# Patient Record
Sex: FEMALE | Race: WHITE | NOT HISPANIC OR LATINO | Employment: OTHER | URBAN - METROPOLITAN AREA
[De-identification: names, ages, dates, MRNs, and addresses within clinical notes are randomized per-mention and may not be internally consistent; named-entity substitution may affect disease eponyms.]

---

## 2017-01-03 ENCOUNTER — OFFICE VISIT (OUTPATIENT)
Dept: FAMILY MEDICINE CLINIC | Facility: CLINIC | Age: 77
End: 2017-01-03

## 2017-01-03 VITALS
SYSTOLIC BLOOD PRESSURE: 130 MMHG | TEMPERATURE: 98.2 F | HEIGHT: 63 IN | RESPIRATION RATE: 18 BRPM | HEART RATE: 74 BPM | BODY MASS INDEX: 28.17 KG/M2 | WEIGHT: 159 LBS | DIASTOLIC BLOOD PRESSURE: 78 MMHG

## 2017-01-03 DIAGNOSIS — R10.11 RIGHT UPPER QUADRANT ABDOMINAL PAIN: Primary | ICD-10-CM

## 2017-01-03 PROCEDURE — 99214 OFFICE O/P EST MOD 30 MIN: CPT | Performed by: FAMILY MEDICINE

## 2017-01-03 NOTE — PROGRESS NOTES
Subjective   aNncy Goodman is a 76 y.o. female.     History of Present Illness     She comes in today complaining her gallbladder hurts  She has had some intermittent pain in her RUQ and RLQ as well for the last week but she has also had issues with this type of pain for many years  This pain has been happening more frequently  Pain associated with nausea but no vomiting  Pain descried as sharp but mostly a dull ache, lasts for about an hour when it happens.  She can go days without any discomfort at all and then have it several times in a day  Aggravating: can be bothered by eating but not always, she thinks maybe fatty foods  Alleviating: time  Pt denies any change in her bowel movements    The following portions of the patient's history were reviewed and updated as appropriate: allergies, current medications, past family history, past medical history, past social history, past surgical history and problem list.    Review of Systems   Constitutional: Negative.    HENT: Negative.    Eyes: Negative.    Respiratory: Negative.    Cardiovascular: Negative.    Gastrointestinal: Positive for abdominal pain.   Musculoskeletal: Negative.    Skin: Negative.    Neurological: Negative.    Psychiatric/Behavioral: Negative.    All other systems reviewed and are negative.      Objective   Physical Exam   Constitutional: She is oriented to person, place, and time. She appears well-developed and well-nourished. No distress.   HENT:   Head: Normocephalic and atraumatic.   Right Ear: External ear normal.   Left Ear: External ear normal.   Nose: Nose normal.   Mouth/Throat: Oropharynx is clear and moist.   Eyes: Conjunctivae and EOM are normal.   Neck: Normal range of motion. Neck supple. No thyromegaly present.   Cardiovascular: Normal rate and regular rhythm.    Murmur heard.   Systolic murmur is present with a grade of 3/6   Pulmonary/Chest: Effort normal and breath sounds normal. No respiratory distress.   Abdominal: Soft.  Bowel sounds are normal. She exhibits no distension and no mass. There is no tenderness. There is no rebound and no guarding.   Lymphadenopathy:     She has no cervical adenopathy.   Neurological: She is alert and oriented to person, place, and time.   Skin: Skin is warm and dry.   Psychiatric: She has a normal mood and affect. Her behavior is normal. Judgment and thought content normal.   Nursing note and vitals reviewed.      Assessment/Plan   Nancy was seen today for abdominal pain.    Diagnoses and all orders for this visit:    Right upper quadrant abdominal pain  -     US Gallbladder; Future    at this point will pursue work up for her pain with RUQ US and then consider HIDA scan if this is normal, dyskinesia is likely.  Will f/u and make further decisions pending results.

## 2017-01-03 NOTE — MR AVS SNAPSHOT
"                        Nancy Goodman   1/3/2017 9:30 AM   Office Visit    Dept Phone:  [phone]   Encounter #:  [id number]    Provider:  Eyal Cervantes MD   Department:  [facility]                Your Full Care Plan              Your Updated Medication List          This list is accurate as of: 1/3/17  9:43 AM.  Always use your most recent med list.                amLODIPine-benazepril 10-40 MG per capsule   Commonly known as:  LOTREL       B-D INS SYR ULTRAFINE .3CC/31G 31G X 5/16\" 0.3 ML misc   Generic drug:  Insulin Syringe-Needle U-100       chlorthalidone 25 MG tablet   Commonly known as:  HYGROTON   TAKE 1 TABLET DAILY       Cranberry 1000 MG capsule       fish oil 1000 MG capsule capsule       Insulin Glargine 100 UNIT/ML injection pen   Commonly known as:  LANTUS SOLOSTAR   Inject 50 unit marking on U-100 syringe under the skin 1 (one) time daily. 50 Units SQ daily       Insulin Pen Needle 31G X 5 MM misc   Commonly known as:  B-D UF III MINI PEN NEEDLES   Inject once daily       irbesartan 75 MG tablet   Commonly known as:  AVAPRO   Take 1 tablet by mouth Daily.       KLOR-CON 10 MEQ CR tablet   Generic drug:  potassium chloride       Lancets Misc. misc   Use bid prn       PRECISION XTRA TEST STRIPS test strip   Generic drug:  glucose blood       PROBIOTIC ADVANCED PO       Ubiquinol 100 MG capsule       Vitamin D 2000 UNITS capsule       WH[place] BARK PO               You Were Diagnosed With        Codes Comments    Right upper quadrant abdominal pain    -  Primary ICD-10-CM: R10.11  ICD-9-CM: 789.01       Instructions     None    Patient Instructions History      Upcoming Appointments     Visit Type Date Time Department    OFFICE VISIT 1/3/2017  9:30 AM [facility]    FOLLOW UP 5/15/2017  8:30 AM [facility]      MyChart Signup     Our records indicate that you have an active [facility] [employer] account.    You can view your After Visit " "Summary by going to [employer] and logging in with your [facility] username and password.  If you don't have a [facility] username and password but a parent or guardian has access to your record, the parent or guardian should login with their own [facility] username and password and access your record to view the After Visit Summary.    If you have questions, you can email [email or web address] or call [phone] to talk to our [facility] staff.  Remember, [facility] is NOT to be used for urgent needs.  For medical emergencies, dial 911.               Other Info from Your Visit           Your Appointments     May 15, 2017  8:30 AM EDT   Follow Up with Wayne Jorge MD   [facility] (--)    [address] [place]  [facility] [address] [phone]           Arrive 15 minutes prior to appointment.              Allergies     Ciprofloxacin      Statins  Nausea Only      Reason for Visit     Abdominal Pain           Vital Signs     Blood Pressure Pulse Temperature Respirations Height Weight    130/78 74 98.2 °F (36.8 °C) 18 62.5\" (158.8 cm) 159 lb (72.1 kg)    Body Mass Index Smoking Status                28.62 kg/m2 Never Smoker          Problems and Diagnoses Noted     Abdominal pain, right upper quadrant        "

## 2017-01-05 ENCOUNTER — HOSPITAL ENCOUNTER (OUTPATIENT)
Dept: ULTRASOUND IMAGING | Facility: HOSPITAL | Age: 77
Discharge: HOME | End: 2017-01-05
Admitting: FAMILY MEDICINE

## 2017-01-05 DIAGNOSIS — R10.11 RIGHT UPPER QUADRANT ABDOMINAL PAIN: ICD-10-CM

## 2017-01-05 PROCEDURE — 76705 ECHO EXAM OF ABDOMEN: CPT

## 2017-01-06 DIAGNOSIS — R10.11 RIGHT UPPER QUADRANT ABDOMINAL PAIN: Primary | ICD-10-CM

## 2017-01-17 DIAGNOSIS — R10.11 RIGHT UPPER QUADRANT ABDOMINAL PAIN: Primary | ICD-10-CM

## 2017-01-17 RX ORDER — OMEPRAZOLE 40 MG/1
40 CAPSULE, DELAYED RELEASE ORAL DAILY
Qty: 30 CAPSULE | Refills: 5 | Status: SHIPPED | OUTPATIENT
Start: 2017-01-17 | End: 2017-06-05

## 2017-02-21 ENCOUNTER — TELEPHONE (OUTPATIENT)
Dept: FAMILY MEDICINE CLINIC | Facility: CLINIC | Age: 77
End: 2017-02-21

## 2017-02-21 NOTE — TELEPHONE ENCOUNTER
She is concerned because a family with young children have scabies and have had it multiple times. Is there anything they can do for prevention? Unsure if family has been treated.

## 2017-02-21 NOTE — TELEPHONE ENCOUNTER
----- Message from Ainsley Williamson sent at 2/21/2017  3:17 PM EST -----  Contact: Luisito  Patient stated that she has had some children coming to her [employer] that has scabies and would like to know how to get rid of scabies at the [employer]. Please call patient at [phone].

## 2017-02-21 NOTE — TELEPHONE ENCOUNTER
There is no preventative measures that really work, we could treat her with permethrin but the whole family who has the scabies needs to be treated to prevent spread

## 2017-03-15 ENCOUNTER — TELEPHONE (OUTPATIENT)
Dept: FAMILY MEDICINE CLINIC | Facility: CLINIC | Age: 77
End: 2017-03-15

## 2017-03-23 RX ORDER — AMLODIPINE BESYLATE AND BENAZEPRIL HYDROCHLORIDE 10; 40 MG/1; MG/1
CAPSULE ORAL
Qty: 90 CAPSULE | Refills: 0 | Status: SHIPPED | OUTPATIENT
Start: 2017-03-23 | End: 2017-03-29

## 2017-03-28 ENCOUNTER — HOSPITAL ENCOUNTER (OUTPATIENT)
Dept: GENERAL RADIOLOGY | Facility: HOSPITAL | Age: 77
Discharge: HOME | End: 2017-03-28
Admitting: FAMILY MEDICINE

## 2017-03-28 ENCOUNTER — OFFICE VISIT (OUTPATIENT)
Dept: FAMILY MEDICINE CLINIC | Facility: CLINIC | Age: 77
End: 2017-03-28

## 2017-03-28 VITALS
SYSTOLIC BLOOD PRESSURE: 138 MMHG | HEART RATE: 88 BPM | HEIGHT: 63 IN | BODY MASS INDEX: 27.55 KG/M2 | RESPIRATION RATE: 20 BRPM | WEIGHT: 155.5 LBS | DIASTOLIC BLOOD PRESSURE: 70 MMHG | TEMPERATURE: 98.2 F

## 2017-03-28 DIAGNOSIS — Z79.4 TYPE 2 DIABETES MELLITUS WITHOUT COMPLICATION, WITH LONG-TERM CURRENT USE OF INSULIN (HCC): ICD-10-CM

## 2017-03-28 DIAGNOSIS — R53.82 CHRONIC FATIGUE: ICD-10-CM

## 2017-03-28 DIAGNOSIS — R07.89 OTHER CHEST PAIN: Primary | ICD-10-CM

## 2017-03-28 DIAGNOSIS — I10 ESSENTIAL HYPERTENSION: ICD-10-CM

## 2017-03-28 DIAGNOSIS — E78.2 MIXED HYPERLIPIDEMIA: ICD-10-CM

## 2017-03-28 DIAGNOSIS — E11.9 TYPE 2 DIABETES MELLITUS WITHOUT COMPLICATION, WITH LONG-TERM CURRENT USE OF INSULIN (HCC): ICD-10-CM

## 2017-03-28 LAB
ALBUMIN SERPL-MCNC: 4.1 G/DL (ref 3.2–4.8)
ALBUMIN/GLOB SERPL: 1.4 G/DL (ref 1.5–2.5)
ALP SERPL-CCNC: 46 U/L (ref 25–100)
ALT SERPL-CCNC: 29 U/L (ref 7–40)
AST SERPL-CCNC: 23 U/L (ref 0–33)
BASOPHILS # BLD AUTO: 0.05 10*3/MM3 (ref 0–0.2)
BASOPHILS NFR BLD AUTO: 0.8 % (ref 0–1)
BILIRUB SERPL-MCNC: 0.6 MG/DL (ref 0.3–1.2)
BUN SERPL-MCNC: 24 MG/DL (ref 9–23)
BUN/CREAT SERPL: 21.8 (ref 7–25)
CALCIUM SERPL-MCNC: 10.6 MG/DL (ref 8.7–10.4)
CHLORIDE SERPL-SCNC: 105 MMOL/L (ref 99–109)
CHOLEST SERPL-MCNC: 242 MG/DL (ref 0–200)
CO2 SERPL-SCNC: 32 MMOL/L (ref 20–31)
CREAT SERPL-MCNC: 1.1 MG/DL (ref 0.6–1.3)
EOSINOPHIL # BLD AUTO: 0.47 10*3/MM3 (ref 0.1–0.3)
EOSINOPHIL NFR BLD AUTO: 7.2 % (ref 0–3)
ERYTHROCYTE [DISTWIDTH] IN BLOOD BY AUTOMATED COUNT: 14.1 % (ref 11.3–14.5)
GLOBULIN SER CALC-MCNC: 3 GM/DL
GLUCOSE SERPL-MCNC: 98 MG/DL (ref 70–100)
HBA1C MFR BLD: 7.5 % (ref 4.8–5.6)
HCT VFR BLD AUTO: 39.4 % (ref 34.5–44)
HDLC SERPL-MCNC: 37 MG/DL (ref 40–60)
HGB BLD-MCNC: 12.9 G/DL (ref 11.5–15.5)
IMM GRANULOCYTES # BLD: 0.02 10*3/MM3 (ref 0–0.03)
IMM GRANULOCYTES NFR BLD: 0.3 % (ref 0–0.6)
LDLC SERPL CALC-MCNC: 157 MG/DL (ref 0–100)
LYMPHOCYTES # BLD AUTO: 2.5 10*3/MM3 (ref 0.6–4.8)
LYMPHOCYTES NFR BLD AUTO: 38.5 % (ref 24–44)
MCH RBC QN AUTO: 31.1 PG (ref 27–31)
MCHC RBC AUTO-ENTMCNC: 32.7 G/DL (ref 32–36)
MCV RBC AUTO: 94.9 FL (ref 80–99)
MONOCYTES # BLD AUTO: 0.56 10*3/MM3 (ref 0–1)
MONOCYTES NFR BLD AUTO: 8.6 % (ref 0–12)
NEUTROPHILS # BLD AUTO: 2.9 10*3/MM3 (ref 1.5–8.3)
NEUTROPHILS NFR BLD AUTO: 44.6 % (ref 41–71)
PLATELET # BLD AUTO: 225 10*3/MM3 (ref 150–450)
POTASSIUM SERPL-SCNC: 4.5 MMOL/L (ref 3.5–5.5)
PROT SERPL-MCNC: 7.1 G/DL (ref 5.7–8.2)
RBC # BLD AUTO: 4.15 10*6/MM3 (ref 3.89–5.14)
SODIUM SERPL-SCNC: 142 MMOL/L (ref 132–146)
TRIGL SERPL-MCNC: 241 MG/DL (ref 0–150)
TSH SERPL DL<=0.005 MIU/L-ACNC: 2.7 MIU/ML (ref 0.35–5.35)
VLDLC SERPL CALC-MCNC: 48.2 MG/DL
WBC # BLD AUTO: 6.5 10*3/MM3 (ref 3.5–10.8)

## 2017-03-28 PROCEDURE — 71020 HC CHEST PA AND LATERAL: CPT

## 2017-03-28 PROCEDURE — 99214 OFFICE O/P EST MOD 30 MIN: CPT | Performed by: FAMILY MEDICINE

## 2017-03-28 NOTE — PROGRESS NOTES
Subjective   Nancy Goodman is a 76 y.o. female.     History of Present Illness     She has had some chest tightness on several occasions over the last 3 weeks  She will then be tired and chill and stay in her bed all day  Has had some coughing intermittently  Nothing seems to bring it on  The tightness would last for an undetermined amount of time and then slowly resolves  Evelyn tea helps her feel better, improves N  She has SOA and N with this    Has less energy all the time  Her daughters had issue with taking amlodipine and benazepril    The following portions of the patient's history were reviewed and updated as appropriate: allergies, current medications, past family history, past medical history, past social history, past surgical history and problem list.    Review of Systems   Constitutional: Positive for fatigue.   HENT: Negative.    Eyes: Negative.    Respiratory: Positive for shortness of breath.    Cardiovascular: Positive for chest pain and palpitations.   Gastrointestinal: Negative.  Negative for abdominal pain.   Musculoskeletal: Negative.    Skin: Negative.    Neurological: Negative.    Psychiatric/Behavioral: Negative.    All other systems reviewed and are negative.      Objective   Physical Exam   Constitutional: She is oriented to person, place, and time. She appears well-developed and well-nourished. No distress.   HENT:   Head: Normocephalic and atraumatic.   Right Ear: Tympanic membrane, external ear and ear canal normal.   Left Ear: Tympanic membrane, external ear and ear canal normal.   Nose: Nose normal.   Mouth/Throat: Uvula is midline and oropharynx is clear and moist.   Eyes: Conjunctivae and EOM are normal.   Neck: Normal range of motion. Neck supple. No thyromegaly present.   Cardiovascular: Normal rate, regular rhythm and normal heart sounds.    No murmur heard.  Pulmonary/Chest: Effort normal and breath sounds normal. No respiratory distress.   Abdominal: Soft. Bowel sounds are  normal. She exhibits no distension and no mass. There is no tenderness.   Lymphadenopathy:     She has no cervical adenopathy.   Neurological: She is alert and oriented to person, place, and time.   Skin: Skin is warm and dry.   Psychiatric: She has a normal mood and affect. Her behavior is normal. Judgment and thought content normal.   Nursing note and vitals reviewed.      Assessment/Plan   Nancy was seen today for tightness in chest, decreased appetite, vomiting, chills, cough and hypertension.    Diagnoses and all orders for this visit:    Other chest pain  -     XR Chest PA & Lateral  -     Cardiopulmonary Stress Test (CPX); Future    Essential hypertension    Type 2 diabetes mellitus without complication, with long-term current use of insulin  -     Hemoglobin A1c  -     Comprehensive Metabolic Panel  -     CBC & Differential    Mixed hyperlipidemia  -     Lipid Panel    Chronic fatigue  -     TSH    I am concerned with her PMHx that she may be having a cardiac event.  Will check stress test and CXR, recheck her labs today as well.  faitgue is multifactorial but will check blood work and f/u pending results.  She does nto feel like she could walk for treadmill stress test, will check adenosine stress test  She is symptom free at this time.  Pt and daughter agree

## 2017-03-29 DIAGNOSIS — E78.2 MIXED HYPERLIPIDEMIA: Primary | ICD-10-CM

## 2017-03-29 RX ORDER — ATORVASTATIN CALCIUM 40 MG/1
40 TABLET, FILM COATED ORAL NIGHTLY
Qty: 30 TABLET | Refills: 5 | Status: ON HOLD | OUTPATIENT
Start: 2017-03-29 | End: 2017-05-16

## 2017-04-10 ENCOUNTER — TELEPHONE (OUTPATIENT)
Dept: FAMILY MEDICINE CLINIC | Facility: CLINIC | Age: 77
End: 2017-04-10

## 2017-04-10 NOTE — TELEPHONE ENCOUNTER
----- Message from Jaci Varela sent at 4/10/2017  4:35 PM EDT -----  Contact: SHARRI DURAN  PATIENT IS WONDERING IF THERE WERE ANY SPECIAL INSTRUCTIONS FOR HER UP COMING STRESS TEST DUE TO HER BEING DIABETIC AND USING INSULIN THE PATIENT IS SCHEDULED FOR WEDS. SHE CAN BE REACHED  [phone]

## 2017-04-10 NOTE — TELEPHONE ENCOUNTER
We usually cut her insulin dose in half the morning of her test as she does not usually eat.  No other medicine change needed.

## 2017-04-12 ENCOUNTER — OUTSIDE FACILITY SERVICE (OUTPATIENT)
Dept: CARDIOLOGY | Facility: CLINIC | Age: 77
End: 2017-04-12

## 2017-04-12 PROCEDURE — 93018 CV STRESS TEST I&R ONLY: CPT | Performed by: INTERNAL MEDICINE

## 2017-04-12 PROCEDURE — 78452 HT MUSCLE IMAGE SPECT MULT: CPT | Performed by: INTERNAL MEDICINE

## 2017-04-20 ENCOUNTER — OFFICE VISIT (OUTPATIENT)
Dept: RETAIL CLINIC | Facility: CLINIC | Age: 77
End: 2017-04-20

## 2017-04-20 VITALS — RESPIRATION RATE: 18 BRPM | OXYGEN SATURATION: 98 % | TEMPERATURE: 99.5 F | HEART RATE: 104 BPM

## 2017-04-20 DIAGNOSIS — J06.9 ACUTE URI: ICD-10-CM

## 2017-04-20 DIAGNOSIS — J40 BRONCHITIS: Primary | ICD-10-CM

## 2017-04-20 DIAGNOSIS — R01.1 MURMUR, HEART: ICD-10-CM

## 2017-04-20 PROCEDURE — 99213 OFFICE O/P EST LOW 20 MIN: CPT | Performed by: NURSE PRACTITIONER

## 2017-04-20 RX ORDER — BENZONATATE 200 MG/1
200 CAPSULE ORAL 3 TIMES DAILY PRN
Qty: 21 CAPSULE | Refills: 0 | Status: SHIPPED | OUTPATIENT
Start: 2017-04-20 | End: 2017-05-15

## 2017-04-20 RX ORDER — AMOXICILLIN 500 MG/1
1000 CAPSULE ORAL 2 TIMES DAILY
Qty: 14 CAPSULE | Refills: 0 | Status: SHIPPED | OUTPATIENT
Start: 2017-04-20 | End: 2017-05-02

## 2017-04-20 NOTE — PATIENT INSTRUCTIONS
"Upper Respiratory Infection, Adult  Most upper respiratory infections (URIs) are caused by a virus. A URI affects the nose, throat, and upper air passages. The most common type of URI is often called \"the common cold.\"  HOME CARE   · Take medicines only as told by your doctor.  · Gargle warm saltwater or take cough drops to comfort your throat as told by your doctor.  · Use a warm mist humidifier or inhale steam from a shower to increase air moisture. This may make it easier to breathe.  · Drink enough fluid to keep your pee (urine) clear or pale yellow.  · Eat soups and other clear broths.  · Have a healthy diet.  · Rest as needed.  · Go back to work when your fever is gone or your doctor says it is okay.  ¨ You may need to stay home longer to avoid giving your URI to others.  ¨ You can also wear a face mask and wash your hands often to prevent spread of the virus.  · Use your inhaler more if you have asthma.  · Do not use any tobacco products, including cigarettes, chewing tobacco, or electronic cigarettes. If you need help quitting, ask your doctor.  GET HELP IF:  · You are getting worse, not better.  · Your symptoms are not helped by medicine.  · You have chills.  · You are getting more short of breath.  · You have brown or red mucus.  · You have yellow or brown discharge from your nose.  · You have pain in your face, especially when you bend forward.  · You have a fever.  · You have puffy (swollen) neck glands.  · You have pain while swallowing.  · You have white areas in the back of your throat.  GET HELP RIGHT AWAY IF:   · You have very bad or constant:    Headache.    Ear pain.    Pain in your forehead, behind your eyes, and over your cheekbones (sinus pain).    Chest pain.  · You have long-lasting (chronic) lung disease and any of the following:    Wheezing.    Long-lasting cough.    Coughing up blood.    A change in your usual mucus.  · You have a stiff neck.  · You have changes in your:    Vision.   " " Hearing.    Thinking.    Mood.  MAKE SURE YOU:   · Understand these instructions.  · Will watch your condition.  · Will get help right away if you are not doing well or get worse.     This information is not intended to replace advice given to you by your health care provider. Make sure you discuss any questions you have with your health care provider.     Document Released: 06/05/2009 Document Revised: 05/03/2016 Document Reviewed: 03/25/2015  [employer] Interactive Patient Education ©2016 [employer]  Heart Murmur  A heart murmur is an extra sound heard by your health care provider when listening to your heart with a device called a stethoscope. The sound comes from turbulence when blood flows through the heart and may be a \"hum\" or \"whoosh\" sound heard when the heart beats. There are two types of heart murmurs:  · Innocent murmurs. Most people with this type of heart murmur do not have a heart problem. Many children have innocent heart murmurs. Your health care provider may suggest some basic testing to know whether your murmur is an innocent murmur. If an innocent heart murmur is found, there is no need for further tests or treatment and no need to restrict activities or stop playing sports.  · Abnormal murmurs. These types of murmurs can occur in children and adults. In children, abnormal heart murmurs are typically caused from heart defects that are present at birth (congenital). In adults, abnormal murmurs are usually from heart valve problems caused by disease, infection, or aging.  CAUSES   Normally, these valves open to let blood flow through or out of your heart and then shut to keep it from flowing backward. If they do not work properly, you could have:  · Regurgitation--When blood leaks back through the valve in the wrong direction.  · Mitral valve prolapse--When the mitral valve of the heart has a loose flap and does not close tightly.  · Stenosis--When the valve does not open enough and blocks blood " flow.  SIGNS AND SYMPTOMS   Innocent murmurs do not cause symptoms, and many people with abnormal murmurs may or may not have symptoms. If symptoms do develop, they may include:  · Shortness of breath.  · Blue coloring of the skin, especially on the fingertips.  · Chest pain.  · Palpitations, or feeling a fluttering or skipped heartbeat.  · Fainting.  · Persistent cough.  · Getting tired much faster than expected.  DIAGNOSIS   A heart murmur might be heard during a sports physical or during any type of examination. When a murmur is heard, it may suggest a possible problem. When this happens, your health care provider may ask you to see a heart specialist (cardiologist). You may also be asked to have one or more heart tests. In these cases, testing may vary depending on what your health care provider heard. Tests for a heart murmur may include:  · Electrocardiogram.  · Echocardiogram.  · MRI.  For children and adults who have an abnormal heart murmur and want to play sports, it is important to complete testing, review test results, and receive recommendations from your health care provider. If heart disease is present, it may not be safe to play.  TREATMENT   Innocent murmurs require no treatment or activity restriction. If an abnormal murmur represents a problem with the heart, treatment will depend on the exact nature of the problem. In these cases, medicine or surgery may be needed to treat the problem.  HOME CARE INSTRUCTIONS  If you want to participate in sports or other types of strenuous physical activity, it is important to discuss this first with your health care provider. If the murmur represents a problem with the heart and you choose to participate in sports, there is a small chance that a serious problem (including sudden death) could result.   SEEK MEDICAL CARE IF:   · You feel that your symptoms are slowly worsening.  · You develop any new symptoms that cause concern.  · You feel that you are having  side effects from any medicines prescribed.  SEEK IMMEDIATE MEDICAL CARE IF:   · You develop chest pain.  · You have shortness of breath.  · You notice that your heart beats irregularly often enough to cause you to worry.  · You have fainting spells.  · Your symptoms suddenly get worse.     This information is not intended to replace advice given to you by your health care provider. Make sure you discuss any questions you have with your health care provider.     Document Released: 01/25/2006 Document Revised: 01/08/2016 Document Reviewed: 08/25/2014  [employer] Interactive Patient Education ©2016 [employer]    Acute Bronchitis  Bronchitis is inflammation of the airways that extend from the windpipe into the lungs (bronchi). The inflammation often causes mucus to develop. This leads to a cough, which is the most common symptom of bronchitis.   In acute bronchitis, the condition usually develops suddenly and goes away over time, usually in a couple weeks. Smoking, allergies, and asthma can make bronchitis worse. Repeated episodes of bronchitis may cause further lung problems.   CAUSES  Acute bronchitis is most often caused by the same virus that causes a cold. The virus can spread from person to person (contagious) through coughing, sneezing, and touching contaminated objects.  SIGNS AND SYMPTOMS   · Cough.    · Fever.    · Coughing up mucus.    · Body aches.    · Chest congestion.    · Chills.    · Shortness of breath.    · Sore throat.    DIAGNOSIS   Acute bronchitis is usually diagnosed through a physical exam. Your health care provider will also ask you questions about your medical history. Tests, such as chest X-rays, are sometimes done to rule out other conditions.   TREATMENT   Acute bronchitis usually goes away in a couple weeks. Oftentimes, no medical treatment is necessary. Medicines are sometimes given for relief of fever or cough. Antibiotic medicines are usually not needed but may be prescribed in  certain situations. In some cases, an inhaler may be recommended to help reduce shortness of breath and control the cough. A cool mist vaporizer may also be used to help thin bronchial secretions and make it easier to clear the chest.   HOME CARE INSTRUCTIONS  · Get plenty of rest.    · Drink enough fluids to keep your urine clear or pale yellow (unless you have a medical condition that requires fluid restriction). Increasing fluids may help thin your respiratory secretions (sputum) and reduce chest congestion, and it will prevent dehydration.    · Take medicines only as directed by your health care provider.  · If you were prescribed an antibiotic medicine, finish it all even if you start to feel better.  · Avoid smoking and secondhand smoke. Exposure to cigarette smoke or irritating chemicals will make bronchitis worse. If you are a smoker, consider using nicotine gum or skin patches to help control withdrawal symptoms. Quitting smoking will help your lungs heal faster.    · Reduce the chances of another bout of acute bronchitis by washing your hands frequently, avoiding people with cold symptoms, and trying not to touch your hands to your mouth, nose, or eyes.    · Keep all follow-up visits as directed by your health care provider.    SEEK MEDICAL CARE IF:  Your symptoms do not improve after 1 week of treatment.   SEEK IMMEDIATE MEDICAL CARE IF:  · You develop an increased fever or chills.    · You have chest pain.    · You have severe shortness of breath.  · You have bloody sputum.    · You develop dehydration.  · You faint or repeatedly feel like you are going to pass out.  · You develop repeated vomiting.  · You develop a severe headache.  MAKE SURE YOU:   · Understand these instructions.  · Will watch your condition.  · Will get help right away if you are not doing well or get worse.     This information is not intended to replace advice given to you by your health care provider. Make sure you discuss any  questions you have with your health care provider.     Document Released: 01/25/2006 Document Revised: 01/08/2016 Document Reviewed: 06/10/2014  [employer] Interactive Patient Education ©2016 [employer]

## 2017-04-20 NOTE — PROGRESS NOTES
Nancy Goodman is 76 y.o. female      HPI Comments:  is sick with same symptoms. Pt also was recently taken off of benazepril due to cough.    Cough   This is a new problem. The current episode started in the past 7 days (had a cough for weeks, it got better and a few days ago became worse.). The problem has been gradually worsening. The problem occurs every few minutes. The cough is non-productive. Associated symptoms include a fever, nasal congestion and a sore throat. Pertinent negatives include no chest pain, chills, ear pain, headaches, heartburn, hemoptysis, myalgias, postnasal drip, rhinorrhea, shortness of breath or wheezing. Associated symptoms comments: Had a sore throat that is gone now. The symptoms are aggravated by lying down. She has tried nothing for the symptoms. There is no history of asthma, bronchiectasis, bronchitis, COPD, emphysema, environmental allergies or pneumonia.             Current Outpatient Prescriptions:   •  amoxicillin (AMOXIL) 500 MG capsule, Take 2 capsules by mouth 2 (Two) Times a Day., Disp: 14 capsule, Rfl: 0  •  atorvastatin (LIPITOR) 40 MG tablet, Take 1 tablet by mouth Every Night., Disp: 30 tablet, Rfl: 5  •  benzonatate (TESSALON) 200 MG capsule, Take 1 capsule by mouth 3 (Three) Times a Day As Needed for Cough., Disp: 21 capsule, Rfl: 0  •  chlorthalidone (HYGROTEN) 25 MG tablet, TAKE 1 TABLET DAILY, Disp: 90 tablet, Rfl: 1  •  Cholecalciferol (VITAMIN D) 2000 UNITS capsule, Take 1 capsule by mouth 1 (one) time daily., Disp: , Rfl:   •  Cranberry 1000 MG capsule, Take  by mouth., Disp: , Rfl:   •  glucose blood (PRECISION XTRA TEST STRIPS) test strip, Test twice daily., Disp: , Rfl:   •  Insulin Glargine (LANTUS SOLOSTAR) 100 UNIT/ML solution pen-injector, Inject 50 unit marking on U-100 syringe under the skin 1 (one) time daily. 50 Units SQ daily, Disp: 100 mL, Rfl: 3  •  Insulin Pen Needle (B-D UF III MINI PEN NEEDLES) 31G X 5 MM misc, Inject once daily,  "Disp: 100 each, Rfl: 3  •  Insulin Syringe-Needle U-100 (B-D INS SYR ULTRAFINE .3CC/31G) 31G X 5/16\" 0.3 ML misc, , Disp: , Rfl:   •  irbesartan (AVAPRO) 75 MG tablet, Take 1 tablet by mouth Daily., Disp: 90 tablet, Rfl: 3  •  Lancets Misc. misc, Use bid prn, Disp: 180 each, Rfl: 3  •  Misc Natural Products (WHITE WILLOW BARK PO), Take  by mouth., Disp: , Rfl:   •  Omega-3 Fatty Acids (FISH OIL) 1000 MG capsule capsule, Take 1,000 mg by mouth Daily With Breakfast., Disp: , Rfl:   •  omeprazole (PRILOSEC) 40 MG capsule, Take 1 capsule by mouth Daily., Disp: 30 capsule, Rfl: 5  •  potassium chloride (KLOR-CON) 10 MEQ CR tablet, Take 1 capsule by mouth 1 (one) time daily., Disp: , Rfl:   •  Probiotic Product (PROBIOTIC ADVANCED PO), Take 1 tablet by mouth., Disp: , Rfl:   •  Ubiquinol 100 MG capsule, Take  by mouth., Disp: , Rfl:         Allergies   Allergen Reactions   • Ciprofloxacin    • Statins Nausea Only           Past Medical History:   Diagnosis Date   • Allergic rhinitis    • Cellulitis of finger    • Diabetes mellitus    • Edema    • Hyperlipidemia    • Hypertension    • Ingrown nail    • Rosacea    • Vitamin D deficiency            Past Surgical History:   Procedure Laterality Date   • TUBAL ABDOMINAL LIGATION     • VARICOSE VEIN SURGERY             Family History   Problem Relation Age of Onset   • Cancer Mother      Lung cancer           Social History     Social History   • Marital status:      Spouse name: N/A   • Number of children: N/A   • Years of education: N/A     Occupational History   •  Retired     Social History Main Topics   • Smoking status: Never Smoker   • Smokeless tobacco: Never Used   • Alcohol use No   • Drug use: No   • Sexual activity: Not on file      Comment:      Other Topics Concern   • Not on file     Social History Narrative           Review of Systems   Constitutional: Positive for fever. Negative for activity change, appetite change, chills and fatigue.   HENT: " Positive for congestion, sore throat and voice change. Negative for ear pain, facial swelling, postnasal drip, rhinorrhea, sinus pressure and trouble swallowing.    Eyes: Negative.    Respiratory: Positive for cough. Negative for hemoptysis, choking, chest tightness, shortness of breath, wheezing and stridor.    Cardiovascular: Negative for chest pain.   Gastrointestinal: Negative.  Negative for heartburn.   Musculoskeletal: Negative for myalgias.   Allergic/Immunologic: Negative.  Negative for environmental allergies.   Neurological: Negative for headaches.           Physical Exam   Constitutional: She is oriented to person, place, and time. She appears well-developed and well-nourished. She is cooperative.  Non-toxic appearance. She does not have a sickly appearance. She does not appear ill. No distress.   Appears younger than stated age.   HENT:   Head: Normocephalic and atraumatic.   Right Ear: Tympanic membrane and external ear normal.   Left Ear: Tympanic membrane and external ear normal.   Nose: Mucosal edema present. No sinus tenderness. Right sinus exhibits no maxillary sinus tenderness and no frontal sinus tenderness. Left sinus exhibits no maxillary sinus tenderness and no frontal sinus tenderness.   Mouth/Throat: Uvula is midline, oropharynx is clear and moist and mucous membranes are normal. No oropharyngeal exudate. Tonsils are 0 on the right. Tonsils are 0 on the left. No tonsillar exudate.   Eyes: Conjunctivae and EOM are normal. Pupils are equal, round, and reactive to light.   Neck: Neck supple. No JVD present.   Cardiovascular: Regular rhythm.  Tachycardia present.  Exam reveals no gallop and no friction rub.    Murmur heard.   Systolic murmur is present with a grade of 3/6   LSB 3/6 murmur  RSB 3/6 hum.   Pulmonary/Chest: Effort normal and breath sounds normal. No stridor. No respiratory distress. She has no decreased breath sounds. She has no wheezes. She has no rhonchi. She has no rales. She  exhibits no tenderness.   Fine crackles to RLL only.   Lymphadenopathy:     She has no cervical adenopathy.   Neurological: She is alert and oriented to person, place, and time.   Skin: Skin is warm and dry.   Psychiatric: She has a normal mood and affect. Her behavior is normal.           Nancy was seen today for cough.    Diagnoses and all orders for this visit:    Bronchitis    Murmur, heart    Acute URI    Other orders  -     amoxicillin (AMOXIL) 500 MG capsule; Take 2 capsules by mouth 2 (Two) Times a Day.  -     benzonatate (TESSALON) 200 MG capsule; Take 1 capsule by mouth 3 (Three) Times a Day As Needed for Cough.      Advised to follow up with PCP for heart test results, worsening symptoms or concerns. Advised to monitor blood sugars with illness.    Education instructions given to patient per AVS diagnosis. Patient verbalizes understanding of instructions.

## 2017-04-21 DIAGNOSIS — R94.39 ABNORMAL STRESS TEST: Primary | ICD-10-CM

## 2017-05-02 ENCOUNTER — CONSULT (OUTPATIENT)
Dept: CARDIOLOGY | Facility: CLINIC | Age: 77
End: 2017-05-02

## 2017-05-02 VITALS
DIASTOLIC BLOOD PRESSURE: 72 MMHG | WEIGHT: 154.7 LBS | SYSTOLIC BLOOD PRESSURE: 126 MMHG | BODY MASS INDEX: 27.84 KG/M2 | HEART RATE: 106 BPM

## 2017-05-02 DIAGNOSIS — E78.2 MIXED HYPERLIPIDEMIA: ICD-10-CM

## 2017-05-02 DIAGNOSIS — R94.39 ABNORMAL MYOCARDIAL PERFUSION STUDY: Primary | ICD-10-CM

## 2017-05-02 DIAGNOSIS — I25.110 CORONARY ARTERY DISEASE INVOLVING NATIVE CORONARY ARTERY OF NATIVE HEART WITH UNSTABLE ANGINA PECTORIS (HCC): ICD-10-CM

## 2017-05-02 DIAGNOSIS — I10 ESSENTIAL HYPERTENSION: ICD-10-CM

## 2017-05-02 PROCEDURE — 93000 ELECTROCARDIOGRAM COMPLETE: CPT | Performed by: INTERNAL MEDICINE

## 2017-05-02 PROCEDURE — 99204 OFFICE O/P NEW MOD 45 MIN: CPT | Performed by: INTERNAL MEDICINE

## 2017-05-08 ENCOUNTER — PREP FOR SURGERY (OUTPATIENT)
Dept: CARDIOLOGY | Facility: CLINIC | Age: 77
End: 2017-05-08

## 2017-05-08 DIAGNOSIS — R94.39 ABNORMAL MYOCARDIAL PERFUSION STUDY: Primary | ICD-10-CM

## 2017-05-08 RX ORDER — ONDANSETRON 2 MG/ML
4 INJECTION INTRAMUSCULAR; INTRAVENOUS EVERY 6 HOURS PRN
Status: CANCELLED | OUTPATIENT
Start: 2017-05-08

## 2017-05-08 RX ORDER — ASPIRIN 325 MG
325 TABLET ORAL ONCE
Status: CANCELLED | OUTPATIENT
Start: 2017-05-08 | End: 2017-05-08

## 2017-05-08 RX ORDER — NITROGLYCERIN 0.4 MG/1
0.4 TABLET SUBLINGUAL
Status: CANCELLED | OUTPATIENT
Start: 2017-05-08

## 2017-05-08 RX ORDER — SODIUM CHLORIDE 0.9 % (FLUSH) 0.9 %
1-10 SYRINGE (ML) INJECTION AS NEEDED
Status: CANCELLED | OUTPATIENT
Start: 2017-05-08

## 2017-05-08 RX ORDER — ASPIRIN 81 MG/1
325 TABLET ORAL DAILY
Status: CANCELLED | OUTPATIENT
Start: 2017-05-09

## 2017-05-08 RX ORDER — ACETAMINOPHEN 325 MG/1
650 TABLET ORAL EVERY 4 HOURS PRN
Status: CANCELLED | OUTPATIENT
Start: 2017-05-08

## 2017-05-15 ENCOUNTER — OFFICE VISIT (OUTPATIENT)
Dept: FAMILY MEDICINE CLINIC | Facility: CLINIC | Age: 77
End: 2017-05-15

## 2017-05-15 VITALS
TEMPERATURE: 97.8 F | HEART RATE: 92 BPM | SYSTOLIC BLOOD PRESSURE: 140 MMHG | HEIGHT: 63 IN | BODY MASS INDEX: 27.36 KG/M2 | DIASTOLIC BLOOD PRESSURE: 80 MMHG | RESPIRATION RATE: 16 BRPM | WEIGHT: 154.4 LBS

## 2017-05-15 DIAGNOSIS — R94.39 ABNORMAL STRESS TEST: ICD-10-CM

## 2017-05-15 DIAGNOSIS — Z12.31 ENCOUNTER FOR SCREENING MAMMOGRAM FOR BREAST CANCER: ICD-10-CM

## 2017-05-15 DIAGNOSIS — Z79.4 TYPE 2 DIABETES MELLITUS WITHOUT COMPLICATION, WITH LONG-TERM CURRENT USE OF INSULIN (HCC): Primary | ICD-10-CM

## 2017-05-15 DIAGNOSIS — E78.2 MIXED HYPERLIPIDEMIA: ICD-10-CM

## 2017-05-15 DIAGNOSIS — I10 ESSENTIAL HYPERTENSION: ICD-10-CM

## 2017-05-15 DIAGNOSIS — E11.9 TYPE 2 DIABETES MELLITUS WITHOUT COMPLICATION, WITH LONG-TERM CURRENT USE OF INSULIN (HCC): Primary | ICD-10-CM

## 2017-05-15 DIAGNOSIS — E55.9 VITAMIN D DEFICIENCY: ICD-10-CM

## 2017-05-15 DIAGNOSIS — Z12.31 ENCOUNTER FOR SCREENING MAMMOGRAM FOR MALIGNANT NEOPLASM OF BREAST: ICD-10-CM

## 2017-05-15 PROBLEM — R10.11 RIGHT UPPER QUADRANT ABDOMINAL PAIN: Status: RESOLVED | Noted: 2017-01-03 | Resolved: 2017-05-15

## 2017-05-15 PROCEDURE — 99214 OFFICE O/P EST MOD 30 MIN: CPT | Performed by: FAMILY MEDICINE

## 2017-05-15 RX ORDER — CHLORTHALIDONE 25 MG/1
TABLET ORAL
Qty: 90 TABLET | Refills: 0 | Status: SHIPPED | OUTPATIENT
Start: 2017-05-15 | End: 2017-05-23

## 2017-05-16 ENCOUNTER — APPOINTMENT (OUTPATIENT)
Dept: GENERAL RADIOLOGY | Facility: HOSPITAL | Age: 77
End: 2017-05-16

## 2017-05-16 ENCOUNTER — APPOINTMENT (OUTPATIENT)
Dept: CARDIOLOGY | Facility: HOSPITAL | Age: 77
End: 2017-05-16

## 2017-05-16 ENCOUNTER — HOSPITAL ENCOUNTER (OUTPATIENT)
Facility: HOSPITAL | Age: 77
Setting detail: HOSPITAL OUTPATIENT SURGERY
Discharge: HOME | End: 2017-05-16
Attending: INTERNAL MEDICINE | Admitting: INTERNAL MEDICINE

## 2017-05-16 VITALS
HEIGHT: 62 IN | WEIGHT: 155.2 LBS | BODY MASS INDEX: 28.56 KG/M2 | RESPIRATION RATE: 16 BRPM | SYSTOLIC BLOOD PRESSURE: 179 MMHG | HEART RATE: 79 BPM | DIASTOLIC BLOOD PRESSURE: 84 MMHG | TEMPERATURE: 97.1 F | OXYGEN SATURATION: 95 %

## 2017-05-16 DIAGNOSIS — R94.39 ABNORMAL MYOCARDIAL PERFUSION STUDY: ICD-10-CM

## 2017-05-16 DIAGNOSIS — I25.119 CORONARY ARTERY DISEASE INVOLVING NATIVE CORONARY ARTERY OF NATIVE HEART WITH ANGINA PECTORIS (HCC): Primary | ICD-10-CM

## 2017-05-16 LAB
25(OH)D3+25(OH)D2 SERPL-MCNC: 41 NG/ML
ALBUMIN SERPL-MCNC: 4.1 G/DL (ref 3.2–4.8)
ALBUMIN SERPL-MCNC: 4.3 G/DL (ref 3.2–4.8)
ALBUMIN/GLOB SERPL: 1.4 G/DL (ref 1.5–2.5)
ALBUMIN/GLOB SERPL: 1.5 G/DL (ref 1.5–2.5)
ALP SERPL-CCNC: 54 U/L (ref 25–100)
ALP SERPL-CCNC: 58 U/L (ref 25–100)
ALT SERPL W P-5'-P-CCNC: 19 U/L (ref 7–40)
ALT SERPL-CCNC: 22 U/L (ref 7–40)
ANION GAP SERPL CALCULATED.3IONS-SCNC: 5 MMOL/L (ref 3–11)
ARTICHOKE IGE QN: 171 MG/DL (ref 0–130)
AST SERPL-CCNC: 24 U/L (ref 0–33)
AST SERPL-CCNC: 24 U/L (ref 0–33)
BH CV XLRA MEAS LEFT CCA RATIO VEL: 59.1 CM/SEC
BH CV XLRA MEAS LEFT DIST CCA EDV: 15.1 CM/SEC
BH CV XLRA MEAS LEFT DIST CCA PSV: 59.1 CM/SEC
BH CV XLRA MEAS LEFT DIST ICA EDV: 18.9 CM/SEC
BH CV XLRA MEAS LEFT DIST ICA PSV: 60.1 CM/SEC
BH CV XLRA MEAS LEFT ICA RATIO VEL: 103 CM/SEC
BH CV XLRA MEAS LEFT ICA/CCA RATIO: 1.7
BH CV XLRA MEAS LEFT MID ICA EDV: 22.3 CM/SEC
BH CV XLRA MEAS LEFT MID ICA PSV: 103 CM/SEC
BH CV XLRA MEAS LEFT PROX CCA EDV: 21 CM/SEC
BH CV XLRA MEAS LEFT PROX CCA PSV: 93.4 CM/SEC
BH CV XLRA MEAS LEFT PROX ECA PSV: 159 CM/SEC
BH CV XLRA MEAS LEFT PROX ICA EDV: 13.8 CM/SEC
BH CV XLRA MEAS LEFT PROX ICA PSV: 72.2 CM/SEC
BH CV XLRA MEAS LEFT PROX SCLA PSV: 114 CM/SEC
BH CV XLRA MEAS LEFT VERTEBRAL A EDV: 10.1 CM/SEC
BH CV XLRA MEAS LEFT VERTEBRAL A PSV: 31.4 CM/SEC
BH CV XLRA MEAS RIGHT CCA RATIO VEL: 83 CM/SEC
BH CV XLRA MEAS RIGHT DIST CCA EDV: 17.6 CM/SEC
BH CV XLRA MEAS RIGHT DIST CCA PSV: 83 CM/SEC
BH CV XLRA MEAS RIGHT DIST ICA EDV: 26.4 CM/SEC
BH CV XLRA MEAS RIGHT DIST ICA PSV: 95.5 CM/SEC
BH CV XLRA MEAS RIGHT ICA RATIO VEL: 101 CM/SEC
BH CV XLRA MEAS RIGHT ICA/CCA RATIO: 1.2
BH CV XLRA MEAS RIGHT MID ICA EDV: 22 CM/SEC
BH CV XLRA MEAS RIGHT MID ICA PSV: 79.2 CM/SEC
BH CV XLRA MEAS RIGHT PROX CCA EDV: 16.3 CM/SEC
BH CV XLRA MEAS RIGHT PROX CCA PSV: 83 CM/SEC
BH CV XLRA MEAS RIGHT PROX ECA PSV: 119 CM/SEC
BH CV XLRA MEAS RIGHT PROX ICA EDV: 27.7 CM/SEC
BH CV XLRA MEAS RIGHT PROX ICA PSV: 101 CM/SEC
BH CV XLRA MEAS RIGHT PROX SCLA PSV: 182 CM/SEC
BH CV XLRA MEAS RIGHT VERTEBRAL A EDV: 4.1 CM/SEC
BH CV XLRA MEAS RIGHT VERTEBRAL A PSV: 18.7 CM/SEC
BILIRUB SERPL-MCNC: 0.3 MG/DL (ref 0.3–1.2)
BILIRUB SERPL-MCNC: 0.5 MG/DL (ref 0.3–1.2)
BUN BLD-MCNC: 14 MG/DL (ref 9–23)
BUN SERPL-MCNC: 13 MG/DL (ref 9–23)
BUN/CREAT SERPL: 14 (ref 7–25)
BUN/CREAT SERPL: 14.4 (ref 7–25)
CALCIUM SERPL-MCNC: 10.4 MG/DL (ref 8.7–10.4)
CALCIUM SPEC-SCNC: 10.2 MG/DL (ref 8.7–10.4)
CHLORIDE SERPL-SCNC: 101 MMOL/L (ref 99–109)
CHLORIDE SERPL-SCNC: 102 MMOL/L (ref 99–109)
CHOLEST SERPL-MCNC: 260 MG/DL (ref 0–200)
CHOLEST SERPL-MCNC: 261 MG/DL (ref 0–200)
CO2 SERPL-SCNC: 30 MMOL/L (ref 20–31)
CO2 SERPL-SCNC: 33 MMOL/L (ref 20–31)
CREAT BLD-MCNC: 1 MG/DL (ref 0.6–1.3)
CREAT SERPL-MCNC: 0.9 MG/DL (ref 0.6–1.3)
DEPRECATED RDW RBC AUTO: 46.8 FL (ref 37–54)
ERYTHROCYTE [DISTWIDTH] IN BLOOD BY AUTOMATED COUNT: 13.5 % (ref 11.3–14.5)
GFR SERPL CREATININE-BSD FRML MDRD: 54 ML/MIN/1.73
GLOBULIN SER CALC-MCNC: 2.7 GM/DL
GLOBULIN UR ELPH-MCNC: 3.1 GM/DL
GLUCOSE BLD-MCNC: 117 MG/DL (ref 70–100)
GLUCOSE SERPL-MCNC: 95 MG/DL (ref 70–100)
HBA1C MFR BLD: 6.3 % (ref 4.8–5.6)
HBA1C MFR BLD: 6.7 % (ref 4.8–5.6)
HCT VFR BLD AUTO: 41.6 % (ref 34.5–44)
HDLC SERPL-MCNC: 42 MG/DL (ref 40–60)
HDLC SERPL-MCNC: 44 MG/DL (ref 40–60)
HGB BLD-MCNC: 13.8 G/DL (ref 11.5–15.5)
LDLC SERPL CALC-MCNC: 157 MG/DL (ref 0–100)
LDLC/HDLC SERPL: 3.73 {RATIO}
MCH RBC QN AUTO: 31.4 PG (ref 27–31)
MCHC RBC AUTO-ENTMCNC: 33.2 G/DL (ref 32–36)
MCV RBC AUTO: 94.8 FL (ref 80–99)
MICROALBUMIN UR-MCNC: 3.3 UG/ML
PLATELET # BLD AUTO: 186 10*3/MM3 (ref 150–450)
PMV BLD AUTO: 11.1 FL (ref 6–12)
POTASSIUM BLD-SCNC: 3.5 MMOL/L (ref 3.5–5.5)
POTASSIUM SERPL-SCNC: 4.3 MMOL/L (ref 3.5–5.5)
PROT SERPL-MCNC: 6.8 G/DL (ref 5.7–8.2)
PROT SERPL-MCNC: 7.4 G/DL (ref 5.7–8.2)
RBC # BLD AUTO: 4.39 10*6/MM3 (ref 3.89–5.14)
SODIUM BLD-SCNC: 137 MMOL/L (ref 132–146)
SODIUM SERPL-SCNC: 143 MMOL/L (ref 132–146)
TRIGL SERPL-MCNC: 306 MG/DL (ref 0–150)
TRIGL SERPL-MCNC: 320 MG/DL (ref 0–150)
VLDLC SERPL CALC-MCNC: 61.2 MG/DL
WBC NRBC COR # BLD: 7.86 10*3/MM3 (ref 3.5–10.8)

## 2017-05-16 PROCEDURE — 93306 TTE W/DOPPLER COMPLETE: CPT

## 2017-05-16 PROCEDURE — 36415 COLL VENOUS BLD VENIPUNCTURE: CPT

## 2017-05-16 PROCEDURE — 25010000002 FENTANYL CITRATE (PF) 100 MCG/2ML SOLUTION: Performed by: INTERNAL MEDICINE

## 2017-05-16 PROCEDURE — 80061 LIPID PANEL: CPT | Performed by: NURSE PRACTITIONER

## 2017-05-16 PROCEDURE — 25010000002 HEPARIN (PORCINE) PER 1000 UNITS: Performed by: INTERNAL MEDICINE

## 2017-05-16 PROCEDURE — 83036 HEMOGLOBIN GLYCOSYLATED A1C: CPT | Performed by: NURSE PRACTITIONER

## 2017-05-16 PROCEDURE — 25010000002 MIDAZOLAM PER 1 MG: Performed by: INTERNAL MEDICINE

## 2017-05-16 PROCEDURE — 0 IOPAMIDOL PER 1 ML: Performed by: INTERNAL MEDICINE

## 2017-05-16 PROCEDURE — 99205 OFFICE O/P NEW HI 60 MIN: CPT | Performed by: THORACIC SURGERY (CARDIOTHORACIC VASCULAR SURGERY)

## 2017-05-16 PROCEDURE — 71010 HC CHEST PA OR AP: CPT

## 2017-05-16 PROCEDURE — 93880 EXTRACRANIAL BILAT STUDY: CPT

## 2017-05-16 PROCEDURE — C1894 INTRO/SHEATH, NON-LASER: HCPCS | Performed by: INTERNAL MEDICINE

## 2017-05-16 PROCEDURE — 80053 COMPREHEN METABOLIC PANEL: CPT | Performed by: NURSE PRACTITIONER

## 2017-05-16 PROCEDURE — 93458 L HRT ARTERY/VENTRICLE ANGIO: CPT | Performed by: INTERNAL MEDICINE

## 2017-05-16 PROCEDURE — 93306 TTE W/DOPPLER COMPLETE: CPT | Performed by: INTERNAL MEDICINE

## 2017-05-16 PROCEDURE — C1769 GUIDE WIRE: HCPCS | Performed by: INTERNAL MEDICINE

## 2017-05-16 PROCEDURE — 85027 COMPLETE CBC AUTOMATED: CPT | Performed by: NURSE PRACTITIONER

## 2017-05-16 PROCEDURE — 93880 EXTRACRANIAL BILAT STUDY: CPT | Performed by: INTERNAL MEDICINE

## 2017-05-16 RX ORDER — ACETAMINOPHEN 325 MG/1
650 TABLET ORAL EVERY 4 HOURS PRN
Status: DISCONTINUED | OUTPATIENT
Start: 2017-05-16 | End: 2017-05-16 | Stop reason: HOSPADM

## 2017-05-16 RX ORDER — ASPIRIN 325 MG
325 TABLET ORAL ONCE
Status: COMPLETED | OUTPATIENT
Start: 2017-05-16 | End: 2017-05-16

## 2017-05-16 RX ORDER — ASPIRIN 81 MG/1
81 TABLET, CHEWABLE ORAL DAILY
Refills: 11 | Status: ON HOLD
Start: 2017-05-16 | End: 2019-04-23

## 2017-05-16 RX ORDER — ATORVASTATIN CALCIUM 40 MG/1
40 TABLET, FILM COATED ORAL DAILY
Qty: 30 TABLET | Refills: 11 | Status: SHIPPED | OUTPATIENT
Start: 2017-05-16 | End: 2017-06-05

## 2017-05-16 RX ORDER — MIDAZOLAM HYDROCHLORIDE 1 MG/ML
INJECTION INTRAMUSCULAR; INTRAVENOUS AS NEEDED
Status: DISCONTINUED | OUTPATIENT
Start: 2017-05-16 | End: 2017-05-16 | Stop reason: HOSPADM

## 2017-05-16 RX ORDER — SODIUM CHLORIDE 0.9 % (FLUSH) 0.9 %
1-10 SYRINGE (ML) INJECTION AS NEEDED
Status: DISCONTINUED | OUTPATIENT
Start: 2017-05-16 | End: 2017-05-16 | Stop reason: HOSPADM

## 2017-05-16 RX ORDER — NITROGLYCERIN 0.4 MG/1
0.4 TABLET SUBLINGUAL
Status: DISCONTINUED | OUTPATIENT
Start: 2017-05-16 | End: 2017-05-16 | Stop reason: HOSPADM

## 2017-05-16 RX ORDER — LIDOCAINE HYDROCHLORIDE 10 MG/ML
INJECTION, SOLUTION INFILTRATION; PERINEURAL AS NEEDED
Status: DISCONTINUED | OUTPATIENT
Start: 2017-05-16 | End: 2017-05-16 | Stop reason: HOSPADM

## 2017-05-16 RX ORDER — ASPIRIN 325 MG
325 TABLET, DELAYED RELEASE (ENTERIC COATED) ORAL DAILY
Status: DISCONTINUED | OUTPATIENT
Start: 2017-05-17 | End: 2017-05-16 | Stop reason: HOSPADM

## 2017-05-16 RX ORDER — HYDROCODONE BITARTRATE AND ACETAMINOPHEN 5; 325 MG/1; MG/1
1 TABLET ORAL EVERY 4 HOURS PRN
Status: DISCONTINUED | OUTPATIENT
Start: 2017-05-16 | End: 2017-05-16 | Stop reason: HOSPADM

## 2017-05-16 RX ORDER — DIAZEPAM 5 MG/1
5 TABLET ORAL ONCE
Status: COMPLETED | OUTPATIENT
Start: 2017-05-16 | End: 2017-05-16

## 2017-05-16 RX ORDER — SODIUM CHLORIDE 9 MG/ML
1-3 INJECTION, SOLUTION INTRAVENOUS CONTINUOUS
Status: DISCONTINUED | OUTPATIENT
Start: 2017-05-16 | End: 2017-05-16 | Stop reason: HOSPADM

## 2017-05-16 RX ORDER — ONDANSETRON 2 MG/ML
4 INJECTION INTRAMUSCULAR; INTRAVENOUS EVERY 6 HOURS PRN
Status: DISCONTINUED | OUTPATIENT
Start: 2017-05-16 | End: 2017-05-16 | Stop reason: HOSPADM

## 2017-05-16 RX ORDER — ASPIRIN 81 MG/1
81 TABLET, CHEWABLE ORAL DAILY
Status: DISCONTINUED | OUTPATIENT
Start: 2017-05-16 | End: 2017-05-16 | Stop reason: HOSPADM

## 2017-05-16 RX ORDER — FENTANYL CITRATE 50 UG/ML
INJECTION, SOLUTION INTRAMUSCULAR; INTRAVENOUS AS NEEDED
Status: DISCONTINUED | OUTPATIENT
Start: 2017-05-16 | End: 2017-05-16 | Stop reason: HOSPADM

## 2017-05-16 RX ADMIN — DIAZEPAM 5 MG: 5 TABLET ORAL at 08:30

## 2017-05-16 RX ADMIN — SODIUM CHLORIDE 3 ML/KG/HR: 9 INJECTION, SOLUTION INTRAVENOUS at 07:53

## 2017-05-16 RX ADMIN — ASPIRIN 325 MG ORAL TABLET 325 MG: 325 PILL ORAL at 07:53

## 2017-05-17 ENCOUNTER — PREP FOR SURGERY (OUTPATIENT)
Dept: CARDIAC SURGERY | Facility: CLINIC | Age: 77
End: 2017-05-17

## 2017-05-17 DIAGNOSIS — I25.119 CORONARY ARTERY DISEASE INVOLVING NATIVE CORONARY ARTERY OF NATIVE HEART WITH ANGINA PECTORIS (HCC): Primary | ICD-10-CM

## 2017-05-18 LAB
BH CV ECHO MEAS - AI DEC SLOPE: 356.5 CM/SEC^2
BH CV ECHO MEAS - AI MAX PG: 70.1 MMHG
BH CV ECHO MEAS - AI MAX VEL: 418.5 CM/SEC
BH CV ECHO MEAS - AI P1/2T: 343.8 MSEC
BH CV ECHO MEAS - AO MAX PG (FULL): 32.7 MMHG
BH CV ECHO MEAS - AO MAX PG: 37 MMHG
BH CV ECHO MEAS - AO MEAN PG (FULL): 20 MMHG
BH CV ECHO MEAS - AO MEAN PG: 22 MMHG
BH CV ECHO MEAS - AO ROOT AREA (BSA CORRECTED): 1.8
BH CV ECHO MEAS - AO ROOT AREA: 7.5 CM^2
BH CV ECHO MEAS - AO ROOT DIAM: 3.1 CM
BH CV ECHO MEAS - AO V2 MAX: 304 CM/SEC
BH CV ECHO MEAS - AO V2 MEAN: 217 CM/SEC
BH CV ECHO MEAS - AO V2 VTI: 70.9 CM
BH CV ECHO MEAS - AVA(I,A): 1.1 CM^2
BH CV ECHO MEAS - AVA(I,D): 1.1 CM^2
BH CV ECHO MEAS - AVA(V,A): 1.1 CM^2
BH CV ECHO MEAS - AVA(V,D): 1.1 CM^2
BH CV ECHO MEAS - BSA(HAYCOCK): 1.8 M^2
BH CV ECHO MEAS - BSA: 1.7 M^2
BH CV ECHO MEAS - BZI_BMI: 28.4 KILOGRAMS/M^2
BH CV ECHO MEAS - BZI_METRIC_HEIGHT: 157.5 CM
BH CV ECHO MEAS - BZI_METRIC_WEIGHT: 70.3 KG
BH CV ECHO MEAS - CONTRAST EF (2CH): 64.4 ML/M^2
BH CV ECHO MEAS - CONTRAST EF 4CH: 83.6 ML/M^2
BH CV ECHO MEAS - EDV(CUBED): 48.6 ML
BH CV ECHO MEAS - EDV(MOD-SP2): 45 ML
BH CV ECHO MEAS - EDV(MOD-SP4): 55 ML
BH CV ECHO MEAS - EDV(TEICH): 56.3 ML
BH CV ECHO MEAS - EF(CUBED): 62.6 %
BH CV ECHO MEAS - EF(MOD-SP2): 64.4 %
BH CV ECHO MEAS - EF(TEICH): 55 %
BH CV ECHO MEAS - ESV(CUBED): 18.2 ML
BH CV ECHO MEAS - ESV(MOD-SP2): 16 ML
BH CV ECHO MEAS - ESV(MOD-SP4): 9 ML
BH CV ECHO MEAS - ESV(TEICH): 25.3 ML
BH CV ECHO MEAS - FS: 27.9 %
BH CV ECHO MEAS - IVS/LVPW: 1.1
BH CV ECHO MEAS - IVSD: 1.5 CM
BH CV ECHO MEAS - LA DIMENSION: 4.1 CM
BH CV ECHO MEAS - LA/AO: 1.3
BH CV ECHO MEAS - LAT PEAK E' VEL: 7.1 CM/SEC
BH CV ECHO MEAS - LV DIASTOLIC VOL/BSA (35-75): 32.1 ML/M^2
BH CV ECHO MEAS - LV MASS(C)D: 181.3 GRAMS
BH CV ECHO MEAS - LV MASS(C)DI: 105.7 GRAMS/M^2
BH CV ECHO MEAS - LV MAX PG: 4.2 MMHG
BH CV ECHO MEAS - LV MEAN PG: 2 MMHG
BH CV ECHO MEAS - LV SYSTOLIC VOL/BSA (12-30): 5.2 ML/M^2
BH CV ECHO MEAS - LV V1 MAX: 103 CM/SEC
BH CV ECHO MEAS - LV V1 MEAN: 68.9 CM/SEC
BH CV ECHO MEAS - LV V1 VTI: 23.8 CM
BH CV ECHO MEAS - LVIDD: 3.7 CM
BH CV ECHO MEAS - LVIDS: 2.6 CM
BH CV ECHO MEAS - LVLD AP2: 7.2 CM
BH CV ECHO MEAS - LVLD AP4: 8 CM
BH CV ECHO MEAS - LVLS AP2: 5.5 CM
BH CV ECHO MEAS - LVLS AP4: 6 CM
BH CV ECHO MEAS - LVOT AREA (M): 3.1 CM^2
BH CV ECHO MEAS - LVOT AREA: 3.1 CM^2
BH CV ECHO MEAS - LVOT DIAM: 2 CM
BH CV ECHO MEAS - LVPWD: 1.3 CM
BH CV ECHO MEAS - MED PEAK E' VEL: 7.3 CM/SEC
BH CV ECHO MEAS - MV A MAX VEL: 129 CM/SEC
BH CV ECHO MEAS - MV E MAX VEL: 108 CM/SEC
BH CV ECHO MEAS - MV E/A: 0.84
BH CV ECHO MEAS - PA ACC SLOPE: 343 CM/SEC^2
BH CV ECHO MEAS - PA ACC TIME: 0.17 SEC
BH CV ECHO MEAS - PA PR(ACCEL): 1.2 MMHG
BH CV ECHO MEAS - RAP SYSTOLE: 8 MMHG
BH CV ECHO MEAS - RVSP: 31 MMHG
BH CV ECHO MEAS - SI(AO): 312 ML/M^2
BH CV ECHO MEAS - SI(CUBED): 17.7 ML/M^2
BH CV ECHO MEAS - SI(LVOT): 43.6 ML/M^2
BH CV ECHO MEAS - SI(MOD-SP2): 16.9 ML/M^2
BH CV ECHO MEAS - SI(MOD-SP4): 26.8 ML/M^2
BH CV ECHO MEAS - SI(TEICH): 18 ML/M^2
BH CV ECHO MEAS - SV(AO): 535.1 ML
BH CV ECHO MEAS - SV(CUBED): 30.4 ML
BH CV ECHO MEAS - SV(LVOT): 74.8 ML
BH CV ECHO MEAS - SV(MOD-SP2): 29 ML
BH CV ECHO MEAS - SV(MOD-SP4): 46 ML
BH CV ECHO MEAS - SV(TEICH): 30.9 ML
BH CV ECHO MEAS - TAPSE (>1.6): 2.1 CM2
BH CV ECHO MEAS - TR MAX VEL: 234 CM/SEC
BH CV XLRA - RV BASE: 2.9 CM
BH CV XLRA - RV LENGTH: 6.9 CM
BH CV XLRA - RV MID: 2.3 CM
BH CV XLRA - TDI S': 6.4 CM/SEC
LV EF 2D ECHO EST: 65 %

## 2017-05-23 ENCOUNTER — ANESTHESIA EVENT (OUTPATIENT)
Dept: PERIOP | Facility: HOSPITAL | Age: 77
End: 2017-05-23

## 2017-05-23 ENCOUNTER — HOSPITAL ENCOUNTER (OUTPATIENT)
Dept: GENERAL RADIOLOGY | Facility: HOSPITAL | Age: 77
Discharge: HOME | End: 2017-05-23
Admitting: PHYSICIAN ASSISTANT

## 2017-05-23 ENCOUNTER — APPOINTMENT (OUTPATIENT)
Dept: MAMMOGRAPHY | Facility: HOSPITAL | Age: 77
End: 2017-05-23
Attending: FAMILY MEDICINE

## 2017-05-23 ENCOUNTER — APPOINTMENT (OUTPATIENT)
Dept: PREADMISSION TESTING | Facility: HOSPITAL | Age: 77
End: 2017-05-23

## 2017-05-23 ENCOUNTER — HOSPITAL ENCOUNTER (OUTPATIENT)
Dept: PULMONOLOGY | Facility: HOSPITAL | Age: 77
Discharge: HOME | End: 2017-05-23

## 2017-05-23 VITALS — WEIGHT: 152.56 LBS | OXYGEN SATURATION: 96 % | HEIGHT: 63 IN | BODY MASS INDEX: 27.03 KG/M2

## 2017-05-23 DIAGNOSIS — I25.119 CORONARY ARTERY DISEASE INVOLVING NATIVE CORONARY ARTERY OF NATIVE HEART WITH ANGINA PECTORIS (HCC): ICD-10-CM

## 2017-05-23 LAB
ABO GROUP BLD: NORMAL
ALBUMIN SERPL-MCNC: 4.4 G/DL (ref 3.2–4.8)
ALBUMIN/GLOB SERPL: 1.5 G/DL (ref 1.5–2.5)
ALP SERPL-CCNC: 65 U/L (ref 25–100)
ALT SERPL W P-5'-P-CCNC: 27 U/L (ref 7–40)
AMPHET+METHAMPHET UR QL: NEGATIVE
AMPHETAMINES UR QL: NEGATIVE
ANION GAP SERPL CALCULATED.3IONS-SCNC: 4 MMOL/L (ref 3–11)
AST SERPL-CCNC: 26 U/L (ref 0–33)
BARBITURATES UR QL SCN: NEGATIVE
BASOPHILS # BLD AUTO: 0.03 10*3/MM3 (ref 0–0.2)
BASOPHILS NFR BLD AUTO: 0.5 % (ref 0–1)
BENZODIAZ UR QL SCN: POSITIVE
BILIRUB SERPL-MCNC: 0.5 MG/DL (ref 0.3–1.2)
BLD GP AB SCN SERPL QL: NEGATIVE
BUN BLD-MCNC: 22 MG/DL (ref 9–23)
BUN/CREAT SERPL: 24.4 (ref 7–25)
BUPRENORPHINE SERPL-MCNC: NEGATIVE NG/ML
CALCIUM SPEC-SCNC: 10.3 MG/DL (ref 8.7–10.4)
CANNABINOIDS SERPL QL: NEGATIVE
CHLORIDE SERPL-SCNC: 101 MMOL/L (ref 99–109)
CO2 SERPL-SCNC: 32 MMOL/L (ref 20–31)
COCAINE UR QL: NEGATIVE
CREAT BLD-MCNC: 0.9 MG/DL (ref 0.6–1.3)
DEPRECATED RDW RBC AUTO: 47.5 FL (ref 37–54)
EOSINOPHIL # BLD AUTO: 0.32 10*3/MM3 (ref 0.1–0.3)
EOSINOPHIL NFR BLD AUTO: 4.9 % (ref 0–3)
ERYTHROCYTE [DISTWIDTH] IN BLOOD BY AUTOMATED COUNT: 13.6 % (ref 11.3–14.5)
GFR SERPL CREATININE-BSD FRML MDRD: 61 ML/MIN/1.73
GLOBULIN UR ELPH-MCNC: 3 GM/DL
GLUCOSE BLD-MCNC: 127 MG/DL (ref 70–100)
HCT VFR BLD AUTO: 42 % (ref 34.5–44)
HGB BLD-MCNC: 13.7 G/DL (ref 11.5–15.5)
IMM GRANULOCYTES # BLD: 0 10*3/MM3 (ref 0–0.03)
IMM GRANULOCYTES NFR BLD: 0 % (ref 0–0.6)
INR PPP: 1.01
LYMPHOCYTES # BLD AUTO: 2.66 10*3/MM3 (ref 0.6–4.8)
LYMPHOCYTES NFR BLD AUTO: 41 % (ref 24–44)
MAGNESIUM SERPL-MCNC: 1.8 MG/DL (ref 1.3–2.7)
MCH RBC QN AUTO: 31.1 PG (ref 27–31)
MCHC RBC AUTO-ENTMCNC: 32.6 G/DL (ref 32–36)
MCV RBC AUTO: 95.5 FL (ref 80–99)
METHADONE UR QL SCN: NEGATIVE
MONOCYTES # BLD AUTO: 0.47 10*3/MM3 (ref 0–1)
MONOCYTES NFR BLD AUTO: 7.2 % (ref 0–12)
NEUTROPHILS # BLD AUTO: 3.01 10*3/MM3 (ref 1.5–8.3)
NEUTROPHILS NFR BLD AUTO: 46.4 % (ref 41–71)
OPIATES UR QL: NEGATIVE
OXYCODONE UR QL SCN: NEGATIVE
PA ADP PRP-ACNC: 222 PRU
PCP UR QL SCN: NEGATIVE
PLATELET # BLD AUTO: 188 10*3/MM3 (ref 150–450)
PMV BLD AUTO: 10.7 FL (ref 6–12)
POTASSIUM BLD-SCNC: 3.6 MMOL/L (ref 3.5–5.5)
PROPOXYPH UR QL: NEGATIVE
PROT SERPL-MCNC: 7.4 G/DL (ref 5.7–8.2)
PROTHROMBIN TIME: 11 SECONDS (ref 9.6–11.5)
RBC # BLD AUTO: 4.4 10*6/MM3 (ref 3.89–5.14)
RH BLD: POSITIVE
SODIUM BLD-SCNC: 137 MMOL/L (ref 132–146)
TRICYCLICS UR QL SCN: NEGATIVE
WBC NRBC COR # BLD: 6.49 10*3/MM3 (ref 3.5–10.8)

## 2017-05-23 PROCEDURE — 86920 COMPATIBILITY TEST SPIN: CPT

## 2017-05-23 PROCEDURE — 94010 BREATHING CAPACITY TEST: CPT | Performed by: INTERNAL MEDICINE

## 2017-05-23 RX ORDER — NITROGLYCERIN 0.4 MG/1
0.4 TABLET SUBLINGUAL
Status: CANCELLED | OUTPATIENT
Start: 2017-05-24

## 2017-05-23 RX ORDER — ACETAMINOPHEN 325 MG/1
650 TABLET ORAL EVERY 4 HOURS PRN
Status: CANCELLED | OUTPATIENT
Start: 2017-05-24

## 2017-05-23 RX ORDER — ASPIRIN 325 MG
325 TABLET ORAL NIGHTLY
Status: SHIPPED | OUTPATIENT
Start: 2017-05-23 | End: 2017-05-24

## 2017-05-23 RX ORDER — CHLORHEXIDINE GLUCONATE 500 MG/1
1 CLOTH TOPICAL EVERY 12 HOURS PRN
Status: DISCONTINUED | OUTPATIENT
Start: 2017-05-23 | End: 2017-06-05

## 2017-05-23 RX ORDER — CHLORHEXIDINE GLUCONATE 500 MG/1
1 CLOTH TOPICAL EVERY 12 HOURS PRN
Status: CANCELLED | OUTPATIENT
Start: 2017-05-23

## 2017-05-23 RX ORDER — CRANBERRY FRUIT EXTRACT 425 MG
1 CAPSULE ORAL DAILY
COMMUNITY
End: 2018-11-28

## 2017-05-23 RX ORDER — ASPIRIN 325 MG
325 TABLET ORAL NIGHTLY
Status: CANCELLED | OUTPATIENT
Start: 2017-05-23 | End: 2017-05-24

## 2017-05-23 RX ORDER — CHLORHEXIDINE GLUCONATE 0.12 MG/ML
15 RINSE ORAL ONCE
Status: CANCELLED | OUTPATIENT
Start: 2017-05-24 | End: 2017-05-24

## 2017-05-23 RX ORDER — CHLORTHALIDONE 25 MG/1
25 TABLET ORAL DAILY
COMMUNITY
End: 2017-05-29 | Stop reason: HOSPADM

## 2017-05-23 RX ORDER — CHLORHEXIDINE GLUCONATE 500 MG/1
1 CLOTH TOPICAL EVERY 12 HOURS PRN
Status: CANCELLED | OUTPATIENT
Start: 2017-05-24

## 2017-05-24 ENCOUNTER — APPOINTMENT (OUTPATIENT)
Dept: GENERAL RADIOLOGY | Facility: HOSPITAL | Age: 77
End: 2017-05-24

## 2017-05-24 ENCOUNTER — HOSPITAL ENCOUNTER (INPATIENT)
Facility: HOSPITAL | Age: 77
LOS: 5 days | Discharge: HOME | End: 2017-05-29
Attending: THORACIC SURGERY (CARDIOTHORACIC VASCULAR SURGERY) | Admitting: THORACIC SURGERY (CARDIOTHORACIC VASCULAR SURGERY)

## 2017-05-24 ENCOUNTER — ANESTHESIA (OUTPATIENT)
Dept: PERIOP | Facility: HOSPITAL | Age: 77
End: 2017-05-24

## 2017-05-24 DIAGNOSIS — Z79.4 TYPE 2 DIABETES MELLITUS WITHOUT COMPLICATION, WITH LONG-TERM CURRENT USE OF INSULIN (HCC): ICD-10-CM

## 2017-05-24 DIAGNOSIS — I25.10 CORONARY ARTERY DISEASE INVOLVING NATIVE CORONARY ARTERY OF NATIVE HEART WITHOUT ANGINA PECTORIS: ICD-10-CM

## 2017-05-24 DIAGNOSIS — Z74.09 IMPAIRED FUNCTIONAL MOBILITY, BALANCE, GAIT, AND ENDURANCE: Primary | ICD-10-CM

## 2017-05-24 DIAGNOSIS — I10 ESSENTIAL HYPERTENSION: ICD-10-CM

## 2017-05-24 DIAGNOSIS — E11.9 TYPE 2 DIABETES MELLITUS WITHOUT COMPLICATION, WITH LONG-TERM CURRENT USE OF INSULIN (HCC): ICD-10-CM

## 2017-05-24 DIAGNOSIS — E78.2 MIXED HYPERLIPIDEMIA: ICD-10-CM

## 2017-05-24 DIAGNOSIS — E55.9 VITAMIN D DEFICIENCY: ICD-10-CM

## 2017-05-24 DIAGNOSIS — I38 VHD (VALVULAR HEART DISEASE): ICD-10-CM

## 2017-05-24 DIAGNOSIS — I25.119 CORONARY ARTERY DISEASE INVOLVING NATIVE CORONARY ARTERY OF NATIVE HEART WITH ANGINA PECTORIS (HCC): ICD-10-CM

## 2017-05-24 LAB
ABO GROUP BLD: NORMAL
ACT BLD: 137 SECONDS (ref 82–152)
ACT BLD: 137 SECONDS (ref 82–152)
ACT BLD: 518 SECONDS (ref 82–152)
ACT BLD: 698 SECONDS (ref 82–152)
ACT BLD: 853 SECONDS (ref 82–152)
ACT BLD: 868 SECONDS (ref 82–152)
ALBUMIN SERPL-MCNC: 2.7 G/DL (ref 3.2–4.8)
ALBUMIN SERPL-MCNC: 3.2 G/DL (ref 3.2–4.8)
ANION GAP SERPL CALCULATED.3IONS-SCNC: 6 MMOL/L (ref 3–11)
ANION GAP SERPL CALCULATED.3IONS-SCNC: 7 MMOL/L (ref 3–11)
APTT PPP: 28.5 SECONDS (ref 24–31)
ARTERIAL PATENCY WRIST A: ABNORMAL
ARTERIAL PATENCY WRIST A: ABNORMAL
ATMOSPHERIC PRESS: ABNORMAL MMHG
ATMOSPHERIC PRESS: ABNORMAL MMHG
BACTERIA UR QL AUTO: NORMAL /HPF
BASE EXCESS BLDA CALC-SCNC: -1 MMOL/L (ref -5–5)
BASE EXCESS BLDA CALC-SCNC: -1 MMOL/L (ref -5–5)
BASE EXCESS BLDA CALC-SCNC: -3 MMOL/L (ref -5–5)
BASE EXCESS BLDA CALC-SCNC: -3 MMOL/L (ref -5–5)
BASE EXCESS BLDA CALC-SCNC: -3.5 MMOL/L (ref 0–2)
BASE EXCESS BLDA CALC-SCNC: -4.8 MMOL/L (ref 0–2)
BASE EXCESS BLDA CALC-SCNC: 1 MMOL/L (ref -5–5)
BASE EXCESS BLDA CALC-SCNC: 2 MMOL/L (ref -5–5)
BDY SITE: ABNORMAL
BDY SITE: ABNORMAL
BILIRUB UR QL STRIP: NEGATIVE
BUN BLD-MCNC: 16 MG/DL (ref 9–23)
BUN BLD-MCNC: 17 MG/DL (ref 9–23)
BUN/CREAT SERPL: 20 (ref 7–25)
BUN/CREAT SERPL: 21.3 (ref 7–25)
CA-I BLDA-SCNC: 0.93 MMOL/L (ref 1.2–1.32)
CA-I BLDA-SCNC: 0.95 MMOL/L (ref 1.2–1.32)
CA-I BLDA-SCNC: 0.96 MMOL/L (ref 1.2–1.32)
CA-I BLDA-SCNC: 0.98 MMOL/L (ref 1.2–1.32)
CA-I BLDA-SCNC: 1.22 MMOL/L (ref 1.2–1.32)
CA-I BLDA-SCNC: 1.33 MMOL/L (ref 1.2–1.32)
CA-I SERPL ISE-MCNC: 0.95 MMOL/L (ref 1.12–1.32)
CALCIUM SPEC-SCNC: 7.2 MG/DL (ref 8.7–10.4)
CALCIUM SPEC-SCNC: 8.2 MG/DL (ref 8.7–10.4)
CHLORIDE SERPL-SCNC: 108 MMOL/L (ref 99–109)
CHLORIDE SERPL-SCNC: 110 MMOL/L (ref 99–109)
CLARITY UR: CLEAR
CO2 BLDA-SCNC: 22.4 MMOL/L (ref 22–33)
CO2 BLDA-SCNC: 23 MMOL/L (ref 24–29)
CO2 BLDA-SCNC: 23.3 MMOL/L (ref 22–33)
CO2 BLDA-SCNC: 25 MMOL/L (ref 24–29)
CO2 BLDA-SCNC: 27 MMOL/L (ref 24–29)
CO2 BLDA-SCNC: 28 MMOL/L (ref 24–29)
CO2 SERPL-SCNC: 22 MMOL/L (ref 20–31)
CO2 SERPL-SCNC: 25 MMOL/L (ref 20–31)
COHGB MFR BLD: 0.8 % (ref 0–2)
COHGB MFR BLD: 1.1 % (ref 0–2)
COLOR UR: YELLOW
CREAT BLD-MCNC: 0.8 MG/DL (ref 0.6–1.3)
CREAT BLD-MCNC: 0.8 MG/DL (ref 0.6–1.3)
DEPRECATED RDW RBC AUTO: 47.8 FL (ref 37–54)
DEPRECATED RDW RBC AUTO: 48.5 FL (ref 37–54)
ERYTHROCYTE [DISTWIDTH] IN BLOOD BY AUTOMATED COUNT: 13.9 % (ref 11.3–14.5)
ERYTHROCYTE [DISTWIDTH] IN BLOOD BY AUTOMATED COUNT: 14.1 % (ref 11.3–14.5)
GFR SERPL CREATININE-BSD FRML MDRD: 70 ML/MIN/1.73
GFR SERPL CREATININE-BSD FRML MDRD: 70 ML/MIN/1.73
GLUCOSE BLD-MCNC: 242 MG/DL (ref 70–100)
GLUCOSE BLD-MCNC: 335 MG/DL (ref 70–100)
GLUCOSE BLDC GLUCOMTR-MCNC: 104 MG/DL (ref 70–130)
GLUCOSE BLDC GLUCOMTR-MCNC: 114 MG/DL (ref 70–130)
GLUCOSE BLDC GLUCOMTR-MCNC: 121 MG/DL (ref 70–130)
GLUCOSE BLDC GLUCOMTR-MCNC: 121 MG/DL (ref 70–130)
GLUCOSE BLDC GLUCOMTR-MCNC: 154 MG/DL (ref 70–130)
GLUCOSE BLDC GLUCOMTR-MCNC: 171 MG/DL (ref 70–130)
GLUCOSE BLDC GLUCOMTR-MCNC: 184 MG/DL (ref 70–130)
GLUCOSE BLDC GLUCOMTR-MCNC: 212 MG/DL (ref 70–130)
GLUCOSE BLDC GLUCOMTR-MCNC: 217 MG/DL (ref 70–130)
GLUCOSE BLDC GLUCOMTR-MCNC: 230 MG/DL (ref 70–130)
GLUCOSE BLDC GLUCOMTR-MCNC: 246 MG/DL (ref 70–130)
GLUCOSE BLDC GLUCOMTR-MCNC: 272 MG/DL (ref 70–130)
GLUCOSE BLDC GLUCOMTR-MCNC: 274 MG/DL (ref 70–130)
GLUCOSE BLDC GLUCOMTR-MCNC: 289 MG/DL (ref 70–130)
GLUCOSE UR STRIP-MCNC: NEGATIVE MG/DL
HCO3 BLDA-SCNC: 21.1 MMOL/L (ref 20–26)
HCO3 BLDA-SCNC: 21.8 MMOL/L (ref 22–26)
HCO3 BLDA-SCNC: 22 MMOL/L (ref 20–26)
HCO3 BLDA-SCNC: 23.6 MMOL/L (ref 22–26)
HCO3 BLDA-SCNC: 23.8 MMOL/L (ref 22–26)
HCO3 BLDA-SCNC: 24.2 MMOL/L (ref 22–26)
HCO3 BLDA-SCNC: 25.9 MMOL/L (ref 22–26)
HCO3 BLDA-SCNC: 26.5 MMOL/L (ref 22–26)
HCT VFR BLD AUTO: 31.2 % (ref 34.5–44)
HCT VFR BLD AUTO: 37.4 % (ref 34.5–44)
HCT VFR BLD CALC: 30.3 %
HCT VFR BLD CALC: 42.9 %
HCT VFR BLDA CALC: 21 % (ref 38–51)
HCT VFR BLDA CALC: 26 % (ref 38–51)
HCT VFR BLDA CALC: 29 % (ref 38–51)
HCT VFR BLDA CALC: 29 % (ref 38–51)
HCT VFR BLDA CALC: 33 % (ref 38–51)
HCT VFR BLDA CALC: 38 % (ref 38–51)
HGB BLD-MCNC: 10.2 G/DL (ref 11.5–15.5)
HGB BLD-MCNC: 12.4 G/DL (ref 11.5–15.5)
HGB BLDA-MCNC: 11.2 G/DL (ref 12–17)
HGB BLDA-MCNC: 12.9 G/DL (ref 12–17)
HGB BLDA-MCNC: 14 G/DL (ref 14–18)
HGB BLDA-MCNC: 7.1 G/DL (ref 12–17)
HGB BLDA-MCNC: 8.8 G/DL (ref 12–17)
HGB BLDA-MCNC: 9.9 G/DL (ref 12–17)
HGB BLDA-MCNC: 9.9 G/DL (ref 12–17)
HGB BLDA-MCNC: 9.9 G/DL (ref 14–18)
HGB UR QL STRIP.AUTO: ABNORMAL
HOROWITZ INDEX BLD+IHG-RTO: 40 %
HOROWITZ INDEX BLD+IHG-RTO: 60 %
HYALINE CASTS UR QL AUTO: NORMAL /LPF
INR PPP: 1.27
KETONES UR QL STRIP: NEGATIVE
LEUKOCYTE ESTERASE UR QL STRIP.AUTO: NEGATIVE
MAGNESIUM SERPL-MCNC: 2.3 MG/DL (ref 1.3–2.7)
MAGNESIUM SERPL-MCNC: 2.7 MG/DL (ref 1.3–2.7)
MCH RBC QN AUTO: 30.8 PG (ref 27–31)
MCH RBC QN AUTO: 30.9 PG (ref 27–31)
MCHC RBC AUTO-ENTMCNC: 32.7 G/DL (ref 32–36)
MCHC RBC AUTO-ENTMCNC: 33.2 G/DL (ref 32–36)
MCV RBC AUTO: 93.3 FL (ref 80–99)
MCV RBC AUTO: 94.3 FL (ref 80–99)
METHGB BLD QL: 1.1 % (ref 0–1.5)
METHGB BLD QL: 1.3 % (ref 0–1.5)
MODALITY: ABNORMAL
MODALITY: ABNORMAL
NITRITE UR QL STRIP: NEGATIVE
OXYHGB MFR BLDV: 96.3 % (ref 94–99)
OXYHGB MFR BLDV: 97.8 % (ref 94–99)
PCO2 BLDA: 36.2 MM HG (ref 35–45)
PCO2 BLDA: 37.3 MM HG (ref 35–45)
PCO2 BLDA: 39.2 MM HG (ref 35–45)
PCO2 BLDA: 41.1 MM HG (ref 35–45)
PCO2 BLDA: 41.8 MM HG (ref 35–45)
PCO2 BLDA: 43.4 MM HG (ref 35–45)
PCO2 BLDA: 43.9 MM HG (ref 35–45)
PCO2 BLDA: 46.9 MM HG (ref 35–45)
PEEP RESPIRATORY: 5 CM[H2O]
PH BLDA: 7.31 PH UNITS (ref 7.35–7.45)
PH BLDA: 7.31 PH UNITS (ref 7.35–7.6)
PH BLDA: 7.34 PH UNITS (ref 7.35–7.45)
PH BLDA: 7.38 PH UNITS (ref 7.35–7.6)
PH BLDA: 7.39 PH UNITS (ref 7.35–7.6)
PH BLDA: 7.39 PH UNITS (ref 7.35–7.6)
PH BLDA: 7.4 PH UNITS (ref 7.35–7.6)
PH BLDA: 7.41 PH UNITS (ref 7.35–7.6)
PH UR STRIP.AUTO: 6 [PH] (ref 5–8)
PHOSPHATE SERPL-MCNC: 1.1 MG/DL (ref 2.4–5.1)
PHOSPHATE SERPL-MCNC: 2.1 MG/DL (ref 2.4–5.1)
PLATELET # BLD AUTO: 71 10*3/MM3 (ref 150–450)
PLATELET # BLD AUTO: 79 10*3/MM3 (ref 150–450)
PMV BLD AUTO: 10.1 FL (ref 6–12)
PMV BLD AUTO: 10.6 FL (ref 6–12)
PO2 BLDA: 116 MM HG (ref 83–108)
PO2 BLDA: 191 MM HG (ref 83–108)
PO2 BLDA: 420 MMHG (ref 80–105)
PO2 BLDA: 439 MMHG (ref 80–105)
PO2 BLDA: 450 MMHG (ref 80–105)
PO2 BLDA: 455 MMHG (ref 80–105)
PO2 BLDA: 467 MMHG (ref 80–105)
PO2 BLDA: 76 MMHG (ref 80–105)
POTASSIUM BLD-SCNC: 3.2 MMOL/L (ref 3.5–5.5)
POTASSIUM BLD-SCNC: 4 MMOL/L (ref 3.5–5.5)
POTASSIUM BLDA-SCNC: 3.4 MMOL/L (ref 3.5–4.9)
POTASSIUM BLDA-SCNC: 3.7 MMOL/L (ref 3.5–4.9)
POTASSIUM BLDA-SCNC: 4 MMOL/L (ref 3.5–4.9)
POTASSIUM BLDA-SCNC: 4.4 MMOL/L (ref 3.5–4.9)
POTASSIUM BLDA-SCNC: 4.9 MMOL/L (ref 3.5–4.9)
POTASSIUM BLDA-SCNC: 5.1 MMOL/L (ref 3.5–4.9)
PROT UR QL STRIP: NEGATIVE
PROTHROMBIN TIME: 14 SECONDS (ref 9.6–11.5)
RBC # BLD AUTO: 3.31 10*6/MM3 (ref 3.89–5.14)
RBC # BLD AUTO: 4.01 10*6/MM3 (ref 3.89–5.14)
RBC # UR: NORMAL /HPF
REF LAB TEST METHOD: NORMAL
RH BLD: POSITIVE
SAO2 % BLDA: 100 % (ref 95–98)
SAO2 % BLDA: 95 % (ref 95–98)
SET MECH RESP RATE: 12
SODIUM BLD-SCNC: 137 MMOL/L (ref 132–146)
SODIUM BLD-SCNC: 141 MMOL/L (ref 132–146)
SODIUM BLDA-SCNC: 133 MMOL/L (ref 138–146)
SODIUM BLDA-SCNC: 135 MMOL/L (ref 138–146)
SODIUM BLDA-SCNC: 135 MMOL/L (ref 138–146)
SODIUM BLDA-SCNC: 136 MMOL/L (ref 138–146)
SODIUM BLDA-SCNC: 136 MMOL/L (ref 138–146)
SODIUM BLDA-SCNC: 139 MMOL/L (ref 138–146)
SP GR UR STRIP: 1.02 (ref 1–1.03)
SQUAMOUS #/AREA URNS HPF: NORMAL /HPF
UROBILINOGEN UR QL STRIP: ABNORMAL
VENTILATOR MODE: ABNORMAL
VENTILATOR MODE: ABNORMAL
VT ON VENT VENT: 500 ML
WBC NRBC COR # BLD: 10.99 10*3/MM3 (ref 3.5–10.8)
WBC NRBC COR # BLD: 13.25 10*3/MM3 (ref 3.5–10.8)
WBC UR QL AUTO: NORMAL /HPF

## 2017-05-24 PROCEDURE — 94002 VENT MGMT INPAT INIT DAY: CPT

## 2017-05-24 PROCEDURE — 25010000002 CEFUROXIME: Performed by: PHYSICIAN ASSISTANT

## 2017-05-24 PROCEDURE — P9041 ALBUMIN (HUMAN),5%, 50ML: HCPCS | Performed by: PHYSICIAN ASSISTANT

## 2017-05-24 PROCEDURE — 86901 BLOOD TYPING SEROLOGIC RH(D): CPT

## 2017-05-24 PROCEDURE — 84132 ASSAY OF SERUM POTASSIUM: CPT

## 2017-05-24 PROCEDURE — 87086 URINE CULTURE/COLONY COUNT: CPT | Performed by: PHYSICIAN ASSISTANT

## 2017-05-24 PROCEDURE — 25010000002 MORPHINE SULFATE (PF) 2 MG/ML SOLUTION: Performed by: THORACIC SURGERY (CARDIOTHORACIC VASCULAR SURGERY)

## 2017-05-24 PROCEDURE — 84295 ASSAY OF SERUM SODIUM: CPT

## 2017-05-24 PROCEDURE — 94799 UNLISTED PULMONARY SVC/PX: CPT

## 2017-05-24 PROCEDURE — 25010000002 PHENYLEPHRINE PER 1 ML: Performed by: ANESTHESIOLOGY

## 2017-05-24 PROCEDURE — 36430 TRANSFUSION BLD/BLD COMPNT: CPT

## 2017-05-24 PROCEDURE — 021309W BYPASS CORONARY ARTERY, FOUR OR MORE ARTERIES FROM AORTA WITH AUTOLOGOUS VENOUS TISSUE, OPEN APPROACH: ICD-10-PCS | Performed by: THORACIC SURGERY (CARDIOTHORACIC VASCULAR SURGERY)

## 2017-05-24 PROCEDURE — 25010000002 PROTAMINE SULFATE PER 10 MG: Performed by: ANESTHESIOLOGY

## 2017-05-24 PROCEDURE — 86900 BLOOD TYPING SEROLOGIC ABO: CPT

## 2017-05-24 PROCEDURE — 25010000002 DOPAMINE PER 40 MG: Performed by: ANESTHESIOLOGY

## 2017-05-24 PROCEDURE — 82947 ASSAY GLUCOSE BLOOD QUANT: CPT

## 2017-05-24 PROCEDURE — 82330 ASSAY OF CALCIUM: CPT | Performed by: PHYSICIAN ASSISTANT

## 2017-05-24 PROCEDURE — 25010000002 ALBUMIN HUMAN 5% PER 50 ML: Performed by: PHYSICIAN ASSISTANT

## 2017-05-24 PROCEDURE — 25010000002 INSULIN REGULAR HUMAN PER 5 UNITS: Performed by: PHYSICIAN ASSISTANT

## 2017-05-24 PROCEDURE — C1751 CATH, INF, PER/CENT/MIDLINE: HCPCS | Performed by: ANESTHESIOLOGY

## 2017-05-24 PROCEDURE — 81001 URINALYSIS AUTO W/SCOPE: CPT | Performed by: PHYSICIAN ASSISTANT

## 2017-05-24 PROCEDURE — 80069 RENAL FUNCTION PANEL: CPT | Performed by: PHYSICIAN ASSISTANT

## 2017-05-24 PROCEDURE — 33508 ENDOSCOPIC VEIN HARVEST: CPT | Performed by: THORACIC SURGERY (CARDIOTHORACIC VASCULAR SURGERY)

## 2017-05-24 PROCEDURE — 82803 BLOOD GASES ANY COMBINATION: CPT

## 2017-05-24 PROCEDURE — 82330 ASSAY OF CALCIUM: CPT

## 2017-05-24 PROCEDURE — 85027 COMPLETE CBC AUTOMATED: CPT | Performed by: PHYSICIAN ASSISTANT

## 2017-05-24 PROCEDURE — 25010000002 MAGNESIUM SULFATE PER 500 MG OF MAGNESIUM

## 2017-05-24 PROCEDURE — 25010000002 PROTAMINE SULFATE PER 10 MG: Performed by: PHYSICIAN ASSISTANT

## 2017-05-24 PROCEDURE — 25010000002 HEPARIN (PORCINE) PER 1000 UNITS: Performed by: THORACIC SURGERY (CARDIOTHORACIC VASCULAR SURGERY)

## 2017-05-24 PROCEDURE — 25010000002 HEPARIN (PORCINE) PER 1000 UNITS

## 2017-05-24 PROCEDURE — 82805 BLOOD GASES W/O2 SATURATION: CPT | Performed by: PHYSICIAN ASSISTANT

## 2017-05-24 PROCEDURE — 33521 CABG ARTERY-VEIN FOUR: CPT | Performed by: PHYSICIAN ASSISTANT

## 2017-05-24 PROCEDURE — 25010000002 MIDAZOLAM PER 1 MG: Performed by: ANESTHESIOLOGY

## 2017-05-24 PROCEDURE — 85730 THROMBOPLASTIN TIME PARTIAL: CPT | Performed by: PHYSICIAN ASSISTANT

## 2017-05-24 PROCEDURE — 99231 SBSQ HOSP IP/OBS SF/LOW 25: CPT | Performed by: PHYSICIAN ASSISTANT

## 2017-05-24 PROCEDURE — 85014 HEMATOCRIT: CPT

## 2017-05-24 PROCEDURE — 25010000002 VANCOMYCIN PER 500 MG: Performed by: PHYSICIAN ASSISTANT

## 2017-05-24 PROCEDURE — 83735 ASSAY OF MAGNESIUM: CPT | Performed by: PHYSICIAN ASSISTANT

## 2017-05-24 PROCEDURE — 25010000002 PROPOFOL 10 MG/ML EMULSION: Performed by: ANESTHESIOLOGY

## 2017-05-24 PROCEDURE — 25010000002 PHENYLEPHRINE PER 1 ML

## 2017-05-24 PROCEDURE — 33533 CABG ARTERIAL SINGLE: CPT | Performed by: PHYSICIAN ASSISTANT

## 2017-05-24 PROCEDURE — 25010000002 ONDANSETRON PER 1 MG: Performed by: PHYSICIAN ASSISTANT

## 2017-05-24 PROCEDURE — 25010000002 VANCOMYCIN PER 500 MG: Performed by: ANESTHESIOLOGY

## 2017-05-24 PROCEDURE — P9016 RBC LEUKOCYTES REDUCED: HCPCS

## 2017-05-24 PROCEDURE — 99291 CRITICAL CARE FIRST HOUR: CPT | Performed by: INTERNAL MEDICINE

## 2017-05-24 PROCEDURE — 06BP4ZZ EXCISION OF RIGHT SAPHENOUS VEIN, PERCUTANEOUS ENDOSCOPIC APPROACH: ICD-10-PCS | Performed by: THORACIC SURGERY (CARDIOTHORACIC VASCULAR SURGERY)

## 2017-05-24 PROCEDURE — 25810000003 DEXTROSE 5 % WITH KCL 20 MEQ 20-5 MEQ/L-% SOLUTION: Performed by: PHYSICIAN ASSISTANT

## 2017-05-24 PROCEDURE — 93005 ELECTROCARDIOGRAM TRACING: CPT | Performed by: PHYSICIAN ASSISTANT

## 2017-05-24 PROCEDURE — 82805 BLOOD GASES W/O2 SATURATION: CPT | Performed by: THORACIC SURGERY (CARDIOTHORACIC VASCULAR SURGERY)

## 2017-05-24 PROCEDURE — 71010 HC CHEST PA OR AP: CPT

## 2017-05-24 PROCEDURE — 25010000002 MANNITOL PER 50 ML

## 2017-05-24 PROCEDURE — 85347 COAGULATION TIME ACTIVATED: CPT

## 2017-05-24 PROCEDURE — 33521 CABG ARTERY-VEIN FOUR: CPT | Performed by: THORACIC SURGERY (CARDIOTHORACIC VASCULAR SURGERY)

## 2017-05-24 PROCEDURE — 33533 CABG ARTERIAL SINGLE: CPT | Performed by: THORACIC SURGERY (CARDIOTHORACIC VASCULAR SURGERY)

## 2017-05-24 PROCEDURE — 02100Z9 BYPASS CORONARY ARTERY, ONE ARTERY FROM LEFT INTERNAL MAMMARY, OPEN APPROACH: ICD-10-PCS | Performed by: THORACIC SURGERY (CARDIOTHORACIC VASCULAR SURGERY)

## 2017-05-24 PROCEDURE — 25010000002 PROPOFOL 1000 MG/ML EMULSION: Performed by: THORACIC SURGERY (CARDIOTHORACIC VASCULAR SURGERY)

## 2017-05-24 PROCEDURE — 5A1221Z PERFORMANCE OF CARDIAC OUTPUT, CONTINUOUS: ICD-10-PCS | Performed by: THORACIC SURGERY (CARDIOTHORACIC VASCULAR SURGERY)

## 2017-05-24 PROCEDURE — 25010000002 HEPARIN (PORCINE) PER 1000 UNITS: Performed by: ANESTHESIOLOGY

## 2017-05-24 PROCEDURE — 25010000002 CALCIUM GLUCONATE PER 10 ML: Performed by: PHYSICIAN ASSISTANT

## 2017-05-24 PROCEDURE — 85610 PROTHROMBIN TIME: CPT | Performed by: PHYSICIAN ASSISTANT

## 2017-05-24 RX ORDER — PHENYLEPHRINE HCL IN 0.9% NACL 0.5 MG/5ML
.5-3 SYRINGE (ML) INTRAVENOUS CONTINUOUS PRN
Status: DISCONTINUED | OUTPATIENT
Start: 2017-05-24 | End: 2017-05-29 | Stop reason: HOSPADM

## 2017-05-24 RX ORDER — SODIUM CHLORIDE 0.9 % (FLUSH) 0.9 %
1-10 SYRINGE (ML) INJECTION AS NEEDED
Status: DISCONTINUED | OUTPATIENT
Start: 2017-05-24 | End: 2017-05-24 | Stop reason: HOSPADM

## 2017-05-24 RX ORDER — PROTAMINE SULFATE 10 MG/ML
50 INJECTION, SOLUTION INTRAVENOUS ONCE
Status: COMPLETED | OUTPATIENT
Start: 2017-05-24 | End: 2017-05-24

## 2017-05-24 RX ORDER — CHLORTHALIDONE 25 MG/1
25 TABLET ORAL DAILY
Status: DISCONTINUED | OUTPATIENT
Start: 2017-05-24 | End: 2017-05-24

## 2017-05-24 RX ORDER — POTASSIUM CHLORIDE 750 MG/1
40 CAPSULE, EXTENDED RELEASE ORAL AS NEEDED
Status: DISCONTINUED | OUTPATIENT
Start: 2017-05-24 | End: 2017-05-29 | Stop reason: HOSPADM

## 2017-05-24 RX ORDER — DEXMEDETOMIDINE HYDROCHLORIDE 4 UG/ML
.2-1.5 INJECTION, SOLUTION INTRAVENOUS CONTINUOUS PRN
Status: DISCONTINUED | OUTPATIENT
Start: 2017-05-24 | End: 2017-05-25

## 2017-05-24 RX ORDER — CHLORHEXIDINE GLUCONATE 0.12 MG/ML
15 RINSE ORAL EVERY 12 HOURS SCHEDULED
Status: DISCONTINUED | OUTPATIENT
Start: 2017-05-24 | End: 2017-05-25

## 2017-05-24 RX ORDER — OXYCODONE HYDROCHLORIDE AND ACETAMINOPHEN 5; 325 MG/1; MG/1
2 TABLET ORAL EVERY 4 HOURS PRN
Status: DISCONTINUED | OUTPATIENT
Start: 2017-05-24 | End: 2017-05-29 | Stop reason: HOSPADM

## 2017-05-24 RX ORDER — PROTAMINE SULFATE 10 MG/ML
INJECTION, SOLUTION INTRAVENOUS AS NEEDED
Status: DISCONTINUED | OUTPATIENT
Start: 2017-05-24 | End: 2017-05-24 | Stop reason: SURG

## 2017-05-24 RX ORDER — MAGNESIUM SULFATE HEPTAHYDRATE 40 MG/ML
4 INJECTION, SOLUTION INTRAVENOUS AS NEEDED
Status: DISCONTINUED | OUTPATIENT
Start: 2017-05-24 | End: 2017-05-29 | Stop reason: HOSPADM

## 2017-05-24 RX ORDER — PROPOFOL 10 MG/ML
VIAL (ML) INTRAVENOUS CONTINUOUS PRN
Status: DISCONTINUED | OUTPATIENT
Start: 2017-05-24 | End: 2017-05-24 | Stop reason: SURG

## 2017-05-24 RX ORDER — HYDROMORPHONE HYDROCHLORIDE 1 MG/ML
0.5 INJECTION, SOLUTION INTRAMUSCULAR; INTRAVENOUS; SUBCUTANEOUS
Status: DISCONTINUED | OUTPATIENT
Start: 2017-05-24 | End: 2017-05-24

## 2017-05-24 RX ORDER — SODIUM CHLORIDE 0.9 % (FLUSH) 0.9 %
30 SYRINGE (ML) INJECTION ONCE AS NEEDED
Status: DISCONTINUED | OUTPATIENT
Start: 2017-05-24 | End: 2017-05-25

## 2017-05-24 RX ORDER — ATORVASTATIN CALCIUM 40 MG/1
40 TABLET, FILM COATED ORAL NIGHTLY
Status: DISCONTINUED | OUTPATIENT
Start: 2017-05-24 | End: 2017-05-26

## 2017-05-24 RX ORDER — MORPHINE SULFATE 2 MG/ML
2 INJECTION, SOLUTION INTRAMUSCULAR; INTRAVENOUS
Status: DISCONTINUED | OUTPATIENT
Start: 2017-05-24 | End: 2017-05-25

## 2017-05-24 RX ORDER — ACETAMINOPHEN 325 MG/1
650 TABLET ORAL EVERY 4 HOURS PRN
Status: DISCONTINUED | OUTPATIENT
Start: 2017-05-24 | End: 2017-05-24 | Stop reason: HOSPADM

## 2017-05-24 RX ORDER — SODIUM CHLORIDE 9 MG/ML
INJECTION, SOLUTION INTRAVENOUS CONTINUOUS PRN
Status: DISCONTINUED | OUTPATIENT
Start: 2017-05-24 | End: 2017-05-24 | Stop reason: SURG

## 2017-05-24 RX ORDER — POTASSIUM CHLORIDE 1.5 G/1.77G
40 POWDER, FOR SOLUTION ORAL AS NEEDED
Status: DISCONTINUED | OUTPATIENT
Start: 2017-05-24 | End: 2017-05-29 | Stop reason: HOSPADM

## 2017-05-24 RX ORDER — CHLORHEXIDINE GLUCONATE 500 MG/1
1 CLOTH TOPICAL EVERY 12 HOURS PRN
Status: DISCONTINUED | OUTPATIENT
Start: 2017-05-24 | End: 2017-05-24 | Stop reason: HOSPADM

## 2017-05-24 RX ORDER — DOPAMINE HYDROCHLORIDE 160 MG/100ML
INJECTION, SOLUTION INTRAVENOUS CONTINUOUS PRN
Status: DISCONTINUED | OUTPATIENT
Start: 2017-05-24 | End: 2017-05-24 | Stop reason: SURG

## 2017-05-24 RX ORDER — METOPROLOL TARTRATE 5 MG/5ML
2.5 INJECTION INTRAVENOUS EVERY 6 HOURS SCHEDULED
Status: DISCONTINUED | OUTPATIENT
Start: 2017-05-24 | End: 2017-05-25

## 2017-05-24 RX ORDER — PROPOFOL 10 MG/ML
VIAL (ML) INTRAVENOUS AS NEEDED
Status: DISCONTINUED | OUTPATIENT
Start: 2017-05-24 | End: 2017-05-24 | Stop reason: SURG

## 2017-05-24 RX ORDER — SUFENTANIL CITRATE 50 UG/ML
INJECTION EPIDURAL; INTRAVENOUS AS NEEDED
Status: DISCONTINUED | OUTPATIENT
Start: 2017-05-24 | End: 2017-05-24 | Stop reason: SURG

## 2017-05-24 RX ORDER — ALBUMIN, HUMAN INJ 5% 5 %
500 SOLUTION INTRAVENOUS AS NEEDED
Status: DISCONTINUED | OUTPATIENT
Start: 2017-05-24 | End: 2017-05-29 | Stop reason: HOSPADM

## 2017-05-24 RX ORDER — CALCIUM CHLORIDE 100 MG/ML
INJECTION INTRAVENOUS; INTRAVENTRICULAR AS NEEDED
Status: DISCONTINUED | OUTPATIENT
Start: 2017-05-24 | End: 2017-05-24 | Stop reason: SURG

## 2017-05-24 RX ORDER — PANTOPRAZOLE SODIUM 40 MG/10ML
40 INJECTION, POWDER, LYOPHILIZED, FOR SOLUTION INTRAVENOUS
Status: DISCONTINUED | OUTPATIENT
Start: 2017-05-25 | End: 2017-05-25

## 2017-05-24 RX ORDER — FAMOTIDINE 10 MG/ML
20 INJECTION, SOLUTION INTRAVENOUS 2 TIMES DAILY
Status: DISCONTINUED | OUTPATIENT
Start: 2017-05-24 | End: 2017-05-24

## 2017-05-24 RX ORDER — SODIUM CHLORIDE 9 MG/ML
INJECTION, SOLUTION INTRAVENOUS AS NEEDED
Status: DISCONTINUED | OUTPATIENT
Start: 2017-05-24 | End: 2017-05-24 | Stop reason: HOSPADM

## 2017-05-24 RX ORDER — MAGNESIUM HYDROXIDE 1200 MG/15ML
LIQUID ORAL AS NEEDED
Status: DISCONTINUED | OUTPATIENT
Start: 2017-05-24 | End: 2017-05-24 | Stop reason: HOSPADM

## 2017-05-24 RX ORDER — POTASSIUM CHLORIDE, DEXTROSE MONOHYDRATE 150; 5 MG/100ML; G/100ML
30 INJECTION, SOLUTION INTRAVENOUS CONTINUOUS
Status: DISCONTINUED | OUTPATIENT
Start: 2017-05-24 | End: 2017-05-27

## 2017-05-24 RX ORDER — VECURONIUM BROMIDE 1 MG/ML
INJECTION, POWDER, LYOPHILIZED, FOR SOLUTION INTRAVENOUS AS NEEDED
Status: DISCONTINUED | OUTPATIENT
Start: 2017-05-24 | End: 2017-05-24 | Stop reason: SURG

## 2017-05-24 RX ORDER — MAGNESIUM SULFATE HEPTAHYDRATE 40 MG/ML
2 INJECTION, SOLUTION INTRAVENOUS AS NEEDED
Status: DISCONTINUED | OUTPATIENT
Start: 2017-05-24 | End: 2017-05-29 | Stop reason: HOSPADM

## 2017-05-24 RX ORDER — MEPERIDINE HYDROCHLORIDE 25 MG/ML
25 INJECTION INTRAMUSCULAR; INTRAVENOUS; SUBCUTANEOUS EVERY 4 HOURS PRN
Status: ACTIVE | OUTPATIENT
Start: 2017-05-24 | End: 2017-05-24

## 2017-05-24 RX ORDER — SODIUM CHLORIDE 9 MG/ML
30 INJECTION, SOLUTION INTRAVENOUS CONTINUOUS PRN
Status: DISCONTINUED | OUTPATIENT
Start: 2017-05-24 | End: 2017-05-25

## 2017-05-24 RX ORDER — NITROGLYCERIN 20 MG/100ML
INJECTION INTRAVENOUS CONTINUOUS PRN
Status: DISCONTINUED | OUTPATIENT
Start: 2017-05-24 | End: 2017-05-24 | Stop reason: SURG

## 2017-05-24 RX ORDER — FENTANYL CITRATE 50 UG/ML
50 INJECTION, SOLUTION INTRAMUSCULAR; INTRAVENOUS
Status: DISCONTINUED | OUTPATIENT
Start: 2017-05-24 | End: 2017-05-24

## 2017-05-24 RX ORDER — BISACODYL 10 MG
10 SUPPOSITORY, RECTAL RECTAL DAILY PRN
Status: DISCONTINUED | OUTPATIENT
Start: 2017-05-25 | End: 2017-05-29 | Stop reason: HOSPADM

## 2017-05-24 RX ORDER — ASPIRIN 325 MG
325 TABLET ORAL ONCE
Status: DISCONTINUED | OUTPATIENT
Start: 2017-05-24 | End: 2017-05-24

## 2017-05-24 RX ORDER — POTASSIUM CHLORIDE 29.8 MG/ML
20 INJECTION INTRAVENOUS
Status: DISCONTINUED | OUTPATIENT
Start: 2017-05-24 | End: 2017-05-29 | Stop reason: HOSPADM

## 2017-05-24 RX ORDER — FENTANYL CITRATE 50 UG/ML
25 INJECTION, SOLUTION INTRAMUSCULAR; INTRAVENOUS
Status: DISCONTINUED | OUTPATIENT
Start: 2017-05-24 | End: 2017-05-25

## 2017-05-24 RX ORDER — ASPIRIN 325 MG
325 TABLET, DELAYED RELEASE (ENTERIC COATED) ORAL DAILY
Status: DISCONTINUED | OUTPATIENT
Start: 2017-05-25 | End: 2017-05-29 | Stop reason: HOSPADM

## 2017-05-24 RX ORDER — ACETAMINOPHEN 325 MG/1
650 TABLET ORAL EVERY 4 HOURS PRN
Status: DISCONTINUED | OUTPATIENT
Start: 2017-05-24 | End: 2017-05-29 | Stop reason: HOSPADM

## 2017-05-24 RX ORDER — FAMOTIDINE 20 MG/1
20 TABLET, FILM COATED ORAL ONCE
Status: COMPLETED | OUTPATIENT
Start: 2017-05-24 | End: 2017-05-24

## 2017-05-24 RX ORDER — FAMOTIDINE 20 MG/1
20 TABLET, FILM COATED ORAL 2 TIMES DAILY
Status: DISCONTINUED | OUTPATIENT
Start: 2017-05-24 | End: 2017-05-24

## 2017-05-24 RX ORDER — ROCURONIUM BROMIDE 10 MG/ML
INJECTION, SOLUTION INTRAVENOUS AS NEEDED
Status: DISCONTINUED | OUTPATIENT
Start: 2017-05-24 | End: 2017-05-24 | Stop reason: SURG

## 2017-05-24 RX ORDER — SODIUM CHLORIDE, SODIUM LACTATE, POTASSIUM CHLORIDE, CALCIUM CHLORIDE 600; 310; 30; 20 MG/100ML; MG/100ML; MG/100ML; MG/100ML
9 INJECTION, SOLUTION INTRAVENOUS CONTINUOUS
Status: DISCONTINUED | OUTPATIENT
Start: 2017-05-24 | End: 2017-05-24 | Stop reason: HOSPADM

## 2017-05-24 RX ORDER — AMINOCAPROIC ACID 250 MG/ML
INJECTION, SOLUTION INTRAVENOUS AS NEEDED
Status: DISCONTINUED | OUTPATIENT
Start: 2017-05-24 | End: 2017-05-24 | Stop reason: SURG

## 2017-05-24 RX ORDER — LIDOCAINE HYDROCHLORIDE 20 MG/ML
INJECTION, SOLUTION INFILTRATION; PERINEURAL AS NEEDED
Status: DISCONTINUED | OUTPATIENT
Start: 2017-05-24 | End: 2017-05-24 | Stop reason: SURG

## 2017-05-24 RX ORDER — NITROGLYCERIN 20 MG/100ML
10-50 INJECTION INTRAVENOUS CONTINUOUS PRN
Status: DISCONTINUED | OUTPATIENT
Start: 2017-05-24 | End: 2017-05-29 | Stop reason: HOSPADM

## 2017-05-24 RX ORDER — DOBUTAMINE HYDROCHLORIDE 100 MG/100ML
2-20 INJECTION INTRAVENOUS CONTINUOUS PRN
Status: DISCONTINUED | OUTPATIENT
Start: 2017-05-24 | End: 2017-05-29 | Stop reason: HOSPADM

## 2017-05-24 RX ORDER — ESMOLOL HYDROCHLORIDE 10 MG/ML
INJECTION INTRAVENOUS AS NEEDED
Status: DISCONTINUED | OUTPATIENT
Start: 2017-05-24 | End: 2017-05-24 | Stop reason: SURG

## 2017-05-24 RX ORDER — NITROGLYCERIN 0.4 MG/1
0.4 TABLET SUBLINGUAL
Status: DISCONTINUED | OUTPATIENT
Start: 2017-05-24 | End: 2017-05-24 | Stop reason: HOSPADM

## 2017-05-24 RX ORDER — SENNA AND DOCUSATE SODIUM 50; 8.6 MG/1; MG/1
2 TABLET, FILM COATED ORAL 2 TIMES DAILY
Status: DISCONTINUED | OUTPATIENT
Start: 2017-05-24 | End: 2017-05-29 | Stop reason: HOSPADM

## 2017-05-24 RX ORDER — NALOXONE HCL 0.4 MG/ML
0.4 VIAL (ML) INJECTION
Status: DISCONTINUED | OUTPATIENT
Start: 2017-05-24 | End: 2017-05-25

## 2017-05-24 RX ORDER — DOCUSATE SODIUM 100 MG/1
100 CAPSULE, LIQUID FILLED ORAL 2 TIMES DAILY PRN
Status: DISCONTINUED | OUTPATIENT
Start: 2017-05-24 | End: 2017-05-29 | Stop reason: HOSPADM

## 2017-05-24 RX ORDER — IRBESARTAN 75 MG/1
75 TABLET ORAL DAILY
Status: DISCONTINUED | OUTPATIENT
Start: 2017-05-24 | End: 2017-05-24

## 2017-05-24 RX ORDER — VANCOMYCIN HYDROCHLORIDE 1 G/200ML
15 INJECTION, SOLUTION INTRAVENOUS ONCE
Status: COMPLETED | OUTPATIENT
Start: 2017-05-24 | End: 2017-05-24

## 2017-05-24 RX ORDER — MIDAZOLAM HYDROCHLORIDE 1 MG/ML
INJECTION INTRAMUSCULAR; INTRAVENOUS AS NEEDED
Status: DISCONTINUED | OUTPATIENT
Start: 2017-05-24 | End: 2017-05-24 | Stop reason: SURG

## 2017-05-24 RX ORDER — HEPARIN SODIUM 1000 [USP'U]/ML
INJECTION, SOLUTION INTRAVENOUS; SUBCUTANEOUS AS NEEDED
Status: DISCONTINUED | OUTPATIENT
Start: 2017-05-24 | End: 2017-05-24 | Stop reason: SURG

## 2017-05-24 RX ORDER — CHLORHEXIDINE GLUCONATE 0.12 MG/ML
15 RINSE ORAL ONCE
Status: COMPLETED | OUTPATIENT
Start: 2017-05-24 | End: 2017-05-24

## 2017-05-24 RX ORDER — BISACODYL 5 MG/1
10 TABLET, DELAYED RELEASE ORAL DAILY PRN
Status: DISCONTINUED | OUTPATIENT
Start: 2017-05-24 | End: 2017-05-29 | Stop reason: HOSPADM

## 2017-05-24 RX ORDER — PHENYLEPHRINE HCL IN 0.9% NACL 0.5 MG/5ML
SYRINGE (ML) INTRAVENOUS
Status: COMPLETED
Start: 2017-05-24 | End: 2017-05-24

## 2017-05-24 RX ORDER — HYDROCODONE BITARTRATE AND ACETAMINOPHEN 7.5; 325 MG/1; MG/1
1 TABLET ORAL EVERY 4 HOURS PRN
Status: DISCONTINUED | OUTPATIENT
Start: 2017-05-24 | End: 2017-05-29 | Stop reason: HOSPADM

## 2017-05-24 RX ORDER — ONDANSETRON 2 MG/ML
4 INJECTION INTRAMUSCULAR; INTRAVENOUS EVERY 6 HOURS PRN
Status: DISCONTINUED | OUTPATIENT
Start: 2017-05-24 | End: 2017-05-29 | Stop reason: HOSPADM

## 2017-05-24 RX ADMIN — AMINOCAPROIC ACID 10 G: 250 INJECTION, SOLUTION INTRAVENOUS at 10:26

## 2017-05-24 RX ADMIN — SUFENTANIL CITRATE 50 MCG: 50 INJECTION EPIDURAL; INTRAVENOUS at 08:42

## 2017-05-24 RX ADMIN — CHLORHEXIDINE GLUCONATE 15 ML: 1.2 RINSE ORAL at 16:49

## 2017-05-24 RX ADMIN — CHLORHEXIDINE GLUCONATE 15 ML: 1.2 RINSE ORAL at 22:23

## 2017-05-24 RX ADMIN — SUFENTANIL CITRATE 25 MCG: 50 INJECTION EPIDURAL; INTRAVENOUS at 07:00

## 2017-05-24 RX ADMIN — SUFENTANIL CITRATE 75 MCG: 50 INJECTION EPIDURAL; INTRAVENOUS at 07:25

## 2017-05-24 RX ADMIN — VANCOMYCIN HYDROCHLORIDE 1 G: 1 INJECTION, POWDER, LYOPHILIZED, FOR SOLUTION INTRAVENOUS at 06:50

## 2017-05-24 RX ADMIN — MORPHINE SULFATE 2 MG: 2 INJECTION, SOLUTION INTRAMUSCULAR; INTRAVENOUS at 16:48

## 2017-05-24 RX ADMIN — ROCURONIUM BROMIDE 100 MG: 10 INJECTION INTRAVENOUS at 07:04

## 2017-05-24 RX ADMIN — PHENYLEPHRINE HYDROCHLORIDE 100 MCG: 10 INJECTION INTRAVENOUS at 10:33

## 2017-05-24 RX ADMIN — MIDAZOLAM HYDROCHLORIDE 2 MG: 1 INJECTION, SOLUTION INTRAMUSCULAR; INTRAVENOUS at 10:13

## 2017-05-24 RX ADMIN — MIDAZOLAM HYDROCHLORIDE 1 MG: 1 INJECTION, SOLUTION INTRAMUSCULAR; INTRAVENOUS at 07:03

## 2017-05-24 RX ADMIN — Medication 0.2 MCG/KG/MIN: at 11:40

## 2017-05-24 RX ADMIN — PROTAMINE SULFATE 250 MG: 10 INJECTION, SOLUTION INTRAVENOUS at 10:22

## 2017-05-24 RX ADMIN — CALCIUM GLUCONATE 2 G: 94 INJECTION, SOLUTION INTRAVENOUS at 13:43

## 2017-05-24 RX ADMIN — OXYCODONE AND ACETAMINOPHEN 1 TABLET: 5; 325 TABLET ORAL at 17:01

## 2017-05-24 RX ADMIN — POTASSIUM CHLORIDE AND DEXTROSE MONOHYDRATE 30 ML/HR: 150; 5 INJECTION, SOLUTION INTRAVENOUS at 11:39

## 2017-05-24 RX ADMIN — SODIUM CHLORIDE, POTASSIUM CHLORIDE, SODIUM LACTATE AND CALCIUM CHLORIDE: 600; 310; 30; 20 INJECTION, SOLUTION INTRAVENOUS at 06:43

## 2017-05-24 RX ADMIN — DOPAMINE HYDROCHLORIDE 15 MCG/KG/MIN: 160 INJECTION, SOLUTION INTRAVENOUS at 10:10

## 2017-05-24 RX ADMIN — MORPHINE SULFATE 2 MG: 2 INJECTION, SOLUTION INTRAMUSCULAR; INTRAVENOUS at 18:07

## 2017-05-24 RX ADMIN — ESMOLOL HYDROCHLORIDE 10 MG: 10 INJECTION, SOLUTION INTRAVENOUS at 07:20

## 2017-05-24 RX ADMIN — PHENYLEPHRINE HYDROCHLORIDE 100 MCG: 10 INJECTION INTRAVENOUS at 07:35

## 2017-05-24 RX ADMIN — ATORVASTATIN CALCIUM 40 MG: 40 TABLET, FILM COATED ORAL at 21:55

## 2017-05-24 RX ADMIN — PHENYLEPHRINE HYDROCHLORIDE 100 MCG: 10 INJECTION INTRAVENOUS at 07:20

## 2017-05-24 RX ADMIN — PROPOFOL 100 MG: 10 INJECTION, EMULSION INTRAVENOUS at 07:04

## 2017-05-24 RX ADMIN — MIDAZOLAM HYDROCHLORIDE 1 MG: 1 INJECTION, SOLUTION INTRAMUSCULAR; INTRAVENOUS at 06:55

## 2017-05-24 RX ADMIN — SODIUM CHLORIDE: 9 INJECTION, SOLUTION INTRAVENOUS at 07:20

## 2017-05-24 RX ADMIN — ALBUMIN HUMAN 500 ML: 0.05 INJECTION, SOLUTION INTRAVENOUS at 14:30

## 2017-05-24 RX ADMIN — AMINOCAPROIC ACID 10 G: 250 INJECTION, SOLUTION INTRAVENOUS at 07:22

## 2017-05-24 RX ADMIN — OXYCODONE AND ACETAMINOPHEN 2 TABLET: 5; 325 TABLET ORAL at 21:55

## 2017-05-24 RX ADMIN — NITROGLYCERIN 5 MCG/MIN: 20 INJECTION INTRAVENOUS at 07:55

## 2017-05-24 RX ADMIN — VECURONIUM BROMIDE 6 MG: 1 INJECTION, POWDER, LYOPHILIZED, FOR SOLUTION INTRAVENOUS at 08:35

## 2017-05-24 RX ADMIN — ASPIRIN 325 MG ORAL TABLET 325 MG: 325 PILL ORAL at 15:00

## 2017-05-24 RX ADMIN — PROPOFOL 10 MCG/KG/MIN: 10 INJECTION, EMULSION INTRAVENOUS at 10:26

## 2017-05-24 RX ADMIN — CALCIUM CHLORIDE 1 G: 100 INJECTION INTRAVENOUS; INTRAVENTRICULAR at 10:20

## 2017-05-24 RX ADMIN — MIDAZOLAM HYDROCHLORIDE 2 MG: 1 INJECTION, SOLUTION INTRAMUSCULAR; INTRAVENOUS at 08:40

## 2017-05-24 RX ADMIN — CEFUROXIME 1.5 G: 1.5 INJECTION, POWDER, FOR SOLUTION INTRAVENOUS at 16:59

## 2017-05-24 RX ADMIN — PHENYLEPHRINE HYDROCHLORIDE 100 MCG: 10 INJECTION INTRAVENOUS at 07:50

## 2017-05-24 RX ADMIN — MORPHINE SULFATE 2 MG: 2 INJECTION, SOLUTION INTRAMUSCULAR; INTRAVENOUS at 17:16

## 2017-05-24 RX ADMIN — ONDANSETRON 4 MG: 2 INJECTION INTRAMUSCULAR; INTRAVENOUS at 22:05

## 2017-05-24 RX ADMIN — VANCOMYCIN HYDROCHLORIDE 1000 MG: 1 INJECTION, SOLUTION INTRAVENOUS at 06:46

## 2017-05-24 RX ADMIN — SODIUM CHLORIDE, POTASSIUM CHLORIDE, SODIUM LACTATE AND CALCIUM CHLORIDE 9 ML/HR: 600; 310; 30; 20 INJECTION, SOLUTION INTRAVENOUS at 05:59

## 2017-05-24 RX ADMIN — SUFENTANIL CITRATE 100 MCG: 50 INJECTION EPIDURAL; INTRAVENOUS at 10:12

## 2017-05-24 RX ADMIN — MORPHINE SULFATE 2 MG: 2 INJECTION, SOLUTION INTRAMUSCULAR; INTRAVENOUS at 21:55

## 2017-05-24 RX ADMIN — CEFUROXIME 1.5 G: 1.5 INJECTION, POWDER, FOR SOLUTION INTRAVENOUS at 22:00

## 2017-05-24 RX ADMIN — HEPARIN SODIUM 20000 UNITS: 1000 INJECTION, SOLUTION INTRAVENOUS; SUBCUTANEOUS at 08:26

## 2017-05-24 RX ADMIN — PROPOFOL 40 MCG/KG/MIN: 10 INJECTION, EMULSION INTRAVENOUS at 14:40

## 2017-05-24 RX ADMIN — DEXMEDETOMIDINE HYDROCHLORIDE 0.5 MCG/KG/HR: 4 INJECTION, SOLUTION INTRAVENOUS at 15:40

## 2017-05-24 RX ADMIN — ESMOLOL HYDROCHLORIDE 25 MG: 10 INJECTION, SOLUTION INTRAVENOUS at 10:20

## 2017-05-24 RX ADMIN — SODIUM CHLORIDE 30 ML/HR: 900 INJECTION INTRAVENOUS at 22:20

## 2017-05-24 RX ADMIN — POTASSIUM CHLORIDE AND DEXTROSE MONOHYDRATE 30 ML/HR: 150; 5 INJECTION, SOLUTION INTRAVENOUS at 22:19

## 2017-05-24 RX ADMIN — CHLORHEXIDINE GLUCONATE 15 ML: 1.2 RINSE ORAL at 05:59

## 2017-05-24 RX ADMIN — POTASSIUM CHLORIDE AND DEXTROSE MONOHYDRATE 30 ML/HR: 150; 5 INJECTION, SOLUTION INTRAVENOUS at 11:20

## 2017-05-24 RX ADMIN — METOPROLOL TARTRATE 2 MG: 5 INJECTION, SOLUTION INTRAVENOUS at 10:50

## 2017-05-24 RX ADMIN — LIDOCAINE HYDROCHLORIDE 25 MG: 20 INJECTION, SOLUTION INFILTRATION; PERINEURAL at 07:04

## 2017-05-24 RX ADMIN — MIDAZOLAM HYDROCHLORIDE 1 MG: 1 INJECTION, SOLUTION INTRAMUSCULAR; INTRAVENOUS at 07:25

## 2017-05-24 RX ADMIN — HEPARIN SODIUM 5000 UNITS: 1000 INJECTION, SOLUTION INTRAVENOUS; SUBCUTANEOUS at 08:18

## 2017-05-24 RX ADMIN — SODIUM CHLORIDE 6.3 UNITS/HR: 9 INJECTION, SOLUTION INTRAVENOUS at 13:00

## 2017-05-24 RX ADMIN — FAMOTIDINE 20 MG: 20 TABLET ORAL at 05:59

## 2017-05-24 RX ADMIN — MUPIROCIN 1 APPLICATION: 20 OINTMENT TOPICAL at 05:59

## 2017-05-24 RX ADMIN — SODIUM BICARBONATE 50 MEQ: 84 INJECTION, SOLUTION INTRAVENOUS at 12:20

## 2017-05-24 RX ADMIN — PROTAMINE SULFATE 100 MG: 10 INJECTION, SOLUTION INTRAVENOUS at 11:42

## 2017-05-25 ENCOUNTER — APPOINTMENT (OUTPATIENT)
Dept: GENERAL RADIOLOGY | Facility: HOSPITAL | Age: 77
End: 2017-05-25

## 2017-05-25 LAB
ABO + RH BLD: NORMAL
ABO + RH BLD: NORMAL
ALBUMIN SERPL-MCNC: 3.3 G/DL (ref 3.2–4.8)
ANION GAP SERPL CALCULATED.3IONS-SCNC: 3 MMOL/L (ref 3–11)
BASOPHILS # BLD AUTO: 0.02 10*3/MM3 (ref 0–0.2)
BASOPHILS NFR BLD AUTO: 0.2 % (ref 0–1)
BH BB BLOOD EXPIRATION DATE: NORMAL
BH BB BLOOD EXPIRATION DATE: NORMAL
BH BB BLOOD TYPE BARCODE: 5100
BH BB BLOOD TYPE BARCODE: 5100
BH BB DISPENSE STATUS: NORMAL
BH BB DISPENSE STATUS: NORMAL
BH BB PRODUCT CODE: NORMAL
BH BB PRODUCT CODE: NORMAL
BH BB UNIT NUMBER: NORMAL
BH BB UNIT NUMBER: NORMAL
BUN BLD-MCNC: 15 MG/DL (ref 9–23)
BUN/CREAT SERPL: 18.8 (ref 7–25)
CALCIUM SPEC-SCNC: 8.1 MG/DL (ref 8.7–10.4)
CHLORIDE SERPL-SCNC: 107 MMOL/L (ref 99–109)
CO2 SERPL-SCNC: 28 MMOL/L (ref 20–31)
CREAT BLD-MCNC: 0.8 MG/DL (ref 0.6–1.3)
CROSSMATCH INTERPRETATION: NORMAL
CROSSMATCH INTERPRETATION: NORMAL
DEPRECATED RDW RBC AUTO: 48.5 FL (ref 37–54)
EOSINOPHIL # BLD AUTO: 0.11 10*3/MM3 (ref 0.1–0.3)
EOSINOPHIL NFR BLD AUTO: 1 % (ref 0–3)
ERYTHROCYTE [DISTWIDTH] IN BLOOD BY AUTOMATED COUNT: 14.2 % (ref 11.3–14.5)
GFR SERPL CREATININE-BSD FRML MDRD: 70 ML/MIN/1.73
GLUCOSE BLD-MCNC: 119 MG/DL (ref 70–100)
GLUCOSE BLDC GLUCOMTR-MCNC: 109 MG/DL (ref 70–130)
GLUCOSE BLDC GLUCOMTR-MCNC: 110 MG/DL (ref 70–130)
GLUCOSE BLDC GLUCOMTR-MCNC: 124 MG/DL (ref 70–130)
GLUCOSE BLDC GLUCOMTR-MCNC: 126 MG/DL (ref 70–130)
GLUCOSE BLDC GLUCOMTR-MCNC: 129 MG/DL (ref 70–130)
GLUCOSE BLDC GLUCOMTR-MCNC: 134 MG/DL (ref 70–130)
GLUCOSE BLDC GLUCOMTR-MCNC: 144 MG/DL (ref 70–130)
GLUCOSE BLDC GLUCOMTR-MCNC: 145 MG/DL (ref 70–130)
GLUCOSE BLDC GLUCOMTR-MCNC: 151 MG/DL (ref 70–130)
GLUCOSE BLDC GLUCOMTR-MCNC: 164 MG/DL (ref 70–130)
GLUCOSE BLDC GLUCOMTR-MCNC: 204 MG/DL (ref 70–130)
GLUCOSE BLDC GLUCOMTR-MCNC: 95 MG/DL (ref 70–130)
GLUCOSE BLDC GLUCOMTR-MCNC: 99 MG/DL (ref 70–130)
HCT VFR BLD AUTO: 24.5 % (ref 34.5–44)
HCT VFR BLD AUTO: 27.4 % (ref 34.5–44)
HCT VFR BLD AUTO: 28.7 % (ref 34.5–44)
HGB BLD-MCNC: 8 G/DL (ref 11.5–15.5)
HGB BLD-MCNC: 9.1 G/DL (ref 11.5–15.5)
HGB BLD-MCNC: 9.6 G/DL (ref 11.5–15.5)
IMM GRANULOCYTES # BLD: 0.02 10*3/MM3 (ref 0–0.03)
IMM GRANULOCYTES NFR BLD: 0.2 % (ref 0–0.6)
INR PPP: 1.13
LYMPHOCYTES # BLD AUTO: 0.97 10*3/MM3 (ref 0.6–4.8)
LYMPHOCYTES NFR BLD AUTO: 9.2 % (ref 24–44)
MAGNESIUM SERPL-MCNC: 2.1 MG/DL (ref 1.3–2.7)
MCH RBC QN AUTO: 31 PG (ref 27–31)
MCHC RBC AUTO-ENTMCNC: 33.4 G/DL (ref 32–36)
MCV RBC AUTO: 92.6 FL (ref 80–99)
MONOCYTES # BLD AUTO: 0.63 10*3/MM3 (ref 0–1)
MONOCYTES NFR BLD AUTO: 6 % (ref 0–12)
NEUTROPHILS # BLD AUTO: 8.81 10*3/MM3 (ref 1.5–8.3)
NEUTROPHILS NFR BLD AUTO: 83.4 % (ref 41–71)
PHOSPHATE SERPL-MCNC: 1.9 MG/DL (ref 2.4–5.1)
PHOSPHATE SERPL-MCNC: 3 MG/DL (ref 2.4–5.1)
PLATELET # BLD AUTO: 78 10*3/MM3 (ref 150–450)
PMV BLD AUTO: 10.8 FL (ref 6–12)
POTASSIUM BLD-SCNC: 3.1 MMOL/L (ref 3.5–5.5)
POTASSIUM BLD-SCNC: 3.1 MMOL/L (ref 3.5–5.5)
POTASSIUM BLD-SCNC: 3.8 MMOL/L (ref 3.5–5.5)
POTASSIUM BLD-SCNC: 4.4 MMOL/L (ref 3.5–5.5)
PROTHROMBIN TIME: 12.4 SECONDS (ref 9.6–11.5)
RBC # BLD AUTO: 3.1 10*6/MM3 (ref 3.89–5.14)
SODIUM BLD-SCNC: 138 MMOL/L (ref 132–146)
UNIT  ABO: NORMAL
UNIT  ABO: NORMAL
UNIT  RH: NORMAL
UNIT  RH: NORMAL
WBC NRBC COR # BLD: 10.56 10*3/MM3 (ref 3.5–10.8)

## 2017-05-25 PROCEDURE — 25010000003 POTASSIUM CHLORIDE PER 2 MEQ: Performed by: PHYSICIAN ASSISTANT

## 2017-05-25 PROCEDURE — 80069 RENAL FUNCTION PANEL: CPT | Performed by: PHYSICIAN ASSISTANT

## 2017-05-25 PROCEDURE — 25010000002 KETOROLAC TROMETHAMINE PER 15 MG: Performed by: NURSE PRACTITIONER

## 2017-05-25 PROCEDURE — 85610 PROTHROMBIN TIME: CPT | Performed by: PHYSICIAN ASSISTANT

## 2017-05-25 PROCEDURE — 25010000002 MORPHINE SULFATE (PF) 2 MG/ML SOLUTION: Performed by: THORACIC SURGERY (CARDIOTHORACIC VASCULAR SURGERY)

## 2017-05-25 PROCEDURE — 84100 ASSAY OF PHOSPHORUS: CPT | Performed by: THORACIC SURGERY (CARDIOTHORACIC VASCULAR SURGERY)

## 2017-05-25 PROCEDURE — 25010000002 CEFUROXIME: Performed by: PHYSICIAN ASSISTANT

## 2017-05-25 PROCEDURE — 99291 CRITICAL CARE FIRST HOUR: CPT | Performed by: INTERNAL MEDICINE

## 2017-05-25 PROCEDURE — 25010000002 MORPHINE SULFATE (PF) 2 MG/ML SOLUTION: Performed by: INTERNAL MEDICINE

## 2017-05-25 PROCEDURE — 85025 COMPLETE CBC W/AUTO DIFF WBC: CPT | Performed by: PHYSICIAN ASSISTANT

## 2017-05-25 PROCEDURE — 82962 GLUCOSE BLOOD TEST: CPT

## 2017-05-25 PROCEDURE — 99232 SBSQ HOSP IP/OBS MODERATE 35: CPT | Performed by: INTERNAL MEDICINE

## 2017-05-25 PROCEDURE — 83735 ASSAY OF MAGNESIUM: CPT | Performed by: PHYSICIAN ASSISTANT

## 2017-05-25 PROCEDURE — 25010000002 ALBUMIN HUMAN 5% PER 50 ML: Performed by: THORACIC SURGERY (CARDIOTHORACIC VASCULAR SURGERY)

## 2017-05-25 PROCEDURE — 93005 ELECTROCARDIOGRAM TRACING: CPT | Performed by: PHYSICIAN ASSISTANT

## 2017-05-25 PROCEDURE — 97110 THERAPEUTIC EXERCISES: CPT

## 2017-05-25 PROCEDURE — 85018 HEMOGLOBIN: CPT | Performed by: THORACIC SURGERY (CARDIOTHORACIC VASCULAR SURGERY)

## 2017-05-25 PROCEDURE — 71010 HC CHEST PA OR AP: CPT

## 2017-05-25 PROCEDURE — 84132 ASSAY OF SERUM POTASSIUM: CPT | Performed by: THORACIC SURGERY (CARDIOTHORACIC VASCULAR SURGERY)

## 2017-05-25 PROCEDURE — 85014 HEMATOCRIT: CPT | Performed by: THORACIC SURGERY (CARDIOTHORACIC VASCULAR SURGERY)

## 2017-05-25 PROCEDURE — 63710000001 INSULIN LISPRO (HUMAN) PER 5 UNITS: Performed by: INTERNAL MEDICINE

## 2017-05-25 PROCEDURE — 93010 ELECTROCARDIOGRAM REPORT: CPT | Performed by: INTERNAL MEDICINE

## 2017-05-25 PROCEDURE — 63710000001 INSULIN DETEMIR PER 5 UNITS: Performed by: INTERNAL MEDICINE

## 2017-05-25 PROCEDURE — 97163 PT EVAL HIGH COMPLEX 45 MIN: CPT

## 2017-05-25 PROCEDURE — 25010000002 KETOROLAC TROMETHAMINE PER 15 MG: Performed by: INTERNAL MEDICINE

## 2017-05-25 PROCEDURE — P9041 ALBUMIN (HUMAN),5%, 50ML: HCPCS | Performed by: THORACIC SURGERY (CARDIOTHORACIC VASCULAR SURGERY)

## 2017-05-25 RX ORDER — DEXTROSE MONOHYDRATE 25 G/50ML
25 INJECTION, SOLUTION INTRAVENOUS
Status: DISCONTINUED | OUTPATIENT
Start: 2017-05-25 | End: 2017-05-29 | Stop reason: HOSPADM

## 2017-05-25 RX ORDER — KETOROLAC TROMETHAMINE 15 MG/ML
15 INJECTION, SOLUTION INTRAMUSCULAR; INTRAVENOUS EVERY 8 HOURS
Status: COMPLETED | OUTPATIENT
Start: 2017-05-25 | End: 2017-05-26

## 2017-05-25 RX ORDER — NICOTINE POLACRILEX 4 MG
15 LOZENGE BUCCAL
Status: DISCONTINUED | OUTPATIENT
Start: 2017-05-25 | End: 2017-05-29 | Stop reason: HOSPADM

## 2017-05-25 RX ORDER — KETOROLAC TROMETHAMINE 30 MG/ML
30 INJECTION, SOLUTION INTRAMUSCULAR; INTRAVENOUS ONCE
Status: COMPLETED | OUTPATIENT
Start: 2017-05-25 | End: 2017-05-25

## 2017-05-25 RX ORDER — PANTOPRAZOLE SODIUM 40 MG/1
40 TABLET, DELAYED RELEASE ORAL
Status: DISCONTINUED | OUTPATIENT
Start: 2017-05-26 | End: 2017-05-29 | Stop reason: HOSPADM

## 2017-05-25 RX ORDER — MORPHINE SULFATE 2 MG/ML
2 INJECTION, SOLUTION INTRAMUSCULAR; INTRAVENOUS
Status: DISCONTINUED | OUTPATIENT
Start: 2017-05-25 | End: 2017-05-29 | Stop reason: HOSPADM

## 2017-05-25 RX ORDER — ALBUMIN, HUMAN INJ 5% 5 %
500 SOLUTION INTRAVENOUS ONCE
Status: COMPLETED | OUTPATIENT
Start: 2017-05-25 | End: 2017-05-25

## 2017-05-25 RX ADMIN — CEFUROXIME 1.5 G: 1.5 INJECTION, POWDER, FOR SOLUTION INTRAVENOUS at 06:36

## 2017-05-25 RX ADMIN — CEFUROXIME 1.5 G: 1.5 INJECTION, POWDER, FOR SOLUTION INTRAVENOUS at 21:21

## 2017-05-25 RX ADMIN — KETOROLAC TROMETHAMINE 15 MG: 15 INJECTION, SOLUTION INTRAMUSCULAR; INTRAVENOUS at 14:16

## 2017-05-25 RX ADMIN — OXYCODONE AND ACETAMINOPHEN 2 TABLET: 5; 325 TABLET ORAL at 23:55

## 2017-05-25 RX ADMIN — KETOROLAC TROMETHAMINE 30 MG: 30 INJECTION, SOLUTION INTRAMUSCULAR at 04:41

## 2017-05-25 RX ADMIN — MORPHINE SULFATE 2 MG: 2 INJECTION, SOLUTION INTRAMUSCULAR; INTRAVENOUS at 04:10

## 2017-05-25 RX ADMIN — MORPHINE SULFATE 2 MG: 2 INJECTION, SOLUTION INTRAMUSCULAR; INTRAVENOUS at 01:26

## 2017-05-25 RX ADMIN — CHLORHEXIDINE GLUCONATE 15 ML: 1.2 RINSE ORAL at 09:09

## 2017-05-25 RX ADMIN — PANTOPRAZOLE SODIUM 40 MG: 40 INJECTION, POWDER, FOR SOLUTION INTRAVENOUS at 06:36

## 2017-05-25 RX ADMIN — POTASSIUM CHLORIDE 20 MEQ: 400 INJECTION, SOLUTION INTRAVENOUS at 06:36

## 2017-05-25 RX ADMIN — KETOROLAC TROMETHAMINE 15 MG: 15 INJECTION, SOLUTION INTRAMUSCULAR; INTRAVENOUS at 21:21

## 2017-05-25 RX ADMIN — POTASSIUM CHLORIDE 20 MEQ: 400 INJECTION, SOLUTION INTRAVENOUS at 04:25

## 2017-05-25 RX ADMIN — Medication 0.8 MCG/KG/MIN: at 15:57

## 2017-05-25 RX ADMIN — HYDROCODONE BITARTRATE AND ACETAMINOPHEN 1 TABLET: 7.5; 325 TABLET ORAL at 10:40

## 2017-05-25 RX ADMIN — CEFUROXIME 1.5 G: 1.5 INJECTION, POWDER, FOR SOLUTION INTRAVENOUS at 14:16

## 2017-05-25 RX ADMIN — HYDROCODONE BITARTRATE AND ACETAMINOPHEN 1 TABLET: 7.5; 325 TABLET ORAL at 21:13

## 2017-05-25 RX ADMIN — INSULIN DETEMIR 20 UNITS: 100 INJECTION, SOLUTION SUBCUTANEOUS at 14:16

## 2017-05-25 RX ADMIN — Medication 2 TABLET: at 09:09

## 2017-05-25 RX ADMIN — INSULIN LISPRO 3 UNITS: 100 INJECTION, SOLUTION INTRAVENOUS; SUBCUTANEOUS at 18:01

## 2017-05-25 RX ADMIN — POTASSIUM PHOSPHATE, MONOBASIC AND POTASSIUM PHOSPHATE, DIBASIC 15 MMOL: 224; 236 INJECTION, SOLUTION INTRAVENOUS at 04:10

## 2017-05-25 RX ADMIN — OXYCODONE AND ACETAMINOPHEN 2 TABLET: 5; 325 TABLET ORAL at 04:10

## 2017-05-25 RX ADMIN — HYDROCODONE BITARTRATE AND ACETAMINOPHEN 1 TABLET: 7.5; 325 TABLET ORAL at 01:26

## 2017-05-25 RX ADMIN — POTASSIUM CHLORIDE 20 MEQ: 400 INJECTION, SOLUTION INTRAVENOUS at 03:18

## 2017-05-25 RX ADMIN — HYDROCODONE BITARTRATE AND ACETAMINOPHEN 1 TABLET: 7.5; 325 TABLET ORAL at 14:18

## 2017-05-25 RX ADMIN — ALBUMIN HUMAN 500 ML: 0.05 INJECTION, SOLUTION INTRAVENOUS at 08:04

## 2017-05-25 RX ADMIN — INSULIN LISPRO 5 UNITS: 100 INJECTION, SOLUTION INTRAVENOUS; SUBCUTANEOUS at 21:13

## 2017-05-25 RX ADMIN — POTASSIUM CHLORIDE 20 MEQ: 29.8 INJECTION, SOLUTION INTRAVENOUS at 04:31

## 2017-05-25 RX ADMIN — Medication 2 TABLET: at 17:56

## 2017-05-25 RX ADMIN — MORPHINE SULFATE 2 MG: 2 INJECTION, SOLUTION INTRAMUSCULAR; INTRAVENOUS at 21:14

## 2017-05-25 RX ADMIN — ASPIRIN 325 MG: 325 TABLET, DELAYED RELEASE ORAL at 09:09

## 2017-05-26 ENCOUNTER — APPOINTMENT (OUTPATIENT)
Dept: GENERAL RADIOLOGY | Facility: HOSPITAL | Age: 77
End: 2017-05-26

## 2017-05-26 LAB
ANION GAP SERPL CALCULATED.3IONS-SCNC: 5 MMOL/L (ref 3–11)
BACTERIA SPEC AEROBE CULT: NORMAL
BUN BLD-MCNC: 15 MG/DL (ref 9–23)
BUN/CREAT SERPL: 15 (ref 7–25)
CALCIUM SPEC-SCNC: 8 MG/DL (ref 8.7–10.4)
CHLORIDE SERPL-SCNC: 101 MMOL/L (ref 99–109)
CO2 SERPL-SCNC: 27 MMOL/L (ref 20–31)
CREAT BLD-MCNC: 1 MG/DL (ref 0.6–1.3)
DEPRECATED RDW RBC AUTO: 52.9 FL (ref 37–54)
ERYTHROCYTE [DISTWIDTH] IN BLOOD BY AUTOMATED COUNT: 15.1 % (ref 11.3–14.5)
GFR SERPL CREATININE-BSD FRML MDRD: 54 ML/MIN/1.73
GLUCOSE BLD-MCNC: 114 MG/DL (ref 70–100)
GLUCOSE BLDC GLUCOMTR-MCNC: 124 MG/DL (ref 70–130)
GLUCOSE BLDC GLUCOMTR-MCNC: 142 MG/DL (ref 70–130)
GLUCOSE BLDC GLUCOMTR-MCNC: 149 MG/DL (ref 70–130)
GLUCOSE BLDC GLUCOMTR-MCNC: 187 MG/DL (ref 70–130)
HCT VFR BLD AUTO: 25.2 % (ref 34.5–44)
HGB BLD-MCNC: 8.2 G/DL (ref 11.5–15.5)
MAGNESIUM SERPL-MCNC: 2.1 MG/DL (ref 1.3–2.7)
MCH RBC QN AUTO: 31.2 PG (ref 27–31)
MCHC RBC AUTO-ENTMCNC: 32.5 G/DL (ref 32–36)
MCV RBC AUTO: 95.8 FL (ref 80–99)
PHOSPHATE SERPL-MCNC: 3.2 MG/DL (ref 2.4–5.1)
PLATELET # BLD AUTO: 72 10*3/MM3 (ref 150–450)
PMV BLD AUTO: 10.9 FL (ref 6–12)
POTASSIUM BLD-SCNC: 4 MMOL/L (ref 3.5–5.5)
RBC # BLD AUTO: 2.63 10*6/MM3 (ref 3.89–5.14)
SODIUM BLD-SCNC: 133 MMOL/L (ref 132–146)
WBC NRBC COR # BLD: 10.4 10*3/MM3 (ref 3.5–10.8)

## 2017-05-26 PROCEDURE — 99291 CRITICAL CARE FIRST HOUR: CPT | Performed by: INTERNAL MEDICINE

## 2017-05-26 PROCEDURE — 84100 ASSAY OF PHOSPHORUS: CPT | Performed by: THORACIC SURGERY (CARDIOTHORACIC VASCULAR SURGERY)

## 2017-05-26 PROCEDURE — 71010 HC CHEST PA OR AP: CPT

## 2017-05-26 PROCEDURE — 99024 POSTOP FOLLOW-UP VISIT: CPT | Performed by: PHYSICIAN ASSISTANT

## 2017-05-26 PROCEDURE — 83735 ASSAY OF MAGNESIUM: CPT | Performed by: THORACIC SURGERY (CARDIOTHORACIC VASCULAR SURGERY)

## 2017-05-26 PROCEDURE — 25010000002 KETOROLAC TROMETHAMINE PER 15 MG: Performed by: INTERNAL MEDICINE

## 2017-05-26 PROCEDURE — 93005 ELECTROCARDIOGRAM TRACING: CPT | Performed by: PHYSICIAN ASSISTANT

## 2017-05-26 PROCEDURE — 85027 COMPLETE CBC AUTOMATED: CPT | Performed by: PHYSICIAN ASSISTANT

## 2017-05-26 PROCEDURE — 97110 THERAPEUTIC EXERCISES: CPT

## 2017-05-26 PROCEDURE — 80048 BASIC METABOLIC PNL TOTAL CA: CPT | Performed by: PHYSICIAN ASSISTANT

## 2017-05-26 PROCEDURE — 25010000002 ONDANSETRON PER 1 MG: Performed by: PHYSICIAN ASSISTANT

## 2017-05-26 PROCEDURE — 63710000001 INSULIN DETEMIR PER 5 UNITS: Performed by: INTERNAL MEDICINE

## 2017-05-26 PROCEDURE — 82962 GLUCOSE BLOOD TEST: CPT

## 2017-05-26 PROCEDURE — 93010 ELECTROCARDIOGRAM REPORT: CPT | Performed by: INTERNAL MEDICINE

## 2017-05-26 PROCEDURE — 99024 POSTOP FOLLOW-UP VISIT: CPT | Performed by: THORACIC SURGERY (CARDIOTHORACIC VASCULAR SURGERY)

## 2017-05-26 PROCEDURE — 99232 SBSQ HOSP IP/OBS MODERATE 35: CPT | Performed by: INTERNAL MEDICINE

## 2017-05-26 RX ORDER — ATORVASTATIN CALCIUM 10 MG/1
10 TABLET, FILM COATED ORAL NIGHTLY
Status: DISCONTINUED | OUTPATIENT
Start: 2017-05-26 | End: 2017-05-29 | Stop reason: HOSPADM

## 2017-05-26 RX ORDER — SODIUM CHLORIDE, SODIUM LACTATE, POTASSIUM CHLORIDE, CALCIUM CHLORIDE 600; 310; 30; 20 MG/100ML; MG/100ML; MG/100ML; MG/100ML
200 INJECTION, SOLUTION INTRAVENOUS CONTINUOUS
Status: DISCONTINUED | OUTPATIENT
Start: 2017-05-26 | End: 2017-05-26

## 2017-05-26 RX ADMIN — PANTOPRAZOLE SODIUM 40 MG: 40 TABLET, DELAYED RELEASE ORAL at 05:45

## 2017-05-26 RX ADMIN — ASPIRIN 325 MG: 325 TABLET, DELAYED RELEASE ORAL at 08:17

## 2017-05-26 RX ADMIN — INSULIN DETEMIR 20 UNITS: 100 INJECTION, SOLUTION SUBCUTANEOUS at 17:41

## 2017-05-26 RX ADMIN — Medication 2 TABLET: at 08:17

## 2017-05-26 RX ADMIN — INSULIN LISPRO 3 UNITS: 100 INJECTION, SOLUTION INTRAVENOUS; SUBCUTANEOUS at 11:40

## 2017-05-26 RX ADMIN — HYDROCODONE BITARTRATE AND ACETAMINOPHEN 1 TABLET: 7.5; 325 TABLET ORAL at 04:15

## 2017-05-26 RX ADMIN — SODIUM CHLORIDE, POTASSIUM CHLORIDE, SODIUM LACTATE AND CALCIUM CHLORIDE 200 ML/HR: 600; 310; 30; 20 INJECTION, SOLUTION INTRAVENOUS at 09:26

## 2017-05-26 RX ADMIN — ONDANSETRON 4 MG: 2 INJECTION INTRAMUSCULAR; INTRAVENOUS at 20:50

## 2017-05-26 RX ADMIN — ACETAMINOPHEN 650 MG: 325 TABLET ORAL at 08:26

## 2017-05-26 RX ADMIN — Medication 2 TABLET: at 17:41

## 2017-05-26 RX ADMIN — ATORVASTATIN CALCIUM 10 MG: 10 TABLET, FILM COATED ORAL at 20:50

## 2017-05-26 RX ADMIN — HYDROCODONE BITARTRATE AND ACETAMINOPHEN 1 TABLET: 7.5; 325 TABLET ORAL at 11:30

## 2017-05-26 RX ADMIN — KETOROLAC TROMETHAMINE 15 MG: 15 INJECTION, SOLUTION INTRAMUSCULAR; INTRAVENOUS at 05:47

## 2017-05-26 RX ADMIN — HYDROCODONE BITARTRATE AND ACETAMINOPHEN 1 TABLET: 7.5; 325 TABLET ORAL at 15:53

## 2017-05-26 RX ADMIN — OXYCODONE AND ACETAMINOPHEN 1 TABLET: 5; 325 TABLET ORAL at 20:50

## 2017-05-27 LAB
ABO + RH BLD: NORMAL
ABO + RH BLD: NORMAL
ANION GAP SERPL CALCULATED.3IONS-SCNC: 6 MMOL/L (ref 3–11)
BH BB BLOOD EXPIRATION DATE: NORMAL
BH BB BLOOD EXPIRATION DATE: NORMAL
BH BB BLOOD TYPE BARCODE: 5100
BH BB BLOOD TYPE BARCODE: 5100
BH BB DISPENSE STATUS: NORMAL
BH BB DISPENSE STATUS: NORMAL
BH BB PRODUCT CODE: NORMAL
BH BB PRODUCT CODE: NORMAL
BH BB UNIT NUMBER: NORMAL
BH BB UNIT NUMBER: NORMAL
BUN BLD-MCNC: 17 MG/DL (ref 9–23)
BUN/CREAT SERPL: 21.3 (ref 7–25)
CALCIUM SPEC-SCNC: 9 MG/DL (ref 8.7–10.4)
CHLORIDE SERPL-SCNC: 100 MMOL/L (ref 99–109)
CO2 SERPL-SCNC: 29 MMOL/L (ref 20–31)
CREAT BLD-MCNC: 0.8 MG/DL (ref 0.6–1.3)
CROSSMATCH INTERPRETATION: NORMAL
CROSSMATCH INTERPRETATION: NORMAL
DEPRECATED RDW RBC AUTO: 52 FL (ref 37–54)
ERYTHROCYTE [DISTWIDTH] IN BLOOD BY AUTOMATED COUNT: 14.8 % (ref 11.3–14.5)
GFR SERPL CREATININE-BSD FRML MDRD: 70 ML/MIN/1.73
GLUCOSE BLD-MCNC: 139 MG/DL (ref 70–100)
GLUCOSE BLDC GLUCOMTR-MCNC: 105 MG/DL (ref 70–130)
GLUCOSE BLDC GLUCOMTR-MCNC: 138 MG/DL (ref 70–130)
GLUCOSE BLDC GLUCOMTR-MCNC: 161 MG/DL (ref 70–130)
GLUCOSE BLDC GLUCOMTR-MCNC: 194 MG/DL (ref 70–130)
HCT VFR BLD AUTO: 27 % (ref 34.5–44)
HGB BLD-MCNC: 8.8 G/DL (ref 11.5–15.5)
MAGNESIUM SERPL-MCNC: 1.9 MG/DL (ref 1.3–2.7)
MCH RBC QN AUTO: 31.1 PG (ref 27–31)
MCHC RBC AUTO-ENTMCNC: 32.6 G/DL (ref 32–36)
MCV RBC AUTO: 95.4 FL (ref 80–99)
PLATELET # BLD AUTO: 94 10*3/MM3 (ref 150–450)
PMV BLD AUTO: 11 FL (ref 6–12)
POTASSIUM BLD-SCNC: 3.9 MMOL/L (ref 3.5–5.5)
POTASSIUM BLD-SCNC: 4.2 MMOL/L (ref 3.5–5.5)
RBC # BLD AUTO: 2.83 10*6/MM3 (ref 3.89–5.14)
SODIUM BLD-SCNC: 135 MMOL/L (ref 132–146)
UNIT  ABO: NORMAL
UNIT  ABO: NORMAL
UNIT  RH: NORMAL
UNIT  RH: NORMAL
WBC NRBC COR # BLD: 9.08 10*3/MM3 (ref 3.5–10.8)

## 2017-05-27 PROCEDURE — 82962 GLUCOSE BLOOD TEST: CPT

## 2017-05-27 PROCEDURE — 85027 COMPLETE CBC AUTOMATED: CPT | Performed by: PHYSICIAN ASSISTANT

## 2017-05-27 PROCEDURE — 84132 ASSAY OF SERUM POTASSIUM: CPT | Performed by: INTERNAL MEDICINE

## 2017-05-27 PROCEDURE — 99233 SBSQ HOSP IP/OBS HIGH 50: CPT | Performed by: INTERNAL MEDICINE

## 2017-05-27 PROCEDURE — 25010000002 FUROSEMIDE PER 20 MG: Performed by: INTERNAL MEDICINE

## 2017-05-27 PROCEDURE — 83735 ASSAY OF MAGNESIUM: CPT | Performed by: INTERNAL MEDICINE

## 2017-05-27 PROCEDURE — 99232 SBSQ HOSP IP/OBS MODERATE 35: CPT | Performed by: INTERNAL MEDICINE

## 2017-05-27 PROCEDURE — 80048 BASIC METABOLIC PNL TOTAL CA: CPT | Performed by: PHYSICIAN ASSISTANT

## 2017-05-27 PROCEDURE — 25010000002 FUROSEMIDE PER 20 MG: Performed by: THORACIC SURGERY (CARDIOTHORACIC VASCULAR SURGERY)

## 2017-05-27 PROCEDURE — 63710000001 INSULIN DETEMIR PER 5 UNITS: Performed by: INTERNAL MEDICINE

## 2017-05-27 RX ORDER — FUROSEMIDE 10 MG/ML
40 INJECTION INTRAMUSCULAR; INTRAVENOUS ONCE
Status: COMPLETED | OUTPATIENT
Start: 2017-05-27 | End: 2017-05-27

## 2017-05-27 RX ADMIN — HYDROCODONE BITARTRATE AND ACETAMINOPHEN 1 TABLET: 7.5; 325 TABLET ORAL at 21:22

## 2017-05-27 RX ADMIN — ATORVASTATIN CALCIUM 10 MG: 10 TABLET, FILM COATED ORAL at 21:22

## 2017-05-27 RX ADMIN — OXYCODONE AND ACETAMINOPHEN 1 TABLET: 5; 325 TABLET ORAL at 11:53

## 2017-05-27 RX ADMIN — INSULIN LISPRO 3 UNITS: 100 INJECTION, SOLUTION INTRAVENOUS; SUBCUTANEOUS at 21:25

## 2017-05-27 RX ADMIN — INSULIN LISPRO 3 UNITS: 100 INJECTION, SOLUTION INTRAVENOUS; SUBCUTANEOUS at 13:36

## 2017-05-27 RX ADMIN — METOPROLOL TARTRATE 12.5 MG: 25 TABLET, FILM COATED ORAL at 09:08

## 2017-05-27 RX ADMIN — Medication 2 TABLET: at 17:59

## 2017-05-27 RX ADMIN — OXYCODONE AND ACETAMINOPHEN 1 TABLET: 5; 325 TABLET ORAL at 15:56

## 2017-05-27 RX ADMIN — METOPROLOL TARTRATE 12.5 MG: 25 TABLET, FILM COATED ORAL at 21:22

## 2017-05-27 RX ADMIN — OXYCODONE AND ACETAMINOPHEN 1 TABLET: 5; 325 TABLET ORAL at 07:44

## 2017-05-27 RX ADMIN — INSULIN DETEMIR 20 UNITS: 100 INJECTION, SOLUTION SUBCUTANEOUS at 21:25

## 2017-05-27 RX ADMIN — MAGNESIUM SULFATE HEPTAHYDRATE 4 G: 40 INJECTION, SOLUTION INTRAVENOUS at 21:23

## 2017-05-27 RX ADMIN — FUROSEMIDE 40 MG: 10 INJECTION, SOLUTION INTRAMUSCULAR; INTRAVENOUS at 11:53

## 2017-05-27 RX ADMIN — FUROSEMIDE 40 MG: 10 INJECTION, SOLUTION INTRAMUSCULAR; INTRAVENOUS at 17:59

## 2017-05-27 RX ADMIN — OXYCODONE AND ACETAMINOPHEN 1 TABLET: 5; 325 TABLET ORAL at 03:40

## 2017-05-27 RX ADMIN — ASPIRIN 325 MG: 325 TABLET, DELAYED RELEASE ORAL at 09:07

## 2017-05-27 RX ADMIN — PANTOPRAZOLE SODIUM 40 MG: 40 TABLET, DELAYED RELEASE ORAL at 05:37

## 2017-05-28 LAB
ANION GAP SERPL CALCULATED.3IONS-SCNC: 6 MMOL/L (ref 3–11)
BUN BLD-MCNC: 20 MG/DL (ref 9–23)
BUN/CREAT SERPL: 20 (ref 7–25)
CALCIUM SPEC-SCNC: 9.3 MG/DL (ref 8.7–10.4)
CHLORIDE SERPL-SCNC: 99 MMOL/L (ref 99–109)
CO2 SERPL-SCNC: 32 MMOL/L (ref 20–31)
CREAT BLD-MCNC: 1 MG/DL (ref 0.6–1.3)
GFR SERPL CREATININE-BSD FRML MDRD: 54 ML/MIN/1.73
GLUCOSE BLD-MCNC: 124 MG/DL (ref 70–100)
GLUCOSE BLDC GLUCOMTR-MCNC: 103 MG/DL (ref 70–130)
GLUCOSE BLDC GLUCOMTR-MCNC: 155 MG/DL (ref 70–130)
GLUCOSE BLDC GLUCOMTR-MCNC: 189 MG/DL (ref 70–130)
GLUCOSE BLDC GLUCOMTR-MCNC: 221 MG/DL (ref 70–130)
GLUCOSE BLDC GLUCOMTR-MCNC: 239 MG/DL (ref 70–130)
POTASSIUM BLD-SCNC: 3.8 MMOL/L (ref 3.5–5.5)
SODIUM BLD-SCNC: 137 MMOL/L (ref 132–146)

## 2017-05-28 PROCEDURE — 63710000001 INSULIN DETEMIR PER 5 UNITS: Performed by: INTERNAL MEDICINE

## 2017-05-28 PROCEDURE — 80048 BASIC METABOLIC PNL TOTAL CA: CPT | Performed by: PHYSICIAN ASSISTANT

## 2017-05-28 PROCEDURE — 99232 SBSQ HOSP IP/OBS MODERATE 35: CPT | Performed by: INTERNAL MEDICINE

## 2017-05-28 PROCEDURE — 82962 GLUCOSE BLOOD TEST: CPT

## 2017-05-28 RX ADMIN — HYDROCODONE BITARTRATE AND ACETAMINOPHEN 1 TABLET: 7.5; 325 TABLET ORAL at 21:20

## 2017-05-28 RX ADMIN — INSULIN LISPRO 5 UNITS: 100 INJECTION, SOLUTION INTRAVENOUS; SUBCUTANEOUS at 11:09

## 2017-05-28 RX ADMIN — METOPROLOL TARTRATE 12.5 MG: 25 TABLET, FILM COATED ORAL at 20:45

## 2017-05-28 RX ADMIN — Medication 2 TABLET: at 17:20

## 2017-05-28 RX ADMIN — HYDROCODONE BITARTRATE AND ACETAMINOPHEN 1 TABLET: 7.5; 325 TABLET ORAL at 08:23

## 2017-05-28 RX ADMIN — ATORVASTATIN CALCIUM 10 MG: 10 TABLET, FILM COATED ORAL at 20:29

## 2017-05-28 RX ADMIN — ASPIRIN 325 MG: 325 TABLET, DELAYED RELEASE ORAL at 08:23

## 2017-05-28 RX ADMIN — INSULIN DETEMIR 20 UNITS: 100 INJECTION, SOLUTION SUBCUTANEOUS at 21:19

## 2017-05-28 RX ADMIN — PANTOPRAZOLE SODIUM 40 MG: 40 TABLET, DELAYED RELEASE ORAL at 06:29

## 2017-05-28 RX ADMIN — INSULIN LISPRO 3 UNITS: 100 INJECTION, SOLUTION INTRAVENOUS; SUBCUTANEOUS at 20:30

## 2017-05-28 RX ADMIN — METOPROLOL TARTRATE 12.5 MG: 25 TABLET, FILM COATED ORAL at 08:23

## 2017-05-28 RX ADMIN — Medication 2 TABLET: at 08:23

## 2017-05-28 RX ADMIN — HYDROCODONE BITARTRATE AND ACETAMINOPHEN 1 TABLET: 7.5; 325 TABLET ORAL at 14:39

## 2017-05-29 VITALS
TEMPERATURE: 98 F | OXYGEN SATURATION: 96 % | RESPIRATION RATE: 20 BRPM | HEART RATE: 97 BPM | DIASTOLIC BLOOD PRESSURE: 57 MMHG | HEIGHT: 62 IN | WEIGHT: 152 LBS | SYSTOLIC BLOOD PRESSURE: 123 MMHG | BODY MASS INDEX: 27.97 KG/M2

## 2017-05-29 LAB
CA-I SERPL ISE-MCNC: 1.17 MMOL/L (ref 1.12–1.32)
GLUCOSE BLDC GLUCOMTR-MCNC: 96 MG/DL (ref 70–130)
MAGNESIUM SERPL-MCNC: 1.9 MG/DL (ref 1.3–2.7)
POTASSIUM BLD-SCNC: 3.6 MMOL/L (ref 3.5–5.5)

## 2017-05-29 PROCEDURE — 82962 GLUCOSE BLOOD TEST: CPT

## 2017-05-29 PROCEDURE — 99024 POSTOP FOLLOW-UP VISIT: CPT | Performed by: PHYSICIAN ASSISTANT

## 2017-05-29 PROCEDURE — 99232 SBSQ HOSP IP/OBS MODERATE 35: CPT | Performed by: INTERNAL MEDICINE

## 2017-05-29 PROCEDURE — 97116 GAIT TRAINING THERAPY: CPT

## 2017-05-29 PROCEDURE — 82330 ASSAY OF CALCIUM: CPT | Performed by: THORACIC SURGERY (CARDIOTHORACIC VASCULAR SURGERY)

## 2017-05-29 PROCEDURE — 84132 ASSAY OF SERUM POTASSIUM: CPT | Performed by: THORACIC SURGERY (CARDIOTHORACIC VASCULAR SURGERY)

## 2017-05-29 PROCEDURE — 83735 ASSAY OF MAGNESIUM: CPT | Performed by: THORACIC SURGERY (CARDIOTHORACIC VASCULAR SURGERY)

## 2017-05-29 RX ORDER — FUROSEMIDE 40 MG/1
40 TABLET ORAL DAILY
Status: DISCONTINUED | OUTPATIENT
Start: 2017-05-30 | End: 2017-05-29 | Stop reason: HOSPADM

## 2017-05-29 RX ORDER — POTASSIUM CHLORIDE 750 MG/1
20 TABLET, EXTENDED RELEASE ORAL
Qty: 30 TABLET | Refills: 0
Start: 2017-05-29 | End: 2017-06-06 | Stop reason: SDUPTHER

## 2017-05-29 RX ORDER — BUMETANIDE 0.25 MG/ML
2 INJECTION INTRAMUSCULAR; INTRAVENOUS ONCE
Status: COMPLETED | OUTPATIENT
Start: 2017-05-29 | End: 2017-05-29

## 2017-05-29 RX ORDER — FUROSEMIDE 40 MG/1
40 TABLET ORAL DAILY
Qty: 30 TABLET | Refills: 11 | Status: SHIPPED | OUTPATIENT
Start: 2017-05-30 | End: 2017-06-28

## 2017-05-29 RX ORDER — HYDROCODONE BITARTRATE AND ACETAMINOPHEN 7.5; 325 MG/1; MG/1
1 TABLET ORAL EVERY 4 HOURS PRN
Qty: 30 TABLET | Refills: 0 | Status: SHIPPED | OUTPATIENT
Start: 2017-05-29 | End: 2017-06-03

## 2017-05-29 RX ORDER — POTASSIUM CHLORIDE 1.5 G/1.77G
20 POWDER, FOR SOLUTION ORAL DAILY
Status: DISCONTINUED | OUTPATIENT
Start: 2017-05-30 | End: 2017-05-29 | Stop reason: HOSPADM

## 2017-05-29 RX ADMIN — METOPROLOL TARTRATE 12.5 MG: 25 TABLET, FILM COATED ORAL at 08:15

## 2017-05-29 RX ADMIN — Medication 2 TABLET: at 08:15

## 2017-05-29 RX ADMIN — BUMETANIDE 2 MG: 0.25 INJECTION, SOLUTION INTRAMUSCULAR; INTRAVENOUS at 09:41

## 2017-05-29 RX ADMIN — ASPIRIN 325 MG: 325 TABLET, DELAYED RELEASE ORAL at 08:15

## 2017-05-29 RX ADMIN — OXYCODONE AND ACETAMINOPHEN 1 TABLET: 5; 325 TABLET ORAL at 03:49

## 2017-05-29 RX ADMIN — PANTOPRAZOLE SODIUM 40 MG: 40 TABLET, DELAYED RELEASE ORAL at 05:17

## 2017-05-30 ENCOUNTER — TRANSITIONAL CARE MANAGEMENT TELEPHONE ENCOUNTER (OUTPATIENT)
Dept: FAMILY MEDICINE CLINIC | Facility: CLINIC | Age: 77
End: 2017-05-30

## 2017-05-31 ENCOUNTER — TELEPHONE (OUTPATIENT)
Dept: FAMILY MEDICINE CLINIC | Facility: CLINIC | Age: 77
End: 2017-05-31

## 2017-06-05 ENCOUNTER — PROCEDURE VISIT (OUTPATIENT)
Dept: CARDIOLOGY | Facility: HOSPITAL | Age: 77
End: 2017-06-05

## 2017-06-05 ENCOUNTER — OFFICE VISIT (OUTPATIENT)
Dept: CARDIOLOGY | Facility: HOSPITAL | Age: 77
End: 2017-06-05

## 2017-06-05 VITALS
HEIGHT: 62 IN | SYSTOLIC BLOOD PRESSURE: 127 MMHG | HEART RATE: 98 BPM | DIASTOLIC BLOOD PRESSURE: 72 MMHG | WEIGHT: 150.1 LBS | TEMPERATURE: 98.4 F | BODY MASS INDEX: 27.62 KG/M2 | OXYGEN SATURATION: 97 % | RESPIRATION RATE: 20 BRPM

## 2017-06-05 DIAGNOSIS — I38 VHD (VALVULAR HEART DISEASE): ICD-10-CM

## 2017-06-05 DIAGNOSIS — I25.10 CORONARY ARTERY DISEASE INVOLVING NATIVE CORONARY ARTERY OF NATIVE HEART WITHOUT ANGINA PECTORIS: Primary | ICD-10-CM

## 2017-06-05 DIAGNOSIS — I10 ESSENTIAL HYPERTENSION: ICD-10-CM

## 2017-06-05 DIAGNOSIS — R06.00 DYSPNEA, UNSPECIFIED TYPE: ICD-10-CM

## 2017-06-05 DIAGNOSIS — I25.10 CORONARY ARTERY DISEASE INVOLVING NATIVE CORONARY ARTERY OF NATIVE HEART WITHOUT ANGINA PECTORIS: ICD-10-CM

## 2017-06-05 DIAGNOSIS — R01.2 PERICARDIAL FRICTION RUB: ICD-10-CM

## 2017-06-05 LAB
ANION GAP SERPL CALCULATED.3IONS-SCNC: 6 MMOL/L (ref 3–11)
BNP SERPL-MCNC: 239 PG/ML (ref 0–100)
BUN BLD-MCNC: 19 MG/DL (ref 9–23)
BUN/CREAT SERPL: 21.1 (ref 7–25)
CALCIUM SPEC-SCNC: 10.1 MG/DL (ref 8.7–10.4)
CHLORIDE SERPL-SCNC: 101 MMOL/L (ref 99–109)
CO2 SERPL-SCNC: 32 MMOL/L (ref 20–31)
CREAT BLD-MCNC: 0.9 MG/DL (ref 0.6–1.3)
DEPRECATED RDW RBC AUTO: 53 FL (ref 37–54)
ERYTHROCYTE [DISTWIDTH] IN BLOOD BY AUTOMATED COUNT: 15.1 % (ref 11.3–14.5)
GFR SERPL CREATININE-BSD FRML MDRD: 61 ML/MIN/1.73
GLUCOSE BLD-MCNC: 116 MG/DL (ref 70–100)
HCT VFR BLD AUTO: 32 % (ref 34.5–44)
HGB BLD-MCNC: 10 G/DL (ref 11.5–15.5)
MCH RBC QN AUTO: 30.3 PG (ref 27–31)
MCHC RBC AUTO-ENTMCNC: 31.3 G/DL (ref 32–36)
MCV RBC AUTO: 97 FL (ref 80–99)
PLATELET # BLD AUTO: 260 10*3/MM3 (ref 150–450)
PMV BLD AUTO: 9.3 FL (ref 6–12)
POTASSIUM BLD-SCNC: 4.3 MMOL/L (ref 3.5–5.5)
RBC # BLD AUTO: 3.3 10*6/MM3 (ref 3.89–5.14)
SODIUM BLD-SCNC: 139 MMOL/L (ref 132–146)
WBC NRBC COR # BLD: 10.51 10*3/MM3 (ref 3.5–10.8)

## 2017-06-05 PROCEDURE — 80048 BASIC METABOLIC PNL TOTAL CA: CPT | Performed by: NURSE PRACTITIONER

## 2017-06-05 PROCEDURE — 99214 OFFICE O/P EST MOD 30 MIN: CPT | Performed by: NURSE PRACTITIONER

## 2017-06-05 PROCEDURE — 93005 ELECTROCARDIOGRAM TRACING: CPT

## 2017-06-05 PROCEDURE — 85027 COMPLETE CBC AUTOMATED: CPT | Performed by: NURSE PRACTITIONER

## 2017-06-05 PROCEDURE — 83880 ASSAY OF NATRIURETIC PEPTIDE: CPT | Performed by: NURSE PRACTITIONER

## 2017-06-05 PROCEDURE — 93010 ELECTROCARDIOGRAM REPORT: CPT | Performed by: INTERNAL MEDICINE

## 2017-06-05 RX ORDER — OMEPRAZOLE 40 MG/1
40 CAPSULE, DELAYED RELEASE ORAL AS NEEDED
COMMUNITY
End: 2017-06-26 | Stop reason: SDUPTHER

## 2017-06-05 RX ORDER — ATORVASTATIN CALCIUM 20 MG/1
20 TABLET, FILM COATED ORAL DAILY
COMMUNITY
End: 2017-06-22 | Stop reason: SDUPTHER

## 2017-06-05 RX ORDER — SENNA PLUS 8.6 MG/1
1 TABLET ORAL AS NEEDED
COMMUNITY
End: 2017-07-12

## 2017-06-05 RX ORDER — COLCHICINE 0.6 MG/1
0.6 TABLET ORAL 2 TIMES DAILY
Qty: 10 TABLET | Refills: 0 | Status: SHIPPED | OUTPATIENT
Start: 2017-06-05 | End: 2017-06-10

## 2017-06-05 RX ORDER — INSULIN GLARGINE 100 [IU]/ML
35 INJECTION, SOLUTION SUBCUTANEOUS DAILY
COMMUNITY
End: 2017-06-28

## 2017-06-05 NOTE — PROGRESS NOTES
[facility]      Encounter Date:06/05/2017     Nancy Goodman  [address] [id number] [phone]    1940    Wayne Jorge MD    Nancy Goodman is a 76 y.o. female.      Subjective:     Chief Complaint:  Coronary Artery Disease (post op CABG X 5)       HPI     Patient is a 76 y.o. female admitted for coronary artery bypass grafting on 05/24/2017 and underwent coronary artery bypass grafting ×5.  Patient was also seen by Dr. Fidel Valdez secondary to hypertension. Stable hospital stay.  Remained in normal sinus rhythm and was discharged home on Monday, 05/29/201    Chief complaint today dyspnea, slowly worsened over the last week.  At rest and with exertion.  Mild to moderate.  Eyes chest pain.  Incisional soreness is well tolerated has not required pain medications for 2 days.  Denies fever, chills, nausea, vomiting, diarrhea, hematuria, incisional drainage or pain.  Denies dizziness, presyncope, syncope.  Cough has improved, occasional, nonproductive.  Right lower extremity mild edema, improving ecchymosis noted.  Patient reports elevated heart rates 90s to low 100s since discharge.  Home blood pressures 120s to 130s.  Good appetite, sleeping well.    Patient Active Problem List    Diagnosis   • CAD (coronary artery disease) [I25.10]     Overview Note:     · Procedure: Coronary artery bypass graft ×5 with EVH #1 saphenous vein graft to diagonal and circumflex #2 saphenous vein graft to PDA and posterior lateral right coronary artery #3 left internal mammary artery to LAD       Surgeons: Fidel Calvillo  · Normal EF by Echo 5/16/17     • VHD (valvular heart disease) [I38]     Overview Note:     · Mild aoritc valve stenosis by Echo 5/16/17     • Type 2 diabetes mellitus without complication, with long-term current use of insulin [E11.9, Z79.4]   • Mixed hyperlipidemia [E78.2]   • Essential hypertension [I10]   • Vitamin D deficiency [E55.9]          Past Surgical History:   Procedure Laterality Date   • CARDIAC CATHETERIZATION N/A 5/16/2017    Procedure: Left Heart Cath;  Surgeon: Fidel Valdez MD;  Location:  [facility];  Service:    • CORONARY ARTERY BYPASS GRAFT N/A 5/24/2017    Procedure: CORONARY ARTERY BYPASS x  5 WITH INTERNAL MAMMARY ARTERY GRAFT and EVH of the Right leg;  Surgeon: Fidel Calvillo MD;  Location:  [facility] OR;  Service:    • FINGER NAIL SURGERY     • TUBAL ABDOMINAL LIGATION     • VARICOSE VEIN SURGERY         Allergies   Allergen Reactions   • Amlodipine    • Ciprofloxacin      Pt not sure of reaction   • Statins Nausea Only         Current Outpatient Prescriptions:   •  aspirin 81 MG chewable tablet, Chew 1 tablet Daily., Disp: , Rfl: 11  •  atorvastatin (LIPITOR) 40 MG tablet, Take 1 tablet by mouth Daily., Disp: 30 tablet, Rfl: 11  •  Cholecalciferol (VITAMIN D) 2000 UNITS capsule, Take 1 capsule by mouth Daily., Disp: , Rfl:   •  Cranberry 425 MG capsule, Take 1 tablet by mouth Daily., Disp: , Rfl:   •  Cyanocobalamin (VITAMIN B-12) 5000 MCG sublingual tablet, Place 1 tablet under the tongue 3 (Three) Times a Week., Disp: , Rfl:   •  ELDERBERRY PO, Take 1 capsule by mouth Daily., Disp: , Rfl:   •  furosemide (LASIX) 40 MG tablet, Take 1 tablet by mouth Daily., Disp: 30 tablet, Rfl: 11  •  Insulin Glargine (LANTUS SOLOSTAR) 100 UNIT/ML solution pen-injector, Inject 50 unit marking on U-100 syringe under the skin 1 (one) time daily. 50 Units SQ daily (Patient taking differently: Inject 47-50 unit marking on U-100 syringe under the skin Every Night. 50 Units SQ daily), Disp: 100 mL, Rfl: 3  •  metoprolol tartrate (LOPRESSOR) 25 MG tablet, Take 0.5 tablets by mouth Every 12 (Twelve) Hours., Disp: 30 tablet, Rfl: 11  •  omeprazole (PRILOSEC) 40 MG capsule, Take 1 capsule by mouth Daily. (Patient taking differently: Take 40 mg by mouth As Needed (acid reflux).), Disp: 30 capsule, Rfl: 5  •  potassium chloride  (KLOR-CON) 10 MEQ CR tablet, Take 2 tablets by mouth Daily. Take 2 tablets daily x 2 weeks, then decrease to 1 tablet daily, Disp: 30 tablet, Rfl: 0  •  Probiotic Product (PROBIOTIC ADVANCED PO), Take 1 tablet by mouth Daily., Disp: , Rfl:   •  Ubiquinol 100 MG capsule, Take 100 mg by mouth Daily., Disp: , Rfl:   No current facility-administered medications for this visit.     Facility-Administered Medications Ordered in Other Visits:   •  Chlorhexidine Gluconate Cloth 2 % pads 1 application, 1 application, Topical, Q12H PRN, DANIEL Esquivel    The following portions of the patient's history were reviewed and updated as appropriate: allergies, current medications, past family history, past medical history, past social history, past surgical history and problem list.    Review of Systems   Constitution: Positive for weakness and malaise/fatigue. Negative for chills, decreased appetite, diaphoresis, fever, night sweats, weight gain and weight loss.   HENT: Positive for congestion (post nasal drip) and headaches (mild, occassional). Negative for nosebleeds.    Eyes: Positive for blurred vision (not new). Negative for visual disturbance and visual halos.   Cardiovascular: Positive for dyspnea on exertion and leg swelling. Negative for chest pain, claudication, cyanosis, irregular heartbeat, near-syncope, orthopnea, palpitations, paroxysmal nocturnal dyspnea and syncope.   Respiratory: Positive for cough (better, nonproductive), shortness of breath and snoring (mild). Negative for hemoptysis, sleep disturbances due to breathing, sputum production and wheezing.    Endocrine: Positive for polydipsia. Negative for cold intolerance, heat intolerance, polyphagia and polyuria.   Hematologic/Lymphatic: Bruises/bleeds easily.   Skin: Positive for itching (Midsternal incision ). Negative for dry skin and rash.   Musculoskeletal: Positive for muscle cramps. Negative for falls, joint pain, joint swelling, muscle weakness and  "myalgias.   Gastrointestinal: Positive for abdominal pain (not recently.  More with past STATin use). Negative for bloating, constipation, diarrhea, dysphagia, heartburn, melena, nausea and vomiting.   Genitourinary: Negative for dysuria, flank pain, hematuria and nocturia.   Neurological: Positive for excessive daytime sleepiness. Negative for difficulty with concentration, dizziness and loss of balance.   Psychiatric/Behavioral: Positive for depression. Negative for altered mental status. The patient is not nervous/anxious.    Allergic/Immunologic: Positive for environmental allergies (seasonal allergies).       Objective:     Vitals:    06/05/17 1122 06/05/17 1129 06/05/17 1130   BP: 136/68 137/73 127/72   BP Location: Right arm Left arm Left arm   Patient Position: Sitting Sitting Standing   Pulse: 95  98   Resp: 20     Temp: 98.4 °F (36.9 °C)     TempSrc: Temporal Artery      SpO2: 97%     Weight: 150 lb 1.6 oz (68.1 kg)     Height: 62\" (157.5 cm)           Physical Exam   Constitutional: She is oriented to person, place, and time. She appears well-developed and well-nourished. No distress.   HENT:   Head: Normocephalic and atraumatic.   Mouth/Throat: Oropharynx is clear and moist.   Eyes: Conjunctivae are normal. Pupils are equal, round, and reactive to light. No scleral icterus.   Neck: No hepatojugular reflux and no JVD present. Carotid bruit is not present. No tracheal deviation present. No thyromegaly present.   Cardiovascular: Normal rate, regular rhythm and intact distal pulses.  Exam reveals friction rub.    No murmur heard.  Pulmonary/Chest: Effort normal and breath sounds normal.   Abdominal: Soft. Bowel sounds are normal. She exhibits no distension. There is no tenderness.   Musculoskeletal: She exhibits edema (Mild nonpitting edema right lower extremity.  Ecchymosis noted on right upper inner thigh, yellowish purple discoloration.).   Lymphadenopathy:     She has no cervical adenopathy. "   Neurological: She is alert and oriented to person, place, and time.   Skin: Skin is warm, dry and intact. No rash noted. No cyanosis or erythema. No pallor.   Mid sternal incision, epigastric mediastinal tube incisions, right EVH sites approximated, scabbed, mild kevin-lesional pinkness, no drainage, tenderness, approximated.   Psychiatric: She has a normal mood and affect. Her behavior is normal. Thought content normal.   Vitals reviewed.      Lab and Diagnostic Review:  05/29/17  Magnesium 1.3 - 2.7 mg/dL 1.9     Ionized Calcium 1.12 - 1.32 mmol/L 1.17     Potassium 3.5 - 5.5 mmol/L 3.6     05/27/17  Glucose 70 - 100 mg/dL 139 (H)   BUN 9 - 23 mg/dL 17   Creatinine 0.60 - 1.30 mg/dL 0.80   Sodium 132 - 146 mmol/L 135   Potassium 3.5 - 5.5 mmol/L 4.2   Chloride 99 - 109 mmol/L 100   CO2 20.0 - 31.0 mmol/L 29.0   Calcium 8.7 - 10.4 mg/dL 9.0   eGFR Non African Amer >60 mL/min/1.73 70   BUN/Creatinine Ratio 7.0 - 25.0 21.3   Anion Gap 3.0 - 11.0 mmol/L 6.0     WBC 3.50 - 10.80 10*3/mm3 9.08   RBC 3.89 - 5.14 10*6/mm3 2.83 (L)   Hemoglobin 11.5 - 15.5 g/dL 8.8 (L)   Hematocrit 34.5 - 44.0 % 27.0 (L)   MCV 80.0 - 99.0 fL 95.4   MCH 27.0 - 31.0 pg 31.1 (H)   MCHC 32.0 - 36.0 g/dL 32.6   RDW 11.3 - 14.5 % 14.8 (H)   RDW-SD 37.0 - 54.0 fl 52.0   MPV 6.0 - 12.0 fL 11.0   Platelets 150 - 450 10*3/mm3 94 (     Echocardiogram 5/16/17: EF 65%, mild concentric LVH, mild MR, mild AR, mild aortic stenosis, maximum pressure gradient 32.7 mmHg      Results for orders placed or performed in visit on 06/05/17   Basic Metabolic Panel   Result Value Ref Range    Glucose 116 (H) 70 - 100 mg/dL    BUN 19 9 - 23 mg/dL    Creatinine 0.90 0.60 - 1.30 mg/dL    Sodium 139 132 - 146 mmol/L    Potassium 4.3 3.5 - 5.5 mmol/L    Chloride 101 99 - 109 mmol/L    CO2 32.0 (H) 20.0 - 31.0 mmol/L    Calcium 10.1 8.7 - 10.4 mg/dL    eGFR Non African Amer 61 >60 mL/min/1.73    BUN/Creatinine Ratio 21.1 7.0 - 25.0    Anion Gap 6.0 3.0 - 11.0 mmol/L    CBC (No Diff)   Result Value Ref Range    WBC 10.51 3.50 - 10.80 10*3/mm3    RBC 3.30 (L) 3.89 - 5.14 10*6/mm3    Hemoglobin 10.0 (L) 11.5 - 15.5 g/dL    Hematocrit 32.0 (L) 34.5 - 44.0 %    MCV 97.0 80.0 - 99.0 fL    MCH 30.3 27.0 - 31.0 pg    MCHC 31.3 (L) 32.0 - 36.0 g/dL    RDW 15.1 (H) 11.3 - 14.5 %    RDW-SD 53.0 37.0 - 54.0 fl    MPV 9.3 6.0 - 12.0 fL    Platelets 260 150 - 450 10*3/mm3   BNP   Result Value Ref Range    .0 (H) 0.0 - 100.0 pg/mL       Assessment and Plan:         1. Coronary artery disease involving native coronary artery of native heart without angina pectoris  S/p CAGB X5  No s/s infection.  REviewed s/s infection, post surgery expectations and management.    - ECG 12 Lead; Sinus rhythm 91 bpm  Increase Metoprolol Tartrate 25 mg BID  Continue home BP and HR log    - Basic Metabolic Panel  - CBC (No Diff)    Asa, Statin, BB    Post op pericardial rub.  Colchinie  0.6 mg BID for 5 days.      2. VHD (valvular heart disease)  Mild MR, mild AR, mild aortic stenosis    3. Essential hypertension  Stable.    4.  Dyspnea  Continue Lasix 40 mg daily for 5 days as prescribed.      Discussed [facility] of [facility] and when to call.  F/u with PCP, cardiology, and CT surgery as scheduled.  [facility]u [facility].      *Please note that portions of this note were completed with a voice recognition program. Efforts were made to edit the dictations, but occasionally words are mistranscribed.

## 2017-06-06 ENCOUNTER — TELEPHONE (OUTPATIENT)
Dept: FAMILY MEDICINE CLINIC | Facility: CLINIC | Age: 77
End: 2017-06-06

## 2017-06-06 RX ORDER — POTASSIUM CHLORIDE 750 MG/1
10 TABLET, EXTENDED RELEASE ORAL 2 TIMES DAILY
Qty: 60 TABLET | Refills: 1 | Status: SHIPPED | OUTPATIENT
Start: 2017-06-06 | End: 2017-07-12 | Stop reason: SDUPTHER

## 2017-06-06 NOTE — TELEPHONE ENCOUNTER
Unclear to me why surjit can not fill for her if he wants.  Ok one month supply and then come in for labs please.  Script sent in

## 2017-06-06 NOTE — TELEPHONE ENCOUNTER
----- Message from Klaudia Ricardo sent at 6/6/2017  9:05 AM EDT -----  Contact: SHARRI  PATIENT HAD OPEN HEART SURGERY YESTERDAY AND DR. RESTREPO WOULD LIKE FOR HER TO STAY ON THE POTASSIUM PRESCRIBED, BUT TO TAKE TWO TIMES A DAY. HER DAUGHTER CALLED, SHE IS ALMOST OUT NOW.  ASKING IF YOU COULD CALL IN THIS FOR HER:    POTASSIUM 10 MEQ CR TABLETS--ONE TWO TIMES A DAY    [facility] IN [facility]

## 2017-06-09 ENCOUNTER — TELEPHONE (OUTPATIENT)
Dept: CARDIOLOGY | Facility: HOSPITAL | Age: 77
End: 2017-06-09

## 2017-06-09 NOTE — TELEPHONE ENCOUNTER
----- Message from Nadia Suarez RPH sent at 6/9/2017 11:43 AM EDT -----  Contact:  Bobby  I talked to her  and he said that she is feeling fine and not having any symptoms of hypotension. I told him to go ahead and give her the metoprolol if she is feeling okay. He also said that he cut her furosemide in half this morning because she has been having diarrhea with the colchicine. I told him to watch her weight and fluid and to return to a whole furosemide if she starts swelling/gaining weight.  ----- Message -----     From: BETH Ramirez     Sent: 6/9/2017  11:04 AM       To: Nadia Suarez RPH    Please call and see how she is feeling.   If feeling ok, I am fine with her BP  ----- Message -----     From: Amanda Hanna     Sent: 6/9/2017  10:29 AM       To: BETH Ramirez    Bobby is concerned with willy's bp.  It is 108/62, pulse is 87.  Should he increase her medications?

## 2017-06-21 RX ORDER — AMLODIPINE BESYLATE AND BENAZEPRIL HYDROCHLORIDE 10; 40 MG/1; MG/1
CAPSULE ORAL
Qty: 90 CAPSULE | Refills: 0 | Status: SHIPPED | OUTPATIENT
Start: 2017-06-21 | End: 2017-06-28

## 2017-06-22 RX ORDER — ATORVASTATIN CALCIUM 20 MG/1
20 TABLET, FILM COATED ORAL DAILY
Qty: 90 TABLET | Refills: 0 | Status: SHIPPED | OUTPATIENT
Start: 2017-06-22 | End: 2017-06-28 | Stop reason: SDUPTHER

## 2017-06-26 ENCOUNTER — OFFICE VISIT (OUTPATIENT)
Dept: CARDIAC SURGERY | Facility: CLINIC | Age: 77
End: 2017-06-26

## 2017-06-26 VITALS
WEIGHT: 146.4 LBS | SYSTOLIC BLOOD PRESSURE: 143 MMHG | HEIGHT: 63 IN | DIASTOLIC BLOOD PRESSURE: 82 MMHG | HEART RATE: 87 BPM | BODY MASS INDEX: 25.94 KG/M2 | OXYGEN SATURATION: 96 %

## 2017-06-26 DIAGNOSIS — Z95.1 S/P CABG (CORONARY ARTERY BYPASS GRAFT): Primary | ICD-10-CM

## 2017-06-26 PROCEDURE — 99024 POSTOP FOLLOW-UP VISIT: CPT | Performed by: PHYSICIAN ASSISTANT

## 2017-06-26 PROCEDURE — 71020 CHG CHEST X-RAY 2 VW: CPT | Performed by: THORACIC SURGERY (CARDIOTHORACIC VASCULAR SURGERY)

## 2017-06-26 RX ORDER — OMEPRAZOLE 40 MG/1
40 CAPSULE, DELAYED RELEASE ORAL DAILY
Qty: 90 CAPSULE | Refills: 1 | Status: SHIPPED | OUTPATIENT
Start: 2017-06-26 | End: 2017-12-07 | Stop reason: SDUPTHER

## 2017-06-26 NOTE — PROGRESS NOTES
06/26/2017  Patient Information  Nancy Goodman                                                                                          [address] [facility] [address]   1940  'PCP/Referring Physician'  Wayne Jorge MD  [phone]  No ref. provider found    Chief Complaint   Patient presents with   • Follow-up     4 wk hosp f/u after cabg. Patient states she has been doin better, her leg is still sore       History of Present Illness:  Ms. Goodman is a 76 year old  female who presents to clinic for follow up 4 weeks status post CABG x5.  Patient states that she has been doing well overall but feels slightly sluggish which she attributes to her blood pressure medication.  Patient states that she has been having trouble with abdominal cramping so she stopped taking Lipitor on 6/22.  Patient states that cardiac rehabilitation is scheduled to start on Thursday at [facility].  Patient reports following up with Dr. Valdez and will follow up with PCP in 2 days.  No other concerns or complaints.    Patient Active Problem List   Diagnosis   • Type 2 diabetes mellitus without complication, with long-term current use of insulin   • Mixed hyperlipidemia   • Essential hypertension   • Vitamin D deficiency   • CAD (coronary artery disease)   • VHD (valvular heart disease)     Past Medical History:   Diagnosis Date   • Allergic rhinitis    • Arthritis    • Cellulitis of finger    • Diabetes mellitus     dx 12 years ago- checks fsbs daily   • Dizziness    • Edema    • GERD (gastroesophageal reflux disease)    • Hyperlipidemia    • Hypertension    • Ingrown nail    • PAT (paroxysmal atrial tachycardia)    • Rosacea    • Vitamin D deficiency    • Wears dentures    • Wears eyeglasses    • Wears partial dentures      Past Surgical History:   Procedure Laterality Date   • CARDIAC CATHETERIZATION N/A 5/16/2017    Procedure: Left Heart Cath;  Surgeon: Fidel Valdez MD;  Location: [facility]  INVASIVE LOCATION;  Service:    • CORONARY ARTERY BYPASS GRAFT N/A 5/24/2017    Procedure: CORONARY ARTERY BYPASS x  5 WITH INTERNAL MAMMARY ARTERY GRAFT and EVH of the Right leg;  Surgeon: Fidel Calvillo MD;  Location: [facility] OR;  Service:    • FINGER NAIL SURGERY     • TUBAL ABDOMINAL LIGATION     • VARICOSE VEIN SURGERY         Current Outpatient Prescriptions:   •  amLODIPine-benazepril (LOTREL) 10-40 MG per capsule, TAKE 1 CAPSULE DAILY, Disp: 90 capsule, Rfl: 0  •  aspirin 81 MG chewable tablet, Chew 1 tablet Daily., Disp: , Rfl: 11  •  atorvastatin (LIPITOR) 20 MG tablet, Take 1 tablet by mouth Daily., Disp: 90 tablet, Rfl: 0  •  Cranberry 425 MG capsule, Take 1 tablet by mouth Daily., Disp: , Rfl:   •  ELDERBERRY PO, Take 2 capsules by mouth Daily., Disp: , Rfl:   •  furosemide (LASIX) 40 MG tablet, Take 1 tablet by mouth Daily., Disp: 30 tablet, Rfl: 11  •  insulin glargine (LANTUS) 100 UNIT/ML injection, Inject 35 Units under the skin Daily., Disp: , Rfl:   •  metoprolol tartrate (LOPRESSOR) 25 MG tablet, Take 1 tablet by mouth 2 (Two) Times a Day., Disp: 60 tablet, Rfl: 3  •  omeprazole (priLOSEC) 40 MG capsule, Take 1 capsule by mouth Daily., Disp: 90 capsule, Rfl: 1  •  potassium chloride (KLOR-CON) 10 MEQ CR tablet, Take 1 tablet by mouth 2 (Two) Times a Day. Take 2 tablets daily x 2 weeks, then decrease to 1 tablet daily, Disp: 60 tablet, Rfl: 1  •  Probiotic Product (PROBIOTIC ADVANCED PO), Take 1 tablet by mouth Daily., Disp: , Rfl:   •  senna (SENOKOT) 8.6 MG tablet, Take 1 tablet by mouth As Needed for Constipation., Disp: , Rfl:   •  Ubiquinol 100 MG capsule, Take 100 mg by mouth Daily., Disp: , Rfl:   Allergies   Allergen Reactions   • Amlodipine      sickness   • Ciprofloxacin      Pt not sure of reaction (stomach problems)   • Statins Nausea Only     Stomach problem     Social History     Social History   • Marital status:      Spouse name: N/A   • Number of children: N/A   • Years  "of education: N/A     Occupational History   •  Retired     Social History Main Topics   • Smoking status: Never Smoker   • Smokeless tobacco: Never Used   • Alcohol use No   • Drug use: No   • Sexual activity: Defer      Comment:      Other Topics Concern   • Not on file     Social History Narrative    Patient consumes 1 cup hot chocolate daily.     Patient lives at home with .          Family History   Problem Relation Age of Onset   • Cancer Mother      Lung cancer     ROS   All systems reviewed and are negative unless otherwise stated in HPI.  Vitals:    06/26/17 1407   BP: 143/82   BP Location: Right arm   Patient Position: Sitting   Pulse: 87   SpO2: 96%   Weight: 146 lb 6.4 oz (66.4 kg)   Height: 62.5\" (158.8 cm)      Physical Exam   Constitutional: She is oriented to person, place, and time. She appears well-developed and well-nourished. No distress.   HENT:   Head: Normocephalic and atraumatic.   Eyes: Conjunctivae are normal. Pupils are equal, round, and reactive to light.   Neck: Normal range of motion. Neck supple.   Cardiovascular: Normal rate, regular rhythm and intact distal pulses.  Exam reveals no gallop and no friction rub.    Murmur heard.   Blowing early systolic murmur is present   Pulmonary/Chest: Effort normal and breath sounds normal. No respiratory distress. She has no wheezes. She has no rales.   Abdominal: Soft. Bowel sounds are normal. She exhibits no distension.   Musculoskeletal: Normal range of motion. She exhibits no edema.   Neurological: She is alert and oriented to person, place, and time.   Skin: Skin is warm and dry. She is not diaphoretic. No erythema.   Psychiatric: She has a normal mood and affect. Her behavior is normal. Judgment and thought content normal.   Incision:  Clean, dry and intact.  No erythema, drainage or signs of infection.  Sternum stable.    Labs/Imaging:  CXR on 6/26:  8 sternal wires visualized.  No bony abnormalities.  No pleural effusion.  No " pneumothorax.    Assessment/Plan:  76 year old  female 4 weeks status post CABG x5 with satisfactory recovery.  Patient instructed to contact Dr. Valdez's office to ask about discontinuing statin and to discuss antihypertensives and sluggish feeling.  Patient encouraged to continue to follow up with PCP and cardiologist.  Patient's questions were answered and she appeared to understand.  Patient instructed to follow up in clinic as needed or sooner with any questions and/or concerns.        Patient Active Problem List   Diagnosis   • Type 2 diabetes mellitus without complication, with long-term current use of insulin   • Mixed hyperlipidemia   • Essential hypertension   • Vitamin D deficiency   • CAD (coronary artery disease)   • VHD (valvular heart disease)     Signed by:

## 2017-06-28 ENCOUNTER — OFFICE VISIT (OUTPATIENT)
Dept: FAMILY MEDICINE CLINIC | Facility: CLINIC | Age: 77
End: 2017-06-28

## 2017-06-28 VITALS
HEART RATE: 68 BPM | RESPIRATION RATE: 18 BRPM | WEIGHT: 146 LBS | SYSTOLIC BLOOD PRESSURE: 140 MMHG | HEIGHT: 63 IN | TEMPERATURE: 97.4 F | DIASTOLIC BLOOD PRESSURE: 62 MMHG | BODY MASS INDEX: 25.87 KG/M2

## 2017-06-28 DIAGNOSIS — I10 ESSENTIAL HYPERTENSION: ICD-10-CM

## 2017-06-28 DIAGNOSIS — Z79.4 TYPE 2 DIABETES MELLITUS WITHOUT COMPLICATION, WITH LONG-TERM CURRENT USE OF INSULIN (HCC): ICD-10-CM

## 2017-06-28 DIAGNOSIS — E11.9 TYPE 2 DIABETES MELLITUS WITHOUT COMPLICATION, WITH LONG-TERM CURRENT USE OF INSULIN (HCC): ICD-10-CM

## 2017-06-28 DIAGNOSIS — E78.2 MIXED HYPERLIPIDEMIA: ICD-10-CM

## 2017-06-28 DIAGNOSIS — Z95.1 S/P CABG (CORONARY ARTERY BYPASS GRAFT): Primary | ICD-10-CM

## 2017-06-28 LAB
ALBUMIN SERPL-MCNC: 4.1 G/DL (ref 3.2–4.8)
ALBUMIN/GLOB SERPL: 1.4 G/DL (ref 1.5–2.5)
ALP SERPL-CCNC: 74 U/L (ref 25–100)
ALT SERPL-CCNC: 17 U/L (ref 7–40)
AST SERPL-CCNC: 23 U/L (ref 0–33)
BASOPHILS # BLD AUTO: 0.05 10*3/MM3 (ref 0–0.2)
BASOPHILS NFR BLD AUTO: 0.7 % (ref 0–1)
BILIRUB SERPL-MCNC: 0.4 MG/DL (ref 0.3–1.2)
BUN SERPL-MCNC: 13 MG/DL (ref 9–23)
BUN/CREAT SERPL: 14.4 (ref 7–25)
CALCIUM SERPL-MCNC: 10.6 MG/DL (ref 8.7–10.4)
CHLORIDE SERPL-SCNC: 101 MMOL/L (ref 99–109)
CO2 SERPL-SCNC: 34 MMOL/L (ref 20–31)
CREAT SERPL-MCNC: 0.9 MG/DL (ref 0.6–1.3)
EOSINOPHIL # BLD AUTO: 0.49 10*3/MM3 (ref 0–0.3)
EOSINOPHIL NFR BLD AUTO: 6.6 % (ref 0–3)
ERYTHROCYTE [DISTWIDTH] IN BLOOD BY AUTOMATED COUNT: 14.4 % (ref 11.3–14.5)
GLOBULIN SER CALC-MCNC: 3 GM/DL
GLUCOSE SERPL-MCNC: 114 MG/DL (ref 70–100)
HBA1C MFR BLD: 5.9 % (ref 4.8–5.6)
HCT VFR BLD AUTO: 39.1 % (ref 34.5–44)
HGB BLD-MCNC: 12 G/DL (ref 11.5–15.5)
IMM GRANULOCYTES # BLD: 0.02 10*3/MM3 (ref 0–0.03)
IMM GRANULOCYTES NFR BLD: 0.3 % (ref 0–0.6)
LYMPHOCYTES # BLD AUTO: 2.72 10*3/MM3 (ref 0.6–4.8)
LYMPHOCYTES NFR BLD AUTO: 36.7 % (ref 24–44)
MCH RBC QN AUTO: 29 PG (ref 27–31)
MCHC RBC AUTO-ENTMCNC: 30.7 G/DL (ref 32–36)
MCV RBC AUTO: 94.4 FL (ref 80–99)
MONOCYTES # BLD AUTO: 0.56 10*3/MM3 (ref 0–1)
MONOCYTES NFR BLD AUTO: 7.5 % (ref 0–12)
NEUTROPHILS # BLD AUTO: 3.58 10*3/MM3 (ref 1.5–8.3)
NEUTROPHILS NFR BLD AUTO: 48.2 % (ref 41–71)
PLATELET # BLD AUTO: 250 10*3/MM3 (ref 150–450)
POTASSIUM SERPL-SCNC: 3.3 MMOL/L (ref 3.5–5.5)
PROT SERPL-MCNC: 7.1 G/DL (ref 5.7–8.2)
RBC # BLD AUTO: 4.14 10*6/MM3 (ref 3.89–5.14)
SODIUM SERPL-SCNC: 143 MMOL/L (ref 132–146)
WBC # BLD AUTO: 7.42 10*3/MM3 (ref 3.5–10.8)

## 2017-06-28 PROCEDURE — 99214 OFFICE O/P EST MOD 30 MIN: CPT | Performed by: FAMILY MEDICINE

## 2017-06-28 RX ORDER — IRBESARTAN 75 MG/1
75 TABLET ORAL NIGHTLY
Qty: 90 TABLET | Refills: 1 | Status: SHIPPED | OUTPATIENT
Start: 2017-06-28 | End: 2018-04-18 | Stop reason: SDUPTHER

## 2017-06-28 RX ORDER — ATORVASTATIN CALCIUM 10 MG/1
10 TABLET, FILM COATED ORAL DAILY
Qty: 90 TABLET | Refills: 1 | Status: SHIPPED | OUTPATIENT
Start: 2017-06-28 | End: 2017-09-21 | Stop reason: SINTOL

## 2017-06-28 NOTE — PROGRESS NOTES
Subjective   Nancy Goodman is a 76 y.o. female.     History of Present Illness     She had CAD x5 on 5/24/17    Diabetes Mellitus Type II, Follow-up:   Nancy Goodman is a 76 y.o. female who is here for follow-up of Type 2 diabetes mellitus.  Current symptoms/problems include CAD and have been stable. Patient is adherent with medications.  Known diabetic complications: cardiovascular disease  Cardiovascular risk factors: diabetes mellitus, dyslipidemia, hypertension and CAD with CABG x 5 2017  Current diabetic medications include lantus. lantus 40 units but she changes around based on what her sugar is  Current monitoring regimen: home blood tests - daily  Home blood sugar records: 140-200  Any episodes of hypoglycemia? no  Eye exam current (within one year): yes  Weight trend: stable  She is on ACE inhibitor or angiotensin II receptor blocker. Patient is on a statin.       Nancy Goodman  is here for follow-up of hypertension of several years duration. She is not exercising and is not adherent to a low-salt diet. Patient does not check her blood pressure.     She is compliant with meds.        Nancy Goodman returns today for follow up of Hyperlipidemia with a lipid program recheck.   Nancy indicates her exercise level as not at all.  Diet: trying to eat better  Patient is not compliant with medications as she has stomach upset with the medicine, taking 1/2 pill from time to time  Any side effects to medications:   chest pain No, at least not since her CABG was done in May myalgia No memory change No  Pt is not due for labs        The following portions of the patient's history were reviewed and updated as appropriate: allergies, current medications, past family history, past medical history, past social history, past surgical history and problem list.    Review of Systems   Constitutional: Positive for fatigue.   HENT: Negative.    Eyes: Negative.    Respiratory: Negative.    Cardiovascular:  Negative.    Gastrointestinal: Negative.    Musculoskeletal: Negative.    Skin: Negative.    Neurological: Negative.    Psychiatric/Behavioral: Negative.    All other systems reviewed and are negative.      Objective   Physical Exam   Constitutional: She is oriented to person, place, and time. She appears well-developed and well-nourished. No distress.   HENT:   Head: Normocephalic and atraumatic.   Right Ear: Tympanic membrane, external ear and ear canal normal.   Left Ear: Tympanic membrane, external ear and ear canal normal.   Nose: Nose normal.   Mouth/Throat: Uvula is midline and oropharynx is clear and moist.   Eyes: Conjunctivae and EOM are normal.   Neck: Normal range of motion. Neck supple. No thyromegaly present.   Cardiovascular: Normal rate, regular rhythm and normal heart sounds.    No murmur heard.  Pulmonary/Chest: Effort normal and breath sounds normal. No respiratory distress.   Abdominal: Soft. Bowel sounds are normal. She exhibits no distension and no mass. There is no tenderness.   Musculoskeletal:   Slow, slightly shuffling gait   Lymphadenopathy:     She has no cervical adenopathy.   Neurological: She is alert and oriented to person, place, and time.   Skin: Skin is warm and dry.   Psychiatric: She has a normal mood and affect. Her behavior is normal. Judgment and thought content normal.   Nursing note and vitals reviewed.      Assessment/Plan   Nancy was seen today for follow-up.    Diagnoses and all orders for this visit:    S/P CABG (coronary artery bypass graft)  -     atorvastatin (LIPITOR) 10 MG tablet; Take 1 tablet by mouth Daily.    Type 2 diabetes mellitus without complication, with long-term current use of insulin  -     CBC & Differential  -     Comprehensive Metabolic Panel  -     Hemoglobin A1c  -     Insulin Glargine (LANTUS SOLOSTAR) 100 UNIT/ML injection pen; Inject 40 Units under the skin Every Morning.  -     Ambulatory Referral to Ophthalmology    Mixed hyperlipidemia  -      atorvastatin (LIPITOR) 10 MG tablet; Take 1 tablet by mouth Daily.    Essential hypertension  -     irbesartan (AVAPRO) 75 MG tablet; Take 1 tablet by mouth Every Night.    reviewed plans for cardiac rehab and encouraged her to partake in this for overall health and improvement in strength and dexterity  Recheck DM, reviewed how to take her insulin, want her to base it on AM fasting glucose level and be consistent with how she is taking the lantus rather than changing dose from time to time.  Will try lipitor 10, need to find a dose she can tolerate.  Pt has failed zocor and crestor in the last.  livalo an option in the future  BP not ideal but she does not want to change medicine at this time, will recheck in 3 months

## 2017-07-03 DIAGNOSIS — E87.6 HYPOKALEMIA: Primary | ICD-10-CM

## 2017-07-07 ENCOUNTER — TELEPHONE (OUTPATIENT)
Dept: CARDIOLOGY | Facility: CLINIC | Age: 77
End: 2017-07-07

## 2017-07-07 NOTE — TELEPHONE ENCOUNTER
Lindsay at [facility] called stating that Ms. Goodman was having some swelling in her feet and a little shortness of breath, she had also noted a 1 pound weight gain in a week. She stated that she had been off of her Lasix and was wondering if she could go back on it.  I spoke with Minerva Portillo PA-C and she stated to have her take the Lasix 40mg every other day till Ms. Goodman's appt with Dr. Valdez on 7/12/17 in [place].    I spoke with Lindsay and relayed the instructions.  She verbally understood and was going to instruct the patient since she was still at cardiac rehab.

## 2017-07-08 ENCOUNTER — RESULTS ENCOUNTER (OUTPATIENT)
Dept: FAMILY MEDICINE CLINIC | Facility: CLINIC | Age: 77
End: 2017-07-08

## 2017-07-08 DIAGNOSIS — E87.6 HYPOKALEMIA: ICD-10-CM

## 2017-07-12 ENCOUNTER — OFFICE VISIT (OUTPATIENT)
Dept: CARDIOLOGY | Facility: CLINIC | Age: 77
End: 2017-07-12

## 2017-07-12 VITALS
HEIGHT: 62 IN | BODY MASS INDEX: 27.33 KG/M2 | DIASTOLIC BLOOD PRESSURE: 62 MMHG | HEART RATE: 91 BPM | SYSTOLIC BLOOD PRESSURE: 108 MMHG | WEIGHT: 148.5 LBS

## 2017-07-12 DIAGNOSIS — Z79.4 TYPE 2 DIABETES MELLITUS WITHOUT COMPLICATION, WITH LONG-TERM CURRENT USE OF INSULIN (HCC): ICD-10-CM

## 2017-07-12 DIAGNOSIS — I10 ESSENTIAL HYPERTENSION: ICD-10-CM

## 2017-07-12 DIAGNOSIS — E78.2 MIXED HYPERLIPIDEMIA: ICD-10-CM

## 2017-07-12 DIAGNOSIS — E11.9 TYPE 2 DIABETES MELLITUS WITHOUT COMPLICATION, WITH LONG-TERM CURRENT USE OF INSULIN (HCC): ICD-10-CM

## 2017-07-12 DIAGNOSIS — I25.10 CORONARY ARTERY DISEASE INVOLVING NATIVE CORONARY ARTERY OF NATIVE HEART WITHOUT ANGINA PECTORIS: Primary | ICD-10-CM

## 2017-07-12 PROCEDURE — 99213 OFFICE O/P EST LOW 20 MIN: CPT | Performed by: INTERNAL MEDICINE

## 2017-07-12 RX ORDER — POTASSIUM CHLORIDE 750 MG/1
10 TABLET, EXTENDED RELEASE ORAL DAILY
Qty: 90 TABLET | Refills: 3 | Status: SHIPPED | OUTPATIENT
Start: 2017-07-12 | End: 2018-07-23 | Stop reason: SDUPTHER

## 2017-07-12 NOTE — PROGRESS NOTES
Subjective:     Encounter Date:07/12/2017      Patient ID: Nancy Goodman is a 76 y.o. female.    Chief Complaint: Mixed hyperlipidemia; Essential hypertension; and Coronary Artery Disease      PROBLEM LIST:  1. Coronary artery disease  a. 5/16/17 angina, cardiac catheter severe ostial LAD and LCx of these.  Ostial RPL and RCA.  b. 5 vessel CABG (Rajinder) LIMA to LAD, SVG to diagonal and LCx, and SVG to PDA and PL.  2. Hypertension  3. Dyslipidemia  4. Diabetes mellitus  5. GERD  6. Varicose veins  7. Surgeries:  a. Tubal ligation    History of Present Illness  Patient returns today for follow up with a history of follow-up status post CABG.  Patient had normal left ventricular function multivessel disease and what 5 vessel CABG by Dr. Calvillo.  Has done reasonably well postoperatively.  She is begun cardiac rehabilitation last week, and has usual musculoskeletal chest soreness.  She had a 2 week trial of Lasix when she went home, last week noticed some increasing shortness of breath and edema, which her Lasix was resumed, and this is significantly improved.  Her atorvastatin dose has been this decreased significantly due to myalgias and nausea.  He states she has not tolerated other statins in the past either.  Denies any exertional angina orthopnea PND.  She brought her blood pressure log with her and heart rate log, which all appear normal during rehabilitation..     Allergies   Allergen Reactions   • Amlodipine      sickness   • Ciprofloxacin      Pt not sure of reaction (stomach problems)   • Statins Nausea Only     Stomach problem         Current Outpatient Prescriptions:   •  aspirin 81 MG chewable tablet, Chew 1 tablet Daily., Disp: , Rfl: 11  •  atorvastatin (LIPITOR) 10 MG tablet, Take 1 tablet by mouth Daily., Disp: 90 tablet, Rfl: 1  •  Cranberry 425 MG capsule, Take 1 tablet by mouth Daily., Disp: , Rfl:   •  ELDERBERRY PO, Take 2 capsules by mouth Daily., Disp: , Rfl:   •  Insulin Glargine (LANTUS  "SOLOSTAR) 100 UNIT/ML injection pen, Inject 40 Units under the skin Every Morning. (Patient taking differently: Inject 35 Units under the skin Every Morning.), Disp: 15 pen, Rfl: 1  •  irbesartan (AVAPRO) 75 MG tablet, Take 1 tablet by mouth Every Night., Disp: 90 tablet, Rfl: 1  •  metoprolol tartrate (LOPRESSOR) 25 MG tablet, Take 1 tablet by mouth 2 (Two) Times a Day. (Patient taking differently: Take 12.5 mg by mouth Every Night.), Disp: 60 tablet, Rfl: 3  •  potassium chloride (KLOR-CON) 10 MEQ CR tablet, Take 1 tablet by mouth 2 (Two) Times a Day. Take 2 tablets daily x 2 weeks, then decrease to 1 tablet daily (Patient taking differently: Take 10 mEq by mouth Daily. Take 2 tablets daily x 2 weeks, then decrease to 1 tablet daily), Disp: 60 tablet, Rfl: 1  •  Probiotic Product (PROBIOTIC ADVANCED PO), Take 1 tablet by mouth Daily., Disp: , Rfl:   •  Ubiquinol 100 MG capsule, Take 100 mg by mouth Daily., Disp: , Rfl:   •  omeprazole (priLOSEC) 40 MG capsule, Take 1 capsule by mouth Daily., Disp: 90 capsule, Rfl: 1    The following portions of the patient's history were reviewed and updated as appropriate: allergies, current medications, past family history, past medical history, past social history, past surgical history and problem list.    Review of Systems   Constitution: Negative.   Cardiovascular: Positive for chest pain and leg swelling.   Respiratory: Negative.    Hematologic/Lymphatic: Negative for bleeding problem. Does not bruise/bleed easily.   Skin: Negative for rash.   Musculoskeletal: Positive for arthritis. Negative for muscle weakness and myalgias.   Gastrointestinal: Negative for heartburn, nausea and vomiting.   Neurological: Negative.           Objective:   Blood pressure 108/62, pulse 91, height 62\" (157.5 cm), weight 148 lb 8 oz (67.4 kg).      Physical Exam   Constitutional: She is oriented to person, place, and time. She appears well-developed and well-nourished.   HENT:   Mouth/Throat: " Oropharynx is clear and moist.   Neck: No JVD present. Carotid bruit is not present. No thyromegaly present.   Cardiovascular: Regular rhythm, S1 normal, S2 normal and intact distal pulses.  Exam reveals no gallop, no S3 and no S4.    Murmur heard.   Midsystolic murmur is present with a grade of 2/6  at the upper left sternal border, lower left sternal border, apex  Pulses:       Carotid pulses are 2+ on the right side, and 2+ on the left side.       Radial pulses are 2+ on the right side, and 2+ on the left side.   Pulmonary/Chest: Breath sounds normal.   Abdominal: Soft. Bowel sounds are normal. She exhibits no mass. There is no tenderness.   Musculoskeletal: She exhibits no edema.   Trace to 1+ bilateral edema.  Significant bilateral venous varicosities   Neurological: She is alert and oriented to person, place, and time.   Skin: Skin is warm and dry. No rash noted.       Lab Review:    Procedures        Assessment:   Nancy was seen today for mixed hyperlipidemia, essential hypertension and coronary artery disease.    Diagnoses and all orders for this visit:    Coronary artery disease involving native coronary artery of native heart without angina pectoris    Essential hypertension    Mixed hyperlipidemia    Type 2 diabetes mellitus without complication, with long-term current use of insulin      Impression  1. Coronary artery disease, 6 weeks status post CABG for multivessel disease and normal LVEF.  Doing well post operatively.    2. Hypertension, blood pressure now on the low side.  3. Edema, due to venous insufficiency/venous varicosities  4. Dyslipidemia on highest dose that she can tolerate of statin.  5. Diabetes per primary care physician    Plan:  1. Continue current medical therapy.  2. Use Lasix when necessary peripheral edema for her venous insufficiency.  3. We'll keep a close eye on her blood pressure, she understands she returns to her normal diet and activity it may require reinstitution of her  antihypertensives  4. Continue cardiac rehabilitation  5. Revisit in 9 MO, or sooner as needed.    Fidel Valdez MD

## 2017-07-31 ENCOUNTER — TELEPHONE (OUTPATIENT)
Dept: FAMILY MEDICINE CLINIC | Facility: CLINIC | Age: 77
End: 2017-07-31

## 2017-08-01 ENCOUNTER — TELEPHONE (OUTPATIENT)
Dept: FAMILY MEDICINE CLINIC | Facility: CLINIC | Age: 77
End: 2017-08-01

## 2017-08-01 DIAGNOSIS — E11.9 TYPE 2 DIABETES MELLITUS WITHOUT COMPLICATION, WITH LONG-TERM CURRENT USE OF INSULIN (HCC): ICD-10-CM

## 2017-08-01 DIAGNOSIS — Z79.4 TYPE 2 DIABETES MELLITUS WITHOUT COMPLICATION, WITH LONG-TERM CURRENT USE OF INSULIN (HCC): ICD-10-CM

## 2017-08-01 NOTE — TELEPHONE ENCOUNTER
----- Message from Jaci Varela sent at 8/1/2017  1:42 PM EDT -----  Contact: [facility] DURAN  REFI[facility] ON [employer] [facility]     PT WANTS A WRITTEN RX  PT ALSO REQUESTING A NEW RX FOR A FREESTYLE METER WITH STRIPS  [facility] [employer] [facility]     [phone]

## 2017-08-14 RX ORDER — CHLORTHALIDONE 25 MG/1
TABLET ORAL
Qty: 90 TABLET | OUTPATIENT
Start: 2017-08-14

## 2017-09-21 ENCOUNTER — OFFICE VISIT (OUTPATIENT)
Dept: FAMILY MEDICINE CLINIC | Facility: CLINIC | Age: 77
End: 2017-09-21

## 2017-09-21 VITALS
RESPIRATION RATE: 16 BRPM | TEMPERATURE: 97.8 F | HEART RATE: 80 BPM | DIASTOLIC BLOOD PRESSURE: 68 MMHG | BODY MASS INDEX: 28.26 KG/M2 | SYSTOLIC BLOOD PRESSURE: 130 MMHG | WEIGHT: 153.6 LBS | HEIGHT: 62 IN

## 2017-09-21 DIAGNOSIS — I10 ESSENTIAL HYPERTENSION: ICD-10-CM

## 2017-09-21 DIAGNOSIS — I25.10 CORONARY ARTERY DISEASE INVOLVING NATIVE CORONARY ARTERY OF NATIVE HEART WITHOUT ANGINA PECTORIS: ICD-10-CM

## 2017-09-21 DIAGNOSIS — E78.2 MIXED HYPERLIPIDEMIA: ICD-10-CM

## 2017-09-21 DIAGNOSIS — Z79.4 TYPE 2 DIABETES MELLITUS WITHOUT COMPLICATION, WITH LONG-TERM CURRENT USE OF INSULIN (HCC): Primary | ICD-10-CM

## 2017-09-21 DIAGNOSIS — E11.9 TYPE 2 DIABETES MELLITUS WITHOUT COMPLICATION, WITH LONG-TERM CURRENT USE OF INSULIN (HCC): Primary | ICD-10-CM

## 2017-09-21 PROCEDURE — 99214 OFFICE O/P EST MOD 30 MIN: CPT | Performed by: FAMILY MEDICINE

## 2017-09-21 NOTE — PROGRESS NOTES
Subjective    FU Diab Type 2, HTN, HLP, hypokalemia & CAD / CABG X 5.   NOTE: pt is fasting for labs.  Pt could not tolerate Atorvastatin even 3 X weekly, had to stop.    Nancy Goodman is a 76 y.o. female.     History of Present Illness     Overall doing pretty good - s/p CABG in early June. No residual issues with heart that she is aware of; cardiology has released until April and done with surgeon.    Energy level is climbing - essentially back to normal    No chest pain or pressure;    Lipitor - got chills, myalgias; is able to tolerate some salmon oil - would like to restart cinnamon.    The following portions of the patient's history were reviewed and updated as appropriate: allergies, current medications, past medical history and problem list.    Review of Systems   Constitutional: Negative.    Respiratory: Negative.  Negative for shortness of breath.    Cardiovascular: Negative.  Negative for chest pain.   Gastrointestinal: Negative.    Musculoskeletal: Negative.    Skin: Negative.    Neurological: Negative.    Psychiatric/Behavioral: Negative.        Objective   Physical Exam   Constitutional: She is oriented to person, place, and time. She appears well-developed and well-nourished.   HENT:   Head: Normocephalic.   Mouth/Throat: Oropharynx is clear and moist.   Eyes: EOM are normal. Pupils are equal, round, and reactive to light.   Neck: Neck supple. Carotid bruit is not present. No thyromegaly present.   Cardiovascular: Normal rate, regular rhythm and normal heart sounds.    Pulmonary/Chest: Effort normal and breath sounds normal. She has no wheezes.   Musculoskeletal: She exhibits no edema.   Neurological: She is alert and oriented to person, place, and time.   Skin: Skin is warm and dry.   Psychiatric: She has a normal mood and affect. Her behavior is normal.   Nursing note and vitals reviewed.      Assessment/Plan   Nancy was seen today for follow-up, coronary artery disease, hypertension,  hyperlipidemia, diabetes and hypokalemia.    Diagnoses and all orders for this visit:    Type 2 diabetes mellitus without complication, with long-term current use of insulin  -     Comprehensive Metabolic Panel  -     Hemoglobin A1c  -     MicroAlbumin, Urine, Random    Coronary artery disease involving native coronary artery of native heart without angina pectoris    Essential hypertension  -     Comprehensive Metabolic Panel  -     CBC & Differential    Mixed hyperlipidemia  -     Lipid Panel With LDL / HDL Ratio    Overall doing pretty well. Labs as noted. Likely f/u in 4-6 months awaiting labs

## 2017-09-22 LAB
ALBUMIN SERPL-MCNC: 4 G/DL (ref 3.2–4.8)
ALBUMIN/GLOB SERPL: 1.5 G/DL (ref 1.5–2.5)
ALP SERPL-CCNC: 63 U/L (ref 25–100)
ALT SERPL-CCNC: 23 U/L (ref 7–40)
AST SERPL-CCNC: 22 U/L (ref 0–33)
BASOPHILS # BLD AUTO: 0.04 10*3/MM3 (ref 0–0.2)
BASOPHILS NFR BLD AUTO: 0.7 % (ref 0–1)
BILIRUB SERPL-MCNC: 0.5 MG/DL (ref 0.3–1.2)
BUN SERPL-MCNC: 19 MG/DL (ref 9–23)
BUN/CREAT SERPL: 21.1 (ref 7–25)
CALCIUM SERPL-MCNC: 9.7 MG/DL (ref 8.7–10.4)
CHLORIDE SERPL-SCNC: 101 MMOL/L (ref 99–109)
CHOLEST SERPL-MCNC: 228 MG/DL (ref 0–200)
CO2 SERPL-SCNC: 31 MMOL/L (ref 20–31)
CREAT SERPL-MCNC: 0.9 MG/DL (ref 0.6–1.3)
EOSINOPHIL # BLD AUTO: 0.24 10*3/MM3 (ref 0–0.3)
EOSINOPHIL NFR BLD AUTO: 3.9 % (ref 0–3)
ERYTHROCYTE [DISTWIDTH] IN BLOOD BY AUTOMATED COUNT: 15.9 % (ref 11.3–14.5)
GLOBULIN SER CALC-MCNC: 2.6 GM/DL
GLUCOSE SERPL-MCNC: 113 MG/DL (ref 70–100)
HBA1C MFR BLD: 7.7 % (ref 4.8–5.6)
HCT VFR BLD AUTO: 40.5 % (ref 34.5–44)
HDLC SERPL-MCNC: 36 MG/DL (ref 40–60)
HGB BLD-MCNC: 13 G/DL (ref 11.5–15.5)
IMM GRANULOCYTES # BLD: 0.01 10*3/MM3 (ref 0–0.03)
IMM GRANULOCYTES NFR BLD: 0.2 % (ref 0–0.6)
LDLC SERPL CALC-MCNC: 146 MG/DL (ref 0–100)
LDLC/HDLC SERPL: 4.07 {RATIO}
LYMPHOCYTES # BLD AUTO: 2.32 10*3/MM3 (ref 0.6–4.8)
LYMPHOCYTES NFR BLD AUTO: 37.9 % (ref 24–44)
MCH RBC QN AUTO: 28.8 PG (ref 27–31)
MCHC RBC AUTO-ENTMCNC: 32.1 G/DL (ref 32–36)
MCV RBC AUTO: 89.8 FL (ref 80–99)
MICROALBUMIN UR-MCNC: <3 UG/ML
MONOCYTES # BLD AUTO: 0.54 10*3/MM3 (ref 0–1)
MONOCYTES NFR BLD AUTO: 8.8 % (ref 0–12)
NEUTROPHILS # BLD AUTO: 2.97 10*3/MM3 (ref 1.5–8.3)
NEUTROPHILS NFR BLD AUTO: 48.5 % (ref 41–71)
PLATELET # BLD AUTO: 210 10*3/MM3 (ref 150–450)
POTASSIUM SERPL-SCNC: 4.1 MMOL/L (ref 3.5–5.5)
PROT SERPL-MCNC: 6.6 G/DL (ref 5.7–8.2)
RBC # BLD AUTO: 4.51 10*6/MM3 (ref 3.89–5.14)
SODIUM SERPL-SCNC: 138 MMOL/L (ref 132–146)
TRIGL SERPL-MCNC: 228 MG/DL (ref 0–150)
VLDLC SERPL CALC-MCNC: 45.6 MG/DL
WBC # BLD AUTO: 6.12 10*3/MM3 (ref 3.5–10.8)

## 2017-10-31 ENCOUNTER — TELEPHONE (OUTPATIENT)
Dept: CARDIOLOGY | Facility: CLINIC | Age: 77
End: 2017-10-31

## 2017-10-31 RX ORDER — CHLORTHALIDONE 25 MG/1
25 TABLET ORAL DAILY
Qty: 90 TABLET | Refills: 0 | Status: SHIPPED | OUTPATIENT
Start: 2017-10-31 | End: 2018-04-20

## 2017-10-31 NOTE — TELEPHONE ENCOUNTER
Patient calls to report that she was unable to get her chlorthalidone 25 mg daily from her pharmacy. When recently in office she states she may have forgotten to let us know she is on that. Labs reviewed from 9/21. Labs are acceptable and she is on potassium supplement. She only takes Furosemide as needed in which she states she does not need frequently while on Chlorthalidone. Will send her a new script in for Chlorthalidone and have instructed patient to have labs drawn in 4 weeks. Patient verbalized understanding.

## 2017-12-05 ENCOUNTER — LAB (OUTPATIENT)
Dept: FAMILY MEDICINE CLINIC | Facility: CLINIC | Age: 77
End: 2017-12-05

## 2017-12-05 ENCOUNTER — TELEPHONE (OUTPATIENT)
Dept: FAMILY MEDICINE CLINIC | Facility: CLINIC | Age: 77
End: 2017-12-05

## 2017-12-05 DIAGNOSIS — E11.9 TYPE 2 DIABETES MELLITUS WITHOUT COMPLICATION, WITH LONG-TERM CURRENT USE OF INSULIN (HCC): ICD-10-CM

## 2017-12-05 DIAGNOSIS — Z79.4 TYPE 2 DIABETES MELLITUS WITHOUT COMPLICATION, WITH LONG-TERM CURRENT USE OF INSULIN (HCC): Primary | ICD-10-CM

## 2017-12-05 DIAGNOSIS — Z79.4 TYPE 2 DIABETES MELLITUS WITHOUT COMPLICATION, WITH LONG-TERM CURRENT USE OF INSULIN (HCC): ICD-10-CM

## 2017-12-05 DIAGNOSIS — I10 ESSENTIAL HYPERTENSION: ICD-10-CM

## 2017-12-05 DIAGNOSIS — E78.2 MIXED HYPERLIPIDEMIA: ICD-10-CM

## 2017-12-05 DIAGNOSIS — E11.9 TYPE 2 DIABETES MELLITUS WITHOUT COMPLICATION, WITH LONG-TERM CURRENT USE OF INSULIN (HCC): Primary | ICD-10-CM

## 2017-12-05 LAB
CHOLEST SERPL-MCNC: 223 MG/DL (ref 0–200)
HBA1C MFR BLD: 8 % (ref 4.8–5.6)
HDLC SERPL-MCNC: 36 MG/DL (ref 40–60)
LDLC SERPL CALC-MCNC: 112 MG/DL (ref 0–100)
TRIGL SERPL-MCNC: 373 MG/DL (ref 0–150)
VLDLC SERPL CALC-MCNC: 74.6 MG/DL

## 2017-12-05 NOTE — TELEPHONE ENCOUNTER
----- Message from Klaudia Winston sent at 12/5/2017 10:17 AM EST -----  Contact: SHARRI;PT STOP BY  REFILL ON FREESTYLE LITE TEST STRIPS QS  PEN NEEDLES 5MM MINI QS  LANTUS SOLOSTAR 100UNITS QS    PT NEEDS HANDWRITTEN RX TO TAKE TO [facility]    [phone]

## 2017-12-05 NOTE — TELEPHONE ENCOUNTER
----- Message from Klaudia Winston sent at 12/5/2017 10:17 AM EST -----  Contact: SHARRI;PT STOP BY  REFILL ON FREESTYLE LITE TEST STRIPS QS  PEN NEEDLES 5MM MINI QS  LANTUS SOLOSTAR 100UNITS QS    PT NEEDS HANDWRITTEN RX TO TAKE TO [facility]    [phone]

## 2017-12-07 RX ORDER — OMEPRAZOLE 40 MG/1
CAPSULE, DELAYED RELEASE ORAL
Qty: 90 CAPSULE | Refills: 1 | Status: ON HOLD | OUTPATIENT
Start: 2017-12-07 | End: 2019-05-13

## 2018-04-13 NOTE — PROGRESS NOTES
Subjective:     Encounter Date:04/18/2018    Primary Care Physician: Wayne Jorge MD      Patient ID: Nancy Goodman is a 77 y.o. female.    Chief Complaint:Mixed hyperlipidemia and essential hypertension    PROBLEM LIST:  1. Coronary artery disease  a. 5/16/17 angina, cardiac catheter severe ostial LAD and LCx of these.  Ostial RPL and RCA.  b. 5 vessel CABG ([place]) LIMA to LAD, SVG to diagonal and LCx, and SVG to PDA and PL.  2. Hypertension  3. Dyslipidemia  4. Diabetes mellitus  5. GERD  6. Varicose veins  7. Surgeries:  a. Tubal ligation  b. Varicose vein surgery    Allergies   Allergen Reactions   • Amlodipine      sickness   • Ciprofloxacin      Pt not sure of reaction (stomach problems)   • Statins Nausea Only     Stomach problem         Current Outpatient Prescriptions:   •  aspirin 81 MG chewable tablet, Chew 1 tablet Daily., Disp: , Rfl: 11  •  Cholecalciferol (VITAMIN D3) 5000 units capsule capsule, Take 5,000 Units by mouth Daily., Disp: , Rfl:   •  Cranberry 425 MG capsule, Take 1 tablet by mouth Daily., Disp: , Rfl:   •  Echinacea 380 MG capsule, Take  by mouth 2 (Two) Times a Day., Disp: , Rfl:   •  furosemide (LASIX) 20 MG tablet, Take 20 mg by mouth Daily., Disp: , Rfl:   •  glucose blood test strip, Check QD and PRN, Disp: 100 each, Rfl: 5  •  Insulin Glargine (LANTUS SOLOSTAR) 100 UNIT/ML injection pen, Inject 40 Units under the skin Every Morning (Patient taking differently: Inject 37 Units under the skin Every Morning.), Disp: 15 pen, Rfl: 1  •  Insulin Pen Needle (B-D UF III MINI PEN NEEDLES) 31G X 5 MM misc, 100 each Daily, Disp: 100 each, Rfl: 11  •  melatonin 3 MG tablet, Take  by mouth Every Night., Disp: , Rfl:   •  metoprolol tartrate (LOPRESSOR) 25 MG tablet, Take 0.5 tablets by mouth Every Night., Disp: 45 tablet, Rfl: 3  •  Omega-3 Fatty Acids (SALMON OIL PO), Take 1,200 mg by mouth Daily., Disp: , Rfl:   •  omeprazole (priLOSEC) 40 MG capsule, TAKE 1 CAPSULE DAILY, Disp: 90  capsule, Rfl: 1  •  potassium chloride (KLOR-CON) 10 MEQ CR tablet, Take 1 tablet by mouth Daily. Take 2 tablets daily x 2 weeks, then decrease to 1 tablet daily, Disp: 90 tablet, Rfl: 3  •  Probiotic Product (PROBIOTIC ADVANCED PO), Take 1 tablet by mouth Daily., Disp: , Rfl:   •  Specialty Vitamins Products (HEART SAVIOR PO), Take  by mouth. 2 every other day, Disp: , Rfl:   •  Ubiquinol 100 MG capsule, Take 100 mg by mouth Daily., Disp: , Rfl:   •  chlorthalidone (HYGROTON) 25 MG tablet, Take 1 tablet by mouth Daily., Disp: 90 tablet, Rfl: 0  •  ELDERBERRY PO, Take 2 capsules by mouth Daily., Disp: , Rfl:   •  irbesartan (AVAPRO) 75 MG tablet, Take 1 tablet by mouth Every Night., Disp: 90 tablet, Rfl: 3        History of Present Illness    Returns today for annual follow-up post CABG.  She is very active still goes to the gym 3 times weekly without symptoms of chest pain or exertional dyspnea.  Her main issue is intolerant to statins noted she is now stopped her atorvastatin due to nausea primarily and to lesser extent some myalgias.  She probably cannot take other statins as well.  She tried over-the-counter red yeast Rice which cause the same symptoms symptoms resolve with stopping the red yeast Rice and statins.    The following portions of the patient's history were reviewed and updated as appropriate: allergies, current medications, past family history, past medical history, past social history, past surgical history and problem list.      Social History   Substance Use Topics   • Smoking status: Never Smoker   • Smokeless tobacco: Never Used   • Alcohol use No         Review of Systems   Constitution: Negative.   Cardiovascular: Positive for leg swelling. Negative for chest pain.   Respiratory: Negative.    Hematologic/Lymphatic: Negative for bleeding problem. Does not bruise/bleed easily.   Skin: Negative for rash.   Musculoskeletal: Positive for arthritis. Negative for muscle weakness and myalgias.  "  Gastrointestinal: Positive for nausea. Negative for heartburn and vomiting.   Neurological: Negative.           Objective:    height is 158.8 cm (62.5\") and weight is 72.1 kg (159 lb). Her blood pressure is 146/86 and her pulse is 86.         Physical Exam   Constitutional: She is oriented to person, place, and time. She appears well-developed and well-nourished.   HENT:   Mouth/Throat: Oropharynx is clear and moist.   Neck: No JVD present. Carotid bruit is not present. No thyromegaly present.   Cardiovascular: Regular rhythm, S1 normal, S2 normal and intact distal pulses.  Exam reveals no gallop, no S3 and no S4.    Murmur heard.   Systolic murmur is present with a grade of 2/6  at the upper right sternal border, upper left sternal border  Pulses:       Carotid pulses are 2+ on the right side, and 2+ on the left side.       Radial pulses are 2+ on the right side, and 2+ on the left side.   Pulmonary/Chest: Breath sounds normal.   Abdominal: Soft. Bowel sounds are normal. She exhibits no mass. There is no tenderness.   Musculoskeletal: She exhibits no edema.   Neurological: She is alert and oriented to person, place, and time.   Skin: Skin is warm and dry. No rash noted.       Procedures          Assessment:   Assessment/Plan      Nancy was seen today for mixed hyperlipidemia and essential hypertension.    Diagnoses and all orders for this visit:    Coronary artery disease involving native coronary artery of native heart without angina pectoris  -     Adult Transthoracic Echo Complete W/ Cont if Necessary Per Protocol; Future    VHD (valvular heart disease)    Mixed hyperlipidemia    Essential hypertension      1.  Coronary artery disease, one year status post CABG.  Stable no angina doing well  2.  Dyslipidemia intolerant to statins at normal doses including red yeast Rice.  We have instructed the patient to try 5 mg of Crestor weekly.  If she tolerates this after 1 month increased to twice weekly.  3.  " Hypertension well controlled continue beta blocker and arm  4.  Hemoglobin SC 8.0, diabetes poorly controlled discussed with primary  5.  Aortic stenosis, mild to moderate.  Asymptomatic.  We'll check echocardiogram at next visit.  Fidel Valdez MD           Dictated utilizing Dragon dictation

## 2018-04-18 ENCOUNTER — OFFICE VISIT (OUTPATIENT)
Dept: CARDIOLOGY | Facility: CLINIC | Age: 78
End: 2018-04-18

## 2018-04-18 ENCOUNTER — TELEPHONE (OUTPATIENT)
Dept: FAMILY MEDICINE CLINIC | Facility: CLINIC | Age: 78
End: 2018-04-18

## 2018-04-18 VITALS
BODY MASS INDEX: 28.17 KG/M2 | HEART RATE: 86 BPM | WEIGHT: 159 LBS | HEIGHT: 63 IN | DIASTOLIC BLOOD PRESSURE: 86 MMHG | SYSTOLIC BLOOD PRESSURE: 146 MMHG

## 2018-04-18 DIAGNOSIS — I10 ESSENTIAL HYPERTENSION: ICD-10-CM

## 2018-04-18 DIAGNOSIS — I25.10 CORONARY ARTERY DISEASE INVOLVING NATIVE CORONARY ARTERY OF NATIVE HEART WITHOUT ANGINA PECTORIS: ICD-10-CM

## 2018-04-18 DIAGNOSIS — E78.2 MIXED HYPERLIPIDEMIA: ICD-10-CM

## 2018-04-18 DIAGNOSIS — I38 VHD (VALVULAR HEART DISEASE): Primary | ICD-10-CM

## 2018-04-18 PROCEDURE — 99213 OFFICE O/P EST LOW 20 MIN: CPT | Performed by: INTERNAL MEDICINE

## 2018-04-18 RX ORDER — LANOLIN ALCOHOL/MO/W.PET/CERES
3 CREAM (GRAM) TOPICAL NIGHTLY
Status: ON HOLD | COMMUNITY
End: 2019-04-10

## 2018-04-18 RX ORDER — IRBESARTAN 75 MG/1
75 TABLET ORAL NIGHTLY
Qty: 90 TABLET | Refills: 3 | Status: SHIPPED | OUTPATIENT
Start: 2018-04-18 | End: 2018-04-20 | Stop reason: SDUPTHER

## 2018-04-18 RX ORDER — SELENIUM 200 MCG
TABLET ORAL 2 TIMES DAILY
COMMUNITY
End: 2018-11-28

## 2018-04-18 RX ORDER — FUROSEMIDE 20 MG/1
20 TABLET ORAL DAILY
COMMUNITY
End: 2018-04-20 | Stop reason: SDUPTHER

## 2018-04-20 ENCOUNTER — OFFICE VISIT (OUTPATIENT)
Dept: FAMILY MEDICINE CLINIC | Facility: CLINIC | Age: 78
End: 2018-04-20

## 2018-04-20 VITALS
TEMPERATURE: 97.6 F | RESPIRATION RATE: 18 BRPM | HEART RATE: 68 BPM | BODY MASS INDEX: 28.17 KG/M2 | HEIGHT: 63 IN | SYSTOLIC BLOOD PRESSURE: 120 MMHG | WEIGHT: 159 LBS | DIASTOLIC BLOOD PRESSURE: 72 MMHG

## 2018-04-20 DIAGNOSIS — E11.9 TYPE 2 DIABETES MELLITUS WITHOUT COMPLICATION, WITH LONG-TERM CURRENT USE OF INSULIN (HCC): Primary | ICD-10-CM

## 2018-04-20 DIAGNOSIS — Z79.4 TYPE 2 DIABETES MELLITUS WITHOUT COMPLICATION, WITH LONG-TERM CURRENT USE OF INSULIN (HCC): Primary | ICD-10-CM

## 2018-04-20 DIAGNOSIS — E78.2 MIXED HYPERLIPIDEMIA: ICD-10-CM

## 2018-04-20 DIAGNOSIS — I25.10 CORONARY ARTERY DISEASE INVOLVING NATIVE CORONARY ARTERY OF NATIVE HEART WITHOUT ANGINA PECTORIS: ICD-10-CM

## 2018-04-20 DIAGNOSIS — E87.6 HYPOKALEMIA: ICD-10-CM

## 2018-04-20 DIAGNOSIS — I10 ESSENTIAL HYPERTENSION: ICD-10-CM

## 2018-04-20 DIAGNOSIS — R60.0 LOCALIZED EDEMA: ICD-10-CM

## 2018-04-20 LAB
ALBUMIN SERPL-MCNC: 4.1 G/DL (ref 3.2–4.8)
ALBUMIN/GLOB SERPL: 1.6 G/DL (ref 1.5–2.5)
ALP SERPL-CCNC: 60 U/L (ref 25–100)
ALT SERPL-CCNC: 25 U/L (ref 7–40)
AST SERPL-CCNC: 24 U/L (ref 0–33)
BASOPHILS # BLD AUTO: 0.03 10*3/MM3 (ref 0–0.2)
BASOPHILS NFR BLD AUTO: 0.3 % (ref 0–1)
BILIRUB SERPL-MCNC: 0.5 MG/DL (ref 0.3–1.2)
BUN SERPL-MCNC: 22 MG/DL (ref 9–23)
BUN/CREAT SERPL: 22 (ref 7–25)
CALCIUM SERPL-MCNC: 9.5 MG/DL (ref 8.7–10.4)
CHLORIDE SERPL-SCNC: 104 MMOL/L (ref 99–109)
CHOLEST SERPL-MCNC: 224 MG/DL (ref 0–200)
CO2 SERPL-SCNC: 32 MMOL/L (ref 20–31)
CREAT SERPL-MCNC: 1 MG/DL (ref 0.6–1.3)
EOSINOPHIL # BLD AUTO: 0.39 10*3/MM3 (ref 0–0.3)
EOSINOPHIL NFR BLD AUTO: 4.5 % (ref 0–3)
ERYTHROCYTE [DISTWIDTH] IN BLOOD BY AUTOMATED COUNT: 14.5 % (ref 11.3–14.5)
GFR SERPLBLD CREATININE-BSD FMLA CKD-EPI: 54 ML/MIN/1.73
GFR SERPLBLD CREATININE-BSD FMLA CKD-EPI: 65 ML/MIN/1.73
GLOBULIN SER CALC-MCNC: 2.6 GM/DL
GLUCOSE SERPL-MCNC: 145 MG/DL (ref 70–100)
HBA1C MFR BLD: 7.8 % (ref 4.8–5.6)
HCT VFR BLD AUTO: 43 % (ref 34.5–44)
HDLC SERPL-MCNC: 35 MG/DL (ref 40–60)
HGB BLD-MCNC: 13.9 G/DL (ref 11.5–15.5)
IMM GRANULOCYTES # BLD: 0.01 10*3/MM3 (ref 0–0.03)
IMM GRANULOCYTES NFR BLD: 0.1 % (ref 0–0.6)
LDLC SERPL CALC-MCNC: 127 MG/DL (ref 0–100)
LYMPHOCYTES # BLD AUTO: 2.5 10*3/MM3 (ref 0.6–4.8)
LYMPHOCYTES NFR BLD AUTO: 28.6 % (ref 24–44)
MCH RBC QN AUTO: 30.4 PG (ref 27–31)
MCHC RBC AUTO-ENTMCNC: 32.3 G/DL (ref 32–36)
MCV RBC AUTO: 94.1 FL (ref 80–99)
MONOCYTES # BLD AUTO: 0.45 10*3/MM3 (ref 0–1)
MONOCYTES NFR BLD AUTO: 5.2 % (ref 0–12)
NEUTROPHILS # BLD AUTO: 5.35 10*3/MM3 (ref 1.5–8.3)
NEUTROPHILS NFR BLD AUTO: 61.3 % (ref 41–71)
PLATELET # BLD AUTO: 209 10*3/MM3 (ref 150–450)
POTASSIUM SERPL-SCNC: 4.4 MMOL/L (ref 3.5–5.5)
PROT SERPL-MCNC: 6.7 G/DL (ref 5.7–8.2)
RBC # BLD AUTO: 4.57 10*6/MM3 (ref 3.89–5.14)
SODIUM SERPL-SCNC: 143 MMOL/L (ref 132–146)
TRIGL SERPL-MCNC: 311 MG/DL (ref 0–150)
VLDLC SERPL CALC-MCNC: 62.2 MG/DL
WBC # BLD AUTO: 8.73 10*3/MM3 (ref 3.5–10.8)

## 2018-04-20 PROCEDURE — 99214 OFFICE O/P EST MOD 30 MIN: CPT | Performed by: FAMILY MEDICINE

## 2018-04-20 RX ORDER — IRBESARTAN 75 MG/1
75 TABLET ORAL NIGHTLY
Qty: 90 TABLET | Refills: 3 | Status: SHIPPED | OUTPATIENT
Start: 2018-04-20 | End: 2018-08-10 | Stop reason: SDUPTHER

## 2018-04-20 RX ORDER — IRBESARTAN 75 MG/1
75 TABLET ORAL NIGHTLY
Qty: 90 TABLET | Refills: 3 | Status: SHIPPED | OUTPATIENT
Start: 2018-04-20 | End: 2018-04-20 | Stop reason: SDUPTHER

## 2018-04-20 RX ORDER — FUROSEMIDE 20 MG/1
20 TABLET ORAL DAILY
Qty: 90 TABLET | Refills: 1 | Status: SHIPPED | OUTPATIENT
Start: 2018-04-20 | End: 2018-04-20 | Stop reason: SDUPTHER

## 2018-04-20 RX ORDER — FUROSEMIDE 20 MG/1
20 TABLET ORAL DAILY
Qty: 90 TABLET | Refills: 1 | Status: SHIPPED | OUTPATIENT
Start: 2018-04-20 | End: 2018-08-10 | Stop reason: SDUPTHER

## 2018-04-20 RX ORDER — LANCETS 28 GAUGE
EACH MISCELLANEOUS
COMMUNITY
Start: 2018-04-11 | End: 2018-08-10 | Stop reason: SDUPTHER

## 2018-04-20 RX ORDER — ROSUVASTATIN CALCIUM 5 MG/1
5 TABLET, COATED ORAL DAILY
Qty: 30 TABLET | Refills: 1 | Status: SHIPPED | OUTPATIENT
Start: 2018-04-20 | End: 2018-08-10 | Stop reason: SDUPTHER

## 2018-04-20 NOTE — PROGRESS NOTES
Subjective   Nancy Goodman is a 77 y.o. female.     History of Present Illness     Diabetes Mellitus Type II, Follow-up:   Nancy Goodman is a 77 y.o. female who is here for follow-up of Type 2 diabetes mellitus.  Current symptoms/problems include none and have been unchanged. Patient is adherent with medications.  Known diabetic complications: cardiovascular disease  Cardiovascular risk factors: advanced age (older than 55 for men, 65 for women), diabetes mellitus, dyslipidemia, hypertension and CABG x5  Current diabetic medications include lantus 40 units.   Current monitoring regimen: home blood tests - daily  Home blood sugar records: fasting range: 123-130  Any episodes of hypoglycemia? no  Eye exam current (within one year): yes  Weight trend: stable  She is on ACE inhibitor or angiotensin II receptor blocker. Patient is on a statin.       Nancy Goodman  is here for follow-up of hypertension of several years duration. She is not exercising and is not adherent to a low-salt diet. Patient does not check her blood pressure.   Patient denies chest pain and palpitations. She is compliant with meds.        Nancy Goodman returns today for follow up of Hyperlipidemia with a lipid program recheck.   Nancy indicates her exercise level as not at all.  Diet: trying to eat better  Patient is not compliant with medications   Any side effects to medications:   Had GI upset with zocor, crestor and lipitor  Pt is due for labs  Cardiologist mentioned taking crestor 5 1-2 times a week      The following portions of the patient's history were reviewed and updated as appropriate: allergies, current medications, past family history, past medical history, past social history, past surgical history and problem list.    Review of Systems   Constitutional: Negative.    HENT: Negative.    Eyes: Negative.    Respiratory: Negative.    Cardiovascular: Negative.    Gastrointestinal: Negative.    Musculoskeletal: Negative.     Skin: Negative.    Neurological: Negative.    Psychiatric/Behavioral: Negative.    All other systems reviewed and are negative.      Objective   Physical Exam   Constitutional: She is oriented to person, place, and time. She appears well-developed and well-nourished. No distress.   HENT:   Head: Normocephalic and atraumatic.   Right Ear: Tympanic membrane, external ear and ear canal normal.   Left Ear: Tympanic membrane, external ear and ear canal normal.   Nose: Nose normal.   Mouth/Throat: Uvula is midline and oropharynx is clear and moist. She has dentures.   Eyes: Conjunctivae and EOM are normal.   Neck: Normal range of motion. Neck supple. No thyromegaly present.   Cardiovascular: Normal rate, regular rhythm and normal heart sounds.    No murmur heard.  Pulmonary/Chest: Effort normal and breath sounds normal. No respiratory distress.   Abdominal: Soft. Bowel sounds are normal. She exhibits no distension and no mass. There is no tenderness.   Musculoskeletal: She exhibits no edema.   Lymphadenopathy:     She has no cervical adenopathy.   Neurological: She is alert and oriented to person, place, and time.   Skin: Skin is warm and dry.   Psychiatric: She has a normal mood and affect. Her behavior is normal. Judgment and thought content normal.   Nursing note and vitals reviewed.      Assessment/Plan   Nancy was seen today for diabetes.    Diagnoses and all orders for this visit:    Type 2 diabetes mellitus without complication, with long-term current use of insulin  -     Insulin Glargine (LANTUS SOLOSTAR) 100 UNIT/ML injection pen; Inject 40 Units under the skin Every Morning.    Essential hypertension  -     irbesartan (AVAPRO) 75 MG tablet; Take 1 tablet by mouth Every Night.    Coronary artery disease involving native coronary artery of native heart without angina pectoris  -     metoprolol tartrate (LOPRESSOR) 25 MG tablet; Take 0.5 tablets by mouth Every Night.  -     rosuvastatin (CRESTOR) 5 MG tablet;  Take 1 tablet by mouth Daily.    Localized edema  -     furosemide (LASIX) 20 MG tablet; Take 1 tablet by mouth Daily.    Hypokalemia    Mixed hyperlipidemia  -     rosuvastatin (CRESTOR) 5 MG tablet; Take 1 tablet by mouth Daily.    DM control suboptimal with lantus, may consider adding metformin to regimen pending labs  BP stable, no change in regimen    Will try crestor 5 1-2 times a week for lipids, consider repatha in the future if she does not tolerate this.

## 2018-07-24 RX ORDER — POTASSIUM CHLORIDE 750 MG/1
TABLET, FILM COATED, EXTENDED RELEASE ORAL
Qty: 90 TABLET | Refills: 3 | Status: SHIPPED | OUTPATIENT
Start: 2018-07-24 | End: 2019-05-12

## 2018-08-10 ENCOUNTER — OFFICE VISIT (OUTPATIENT)
Dept: FAMILY MEDICINE CLINIC | Facility: CLINIC | Age: 78
End: 2018-08-10

## 2018-08-10 VITALS
TEMPERATURE: 98 F | WEIGHT: 159 LBS | DIASTOLIC BLOOD PRESSURE: 80 MMHG | SYSTOLIC BLOOD PRESSURE: 120 MMHG | RESPIRATION RATE: 18 BRPM | HEART RATE: 74 BPM | HEIGHT: 63 IN | BODY MASS INDEX: 28.17 KG/M2

## 2018-08-10 DIAGNOSIS — Z79.4 TYPE 2 DIABETES MELLITUS WITHOUT COMPLICATION, WITH LONG-TERM CURRENT USE OF INSULIN (HCC): Primary | ICD-10-CM

## 2018-08-10 DIAGNOSIS — E78.2 MIXED HYPERLIPIDEMIA: ICD-10-CM

## 2018-08-10 DIAGNOSIS — R60.0 LOCALIZED EDEMA: ICD-10-CM

## 2018-08-10 DIAGNOSIS — I25.10 CORONARY ARTERY DISEASE INVOLVING NATIVE CORONARY ARTERY OF NATIVE HEART WITHOUT ANGINA PECTORIS: ICD-10-CM

## 2018-08-10 DIAGNOSIS — I10 ESSENTIAL HYPERTENSION: ICD-10-CM

## 2018-08-10 DIAGNOSIS — E11.9 TYPE 2 DIABETES MELLITUS WITHOUT COMPLICATION, WITH LONG-TERM CURRENT USE OF INSULIN (HCC): Primary | ICD-10-CM

## 2018-08-10 LAB
ALBUMIN SERPL-MCNC: 3.92 G/DL (ref 3.2–4.8)
ALBUMIN/GLOB SERPL: 1.5 G/DL (ref 1.5–2.5)
ALP SERPL-CCNC: 56 U/L (ref 25–100)
ALT SERPL-CCNC: 18 U/L (ref 7–40)
AST SERPL-CCNC: 23 U/L (ref 0–33)
BASOPHILS # BLD AUTO: 0.05 10*3/MM3 (ref 0–0.2)
BASOPHILS NFR BLD AUTO: 0.8 % (ref 0–1)
BILIRUB SERPL-MCNC: 0.3 MG/DL (ref 0.3–1.2)
BUN SERPL-MCNC: 15 MG/DL (ref 9–23)
BUN/CREAT SERPL: 14.6 (ref 7–25)
CALCIUM SERPL-MCNC: 9.3 MG/DL (ref 8.7–10.4)
CHLORIDE SERPL-SCNC: 106 MMOL/L (ref 99–109)
CO2 SERPL-SCNC: 30 MMOL/L (ref 20–31)
CREAT SERPL-MCNC: 1.03 MG/DL (ref 0.6–1.3)
EOSINOPHIL # BLD AUTO: 0.46 10*3/MM3 (ref 0–0.3)
EOSINOPHIL NFR BLD AUTO: 6.9 % (ref 0–3)
ERYTHROCYTE [DISTWIDTH] IN BLOOD BY AUTOMATED COUNT: 14.3 % (ref 11.3–14.5)
GLOBULIN SER CALC-MCNC: 2.7 GM/DL
GLUCOSE SERPL-MCNC: 136 MG/DL (ref 70–100)
HBA1C MFR BLD: 8.1 % (ref 4.8–5.6)
HCT VFR BLD AUTO: 41.8 % (ref 34.5–44)
HGB BLD-MCNC: 13.4 G/DL (ref 11.5–15.5)
IMM GRANULOCYTES # BLD: 0.01 10*3/MM3 (ref 0–0.03)
IMM GRANULOCYTES NFR BLD: 0.2 % (ref 0–0.6)
LYMPHOCYTES # BLD AUTO: 2.53 10*3/MM3 (ref 0.6–4.8)
LYMPHOCYTES NFR BLD AUTO: 38.1 % (ref 24–44)
MCH RBC QN AUTO: 30.2 PG (ref 27–31)
MCHC RBC AUTO-ENTMCNC: 32.1 G/DL (ref 32–36)
MCV RBC AUTO: 94.4 FL (ref 80–99)
MONOCYTES # BLD AUTO: 0.56 10*3/MM3 (ref 0–1)
MONOCYTES NFR BLD AUTO: 8.4 % (ref 0–12)
NEUTROPHILS # BLD AUTO: 3.03 10*3/MM3 (ref 1.5–8.3)
NEUTROPHILS NFR BLD AUTO: 45.6 % (ref 41–71)
PLATELET # BLD AUTO: 218 10*3/MM3 (ref 150–450)
POTASSIUM SERPL-SCNC: 4.3 MMOL/L (ref 3.5–5.5)
PROT SERPL-MCNC: 6.6 G/DL (ref 5.7–8.2)
RBC # BLD AUTO: 4.43 10*6/MM3 (ref 3.89–5.14)
SODIUM SERPL-SCNC: 142 MMOL/L (ref 132–146)
WBC # BLD AUTO: 6.64 10*3/MM3 (ref 3.5–10.8)

## 2018-08-10 PROCEDURE — 99214 OFFICE O/P EST MOD 30 MIN: CPT | Performed by: FAMILY MEDICINE

## 2018-08-10 RX ORDER — IRBESARTAN 75 MG/1
75 TABLET ORAL NIGHTLY
Qty: 90 TABLET | Refills: 3 | Status: SHIPPED | OUTPATIENT
Start: 2018-08-10 | End: 2018-11-26

## 2018-08-10 RX ORDER — FUROSEMIDE 20 MG/1
20 TABLET ORAL DAILY
Qty: 90 TABLET | Refills: 1 | Status: SHIPPED | OUTPATIENT
Start: 2018-08-10 | End: 2018-12-03 | Stop reason: SDUPTHER

## 2018-08-10 RX ORDER — LANCETS 28 GAUGE
EACH MISCELLANEOUS
Qty: 300 EACH | Refills: 11 | Status: SHIPPED | OUTPATIENT
Start: 2018-08-10 | End: 2019-05-12

## 2018-08-10 RX ORDER — ROSUVASTATIN CALCIUM 5 MG/1
5 TABLET, COATED ORAL DAILY
Qty: 30 TABLET | Refills: 1 | Status: SHIPPED | OUTPATIENT
Start: 2018-08-10 | End: 2018-11-26

## 2018-11-26 ENCOUNTER — OFFICE VISIT (OUTPATIENT)
Dept: FAMILY MEDICINE CLINIC | Facility: CLINIC | Age: 78
End: 2018-11-26

## 2018-11-26 VITALS
WEIGHT: 157 LBS | HEART RATE: 72 BPM | DIASTOLIC BLOOD PRESSURE: 80 MMHG | TEMPERATURE: 97.6 F | HEIGHT: 63 IN | BODY MASS INDEX: 27.82 KG/M2 | RESPIRATION RATE: 18 BRPM | SYSTOLIC BLOOD PRESSURE: 138 MMHG

## 2018-11-26 DIAGNOSIS — I25.10 CORONARY ARTERY DISEASE INVOLVING NATIVE CORONARY ARTERY OF NATIVE HEART WITHOUT ANGINA PECTORIS: ICD-10-CM

## 2018-11-26 DIAGNOSIS — Z79.4 TYPE 2 DIABETES MELLITUS WITHOUT COMPLICATION, WITH LONG-TERM CURRENT USE OF INSULIN (HCC): Primary | ICD-10-CM

## 2018-11-26 DIAGNOSIS — I10 ESSENTIAL HYPERTENSION: ICD-10-CM

## 2018-11-26 DIAGNOSIS — E11.9 TYPE 2 DIABETES MELLITUS WITHOUT COMPLICATION, WITH LONG-TERM CURRENT USE OF INSULIN (HCC): Primary | ICD-10-CM

## 2018-11-26 DIAGNOSIS — E78.2 MIXED HYPERLIPIDEMIA: ICD-10-CM

## 2018-11-26 LAB
POC CREATININE URINE: 300
POC MICROALBUMIN URINE: 30

## 2018-11-26 PROCEDURE — 82044 UR ALBUMIN SEMIQUANTITATIVE: CPT | Performed by: FAMILY MEDICINE

## 2018-11-26 PROCEDURE — 99214 OFFICE O/P EST MOD 30 MIN: CPT | Performed by: FAMILY MEDICINE

## 2018-11-26 RX ORDER — LOSARTAN POTASSIUM 25 MG/1
25 TABLET ORAL DAILY
Qty: 90 TABLET | Refills: 1 | Status: SHIPPED | OUTPATIENT
Start: 2018-11-26 | End: 2019-02-26 | Stop reason: CLARIF

## 2018-11-26 NOTE — PROGRESS NOTES
Subjective   Nancy Goodman is a 78 y.o. female.     History of Present Illness     Diabetes Mellitus Type II, Follow-up:   Nancy Goodman is a 78 y.o. female who is here for follow-up of Type 2 diabetes mellitus.  Current symptoms/problems include none and have been stable. Patient is adherent with medications.  Known diabetic complications: cardiovascular disease  Cardiovascular risk factors: diabetes mellitus, dyslipidemia and hypertension  Current diabetic medications include lantus 40 units.   Current monitoring regimen: home blood tests - daily  Home blood sugar records: fasting range: doing well but she does not bring log in  Any episodes of hypoglycemia? no  Eye exam current (within one year): yes  Weight trend: stable  She is on ACE inhibitor or angiotensin II receptor blocker. Patient is on a statin.       Nancy Goodman  is here for follow-up of hypertension of several years duration. She is not exercising and is not adherent to a low-salt diet. Patient does not check her blood pressure.    Patient denies chest pain and palpitations. She is compliant with meds.        Nancy Goodman returns today for follow up of Hyperlipidemia with a lipid program recheck.   Nancy indicates her exercise level as not at all.  Diet: has not been eating well  Patient is not compliant with medications.  She simply could not tolerate the statin, even taking it a couple times a week she still had symptoms.  Any side effects to medications:   chest pain No. myalgia Yes so had to stop it, even at a couple times a week still had symptoms. memory change No  Pt is due for labs        The following portions of the patient's history were reviewed and updated as appropriate: allergies, current medications, past family history, past medical history, past social history, past surgical history and problem list.    Review of Systems   Constitutional: Negative.    HENT: Negative.    Eyes: Negative.    Respiratory: Negative.   Negative for shortness of breath.    Cardiovascular: Negative.  Negative for chest pain.   Gastrointestinal: Positive for diarrhea.   Musculoskeletal: Negative.    Skin: Negative.    Neurological: Negative.    Psychiatric/Behavioral: Negative.    All other systems reviewed and are negative.      Objective   Physical Exam   Constitutional: She is oriented to person, place, and time. She appears well-developed and well-nourished. No distress.   HENT:   Head: Normocephalic and atraumatic.   Right Ear: Tympanic membrane, external ear and ear canal normal.   Left Ear: Tympanic membrane, external ear and ear canal normal.   Nose: Nose normal.   Mouth/Throat: Uvula is midline and oropharynx is clear and moist. She has dentures.   Eyes: Conjunctivae and EOM are normal.   Neck: Normal range of motion. Neck supple. No thyromegaly present.   Cardiovascular: Normal rate and regular rhythm.   Murmur heard.   Systolic murmur is present with a grade of 4/6.  Pulmonary/Chest: Effort normal and breath sounds normal. No respiratory distress.   Abdominal: Soft. Bowel sounds are normal. She exhibits no distension and no mass. There is no tenderness.   Lymphadenopathy:     She has no cervical adenopathy.   Neurological: She is alert and oriented to person, place, and time.   Skin: Skin is warm and dry.   Psychiatric: She has a normal mood and affect. Her behavior is normal. Judgment and thought content normal.   Nursing note and vitals reviewed.      Assessment/Plan   Nancy was seen today for diabetes.    Diagnoses and all orders for this visit:    Type 2 diabetes mellitus without complication, with long-term current use of insulin (CMS/[facility])  -     Comprehensive Metabolic Panel  -     Hemoglobin A1c  -     POCT microalbumin    Essential hypertension  -     losartan (COZAAR) 25 MG tablet; Take 1 tablet by mouth Daily.  -     CBC & Differential  -     Comprehensive Metabolic Panel    Mixed hyperlipidemia  -     Comprehensive Metabolic  Panel  -     Lipid Panel    Coronary artery disease involving native coronary artery of native heart without angina pectoris    we are going to check her DM labs and f/u pending results.  Pt agrees  She simply cannot tolerate any statins, will recheck level and consider injectable treatment for this.  BP stable, however there was an issue with the supply of irbesartan, will change to losartan instead.  Pt to call with any issues.  CAD stable

## 2018-11-27 LAB
ALBUMIN SERPL-MCNC: 4.41 G/DL (ref 3.2–4.8)
ALBUMIN/GLOB SERPL: 1.7 G/DL (ref 1.5–2.5)
ALP SERPL-CCNC: 65 U/L (ref 25–100)
ALT SERPL-CCNC: 27 U/L (ref 7–40)
AST SERPL-CCNC: 23 U/L (ref 0–33)
BASOPHILS # BLD AUTO: 0.05 10*3/MM3 (ref 0–0.2)
BASOPHILS NFR BLD AUTO: 0.8 % (ref 0–1)
BILIRUB SERPL-MCNC: 0.5 MG/DL (ref 0.3–1.2)
BUN SERPL-MCNC: 23 MG/DL (ref 9–23)
BUN/CREAT SERPL: 20.7 (ref 7–25)
CALCIUM SERPL-MCNC: 10.2 MG/DL (ref 8.7–10.4)
CHLORIDE SERPL-SCNC: 105 MMOL/L (ref 99–109)
CHOLEST SERPL-MCNC: 276 MG/DL (ref 0–200)
CO2 SERPL-SCNC: 31 MMOL/L (ref 20–31)
CREAT SERPL-MCNC: 1.11 MG/DL (ref 0.6–1.3)
EOSINOPHIL # BLD AUTO: 0.34 10*3/MM3 (ref 0–0.3)
EOSINOPHIL NFR BLD AUTO: 5.2 % (ref 0–3)
ERYTHROCYTE [DISTWIDTH] IN BLOOD BY AUTOMATED COUNT: 13.8 % (ref 11.3–14.5)
GLOBULIN SER CALC-MCNC: 2.6 GM/DL
GLUCOSE SERPL-MCNC: 131 MG/DL (ref 70–100)
HBA1C MFR BLD: 6.8 % (ref 4.8–5.6)
HCT VFR BLD AUTO: 45.3 % (ref 34.5–44)
HDLC SERPL-MCNC: 41 MG/DL (ref 40–60)
HGB BLD-MCNC: 14.9 G/DL (ref 11.5–15.5)
IMM GRANULOCYTES # BLD: 0.01 10*3/MM3 (ref 0–0.03)
IMM GRANULOCYTES NFR BLD: 0.2 % (ref 0–0.6)
LDLC SERPL CALC-MCNC: ABNORMAL MG/DL
LYMPHOCYTES # BLD AUTO: 2.37 10*3/MM3 (ref 0.6–4.8)
LYMPHOCYTES NFR BLD AUTO: 36.6 % (ref 24–44)
MCH RBC QN AUTO: 30.8 PG (ref 27–31)
MCHC RBC AUTO-ENTMCNC: 32.9 G/DL (ref 32–36)
MCV RBC AUTO: 93.6 FL (ref 80–99)
MONOCYTES # BLD AUTO: 0.52 10*3/MM3 (ref 0–1)
MONOCYTES NFR BLD AUTO: 8 % (ref 0–12)
NEUTROPHILS # BLD AUTO: 3.19 10*3/MM3 (ref 1.5–8.3)
NEUTROPHILS NFR BLD AUTO: 49.2 % (ref 41–71)
PLATELET # BLD AUTO: 188 10*3/MM3 (ref 150–450)
POTASSIUM SERPL-SCNC: 4.1 MMOL/L (ref 3.5–5.5)
PROT SERPL-MCNC: 7 G/DL (ref 5.7–8.2)
RBC # BLD AUTO: 4.84 10*6/MM3 (ref 3.89–5.14)
SODIUM SERPL-SCNC: 142 MMOL/L (ref 132–146)
TRIGL SERPL-MCNC: 478 MG/DL (ref 0–150)
VLDLC SERPL CALC-MCNC: ABNORMAL MG/DL
WBC # BLD AUTO: 6.48 10*3/MM3 (ref 3.5–10.8)

## 2018-11-30 ENCOUNTER — TELEPHONE (OUTPATIENT)
Dept: FAMILY MEDICINE CLINIC | Facility: CLINIC | Age: 78
End: 2018-11-30

## 2018-12-03 ENCOUNTER — TELEPHONE (OUTPATIENT)
Dept: FAMILY MEDICINE CLINIC | Facility: CLINIC | Age: 78
End: 2018-12-03

## 2018-12-03 DIAGNOSIS — R60.0 LOCALIZED EDEMA: ICD-10-CM

## 2018-12-03 RX ORDER — FUROSEMIDE 20 MG/1
20 TABLET ORAL DAILY
Qty: 90 TABLET | Refills: 1 | Status: SHIPPED | OUTPATIENT
Start: 2018-12-03 | End: 2019-02-26 | Stop reason: SDUPTHER

## 2019-02-26 ENCOUNTER — OFFICE VISIT (OUTPATIENT)
Dept: FAMILY MEDICINE CLINIC | Facility: CLINIC | Age: 79
End: 2019-02-26

## 2019-02-26 VITALS
RESPIRATION RATE: 16 BRPM | HEIGHT: 63 IN | HEART RATE: 76 BPM | DIASTOLIC BLOOD PRESSURE: 78 MMHG | WEIGHT: 162 LBS | SYSTOLIC BLOOD PRESSURE: 138 MMHG | BODY MASS INDEX: 28.7 KG/M2 | TEMPERATURE: 98.4 F

## 2019-02-26 DIAGNOSIS — K43.2 INCISIONAL HERNIA, WITHOUT OBSTRUCTION OR GANGRENE: ICD-10-CM

## 2019-02-26 DIAGNOSIS — E78.2 MIXED HYPERLIPIDEMIA: ICD-10-CM

## 2019-02-26 DIAGNOSIS — E11.9 TYPE 2 DIABETES MELLITUS WITHOUT COMPLICATION, WITH LONG-TERM CURRENT USE OF INSULIN (HCC): Primary | ICD-10-CM

## 2019-02-26 DIAGNOSIS — R60.0 LOCALIZED EDEMA: ICD-10-CM

## 2019-02-26 DIAGNOSIS — I10 ESSENTIAL HYPERTENSION: ICD-10-CM

## 2019-02-26 DIAGNOSIS — Z79.4 TYPE 2 DIABETES MELLITUS WITHOUT COMPLICATION, WITH LONG-TERM CURRENT USE OF INSULIN (HCC): Primary | ICD-10-CM

## 2019-02-26 LAB
ALBUMIN SERPL-MCNC: 4.67 G/DL (ref 3.2–4.8)
ALBUMIN/GLOB SERPL: 2.1 G/DL (ref 1.5–2.5)
ALP SERPL-CCNC: 62 U/L (ref 25–100)
ALT SERPL-CCNC: 29 U/L (ref 7–40)
AST SERPL-CCNC: 26 U/L (ref 0–33)
BASOPHILS # BLD AUTO: 0.06 10*3/MM3 (ref 0–0.2)
BASOPHILS NFR BLD AUTO: 1 % (ref 0–1)
BILIRUB SERPL-MCNC: 0.7 MG/DL (ref 0.3–1.2)
BUN SERPL-MCNC: 20 MG/DL (ref 9–23)
BUN/CREAT SERPL: 18.2 (ref 7–25)
CALCIUM SERPL-MCNC: 10.1 MG/DL (ref 8.7–10.4)
CHLORIDE SERPL-SCNC: 100 MMOL/L (ref 99–109)
CO2 SERPL-SCNC: 30 MMOL/L (ref 20–31)
CREAT SERPL-MCNC: 1.1 MG/DL (ref 0.6–1.3)
EOSINOPHIL # BLD AUTO: 0.24 10*3/MM3 (ref 0–0.3)
EOSINOPHIL NFR BLD AUTO: 4.2 % (ref 0–3)
ERYTHROCYTE [DISTWIDTH] IN BLOOD BY AUTOMATED COUNT: 14.1 % (ref 11.3–14.5)
GLOBULIN SER CALC-MCNC: 2.2 GM/DL
GLUCOSE SERPL-MCNC: 123 MG/DL (ref 70–100)
HBA1C MFR BLD: 7.2 % (ref 4.8–5.6)
HCT VFR BLD AUTO: 45.7 % (ref 34.5–44)
HGB BLD-MCNC: 14.9 G/DL (ref 11.5–15.5)
IMM GRANULOCYTES # BLD AUTO: 0.01 10*3/MM3 (ref 0–0.05)
IMM GRANULOCYTES NFR BLD AUTO: 0.2 % (ref 0–0.6)
LYMPHOCYTES # BLD AUTO: 2.74 10*3/MM3 (ref 0.6–4.8)
LYMPHOCYTES NFR BLD AUTO: 47.5 % (ref 24–44)
MCH RBC QN AUTO: 31 PG (ref 27–31)
MCHC RBC AUTO-ENTMCNC: 32.6 G/DL (ref 32–36)
MCV RBC AUTO: 95 FL (ref 80–99)
MONOCYTES # BLD AUTO: 0.48 10*3/MM3 (ref 0–1)
MONOCYTES NFR BLD AUTO: 8.3 % (ref 0–12)
NEUTROPHILS # BLD AUTO: 2.24 10*3/MM3 (ref 1.5–8.3)
NEUTROPHILS NFR BLD AUTO: 38.8 % (ref 41–71)
PLATELET # BLD AUTO: 224 10*3/MM3 (ref 150–450)
POTASSIUM SERPL-SCNC: 4.5 MMOL/L (ref 3.5–5.5)
PROT SERPL-MCNC: 6.9 G/DL (ref 5.7–8.2)
RBC # BLD AUTO: 4.81 10*6/MM3 (ref 3.89–5.14)
SODIUM SERPL-SCNC: 138 MMOL/L (ref 132–146)
WBC # BLD AUTO: 5.77 10*3/MM3 (ref 3.5–10.8)

## 2019-02-26 PROCEDURE — 99214 OFFICE O/P EST MOD 30 MIN: CPT | Performed by: FAMILY MEDICINE

## 2019-02-26 RX ORDER — IRBESARTAN 75 MG/1
75 TABLET ORAL NIGHTLY
Qty: 90 TABLET | Refills: 1 | Status: ON HOLD | OUTPATIENT
Start: 2019-02-26 | End: 2019-04-11 | Stop reason: SDUPTHER

## 2019-02-26 RX ORDER — FUROSEMIDE 20 MG/1
20 TABLET ORAL DAILY
Qty: 90 TABLET | Refills: 1 | Status: SHIPPED | OUTPATIENT
Start: 2019-02-26 | End: 2019-05-12

## 2019-02-26 NOTE — PROGRESS NOTES
Subjective   Nancy Goodman is a 78 y.o. female.     History of Present Illness     Diabetes Mellitus Type II, Follow-up:   Nancy Goodman is a 78 y.o. female who is here for follow-up of Type 2 diabetes mellitus.  Current symptoms/problems include none and have been stable. Patient is adherent with medications.  Known diabetic complications: none  Cardiovascular risk factors: advanced age (older than 55 for men, 65 for women), diabetes mellitus, dyslipidemia and hypertension  Current diabetic medications include lantus 40 units.   Current monitoring regimen: home blood tests - daily  Home blood sugar records: fasting range: doing pretty well for the most part  Any episodes of hypoglycemia? no  Eye exam current (within one year): yes  She is on ACE inhibitor or angiotensin II receptor blocker. Patient is on a statin.       Nancy Goodman  is here for follow-up of hypertension of several years duration. She is not exercising and is not adherent to a low-salt diet. Patient does not check her blood pressure.    Patient denies chest pain and palpitations. She is compliant with meds.        Nancy Goodman returns today for follow up of Hyperlipidemia  Nancy indicates her exercise level as not at all.  Diet: she has not been eating well  She has not been able to tolerate any of the statins.  We tried to get her praluent last time but have not been able to get it approved.  Pt is not due for labs        The following portions of the patient's history were reviewed and updated as appropriate: allergies, current medications, past family history, past medical history, past social history, past surgical history and problem list.    Review of Systems   Constitutional: Negative.    HENT: Negative.    Eyes: Negative.    Respiratory: Negative.    Cardiovascular: Negative.    Gastrointestinal: Negative.    Musculoskeletal: Negative.    Skin: Negative.    Neurological: Negative.    Psychiatric/Behavioral: Negative.     All other systems reviewed and are negative.      Objective   Physical Exam   Constitutional: She appears well-developed and well-nourished. No distress.   Cardiovascular: Normal rate and regular rhythm.   Murmur heard.   Systolic murmur is present with a grade of 4/6.  Pulmonary/Chest: Effort normal and breath sounds normal.   Abdominal:       Psychiatric: She has a normal mood and affect. Her behavior is normal.   Nursing note and vitals reviewed.      Assessment/Plan   Nancy was seen today for follow-up.    Diagnoses and all orders for this visit:    Type 2 diabetes mellitus without complication, with long-term current use of insulin (CMS/[facility])  -     CBC & Differential  -     Comprehensive Metabolic Panel  -     Hemoglobin A1c  -     Insulin Glargine (LANTUS SOLOSTAR) 100 UNIT/ML injection pen; Inject 40 Units under the skin into the appropriate area as directed Every Morning.    Essential hypertension  -     CBC & Differential  -     Comprehensive Metabolic Panel  -     irbesartan (AVAPRO) 75 MG tablet; Take 1 tablet by mouth Every Night.    Mixed hyperlipidemia  -     Comprehensive Metabolic Panel  -     Alirocumab 75 MG/ML solution pen-injector; Inject 75 mg under the skin into the appropriate area as directed Every 14 (Fourteen) Days.    Incisional hernia, without obstruction or gangrene  -     Cancel: Ambulatory Referral to General Surgery  -     Ambulatory Referral to General Surgery    Localized edema  -     furosemide (LASIX) 20 MG tablet; Take 1 tablet by mouth Daily.    will recheck DM labs, she notes her levels have been higher at home the last several weeks  BP stable, no change in regimen  Will try to get praluent covered for her cholesterol as it is very elevated.  Will set her up with general surgeon for upper incisional hernia.

## 2019-03-07 ENCOUNTER — TELEPHONE (OUTPATIENT)
Dept: FAMILY MEDICINE CLINIC | Facility: CLINIC | Age: 79
End: 2019-03-07

## 2019-03-07 DIAGNOSIS — E11.9 TYPE 2 DIABETES MELLITUS WITHOUT COMPLICATION, WITH LONG-TERM CURRENT USE OF INSULIN (HCC): ICD-10-CM

## 2019-03-07 DIAGNOSIS — Z79.4 TYPE 2 DIABETES MELLITUS WITHOUT COMPLICATION, WITH LONG-TERM CURRENT USE OF INSULIN (HCC): ICD-10-CM

## 2019-03-07 NOTE — TELEPHONE ENCOUNTER
----- Message from Theresa Wilkinson sent at 3/7/2019  8:13 AM EST -----  Contact: corinne; med refill for lesvia pt ; she is leaving town today   Pt needs a refill on the following        Insulin Pen Needle (B-D UF III MINI PEN NEEDLES) 31G X 5 MM misc 100 each Daily.    [facility] -  [place], [place] - [address] - [phone]  - [phone]  [phone] (Phone)  [phone] (Fax)

## 2019-03-07 NOTE — TELEPHONE ENCOUNTER
I sent this to the pharm at [place] but the patient wants it printed. She say's, she has to hand carry it and they are leaving today. Will you please print and sign it?

## 2019-03-11 DIAGNOSIS — E78.00 HYPERCHOLESTEROLEMIA: Primary | ICD-10-CM

## 2019-03-13 ENCOUNTER — TELEPHONE (OUTPATIENT)
Dept: FAMILY MEDICINE CLINIC | Facility: CLINIC | Age: 79
End: 2019-03-13

## 2019-03-13 NOTE — TELEPHONE ENCOUNTER
Initial wellness visit scheduled with patient for 8/12/19.  She requested to be scheduled with her  and to see Dr. Cervantes.  Please let me know if the way I scheduled her is incorrect.  Thank you.

## 2019-03-28 DIAGNOSIS — I38 VHD (VALVULAR HEART DISEASE): Primary | ICD-10-CM

## 2019-04-09 ENCOUNTER — APPOINTMENT (OUTPATIENT)
Dept: CARDIOLOGY | Facility: HOSPITAL | Age: 79
End: 2019-04-09

## 2019-04-09 ENCOUNTER — APPOINTMENT (OUTPATIENT)
Dept: GENERAL RADIOLOGY | Facility: HOSPITAL | Age: 79
End: 2019-04-09

## 2019-04-09 ENCOUNTER — HOSPITAL ENCOUNTER (INPATIENT)
Facility: HOSPITAL | Age: 79
LOS: 2 days | Discharge: HOME | End: 2019-04-11
Attending: INTERNAL MEDICINE | Admitting: HOSPITALIST

## 2019-04-09 DIAGNOSIS — I25.10 CORONARY ARTERY DISEASE INVOLVING NATIVE CORONARY ARTERY OF NATIVE HEART WITHOUT ANGINA PECTORIS: ICD-10-CM

## 2019-04-09 DIAGNOSIS — Z74.09 IMPAIRED FUNCTIONAL MOBILITY, BALANCE, GAIT, AND ENDURANCE: Primary | ICD-10-CM

## 2019-04-09 DIAGNOSIS — I10 ESSENTIAL HYPERTENSION: ICD-10-CM

## 2019-04-09 PROBLEM — J81.1 PULMONARY EDEMA: Status: ACTIVE | Noted: 2019-04-09

## 2019-04-09 PROBLEM — I50.30 DIASTOLIC CHF (HCC): Status: ACTIVE | Noted: 2019-04-09

## 2019-04-09 LAB
ANION GAP SERPL CALCULATED.3IONS-SCNC: 15 MMOL/L
AORTIC DIMENSIONLESS INDEX: 0.3 (DI)
BASOPHILS # BLD AUTO: 0.02 10*3/MM3 (ref 0–0.2)
BASOPHILS NFR BLD AUTO: 0.2 % (ref 0–1.5)
BH CV ECHO MEAS - AI DEC SLOPE: 329.6 CM/SEC^2
BH CV ECHO MEAS - AI MAX PG: 53.6 MMHG
BH CV ECHO MEAS - AI MAX VEL: 365.7 CM/SEC
BH CV ECHO MEAS - AI P1/2T: 325 MSEC
BH CV ECHO MEAS - AO MAX PG (FULL): 62.1 MMHG
BH CV ECHO MEAS - AO MAX PG: 66.4 MMHG
BH CV ECHO MEAS - AO MEAN PG (FULL): 35.4 MMHG
BH CV ECHO MEAS - AO MEAN PG: 38.4 MMHG
BH CV ECHO MEAS - AO ROOT AREA (BSA CORRECTED): 1.6
BH CV ECHO MEAS - AO ROOT AREA: 6.2 CM^2
BH CV ECHO MEAS - AO ROOT DIAM: 2.8 CM
BH CV ECHO MEAS - AO V2 MAX: 407.4 CM/SEC
BH CV ECHO MEAS - AO V2 MEAN: 285 CM/SEC
BH CV ECHO MEAS - AO V2 VTI: 93.4 CM
BH CV ECHO MEAS - AVA(I,A): 0.69 CM^2
BH CV ECHO MEAS - AVA(I,D): 0.69 CM^2
BH CV ECHO MEAS - AVA(V,A): 0.77 CM^2
BH CV ECHO MEAS - AVA(V,D): 0.77 CM^2
BH CV ECHO MEAS - BSA(HAYCOCK): 1.8 M^2
BH CV ECHO MEAS - BSA: 1.7 M^2
BH CV ECHO MEAS - BZI_BMI: 29.1 KILOGRAMS/M^2
BH CV ECHO MEAS - BZI_METRIC_HEIGHT: 157.5 CM
BH CV ECHO MEAS - BZI_METRIC_WEIGHT: 72.1 KG
BH CV ECHO MEAS - EDV(CUBED): 102.5 ML
BH CV ECHO MEAS - EDV(TEICH): 101.3 ML
BH CV ECHO MEAS - EF(CUBED): 83.2 %
BH CV ECHO MEAS - EF(TEICH): 76.2 %
BH CV ECHO MEAS - ESV(CUBED): 17.2 ML
BH CV ECHO MEAS - ESV(TEICH): 24.2 ML
BH CV ECHO MEAS - FS: 44.9 %
BH CV ECHO MEAS - IVS/LVPW: 1
BH CV ECHO MEAS - IVSD: 1.1 CM
BH CV ECHO MEAS - LA DIMENSION: 3.8 CM
BH CV ECHO MEAS - LA/AO: 1.3
BH CV ECHO MEAS - LAD MAJOR: 5.3 CM
BH CV ECHO MEAS - LAT PEAK E' VEL: 10.1 CM/SEC
BH CV ECHO MEAS - LATERAL E/E' RATIO: 9.6
BH CV ECHO MEAS - LV MASS(C)D: 170.7 GRAMS
BH CV ECHO MEAS - LV MASS(C)DI: 98.4 GRAMS/M^2
BH CV ECHO MEAS - LV MAX PG: 4.3 MMHG
BH CV ECHO MEAS - LV MEAN PG: 3 MMHG
BH CV ECHO MEAS - LV V1 MAX: 104 CM/SEC
BH CV ECHO MEAS - LV V1 MEAN: 78.4 CM/SEC
BH CV ECHO MEAS - LV V1 VTI: 21.3 CM
BH CV ECHO MEAS - LVIDD: 4.7 CM
BH CV ECHO MEAS - LVIDS: 2.6 CM
BH CV ECHO MEAS - LVOT AREA (M): 3.1 CM^2
BH CV ECHO MEAS - LVOT AREA: 3 CM^2
BH CV ECHO MEAS - LVOT DIAM: 2 CM
BH CV ECHO MEAS - LVPWD: 1 CM
BH CV ECHO MEAS - MED PEAK E' VEL: 5.5 CM/SEC
BH CV ECHO MEAS - MEDIAL E/E' RATIO: 17.6
BH CV ECHO MEAS - MV A MAX VEL: 128.3 CM/SEC
BH CV ECHO MEAS - MV DEC SLOPE: 402.5 CM/SEC^2
BH CV ECHO MEAS - MV DEC TIME: 0.32 SEC
BH CV ECHO MEAS - MV E MAX VEL: 98.7 CM/SEC
BH CV ECHO MEAS - MV E/A: 0.77
BH CV ECHO MEAS - MV MAX PG: 4 MMHG
BH CV ECHO MEAS - MV MEAN PG: 7 MMHG
BH CV ECHO MEAS - MV P1/2T MAX VEL: 123.5 CM/SEC
BH CV ECHO MEAS - MV P1/2T: 89.9 MSEC
BH CV ECHO MEAS - MVA P1/2T LCG: 1.8 CM^2
BH CV ECHO MEAS - MVA(P1/2T): 2.4 CM^2
BH CV ECHO MEAS - PA ACC SLOPE: 1013 CM/SEC^2
BH CV ECHO MEAS - PA ACC TIME: 0.11 SEC
BH CV ECHO MEAS - PA PR(ACCEL): 27.9 MMHG
BH CV ECHO MEAS - RAP SYSTOLE: 3 MMHG
BH CV ECHO MEAS - RV MAX PG: 2.5 MMHG
BH CV ECHO MEAS - RV V1 MAX: 79.5 CM/SEC
BH CV ECHO MEAS - RVSP: 22 MMHG
BH CV ECHO MEAS - SI(AO): 334.7 ML/M^2
BH CV ECHO MEAS - SI(CUBED): 49.2 ML/M^2
BH CV ECHO MEAS - SI(LVOT): 37.3 ML/M^2
BH CV ECHO MEAS - SI(TEICH): 44.5 ML/M^2
BH CV ECHO MEAS - SV(AO): 580.3 ML
BH CV ECHO MEAS - SV(CUBED): 85.3 ML
BH CV ECHO MEAS - SV(LVOT): 64.6 ML
BH CV ECHO MEAS - SV(TEICH): 77.2 ML
BH CV ECHO MEAS - TAPSE (>1.6): 1.6 CM2
BH CV ECHO MEAS - TR MAX PG: 19 MMHG
BH CV ECHO MEAS - TR MAX VEL: 215.5 CM/SEC
BH CV ECHO MEASUREMENTS AVERAGE E/E' RATIO: 12.65
BH CV XLRA - RV BASE: 3.4 CM
BH CV XLRA - RV LENGTH: 6 CM
BH CV XLRA - RV MID: 2.6 CM
BH CV XLRA - TDI S': 11.7 CM/SEC
BUN BLD-MCNC: 22 MG/DL (ref 8–23)
BUN/CREAT SERPL: 18.3 (ref 7–25)
CALCIUM SPEC-SCNC: 9.1 MG/DL (ref 8.6–10.5)
CHLORIDE SERPL-SCNC: 99 MMOL/L (ref 98–107)
CO2 SERPL-SCNC: 23 MMOL/L (ref 22–29)
CREAT BLD-MCNC: 1.2 MG/DL (ref 0.57–1)
DEPRECATED RDW RBC AUTO: 47.5 FL (ref 37–54)
EOSINOPHIL # BLD AUTO: 0.03 10*3/MM3 (ref 0–0.4)
EOSINOPHIL NFR BLD AUTO: 0.3 % (ref 0.3–6.2)
ERYTHROCYTE [DISTWIDTH] IN BLOOD BY AUTOMATED COUNT: 13.6 % (ref 12.3–15.4)
GFR SERPL CREATININE-BSD FRML MDRD: 43 ML/MIN/1.73
GLUCOSE BLD-MCNC: 205 MG/DL (ref 65–99)
GLUCOSE BLDC GLUCOMTR-MCNC: 168 MG/DL (ref 70–130)
GLUCOSE BLDC GLUCOMTR-MCNC: 175 MG/DL (ref 70–130)
GLUCOSE BLDC GLUCOMTR-MCNC: 176 MG/DL (ref 70–130)
GLUCOSE BLDC GLUCOMTR-MCNC: 190 MG/DL (ref 70–130)
HBA1C MFR BLD: 7.6 % (ref 4.8–5.6)
HCT VFR BLD AUTO: 42.9 % (ref 34–46.6)
HGB BLD-MCNC: 13.9 G/DL (ref 12–15.9)
IMM GRANULOCYTES # BLD AUTO: 0.03 10*3/MM3 (ref 0–0.05)
IMM GRANULOCYTES NFR BLD AUTO: 0.3 % (ref 0–0.5)
LEFT ATRIUM VOLUME INDEX: 27.1 ML/M^2
LEFT ATRIUM VOLUME: 47 ML
LV EF 2D ECHO EST: 65 %
LYMPHOCYTES # BLD AUTO: 2.01 10*3/MM3 (ref 0.7–3.1)
LYMPHOCYTES NFR BLD AUTO: 21.3 % (ref 19.6–45.3)
MAXIMAL PREDICTED HEART RATE: 141 BPM
MCH RBC QN AUTO: 31 PG (ref 26.6–33)
MCHC RBC AUTO-ENTMCNC: 32.4 G/DL (ref 31.5–35.7)
MCV RBC AUTO: 95.5 FL (ref 79–97)
MONOCYTES # BLD AUTO: 0.51 10*3/MM3 (ref 0.1–0.9)
MONOCYTES NFR BLD AUTO: 5.4 % (ref 5–12)
NEUTROPHILS # BLD AUTO: 6.87 10*3/MM3 (ref 1.4–7)
NEUTROPHILS NFR BLD AUTO: 72.8 % (ref 42.7–76)
PLATELET # BLD AUTO: 218 10*3/MM3 (ref 140–450)
PMV BLD AUTO: 10.9 FL (ref 6–12)
POTASSIUM BLD-SCNC: 4.5 MMOL/L (ref 3.5–5.2)
RBC # BLD AUTO: 4.49 10*6/MM3 (ref 3.77–5.28)
SODIUM BLD-SCNC: 137 MMOL/L (ref 136–145)
STRESS TARGET HR: 120 BPM
TROPONIN T SERPL-MCNC: 0.02 NG/ML (ref 0–0.03)
WBC NRBC COR # BLD: 9.44 10*3/MM3 (ref 3.4–10.8)

## 2019-04-09 PROCEDURE — 83036 HEMOGLOBIN GLYCOSYLATED A1C: CPT | Performed by: HOSPITALIST

## 2019-04-09 PROCEDURE — 99222 1ST HOSP IP/OBS MODERATE 55: CPT | Performed by: INTERNAL MEDICINE

## 2019-04-09 PROCEDURE — 25010000002 HEPARIN (PORCINE) PER 1000 UNITS: Performed by: HOSPITALIST

## 2019-04-09 PROCEDURE — 82962 GLUCOSE BLOOD TEST: CPT

## 2019-04-09 PROCEDURE — 94660 CPAP INITIATION&MGMT: CPT

## 2019-04-09 PROCEDURE — 71045 X-RAY EXAM CHEST 1 VIEW: CPT

## 2019-04-09 PROCEDURE — 63710000001 INSULIN LISPRO (HUMAN) PER 5 UNITS: Performed by: HOSPITALIST

## 2019-04-09 PROCEDURE — 84484 ASSAY OF TROPONIN QUANT: CPT | Performed by: HOSPITALIST

## 2019-04-09 PROCEDURE — 99223 1ST HOSP IP/OBS HIGH 75: CPT | Performed by: HOSPITALIST

## 2019-04-09 PROCEDURE — 25010000002 FUROSEMIDE PER 20 MG: Performed by: HOSPITALIST

## 2019-04-09 PROCEDURE — 93306 TTE W/DOPPLER COMPLETE: CPT

## 2019-04-09 PROCEDURE — 80048 BASIC METABOLIC PNL TOTAL CA: CPT | Performed by: HOSPITALIST

## 2019-04-09 PROCEDURE — 85025 COMPLETE CBC W/AUTO DIFF WBC: CPT | Performed by: HOSPITALIST

## 2019-04-09 PROCEDURE — 93005 ELECTROCARDIOGRAM TRACING: CPT | Performed by: HOSPITALIST

## 2019-04-09 PROCEDURE — 97161 PT EVAL LOW COMPLEX 20 MIN: CPT

## 2019-04-09 PROCEDURE — 93306 TTE W/DOPPLER COMPLETE: CPT | Performed by: INTERNAL MEDICINE

## 2019-04-09 PROCEDURE — 93010 ELECTROCARDIOGRAM REPORT: CPT | Performed by: INTERNAL MEDICINE

## 2019-04-09 RX ORDER — DEXTROSE MONOHYDRATE 25 G/50ML
25 INJECTION, SOLUTION INTRAVENOUS
Status: DISCONTINUED | OUTPATIENT
Start: 2019-04-09 | End: 2019-04-11 | Stop reason: HOSPADM

## 2019-04-09 RX ORDER — HEPARIN SODIUM 5000 [USP'U]/ML
5000 INJECTION, SOLUTION INTRAVENOUS; SUBCUTANEOUS EVERY 8 HOURS SCHEDULED
Status: DISCONTINUED | OUTPATIENT
Start: 2019-04-09 | End: 2019-04-11 | Stop reason: HOSPADM

## 2019-04-09 RX ORDER — FUROSEMIDE 10 MG/ML
20 INJECTION INTRAMUSCULAR; INTRAVENOUS ONCE
Status: COMPLETED | OUTPATIENT
Start: 2019-04-09 | End: 2019-04-09

## 2019-04-09 RX ORDER — ASPIRIN 81 MG/1
81 TABLET, CHEWABLE ORAL DAILY
Status: DISCONTINUED | OUTPATIENT
Start: 2019-04-09 | End: 2019-04-11 | Stop reason: HOSPADM

## 2019-04-09 RX ORDER — LOSARTAN POTASSIUM 25 MG/1
25 TABLET ORAL
Status: DISCONTINUED | OUTPATIENT
Start: 2019-04-09 | End: 2019-04-11 | Stop reason: HOSPADM

## 2019-04-09 RX ORDER — SODIUM CHLORIDE 0.9 % (FLUSH) 0.9 %
3-10 SYRINGE (ML) INJECTION AS NEEDED
Status: DISCONTINUED | OUTPATIENT
Start: 2019-04-09 | End: 2019-04-11 | Stop reason: HOSPADM

## 2019-04-09 RX ORDER — NITROGLYCERIN 20 MG/100ML
10 INJECTION INTRAVENOUS
Status: DISCONTINUED | OUTPATIENT
Start: 2019-04-09 | End: 2019-04-10

## 2019-04-09 RX ORDER — SODIUM CHLORIDE 0.9 % (FLUSH) 0.9 %
3 SYRINGE (ML) INJECTION EVERY 12 HOURS SCHEDULED
Status: DISCONTINUED | OUTPATIENT
Start: 2019-04-09 | End: 2019-04-11 | Stop reason: HOSPADM

## 2019-04-09 RX ORDER — PANTOPRAZOLE SODIUM 40 MG/1
40 TABLET, DELAYED RELEASE ORAL
Status: DISCONTINUED | OUTPATIENT
Start: 2019-04-09 | End: 2019-04-11 | Stop reason: HOSPADM

## 2019-04-09 RX ORDER — NICOTINE POLACRILEX 4 MG
15 LOZENGE BUCCAL
Status: DISCONTINUED | OUTPATIENT
Start: 2019-04-09 | End: 2019-04-11 | Stop reason: HOSPADM

## 2019-04-09 RX ADMIN — HEPARIN SODIUM 5000 UNITS: 5000 INJECTION INTRAVENOUS; SUBCUTANEOUS at 15:04

## 2019-04-09 RX ADMIN — INSULIN LISPRO 2 UNITS: 100 INJECTION, SOLUTION INTRAVENOUS; SUBCUTANEOUS at 21:03

## 2019-04-09 RX ADMIN — ASPIRIN 81 MG 81 MG: 81 TABLET ORAL at 10:37

## 2019-04-09 RX ADMIN — NITROGLYCERIN 10 MCG/MIN: 20 INJECTION INTRAVENOUS at 06:49

## 2019-04-09 RX ADMIN — INSULIN LISPRO 2 UNITS: 100 INJECTION, SOLUTION INTRAVENOUS; SUBCUTANEOUS at 13:04

## 2019-04-09 RX ADMIN — INSULIN LISPRO 2 UNITS: 100 INJECTION, SOLUTION INTRAVENOUS; SUBCUTANEOUS at 16:29

## 2019-04-09 RX ADMIN — METOPROLOL TARTRATE 12.5 MG: 25 TABLET, FILM COATED ORAL at 21:01

## 2019-04-09 RX ADMIN — FUROSEMIDE 20 MG: 10 INJECTION, SOLUTION INTRAMUSCULAR; INTRAVENOUS at 10:37

## 2019-04-09 RX ADMIN — INSULIN LISPRO 2 UNITS: 100 INJECTION, SOLUTION INTRAVENOUS; SUBCUTANEOUS at 10:37

## 2019-04-09 RX ADMIN — LOSARTAN POTASSIUM 25 MG: 25 TABLET, FILM COATED ORAL at 10:36

## 2019-04-09 RX ADMIN — PANTOPRAZOLE SODIUM 40 MG: 40 TABLET, DELAYED RELEASE ORAL at 10:36

## 2019-04-09 RX ADMIN — HEPARIN SODIUM 5000 UNITS: 5000 INJECTION INTRAVENOUS; SUBCUTANEOUS at 10:37

## 2019-04-09 RX ADMIN — HEPARIN SODIUM 5000 UNITS: 5000 INJECTION INTRAVENOUS; SUBCUTANEOUS at 21:03

## 2019-04-09 RX ADMIN — METOPROLOL TARTRATE 12.5 MG: 25 TABLET, FILM COATED ORAL at 10:36

## 2019-04-09 NOTE — CONSULTS
[facility]   Consult Note    Referring Provider: Dr. Kwasi Ariza    Reason for Consultation: CHF    Patient Care Team:  Wayne Jorge MD as PCP - General  Eyal Cervantes MD as PCP - Claims Attributed  Fidel Valdez MD as Consulting Physician (Cardiology)     PROBLEM LIST:  1. Coronary artery disease  a. 5/16/17 angina, cardiac catheter severe ostial LAD and LCx of these.  Ostial RPL and RCA.  b. 5 vessel CABG (Calvillo) LIMA to LAD, SVG to diagonal and LCx, and SVG to PDA and PL.  2. Aortic stenosis  a. Peak gradient of 27 by cath 5/17  b. 4/19 peak gradient by echo of 66  3. Hypertension  4. Dyslipidemia  5. Diabetes mellitus  6. GERD  7. Varicose veins  8. Arthritis  9. Surgeries:  a. Tubal ligation  b. Varicose vein surgery  c. CABG      Allergies   Allergen Reactions   • Amlodipine      sickness   • Ciprofloxacin      Pt not sure of reaction (stomach problems)   • Statins Nausea Only     Stomach problem           Current Facility-Administered Medications:   •  aspirin chewable tablet 81 mg, 81 mg, Oral, Daily, Kwasi Ariza MD, 81 mg at 04/09/19 1037  •  dextrose (D50W) 25 g/ 50mL Intravenous Solution 25 g, 25 g, Intravenous, Q15 Min PRN, Kwasi Ariza MD  •  dextrose (GLUTOSE) oral gel 15 g, 15 g, Oral, Q15 Min PRN, Kwasi Ariza MD  •  glucagon (human recombinant) (GLUCAGEN DIAGNOSTIC) injection 1 mg, 1 mg, Subcutaneous, PRN, Kwasi Ariza MD  •  heparin (porcine) 5000 UNIT/ML injection 5,000 Units, 5,000 Units, Subcutaneous, Q8H, Kwasi Ariza MD, 5,000 Units at 04/09/19 1037  •  insulin lispro (humaLOG) injection 0-7 Units, 0-7 Units, Subcutaneous, 4x Daily With Meals & Nightly, Kwasi Ariza MD, 2 Units at 04/09/19 1037  •  losartan (COZAAR) tablet 25 mg, 25 mg, Oral, Q24H, Kwasi Ariza MD, 25 mg at 04/09/19 1036  •  metoprolol tartrate (LOPRESSOR) half tablet 12.5 mg, 12.5 mg, Oral, Q12H, Kwasi Ariza MD, 12.5 mg at 04/09/19 1036  •  nitroglycerin  50 mg/250 mL (0.2 mg/mL) infusion, 10 mcg/min, Intravenous, Titrated, Marek Jones MD, Last Rate: 3 mL/hr at 04/09/19 0649, 10 mcg/min at 04/09/19 0649  •  pantoprazole (PROTONIX) EC tablet 40 mg, 40 mg, Oral, Q AM, Kwasi Ariza MD, 40 mg at 04/09/19 1036  •  sodium chloride 0.9 % flush 3 mL, 3 mL, Intravenous, Q12H, Kwasi Ariza MD  •  sodium chloride 0.9 % flush 3-10 mL, 3-10 mL, Intravenous, PRN, Kwasi Ariza MD      nitroglycerin 10 mcg/min Last Rate: 10 mcg/min (04/09/19 0649)       Medications Prior to Admission   Medication Sig Dispense Refill Last Dose   • aspirin 81 MG chewable tablet Chew 1 tablet Daily.  11 Taking   • Cholecalciferol (VITAMIN D3) 5000 units capsule capsule Take 5,000 Units by mouth Daily.      • furosemide (LASIX) 20 MG tablet Take 1 tablet by mouth Daily. 90 tablet 1    • glucose blood test strip Check QD and  each 5    • Insulin Glargine (LANTUS SOLOSTAR) 100 UNIT/ML injection pen Inject 40 Units under the skin into the appropriate area as directed Every Morning. 15 pen 1    • Insulin Pen Needle (B-D UF III MINI PEN NEEDLES) 31G X 5 MM misc Use daily as directed with insulin. 100 each 3    • irbesartan (AVAPRO) 75 MG tablet Take 1 tablet by mouth Every Night. 90 tablet 1    • KLOR-CON 10 MEQ CR tablet AT THE START OF THERAPY, TAKE 2 TABLETS DAILY FOR 2 WEEKS, THEN DECREASE TO 1 TABLET DAILY THEREAFTER 90 tablet 3    • Lancets (FREESTYLE) lancets Check QD and  each 11    • melatonin 3 MG tablet Take  by mouth Every Night.      • metoprolol tartrate (LOPRESSOR) 25 MG tablet Take 0.5 tablets by mouth Every Night. 45 tablet 3    • Omega-3 Fatty Acids (SALMON OIL PO) Take 1,200 mg by mouth Daily.      • omeprazole (priLOSEC) 40 MG capsule TAKE 1 CAPSULE DAILY 90 capsule 1 Taking   • Probiotic Product (PROBIOTIC ADVANCED PO) Take 1 tablet by mouth Daily.   Taking   • Specialty Vitamins Products (HEART SAVIOR PO) Take  by mouth. 2 every other day      •  Ubiquinol 100 MG capsule Take 100 mg by mouth Daily.   Taking         Subjective .   History of present illness:    Patient is a 78-year-old  female who we are asked to see today for further evaluation of pulmonary edema/congestive heart failure.  She has history of coronary artery disease with previous coronary artery bypass grafting performed about 2 years ago.  Since that time she notes to have been doing fairly well.  She is quite active.  Her and her  recently traveled to [facility] and she notes that she did well there.  She was in her usual state of health up until last evening.  She notes that she missed her Lasix dose yesterday.  States also that she likely had extra sodium and volume.  When she remembered that she had not taken her dose it was time for her to go to bed.  She did not wish to be using the bathroom all night and decided not to take the medication.  She then awoke in the middle the night significantly short of breath.  She then presented to the [facility] for further evaluation.  By record review there she was noted to be 71% on room air.  She was placed on BiPAP and given IV diuretics.  She diuresed and was transferred [facility] for higher level of care.  Upon arrival here BiPAP was discontinued.  She was noted to be saturating well on room air and supplemental oxygen was not administered.  She was also noted to be hypertensive on arrival to the outside facility.  Currently she is on a nitroglycerin drip.  Echocardiogram has been ordered and is pending.  She denies any chest pain, pressure, tightness.  Does note that while she was recently on vacation that she did have some shortness of breath walking up inclines and hills.  She denies any syncope, near syncope.  She has intermittent lower extremity edema related to her varicose veins that has not been worsening as of late.  Due to her symptoms and history we were asked to see her for further  "evaluation.      Social History     Socioeconomic History   • Marital status:      Spouse name: Not on file   • Number of children: Not on file   • Years of education: Not on file   • Highest education level: Not on file   Occupational History     Employer: RETIRED   Tobacco Use   • Smoking status: Never Smoker   • Smokeless tobacco: Never Used   Substance and Sexual Activity   • Alcohol use: No   • Drug use: No   • Sexual activity: Defer     Comment:    Social History Narrative    ** Merged History Encounter **         Patient consumes 1 cup hot chocolate daily.   Patient lives at home with .        Family History   Problem Relation Age of Onset   • Cancer Mother         Lung cancer         Review of Systems:  Review of Systems   Constitution: Positive for malaise/fatigue. Negative for fever and weakness.   HENT: Negative for nosebleeds.    Eyes: Negative for redness and visual disturbance.   Cardiovascular: Negative for orthopnea, palpitations and paroxysmal nocturnal dyspnea.   Respiratory: Positive for shortness of breath. Negative for cough, snoring, sputum production and wheezing.    Hematologic/Lymphatic: Negative for bleeding problem.   Skin: Negative for flushing, itching and rash.   Musculoskeletal: Positive for arthritis and joint pain. Negative for falls and muscle cramps.   Gastrointestinal: Negative for abdominal pain, diarrhea, heartburn, nausea and vomiting.   Genitourinary: Negative for hematuria.   Neurological: Negative for excessive daytime sleepiness, dizziness, headaches and tremors.   Psychiatric/Behavioral: Negative for substance abuse. The patient is not nervous/anxious.               Objective   Vitals:  Blood pressure 165/78, pulse 80, temperature 97.9 °F (36.6 °C), temperature source Axillary, resp. rate 14, height 158.8 cm (62.5\"), weight 72.5 kg (159 lb 13.3 oz), SpO2 99 %.       Physical Exam   Constitutional: She is oriented to person, place, and time. She appears " well-developed and well-nourished. No distress.   HENT:   Head: Normocephalic and atraumatic.   Eyes: Right eye exhibits no discharge. Left eye exhibits no discharge.   Neck: No JVD present. No tracheal deviation present.   Cardiovascular: Normal rate, regular rhythm and intact distal pulses. Exam reveals no friction rub.   Murmur (3/6 systolic ejection murmur with radiation to bilateral carotids) heard.  Pulmonary/Chest: Effort normal and breath sounds normal. No respiratory distress.   Abdominal: Soft. Bowel sounds are normal. There is no tenderness.   Musculoskeletal: She exhibits edema (Trace lower extremity edema). She exhibits no deformity.   Neurological: She is alert and oriented to person, place, and time.   Skin: Skin is warm and dry.            Results Review:  I reviewed the patient's new clinical results.  Results from last 7 days   Lab Units 04/09/19  0830   WBC 10*3/mm3 9.44   HEMOGLOBIN g/dL 13.9   HEMATOCRIT % 42.9   PLATELETS 10*3/mm3 218           Invalid input(s): LABALBU, PROT          No results found for: TROPONINI                    Tele:  SR    EKG: SR with lateral ST changes    ECHO: pending     Assessment/Plan     1. Acute heart failure, echocardiogram pending.  Previously normal LVEF.  2. Coronary artery disease with previous coronary artery bypass grafting.  3. Aortic stenosis  4. Hypertension, uncontrolled on admit.  We will continue to follow and wean nitroglycerin as tolerated.  5. Dyslipidemia, on statin.       Plan:    1. Aortic stenosis, patient is a good TAVR candidate now that she is symptomatic with heart failure.  We will consult Dr. Calvillo  2. Consider transition to PO diuretics tomorrow (given IV today).      BETH Fisher obtained past medical, family history, social history, review of systems and functioned as a scribe for the remainder of the dictation for Dr. Valdez      Electronically signed by BETH Fisher, 04/09/19, 10:42 AM.  Fidel MONTESINOS MD,  personally performed the services described in this documentation as scribed by the above individual in my presence, and it is both accurate and complete      Dictated utilizing Dragon dictation

## 2019-04-09 NOTE — PLAN OF CARE
Problem: Patient Care Overview  Goal: Plan of Care Review  Outcome: Ongoing (interventions implemented as appropriate)   04/09/19 0809   Coping/Psychosocial   Plan of Care Reviewed With patient   Plan of Care Review   Progress no change   OTHER   Outcome Summary BiPap, VSS       Problem: Fluid Volume Excess (Adult)  Goal: Identify Related Risk Factors and Signs and Symptoms  Outcome: Ongoing (interventions implemented as appropriate)

## 2019-04-09 NOTE — PROGRESS NOTES
Discharge Planning Assessment  [facility]     Patient Name: Nancy Goodman  MRN: [id number]  Today's Date: 4/9/2019    Admit Date: 4/9/2019    Discharge Needs Assessment     Row Name 04/09/19 1529       Living Environment    Lives With  spouse pt resides in [facility]    Name(s) of Who Lives With Patient  Zaki Rosales    Current Living Arrangements  home/apartment/condo    Primary Care Provided by  self    Provides Primary Care For  no one    Family Caregiver if Needed  spouse;child(juana), adult    Family Caregiver Names  Zaki Rosales or karina Gardner    Quality of Family Relationships  helpful;involved;supportive    Able to Return to Prior Arrangements  yes       Resource/Environmental Concerns    Resource/Environmental Concerns  none       Transition Planning    Patient/Family Anticipates Transition to  home with family    Patient/Family Anticipated Services at Transition  none       Discharge Needs Assessment    Readmission Within the Last 30 Days  no previous admission in last 30 days    Concerns to be Addressed  discharge planning    Equipment Currently Used at Home  none    Anticipated Changes Related to Illness  none    Equipment Needed After Discharge  none        Discharge Plan     Row Name 04/09/19 5806       Plan    Plan  home    Patient/Family in Agreement with Plan  yes    Plan Comments  CM spoke with pt,  Bobby and daughter Anne Marie at bedside. Pt resides in [facility] with her . Pt is independent of adl's and denies current home health or outpt services. Pt plans to return home and denies discharge needs at this time. CM will continue to follow.     Final Discharge Disposition Code  01 - home or self-care        Destination      No service coordination in this encounter.      Durable Medical Equipment      No service coordination in this encounter.      Dialysis/Infusion      No service coordination in this encounter.      Home Medical Care      No service coordination in this  encounter.      Therapy      No service coordination in this encounter.      Community Resources      No service coordination in this encounter.          Demographic Summary     Row Name 04/09/19 1524       General Information    Admission Type  inpatient    Referral Source  admission list    Reason for Consult  discharge planning    Preferred Language  English    General Information Comments  PCP- Dr. Cervantes or Dr. Wayne Jorge       Contact Information    Permission Granted to Share Info With      Contact Information Comments  [phone]        Functional Status     Row Name 04/09/19 1525       Functional Status    Usual Activity Tolerance  moderate    Current Activity Tolerance  fair       Functional Status, IADL    Medications  independent    Meal Preparation  independent    Housekeeping  independent    Laundry  independent    Shopping  independent       Employment/    Employment/ Comments  pt confirms she has Medicare and  for life insurance, pt denies concerns or disruption in coverage. Pt confirms she has prescription drug coverage and denies issues obtaining or affording current medications.         Psychosocial    No documentation.       Abuse/Neglect    No documentation.       Legal    No documentation.       Substance Abuse    No documentation.       Patient Forms    No documentation.           Tri Stanford

## 2019-04-09 NOTE — H&P
[facility] Medicine Services  HISTORY AND PHYSICAL    Patient Name: Nancy Goodman  : 1940  MRN: [id number]  Primary Care Physician: Wayne Jorge MD  Date of admission: 2019      Subjective   Subjective     Chief Complaint:  Dyspnea    HPI:  Nancy Goodman is a 78 y.o. female with history of CAD s/p CABG, HTN, DHF, here with acute dyspnea last night. She forgot to take her diuretic yesterday, and by the time she remembered to take it, it was evening and she didn't want going to the bathroom to disturb her sleep. She then woke up acutely dyspneic and orthopneic. No chest pain, but heaviness noted and L arm aching. No pain. Now. She went to [place] ED, was treated and transferred here. Still mildly SOA now with cough/nonproductive. No f/c. No n/v. No diarrhea. In her usual state of health. States that she has gained 5 lbs recently and notes some edema.    Review of Systems     Otherwise complete ROS reviewed and is negative except as mentioned in the HPI.    Personal History     Past Medical History:   Diagnosis Date   • Allergic rhinitis    • Arthritis    • Cellulitis of finger    • Diabetes mellitus (CMS/HCC)     dx 12 years ago- checks fsbs daily   • Dizziness    • Edema    • GERD (gastroesophageal reflux disease)    • Hyperlipidemia    • Hypertension    • Ingrown nail    • PAT (paroxysmal atrial tachycardia) (CMS/HCC)    • Rosacea    • Vitamin D deficiency    • Wears dentures    • Wears eyeglasses    • Wears partial dentures        Past Surgical History:   Procedure Laterality Date   • CARDIAC CATHETERIZATION N/A 2017    Procedure: Left Heart Cath;  Surgeon: Fidel Valdez MD;  Location:  [facility];  Service:    • CORONARY ARTERY BYPASS GRAFT N/A 2017    Procedure: CORONARY ARTERY BYPASS x  5 WITH INTERNAL MAMMARY ARTERY GRAFT and EVH of the Right leg;  Surgeon: Fidel Calvillo MD;  Location:  [facility];  Service:    • FINGER NAIL  SURGERY     • TUBAL ABDOMINAL LIGATION     • VARICOSE VEIN SURGERY         Family History: family history includes Cancer in her mother. Otherwise pertinent FHx was reviewed and unremarkable.     Social History:  reports that she has never smoked. She has never used smokeless tobacco. She reports that she does not drink alcohol or use drugs.  Social History     Social History Narrative    ** Merged History Encounter **         Patient consumes 1 cup hot chocolate daily.   Patient lives at home with .          Medications:    Available home medication information reviewed.  Medications Prior to Admission   Medication Sig Dispense Refill Last Dose   • aspirin 81 MG chewable tablet Chew 1 tablet Daily.  11 Taking   • Cholecalciferol (VITAMIN D3) 5000 units capsule capsule Take 5,000 Units by mouth Daily.      • furosemide (LASIX) 20 MG tablet Take 1 tablet by mouth Daily. 90 tablet 1    • glucose blood test strip Check QD and  each 5    • Insulin Glargine (LANTUS SOLOSTAR) 100 UNIT/ML injection pen Inject 40 Units under the skin into the appropriate area as directed Every Morning. 15 pen 1    • Insulin Pen Needle (B-D UF III MINI PEN NEEDLES) 31G X 5 MM misc Use daily as directed with insulin. 100 each 3    • irbesartan (AVAPRO) 75 MG tablet Take 1 tablet by mouth Every Night. 90 tablet 1    • KLOR-CON 10 MEQ CR tablet AT THE START OF THERAPY, TAKE 2 TABLETS DAILY FOR 2 WEEKS, THEN DECREASE TO 1 TABLET DAILY THEREAFTER 90 tablet 3    • Lancets (FREESTYLE) lancets Check QD and  each 11    • melatonin 3 MG tablet Take  by mouth Every Night.      • metoprolol tartrate (LOPRESSOR) 25 MG tablet Take 0.5 tablets by mouth Every Night. 45 tablet 3    • Omega-3 Fatty Acids (SALMON OIL PO) Take 1,200 mg by mouth Daily.      • omeprazole (priLOSEC) 40 MG capsule TAKE 1 CAPSULE DAILY 90 capsule 1 Taking   • Probiotic Product (PROBIOTIC ADVANCED PO) Take 1 tablet by mouth Daily.   Taking   • Specialty Vitamins  Products (HEART SAVIOR PO) Take  by mouth. 2 every other day      • Ubiquinol 100 MG capsule Take 100 mg by mouth Daily.   Taking       Allergies   Allergen Reactions   • Amlodipine      sickness   • Ciprofloxacin      Pt not sure of reaction (stomach problems)   • Statins Nausea Only     Stomach problem       Objective   Objective     Vital Signs:   Temp:  [97.9 °F (36.6 °C)] 97.9 °F (36.6 °C)  Heart Rate:  [80] 80  Resp:  [14] 14  BP: (165)/(78) 165/78        Physical Exam   NAD, alert and oriented  OP clear, MMM  Neck supple  No LAD  RRR  L base with crackles, clear on R  +BS, ND, NT  TRUONG  No c/c, but B varicosities noted and edema noted  No other rashes/skin changes  Normal affect    Results Reviewed:  I have personally reviewed current lab, radiology, and data and agree.              Invalid input(s):  ALKPHOS  CrCl cannot be calculated (Patient's most recent lab result is older than the maximum 30 days allowed.).  Brief Urine Lab Results  (Last result in the past 365 days)      Color   Clarity   Blood   Leuk Est   Nitrite   Protein   CREAT   Urine HCG        11/26/18 0939             300           No results found for: BNP  Imaging Results (last 24 hours)     Procedure Component Value Units Date/Time    XR Chest 1 View [[id number]] Updated:  04/09/19 0803        Results for orders placed during the hospital encounter of 05/16/17   Adult Transthoracic Echo Complete    Narrative · Left ventricular function is normal. Estimated EF = 65%.  · Left ventricular wall thickness is consistent with mild concentric   hypertrophy.  · Mild mitral valve regurgitation is present  · Mild aortic valve regurgitation is present.  · Mild aortic valve stenosis is present. Aortic valve maximum pressure   gradient is 32.7 mmHg.          Assessment/Plan   Assessment / Plan     Active Hospital Problems    Diagnosis POA   • **Pulmonary edema [J81.1] Yes   • Diastolic CHF (CMS/HCC) [I50.30] Unknown   • CAD (coronary artery disease)  [I25.10] Unknown   • Localized edema [R60.0] Yes   • S/P CABG (coronary artery bypass graft) [Z95.1] Not Applicable   • VHD (valvular heart disease) [I38] Yes     · Mild aoritc valve stenosis by Echo 5/16/17     • Essential hypertension [I10] Yes   • Mixed hyperlipidemia [E78.2] Yes   • Type 2 diabetes mellitus without complication, with long-term current use of insulin (CMS/LTAC, located within [facility]) [E11.9, Z79.4] Not Applicable     A/C DHF, with acute pulmonary edema and hypoxia  --lasix IV x 1  --continue BB/ARB  --ECHO  Chest pain  --hx of CAD/CABG  --check ECHO  --check troponin  --check EKG  HTN  HL  DM  VHD    DVT prophylaxis:  TITO    CODE STATUS:    Code Status and Medical Interventions:   Ordered at: 04/09/19 0743     Level Of Support Discussed With:    Patient     Code Status:    CPR     Medical Interventions (Level of Support Prior to Arrest):    Full       Admission Status:  I believe this patient meets inpatient criteria.      Electronically signed by Kwasi Ariza MD, 04/09/19, 8:25 AM.

## 2019-04-09 NOTE — NURSING NOTE
ACC REVIEW REPORT: [facility]        PATIENT NAME: Nancy Goodman    PATIENT ID: [id number]    BED: S460    BED TYPE: TELE    BED GIVEN TO: IVORY IRIZARRY RN    TIME BED GIVEN: 0435    YOB: 1940    AGE: 78    GENDER: FEMALE    PREVIOUS ADMIT TO [facility]: YES    PREVIOUS ADMISSION DATE: 5/24/17    PATIENT CLASS: INPATIENT    TODAY'S DATE: 4/9/2019    TRANSFER DATE: 4/9/19    ETA:     TRANSFERRING FACILITY: [facility]    TRANSFERRING FACILITY PHONE # : [phone]    TRANSFERRING MD: FOSTER    DATE/TIME REQUEST RECEIVED: 4/9/19 @ 0358    [facility] RN: KAYLA ROGER RN    REPORT FROM: IVORY IRIZARRY RN    TIME REPORT TAKEN: 0435    DIAGNOSIS: ACUTE RESP FAILLURE, CHF, FLASH PULMONARY EDEMA    REASON FOR TRANSFER TO [facility]: HIGHER LEVEL OF CARE    TRANSPORTATION: AMBULANCE    CLINICAL REASON FOR TRANSFER TO [facility]: HIGHER LEVEL OF CARE      CLINICAL INFORMATION    HEIGHT: 62 INCHES    WEIGHT: 72.5 KG    ALLERGIES:   Agent Severity Comments   Amlodipine  sickness   Ciprofloxacin  Pt not sure of reaction (stomach problems)   Statins  Stomach problem       [place]: 16 FR PLACED ON 4/9/19    INFECTIOUS DISEASE: NONE    ISOLATION: NONE    LAST VITAL SIGNS:  TIME:   TEMP: 97.2  PULSE: 89  B/P: 113/59  RESP: 18    LAB INFORMATION:   WBC 15.3  GLUCOSE 223    CULTURE INFORMATION: NONE    MEDS/IV FLUIDS:   #18 LEFT AC  LASIX 40 MG   MG  NITRO GTT @ 10 MCG/MIN        CARDIAC SYSTEM:    CHEST PAIN: NO    RATE: 0    SCALE: 0-10    RHYTHM: NSR    Is patient taking or has patient been given any drugs that could increase bleeding? NONE  (Plavix, Brilinta, Effient, Eliquis, Xarelto, Warfarin, Integrilin, Angiomax)    DRUG:      DOSE/FREQUENCY:     CARDIAC ENZYMES:    DATE: 4/9/19  TIME:   CK:   CKMB:   TAYLOR:   TROP: 0.114    DATE: 4/9/19  TIME:   CK:   CKMB:   TAYLOR:   TROP: 0.223      RESPIRATORY SYSTEM:    LUNG SOUNDS:    CLEAR:   CRACKLES:   WHEEZES:   RHONCHI: YES  DIMINISHED: YES    OXYGEN: ON BIPAP @ 40% FIO2,  RATE 12    O2 SAT: 90%    ADMINISTRATION ROUTE: BIPAP    IMAGING FINDINGS: NOT RESULTED    RESPIRATORY STATUS: STABLE WITH BIPAP      CNS/MUSCULOSKELETAL    ALERT AND ORIENTED:    PERSON: YES  PLACE: YES  TIME: YES    INJURY:  WHERE: NONE    KARINA COMA SCALE:    E: 4  M: 6  V: 5      GI//GY      ABDOMINAL PAIN: NO    VOMITING: NO    DIARRHEA: NO    NAUSEA: NO    BOWEL SOUNDS: ACTIVE    OCCULT STOOL: N/A    VAGINAL BLEEDING: NO    TESTICULAR PAIN: N/A    HEMATURIA: NO      PAST MEDICAL HISTORY:   Diagnosis Date Comment Source   Allergic rhinitis   Provider   Arthritis   Provider   Cellulitis of finger   Provider   Diabetes mellitus (CMS/[facility])  dx 12 years ago- checks fsbs daily Provider   Dizziness   Provider   Edema   Provider   GERD (gastroesophageal reflux disease)   Provider   Hyperlipidemia   Provider   Hypertension   Provider   Ingrown nail   Provider   PAT (paroxysmal atrial tachycardia) (CMS/[facility])   Provider   Rosacea   Provider   Vitamin D deficiency   Provider   Wears dentures   Provider   Wears eyeglasses   Provider   Wears partial dentures        Past Surgical History      Laterality Last Occurrence Comments   Tubal ligation [SHX77]      Varicose vein surgery [EWY738]      Cardiac catheterization [HJQ397] N/A 5/16/2017 Procedure: Left Heart Cath;  Surgeon: Fidel Valdez MD;  Location: [facility] CATH INVASIVE LOCATION;  Service:    Finger nail surgery [[id number]]      Coronary artery bypass graft [[id number]] N/A 5/24/2017 Procedure: CORONARY ARTERY BYPASS x  5 WITH INTERNAL MAMMARY ARTERY GRAFT and EVH of the Right leg;  Surgeon: Fidel Calvillo MD;  Location: [facility] OR;  Service:        OTHER SYMPTOM NOTES:   PATIENT ARRIVED TO OUTSIDE ED WITHIN 15 MINUTES OF SUDDEN ONSET OF SOA    ADDITIONAL NOTES:           Laura Zaman RN  4/9/2019  4:29 AM

## 2019-04-09 NOTE — THERAPY EVALUATION
Acute Care - Physical Therapy Initial Evaluation  [facility]     Patient Name: Nancy Goodman  : 1940  MRN: [id number]  Today's Date: 2019   Onset of Illness/Injury or Date of Surgery: 19  Date of Referral to PT: 19  Referring Physician: DOMINGO Ariza MD      Admit Date: 2019    Visit Dx:     ICD-10-CM ICD-9-CM   1. Impaired functional mobility, balance, gait, and endurance Z74.09 V49.89     Patient Active Problem List   Diagnosis   • Type 2 diabetes mellitus without complication, with long-term current use of insulin (CMS/[facility])   • Mixed hyperlipidemia   • Essential hypertension   • Vitamin D deficiency   • CAD (coronary artery disease)   • VHD (valvular heart disease)   • S/P CABG (coronary artery bypass graft)   • Hypokalemia   • Localized edema   • Pulmonary edema   • Diastolic CHF (CMS/[facility])   • CAD (coronary artery disease)     Past Medical History:   Diagnosis Date   • Allergic rhinitis    • Arthritis    • Cellulitis of finger    • Diabetes mellitus (CMS/[facility])     dx 12 years ago- checks fsbs daily   • Dizziness    • Edema    • GERD (gastroesophageal reflux disease)    • Hyperlipidemia    • Hypertension    • Ingrown nail    • PAT (paroxysmal atrial tachycardia) (CMS/[facility])    • Rosacea    • Vitamin D deficiency    • Wears dentures    • Wears eyeglasses    • Wears partial dentures      Past Surgical History:   Procedure Laterality Date   • CARDIAC CATHETERIZATION N/A 2017    Procedure: Left Heart Cath;  Surgeon: Fidel Valdez MD;  Location:  [facility] CATH INVASIVE LOCATION;  Service:    • CORONARY ARTERY BYPASS GRAFT N/A 2017    Procedure: CORONARY ARTERY BYPASS x  5 WITH INTERNAL MAMMARY ARTERY GRAFT and EVH of the Right leg;  Surgeon: Fidel Calvillo MD;  Location:  [facility] OR;  Service:    • FINGER NAIL SURGERY     • TUBAL ABDOMINAL LIGATION     • VARICOSE VEIN SURGERY          PT ASSESSMENT (last 12 hours)      Physical Therapy Evaluation     Row Name 19 1400           PT Evaluation Time/Intention    Subjective Information  no complaints  -MB     Document Type  evaluation  -MB     Mode of Treatment  physical therapy  -MB     Patient Effort  good  -MB     Symptoms Noted During/After Treatment  none  -MB     Row Name 04/09/19 1400          General Information    Patient Profile Reviewed?  yes  -MB     Onset of Illness/Injury or Date of Surgery  04/09/19  -MB     Referring Physician  DOMINGO Ariza MD  -MB     Patient Observations  alert;cooperative;agree to therapy  -MB     Patient/Family Observations  Pt's  and dtr present at bedside.   -MB     General Observations of Patient  Pt. supine, on RA, telemetry, IV intact LUE.  RN consent for PT.  -MB     Prior Level of Function  independent:;all household mobility;community mobility;gait;transfer;bed mobility;ADL's;home management;driving;shopping;using stairs Exercises 3x/week; Recently returned from trip to [place].   -MB     Equipment Currently Used at Home  none  -MB     Pertinent History of Current Functional Problem  Pt. is a 79 yo female who presented to ED w/ acute dyspnea.  PMH: CAD s/p CABG, HTN, DHF.  Pt. adm w/ pulmonary edema.   -MB     Existing Precautions/Restrictions  no known precautions/restrictions  -MB     Risks Reviewed  patient and family:;LOB;nausea/vomiting;dizziness;increased discomfort;change in vital signs  -MB     Benefits Reviewed  patient and family:;improve function;increase independence;increase strength;increase balance;decrease risk of DVT;improve skin integrity;increase knowledge  -MB     Barriers to Rehab  none identified  -MB     Row Name 04/09/19 1400          Relationship/Environment    Primary Source of Support/Comfort  spouse;child(juana)  -MB     Lives With  spouse  -MB     Row Name 04/09/19 1400          Resource/Environmental Concerns    Current Living Arrangements  home/apartment/condo  -MB     Row Name 04/09/19 1400          Home Main Entrance    Number of Stairs, Main Entrance  other  (see comments) 13  -MB     Stair Railings, Main Entrance  railing on left side (ascending)  -MB     Row Name 04/09/19 1400          Cognitive Assessment/Intervention- PT/OT    Orientation Status (Cognition)  oriented x 4  -MB     Follows Commands (Cognition)  WNL  -MB     Row Name 04/09/19 1400          Bed Mobility Assessment/Treatment    Bed Mobility Assessment/Treatment  bed mobility (all) activities  -MB     [place] Level (Bed Mobility)  independent  -MB     Comment (Bed Mobility)  Pt. denied SOA/dizziness in sitting.   -MB     Row Name 04/09/19 1400          Transfer Assessment/Treatment    Transfer Assessment/Treatment  sit-stand transfer;stand-sit transfer;toilet transfer  -MB     Comment (Transfers)  Pt. demo good safety awareness w/ transfers.   -MB     Sit-Stand [place] (Transfers)  supervision  -MB     Stand-Sit [place] (Transfers)  supervision  -MB     [place] Level (Toilet Transfer)  supervision  -MB     Assistive Device (Toilet Transfer)  commode;grab bars/safety frame  -MB     Row Name 04/09/19 1400          Sit-Stand Transfer    Assistive Device (Sit-Stand Transfers)  other (see comments) gait belt, no AD  -MB     Row Name 04/09/19 1400          Stand-Sit Transfer    Assistive Device (Stand-Sit Transfers)  other (see comments) gait belt  -MB     Row Name 04/09/19 1400          Toilet Transfer    Type (Toilet Transfer)  sit-stand;stand-sit  -MB     Row Name 04/09/19 1400          Gait/Stairs Assessment/Training    [place] Level (Gait)  supervision  -MB     Assistive Device (Gait)  other (see comments) no AD; gait belt  -MB     Distance in Feet (Gait)  600  -MB     Pattern (Gait)  step-through  -MB     Comment (Gait/Stairs)  Pt. demo step through gait pattern and good safety awareness.  Pt. c/o increased fatigue following ambulation.   -MB     Row Name 04/09/19 1400          General ROM    GENERAL ROM COMMENTS  BLE and BUE ROM WFL.  -MB     Row Name 04/09/19 1400          MMT  (Manual Muscle Testing)    General MMT Comments  BLEs and BUEs WFL.  -MB     Row Name 04/09/19 1400          Motor Assessment/Intervention    Additional Documentation  Balance (Group)  -MB     Row Name 04/09/19 1400          Balance    Balance  static standing balance;dynamic standing balance  -MB     Row Name 04/09/19 1400          Static Standing Balance    Level of [place] (Supported Standing, Static Balance)  independent  -MB     Time Able to Maintain Position (Supported Standing, Static Balance)  1 to 2 minutes  -MB     Assistive Device Utilized (Supported Standing, Static Balance)  -- gait belt  -MB     Row Name 04/09/19 1400          Dynamic Standing Balance    Level of [place], Reaches Outside Midline (Standing, Dynamic Balance)  supervision  -MB     Time Able to Maintain Position, Reaches Outside Midline (Standing, Dynamic Balance)  1 to 2 minutes  -MB     Assistive Device Utilized (Supported Standing, Dynamic Balance)  other (see comments) gait belt  -MB     Row Name 04/09/19 1400          Sensory Assessment/Intervention    Sensory General Assessment  no sensation deficits identified  -MB     Row Name 04/09/19 1400          Pain Assessment    Additional Documentation  Pain Scale: Numbers Pre/Post-Treatment (Group)  -MB     Row Name 04/09/19 1400          Pain Scale: Numbers Pre/Post-Treatment    Pain Scale: Numbers, Pretreatment  0/10 - no pain  -MB     Pain Scale: Numbers, Post-Treatment  0/10 - no pain  -MB     Row Name 04/09/19 1400          Plan of Care Review    Plan of Care Reviewed With  patient;spouse;daughter  -[facility] Name 04/09/19 1400          Physical Therapy Clinical Impression    Date of Referral to PT  04/09/19  -MB     PT Diagnosis (PT Clinical Impression)  Functional decline  -MB     Functional Level at Time of Evaluation (PT Clinical Impression)  Supervision, w/ dec. activity tolerance  -MB     Patient/Family Goals Statement (PT Clinical Impression)  Return to home and PLOF   -MB     Criteria for Skilled Interventions Met (PT Clinical Impression)  yes;treatment indicated  -MB     Rehab Potential (PT Clinical Summary)  good, to achieve stated therapy goals  -MB     Care Plan Review (PT)  evaluation/treatment results reviewed;care plan/treatment goals reviewed;risks/benefits reviewed;current/potential barriers reviewed;patient/other agree to care plan  -MB     Row Name 04/09/19 1400          Vital Signs    Pre Systolic BP Rehab  165  -MB     Pre Treatment Diastolic BP  78  -MB     Pre SpO2 (%)  94  -MB     O2 Delivery Pre Treatment  room air  -MB     O2 Delivery Intra Treatment  room air  -MB     Post SpO2 (%)  95  -MB     O2 Delivery Post Treatment  room air  -MB     Pre Patient Position  Supine  -MB     Intra Patient Position  Standing  -MB     Post Patient Position  Sitting  -MB     Row Name 04/09/19 1400          Physical Therapy Goals    Bed Mobility Goal Selection (PT)  --  -MB     Transfer Goal Selection (PT)  transfer, PT goal 1  -MB     Gait Training Goal Selection (PT)  gait training, PT goal 1  -MB     Stairs Goal Selection (PT)  stairs, PT goal 1  -MB     Additional Documentation  Stairs Goal Selection (PT) (Row)  -MB     Row Name 04/09/19 1400          Transfer Goal 1 (PT)    Activity/Assistive Device (Transfer Goal 1, PT)  transfers, all  -MB     [place] Level/Cues Needed (Transfer Goal 1, PT)  independent  -MB     Time Frame (Transfer Goal 1, PT)  1 week  -MB     Progress/Outcome (Transfer Goal 1, PT)  goal ongoing  -MB     Row Name 04/09/19 1400          Gait Training Goal 1 (PT)    Activity/Assistive Device (Gait Training Goal 1, PT)  gait (walking locomotion)  -MB     [place] Level (Gait Training Goal 1, PT)  independent  -MB     Distance (Gait Goal 1, PT)  500  -MB     Time Frame (Gait Training Goal 1, PT)  1 week  -MB     Progress/Outcome (Gait Training Goal 1, PT)  goal ongoing  -MB     Row Name 04/09/19 1400          Stairs Goal 1 (PT)    Activity/Assistive  Device (Stairs Goal 1, PT)  stairs, all skills;using handrail, left  -MB     [place] Level/Cues Needed (Stairs Goal 1, PT)  independent  -MB     Number of Stairs (Stairs Goal 1, PT)  13  -MB     Time Frame (Stairs Goal 1, PT)  1 week  -MB     Progress/Outcome (Stairs Goal 1, PT)  goal ongoing  -MB     Row Name 04/09/19 1400          Positioning and Restraints    Pre-Treatment Position  in bed  -MB     Post Treatment Position  chair  -MB     In Chair  notified nsg;reclined;call light within reach;encouraged to call for assist;with family/caregiver;legs elevated  -MB     Row Name 04/09/19 1400          Living Environment    Home Accessibility  stairs to enter home;tub/shower is not walk in  -MB       User Key  (r) = Recorded By, (t) = Taken By, (c) = Cosigned By    Initials Name Provider Type    Radha Lofton, PT Physical Therapist        Physical Therapy Education     Title: PT OT SLP Therapies (Done)     Topic: Physical Therapy (Done)     Point: Mobility training (Done)     Learning Progress Summary           Patient Acceptance, E,D, VU by MB at 4/9/2019  4:02 PM   Family Acceptance, E,D, VU by MB at 4/9/2019  4:02 PM                   Point: Home exercise program (Done)     Learning Progress Summary           Patient Acceptance, E,D, VU by MB at 4/9/2019  4:02 PM   Family Acceptance, E,D, VU by MB at 4/9/2019  4:02 PM                   Point: Body mechanics (Done)     Learning Progress Summary           Patient Acceptance, E,D, VU by MB at 4/9/2019  4:02 PM   Family Acceptance, E,D, VU by MB at 4/9/2019  4:02 PM                   Point: Precautions (Done)     Learning Progress Summary           Patient Acceptance, E,D, VU by MB at 4/9/2019  4:02 PM   Family Acceptance, E,D, VU by MB at 4/9/2019  4:02 PM                               User Key     Initials Effective Dates Name Provider Type Hilary BERRIOS 03/14/16 -  Radha Da Silva, PT Physical Therapist PT              PT Recommendation and  Plan  Anticipated Discharge Disposition (PT): home  Planned Therapy Interventions (PT Eval): balance training, bed mobility training, gait training, home exercise program, patient/family education, stair training, strengthening, transfer training  Therapy Frequency (PT Clinical Impression): daily  Outcome Summary/Treatment Plan (PT)  Anticipated Equipment Needs at Discharge (PT): (none)  Anticipated Discharge Disposition (PT): home  Plan of Care Reviewed With: patient, spouse, daughter  Progress: improving  Outcome Summary: PT eval completed.  Pt. presents w/ mild functional decline w/ decreased activity tolerance.  Pt. independent w/ bed mobility, and requires supervision for transfers and gait.  Pt. ambulated 600ft w/ supervision and increased c/o fatigue; VSS throughout.  Pt. will benefit from brief IPPT to promote return to PLOF.  Anticipate safe D/C to home w/ .   Outcome Measures     Row Name 04/09/19 1400             How much help from another person do you currently need...    Turning from your back to your side while in flat bed without using bedrails?  4  -MB      Moving from lying on back to sitting on the side of a flat bed without bedrails?  4  -MB      Moving to and from a bed to a chair (including a wheelchair)?  4  -MB      Standing up from a chair using your arms (e.g., wheelchair, bedside chair)?  4  -MB      Climbing 3-5 steps with a railing?  3  -MB      To walk in hospital room?  3  -MB      AM-PAC 6 Clicks Score  22  -MB         Functional Assessment    Outcome Measure Options  AM-PAC 6 Clicks Basic Mobility (PT)  -MB        User Key  (r) = Recorded By, (t) = Taken By, (c) = Cosigned By    Initials Name Provider Type    Radha Lofton, PT Physical Therapist         Time Calculation:   PT Charges     Row Name 04/09/19 5351             Time Calculation    Start Time  1400  -MB      PT Received On  04/09/19  -MB      PT Goal Re-Cert Due Date  04/19/19  -MB        User Key  (r) =  Recorded By, (t) = Taken By, (c) = Cosigned By    Initials Name Provider Type    Radha Lofton, PT Physical Therapist        Therapy Charges for Today     Code Description Service Date Service Provider Modifiers Qty    [id number] HC PT EVAL LOW COMPLEXITY 4 4/9/2019 Radha Da Silva, PT GP 1          PT G-Codes  Outcome Measure Options: AM-PAC 6 Clicks Basic Mobility (PT)  AM-PAC 6 Clicks Score: 22      Radha Da Silva, PT  4/9/2019

## 2019-04-09 NOTE — PLAN OF CARE
Problem: Patient Care Overview  Goal: Plan of Care Review  Outcome: Ongoing (interventions implemented as appropriate)   04/09/19 0052   Coping/Psychosocial   Plan of Care Reviewed With patient;spouse;daughter   Plan of Care Review   Progress improving   OTHER   Outcome Summary PT eval completed. Pt. presents w/ mild functional decline w/ decreased activity tolerance. Pt. independent w/ bed mobility, and requires supervision for transfers and gait. Pt. ambulated 600ft w/ supervision and increased c/o fatigue; VSS throughout. Pt. will benefit from brief IPPT to promote return to PLOF. Anticipate safe D/C to home w/ .

## 2019-04-10 ENCOUNTER — APPOINTMENT (OUTPATIENT)
Dept: CARDIOLOGY | Facility: HOSPITAL | Age: 79
End: 2019-04-10

## 2019-04-10 ENCOUNTER — APPOINTMENT (OUTPATIENT)
Dept: CT IMAGING | Facility: HOSPITAL | Age: 79
End: 2019-04-10

## 2019-04-10 PROBLEM — E87.6 HYPOKALEMIA: Status: RESOLVED | Noted: 2018-04-20 | Resolved: 2019-04-10

## 2019-04-10 PROBLEM — I35.0 AORTIC STENOSIS, SEVERE: Status: ACTIVE | Noted: 2017-05-24

## 2019-04-10 LAB
ANION GAP SERPL CALCULATED.3IONS-SCNC: 12 MMOL/L
BH CV XLRA MEAS LEFT BULB EDV: 28.1 CM/SEC
BH CV XLRA MEAS LEFT BULB PSV: 154.9 CM/SEC
BH CV XLRA MEAS LEFT CCA RATIO VEL: 73.3 CM/SEC
BH CV XLRA MEAS LEFT DIST CCA EDV: 11.8 CM/SEC
BH CV XLRA MEAS LEFT DIST CCA PSV: 72.5 CM/SEC
BH CV XLRA MEAS LEFT DIST ICA EDV: 25.8 CM/SEC
BH CV XLRA MEAS LEFT DIST ICA PSV: 112.4 CM/SEC
BH CV XLRA MEAS LEFT ICA RATIO VEL: 146 CM/SEC
BH CV XLRA MEAS LEFT ICA/CCA RATIO: 2
BH CV XLRA MEAS LEFT MID CCA EDV: 13.1 CM/SEC
BH CV XLRA MEAS LEFT MID CCA PSV: 73.8 CM/SEC
BH CV XLRA MEAS LEFT MID ICA EDV: 21 CM/SEC
BH CV XLRA MEAS LEFT MID ICA PSV: 131 CM/SEC
BH CV XLRA MEAS LEFT PROX CCA EDV: 18.9 CM/SEC
BH CV XLRA MEAS LEFT PROX CCA PSV: 97.8 CM/SEC
BH CV XLRA MEAS LEFT PROX ECA PSV: 169 CM/SEC
BH CV XLRA MEAS LEFT PROX ICA EDV: 26.2 CM/SEC
BH CV XLRA MEAS LEFT PROX ICA PSV: 146.7 CM/SEC
BH CV XLRA MEAS LEFT PROX SCLA PSV: 119 CM/SEC
BH CV XLRA MEAS LEFT VERTEBRAL A EDV: 7.8 CM/SEC
BH CV XLRA MEAS LEFT VERTEBRAL A PSV: 36.7 CM/SEC
BH CV XLRA MEAS RIGHT CCA RATIO VEL: 90.4 CM/SEC
BH CV XLRA MEAS RIGHT DIST CCA EDV: 18.2 CM/SEC
BH CV XLRA MEAS RIGHT DIST CCA PSV: 83.3 CM/SEC
BH CV XLRA MEAS RIGHT DIST ICA EDV: 17 CM/SEC
BH CV XLRA MEAS RIGHT DIST ICA PSV: 90.4 CM/SEC
BH CV XLRA MEAS RIGHT ICA RATIO VEL: 94.3 CM/SEC
BH CV XLRA MEAS RIGHT ICA/CCA RATIO: 1
BH CV XLRA MEAS RIGHT MID CCA EDV: 16 CM/SEC
BH CV XLRA MEAS RIGHT MID CCA PSV: 91 CM/SEC
BH CV XLRA MEAS RIGHT MID ICA EDV: 17.5 CM/SEC
BH CV XLRA MEAS RIGHT MID ICA PSV: 84.3 CM/SEC
BH CV XLRA MEAS RIGHT PROX CCA EDV: 14.9 CM/SEC
BH CV XLRA MEAS RIGHT PROX CCA PSV: 84.4 CM/SEC
BH CV XLRA MEAS RIGHT PROX ECA PSV: 168 CM/SEC
BH CV XLRA MEAS RIGHT PROX ICA PSV: 130 CM/SEC
BH CV XLRA MEAS RIGHT PROX SCLA PSV: 114 CM/SEC
BH CV XLRA MEAS RIGHT VERTEBRAL A PSV: 30.6 CM/SEC
BUN BLD-MCNC: 21 MG/DL (ref 8–23)
BUN/CREAT SERPL: 21.4 (ref 7–25)
CALCIUM SPEC-SCNC: 9 MG/DL (ref 8.6–10.5)
CHLORIDE SERPL-SCNC: 100 MMOL/L (ref 98–107)
CO2 SERPL-SCNC: 27 MMOL/L (ref 22–29)
CREAT BLD-MCNC: 0.98 MG/DL (ref 0.57–1)
DEPRECATED RDW RBC AUTO: 47.2 FL (ref 37–54)
ERYTHROCYTE [DISTWIDTH] IN BLOOD BY AUTOMATED COUNT: 13.6 % (ref 12.3–15.4)
GFR SERPL CREATININE-BSD FRML MDRD: 55 ML/MIN/1.73
GLUCOSE BLD-MCNC: 171 MG/DL (ref 65–99)
GLUCOSE BLDC GLUCOMTR-MCNC: 150 MG/DL (ref 70–130)
GLUCOSE BLDC GLUCOMTR-MCNC: 155 MG/DL (ref 70–130)
GLUCOSE BLDC GLUCOMTR-MCNC: 174 MG/DL (ref 70–130)
GLUCOSE BLDC GLUCOMTR-MCNC: 185 MG/DL (ref 70–130)
HCT VFR BLD AUTO: 42.6 % (ref 34–46.6)
HGB BLD-MCNC: 13.6 G/DL (ref 12–15.9)
LEFT ARM BP: NORMAL MMHG
MCH RBC QN AUTO: 30.2 PG (ref 26.6–33)
MCHC RBC AUTO-ENTMCNC: 31.9 G/DL (ref 31.5–35.7)
MCV RBC AUTO: 94.7 FL (ref 79–97)
PLATELET # BLD AUTO: 202 10*3/MM3 (ref 140–450)
PMV BLD AUTO: 11 FL (ref 6–12)
POTASSIUM BLD-SCNC: 3.8 MMOL/L (ref 3.5–5.2)
RBC # BLD AUTO: 4.5 10*6/MM3 (ref 3.77–5.28)
RIGHT ARM BP: NORMAL MMHG
SODIUM BLD-SCNC: 139 MMOL/L (ref 136–145)
WBC NRBC COR # BLD: 8.22 10*3/MM3 (ref 3.4–10.8)

## 2019-04-10 PROCEDURE — 99223 1ST HOSP IP/OBS HIGH 75: CPT | Performed by: THORACIC SURGERY (CARDIOTHORACIC VASCULAR SURGERY)

## 2019-04-10 PROCEDURE — 0 IOPAMIDOL PER 1 ML: Performed by: HOSPITALIST

## 2019-04-10 PROCEDURE — 97110 THERAPEUTIC EXERCISES: CPT

## 2019-04-10 PROCEDURE — 99231 SBSQ HOSP IP/OBS SF/LOW 25: CPT | Performed by: NURSE PRACTITIONER

## 2019-04-10 PROCEDURE — 85027 COMPLETE CBC AUTOMATED: CPT | Performed by: HOSPITALIST

## 2019-04-10 PROCEDURE — 93880 EXTRACRANIAL BILAT STUDY: CPT | Performed by: INTERNAL MEDICINE

## 2019-04-10 PROCEDURE — 82962 GLUCOSE BLOOD TEST: CPT

## 2019-04-10 PROCEDURE — 71275 CT ANGIOGRAPHY CHEST: CPT

## 2019-04-10 PROCEDURE — 93880 EXTRACRANIAL BILAT STUDY: CPT

## 2019-04-10 PROCEDURE — 97116 GAIT TRAINING THERAPY: CPT

## 2019-04-10 PROCEDURE — 74174 CTA ABD&PLVS W/CONTRAST: CPT

## 2019-04-10 PROCEDURE — 99232 SBSQ HOSP IP/OBS MODERATE 35: CPT | Performed by: NURSE PRACTITIONER

## 2019-04-10 PROCEDURE — 80048 BASIC METABOLIC PNL TOTAL CA: CPT | Performed by: HOSPITALIST

## 2019-04-10 PROCEDURE — 25010000002 HEPARIN (PORCINE) PER 1000 UNITS: Performed by: HOSPITALIST

## 2019-04-10 PROCEDURE — 99232 SBSQ HOSP IP/OBS MODERATE 35: CPT | Performed by: INTERNAL MEDICINE

## 2019-04-10 RX ORDER — DIAPER,BRIEF,INFANT-TODD,DISP
EACH MISCELLANEOUS EVERY 12 HOURS SCHEDULED
Status: DISCONTINUED | OUTPATIENT
Start: 2019-04-10 | End: 2019-04-11 | Stop reason: HOSPADM

## 2019-04-10 RX ORDER — FUROSEMIDE 20 MG/1
20 TABLET ORAL DAILY
Status: DISCONTINUED | OUTPATIENT
Start: 2019-04-10 | End: 2019-04-11 | Stop reason: HOSPADM

## 2019-04-10 RX ADMIN — METOPROLOL TARTRATE 12.5 MG: 25 TABLET, FILM COATED ORAL at 08:49

## 2019-04-10 RX ADMIN — FUROSEMIDE 20 MG: 20 TABLET ORAL at 17:34

## 2019-04-10 RX ADMIN — INSULIN LISPRO 2 UNITS: 100 INJECTION, SOLUTION INTRAVENOUS; SUBCUTANEOUS at 13:27

## 2019-04-10 RX ADMIN — HEPARIN SODIUM 5000 UNITS: 5000 INJECTION INTRAVENOUS; SUBCUTANEOUS at 13:27

## 2019-04-10 RX ADMIN — SODIUM CHLORIDE, PRESERVATIVE FREE 3 ML: 5 INJECTION INTRAVENOUS at 21:39

## 2019-04-10 RX ADMIN — PANTOPRAZOLE SODIUM 40 MG: 40 TABLET, DELAYED RELEASE ORAL at 05:49

## 2019-04-10 RX ADMIN — ASPIRIN 81 MG 81 MG: 81 TABLET ORAL at 08:49

## 2019-04-10 RX ADMIN — IOPAMIDOL 80 ML: 755 INJECTION, SOLUTION INTRAVENOUS at 14:45

## 2019-04-10 RX ADMIN — INSULIN LISPRO 2 UNITS: 100 INJECTION, SOLUTION INTRAVENOUS; SUBCUTANEOUS at 17:34

## 2019-04-10 RX ADMIN — SODIUM CHLORIDE, PRESERVATIVE FREE 3 ML: 5 INJECTION INTRAVENOUS at 08:49

## 2019-04-10 RX ADMIN — METOPROLOL TARTRATE 12.5 MG: 25 TABLET, FILM COATED ORAL at 13:27

## 2019-04-10 RX ADMIN — LOSARTAN POTASSIUM 25 MG: 25 TABLET, FILM COATED ORAL at 08:49

## 2019-04-10 RX ADMIN — HEPARIN SODIUM 5000 UNITS: 5000 INJECTION INTRAVENOUS; SUBCUTANEOUS at 21:32

## 2019-04-10 RX ADMIN — METOPROLOL TARTRATE 25 MG: 25 TABLET ORAL at 21:32

## 2019-04-10 RX ADMIN — HEPARIN SODIUM 5000 UNITS: 5000 INJECTION INTRAVENOUS; SUBCUTANEOUS at 05:49

## 2019-04-10 RX ADMIN — INSULIN LISPRO 2 UNITS: 100 INJECTION, SOLUTION INTRAVENOUS; SUBCUTANEOUS at 21:33

## 2019-04-10 RX ADMIN — HYDROCORTISONE: 1 CREAM TOPICAL at 21:38

## 2019-04-10 RX ADMIN — INSULIN LISPRO 2 UNITS: 100 INJECTION, SOLUTION INTRAVENOUS; SUBCUTANEOUS at 08:48

## 2019-04-10 NOTE — PROGRESS NOTES
"[facility] at [facility]  IP Progress Note   LOS: 1 day   Patient Care Team:  Wayne Jorge MD as PCP - General  Eyal Cervantes MD as PCP - Claims Attributed  Fidel Valdez MD as Consulting Physician (Cardiology)    Chief Complaint: Follow up for Coronary Artery Disease, Diastolic Heart Failure, Valvular Heart Disease    Subjective    Feels much better, dyspnea has resolved.  Denies chest pain.  Diuresed well.    Active Hospital Problems    Diagnosis    • **Pulmonary edema [J81.1]      Priority: Medium   • Diastolic CHF (CMS/HCC) [I50.30]      Priority: High     1. Echo 4-9-19  ·  Estimated EF = 65%     • Aortic stenosis, severe [I35.0]      Priority: High     1. Mild aoritc valve stenosis by Echo 5/16/17    2. Echo 4-9-19  · Aortic valve maximum pressure gradient is 66.4 mmHg.  · Aortic valve mean pressure gradient is 38.4 mmHg.     • CAD (coronary artery disease) [I25.10]      Priority: High     a. 5/16/17 angina, cardiac catheter severe ostial LAD and LCx of these.  Ostial RPL and RCA.  b. 5 vessel CABG ([place]) LIMA to LAD, SVG to diagonal and LCx, and SVG to PDA and PL.     • Essential hypertension [I10]      Priority: Medium   • Mixed hyperlipidemia [E78.2]      Priority: Medium   • Type 2 diabetes mellitus without complication, with long-term current use of insulin (CMS/[facility]) [E11.9, Z79.4]      Priority: Low   • Localized edema [R60.0]      Tele: Sinus Rythym    Vitals:  Visit Vitals  /69   Pulse 68   Temp 98.3 °F (36.8 °C)   Resp 18   Ht 158.8 cm (62.5\")   Wt 72.5 kg (159 lb 13.3 oz)   SpO2 93%   BMI 28.77 kg/m²         Intake/Output Summary (Last 24 hours) at 4/10/2019 1049  Last data filed at 4/10/2019 0550  Gross per 24 hour   Intake 530 ml   Output 1600 ml   Net -1070 ml       Physical Exam:  General: No apparent distress.  Neck: no JVD.  Chest:No respiratory distress, breath sounds are normal. No wheezes,  rhonchi or rales.  Cardiovascular: Normal S1 and S2, 2/6 " systolic murmur   extremities: No edema.    Results Review:     I reviewed the patient's new clinical results.    Results from last 7 days   Lab Units 04/10/19  0443   WBC 10*3/mm3 8.22   HEMOGLOBIN g/dL 13.6   HEMATOCRIT % 42.6   PLATELETS 10*3/mm3 202     Results from last 7 days   Lab Units 04/10/19  0443   SODIUM mmol/L 139   POTASSIUM mmol/L 3.8   CHLORIDE mmol/L 100   CO2 mmol/L 27.0   BUN mg/dL 21   CREATININE mg/dL 0.98   CALCIUM mg/dL 9.0   GLUCOSE mg/dL 171*       Lab Results   Component Value Date    HGBA1C 7.60 (H) 04/09/2019       Scheduled Meds:  aspirin 81 mg Oral Daily   heparin (porcine) 5,000 Units Subcutaneous Q8H   insulin lispro 0-7 Units Subcutaneous 4x Daily With Meals & Nightly   losartan 25 mg Oral Q24H   metoprolol tartrate 12.5 mg Oral Q12H   pantoprazole 40 mg Oral Q AM   pharmacy consult - MTM  Does not apply Daily   sodium chloride 3 mL Intravenous Q12H       Continuous Infusions:  nitroglycerin 10 mcg/min Last Rate: 5 mcg/min (04/10/19 0249)           IMPRESSION:  Mild chronic changes without acute cardiopulmonary process.    Assessment:  1. Acute Diastolic CHF  2. Severe AS  3. CAD, status post CABG, stable  4. HTN    Plan:  1. Diuresed 1L over night  2. TAVR evaluation initiated  3. Will return as Out Pt for LHC and ELIZABETH  4.  DC NTG drip, add daily Lasix  5. Probable DC home tomorrow, okay with us.  6.  I will sign off, please reconsult if needed.    Melissa Humphrye MD, FACC, [facility]

## 2019-04-10 NOTE — PROGRESS NOTES
TAVR APRN Evaluation    Nancy Goodman 1940      04/10/19    PCP: Wayne Jorge MD  Primary Cardiologist: Fidel Valdez MD    TAVR Team:  1.  Fidel Calvillo MD  2.  Mino Gordon MD  3.  Anushka Hankins MD  4.  Melissa Humphrey MD    Chief Complaint: Severe aortic stenosis/ admission for acute diastolic heart failure 4/9/19.      Identification: This is a 78 y.o. year old female from [place], [place].    History of Present Illness: Ms. Goodman was referred for consideration of TAVR due to prior CABG 2017 and new progression of aortic stenosis causing acute diastolic heart failure admission.  Patient reported forgetting to take her diuretic on 4/8 and awakening during the overnight hours with acute left arm aching/ dyspnea/ orthopnea.  She presented to the [facility] ER and was then transferred here.  Her aortic valve indices are as follows:  LULI 0.69 cm2, Aortic valve Vmax 4.07 m/sec, and AV mean gradient 38 mm Hg.    Patient is generally very active.  She and spouse exercise at a gym three times per week.  She is able to complete all her house chores, but states over the past several months, she has to pace herself and do less work each day.  In March, she and her  visited the [facility] [place] with a group tour.  There were several tours which involved long walks or inclines that she had to abbreviate due to feeling fatigued and out of breath.      Problem List:   1. Coronary artery disease  a. 5/16/17 angina, cardiac catheter severe ostial LAD and LCx of these.  Ostial RPL and RCA.  b. 5 vessel CABG (Rajinder) LIMA to LAD, SVG to diagonal and LCx, and SVG to PDA and PL.  2. Aortic stenosis  a. Peak gradient of 27 by cath 5/17  b. 4/9/19 peak velocity 4.07 m/sec and LULI 0.69 cm2   3. Acute diastolic heart failure due to valvular disease  a. Admission 4/9/19  4. Dyslipidemia  5. Diabetes mellitus  a. HgA1C upon admission 7.6%  6. GERD  7. HTN  8. Arthritis  9. Surgeries:  a. Tubal  ligation  b. Varicose vein surgery  c. CABG        Past Surgical History:  Past Surgical History:   Procedure Laterality Date   • CARDIAC CATHETERIZATION N/A 5/16/2017    Procedure: Left Heart Cath;  Surgeon: Fidel Valdez MD;  Location:  [facility] CATH [facility]AS[facility];  Service:    • CORONARY ARTERY BYPASS GRAFT N/A 5/24/2017    Procedure: CORONARY ARTERY BYPASS x  5 WITH INTERNAL MAMMARY ARTERY GRAFT and EVH of the Right leg;  Surgeon: Fidel Calvillo MD;  Location:  [facility] OR;  Service:    • FINGER NAIL SURGERY     • TUBAL ABDOMINAL LIGATION     • VARICOSE VEIN SURGERY         Allergies:  Amlodipine; Ciprofloxacin; and Statins    Social History:  Socioeconomic History   • Marital status:      Spouse name: Bobby   • Number of children: 6   • Highest education level: High school   Occupational History     Employer: Homemaker   Tobacco Use   • Smoking status: Never Smoker   • Smokeless tobacco: Never Used   Substance and Sexual Activity   • Alcohol use: No   • Drug use: No   • Sexual activity: Defer     Comment:    Social History Narrative    Patient consumes 1 cup hot chocolate daily.   Patient lives at home with .  Spouse is Latter-day .  Grew up and lived in [place] until move to [facility] in 2001.           Family History:  Family History   Problem Relation Age of Onset   • Cancer Mother         Lung cancer       Current Medications:  •  aspirin chewable tablet 81 mg, 81 mg, Oral, Daily, Kwasi Ariza MD, 81 mg at 04/10/19 0849  •  dextrose (D50W) 25 g/ 50mL Intravenous Solution 25 g, 25 g, Intravenous, Q15 Min PRN, Kwasi Ariza MD  •  dextrose (GLUTOSE) oral gel 15 g, 15 g, Oral, Q15 Min PRN, Kwasi Ariza MD  •  glucagon (human recombinant) (GLUCAGEN DIAGNOSTIC) injection 1 mg, 1 mg, Subcutaneous, PRN, Kwasi Ariza MD  •  heparin (porcine) 5000 UNIT/ML injection 5,000 Units, 5,000 Units, Subcutaneous, Q8H, Kwasi Ariza MD, 5,000 Units at 04/10/19 0549  •  insulin  lispro (humaLOG) injection 0-7 Units, 0-7 Units, Subcutaneous, 4x Daily With Meals & Nightly, Kwasi Ariza MD, 2 Units at 04/10/19 0848  •  losartan (COZAAR) tablet 25 mg, 25 mg, Oral, Q24H, Kwasi Ariza MD, 25 mg at 04/10/19 0849  •  metoprolol tartrate (LOPRESSOR) half tablet 12.5 mg, 12.5 mg, Oral, Q12H, Kwasi Ariza MD, 12.5 mg at 04/10/19 0849  •  nitroglycerin 50 mg/250 mL (0.2 mg/mL) infusion, 10 mcg/min, Intravenous, Titrated, AliuMarek MD, Last Rate: 1.5 mL/hr at 04/10/19 0249, 5 mcg/min at 04/10/19 0249  •  pantoprazole (PROTONIX) EC tablet 40 mg, 40 mg, Oral, Q AM, Kwasi Ariza MD, 40 mg at 04/10/19 0549  •  Pharmacy Consult - [facility], , Does not apply, Daily, Brittani Guallpa, [facility]  •  sodium chloride 0.9 % flush 3 mL, 3 mL, Intravenous, Q12H, Kwasi Ariza MD, 3 mL at 04/10/19 0849  •  sodium chloride 0.9 % flush 3-10 mL, 3-10 mL, Intravenous, PRN, Kwasi Ariza MD    Review of Systems:  Review of Systems   Constitution: Positive for malaise/fatigue. Negative for fever, weight gain and weight loss.   HENT: Negative.    Eyes: Negative.    Cardiovascular: Positive for dyspnea on exertion, leg swelling and orthopnea. Negative for chest pain, palpitations and syncope.   Respiratory: Positive for shortness of breath. Negative for sleep disturbances due to breathing.    Endocrine: Positive for polyuria.   Hematologic/Lymphatic: Negative.    Skin: Negative.    Musculoskeletal: Negative.    Gastrointestinal: Negative.    Genitourinary: Negative.    Neurological: Negative.    Psychiatric/Behavioral: Negative.    Allergic/Immunologic: Negative.         Physical Exam:  Physical Exam   Constitutional: She is oriented to person, place, and time. She appears well-developed and well-nourished. No distress.   HENT:   Head: Normocephalic and atraumatic.   Eyes: Conjunctivae are normal. Pupils are equal, round, and reactive to light. No scleral icterus.   Neck: Normal range of motion. Neck  supple. No JVD present.   Cardiovascular: Normal rate, regular rhythm and intact distal pulses. Exam reveals no gallop and no friction rub.   Murmur heard.  Harsh systolic murmur III/VI   Pulmonary/Chest: Effort normal and breath sounds normal. No respiratory distress. She has no wheezes. She has no rales. She exhibits tenderness.   Mild reproducible tenderness @ distal sternum   Abdominal: Soft. Bowel sounds are normal. There is no tenderness.   Musculoskeletal: Normal range of motion. She exhibits no edema, tenderness or deformity.   Bilateral venous varicosities both LE's   Lymphadenopathy:     She has no cervical adenopathy.   Neurological: She is alert and oriented to person, place, and time. No cranial nerve deficit.   Vitals reviewed.      Vitals:    04/10/19 0506 04/10/19 0526 04/10/19 0546 04/10/19 0600   BP: 156/83 140/68 125/69    BP Location:       Patient Position:       Pulse: 68 64  68   Resp:       Temp:       TempSrc:       SpO2: 94% 93% 92% 93%   Weight:       Height:             Diagnostic Data to Date:  Transthoracic echo: 4/9/19     · Estimated EF = 65%.  · Mild mitral valve regurgitation is present  · Mild aortic valve regurgitation is present.  · Aortic valve maximum pressure gradient is 66.4 mmHg.  · Aortic valve mean pressure gradient is 38.4 mmHg.     Transesophageal echo: To be scheduled    Cardiac Cath: will plan as outpatient with Dr. Valdez    CTA Chest, Abdomen, and Pelvis: Will order today    Carotid Doppler: will order inpatient today    KCCQ12 Questionnaire: 45/70    SANTILLAN Index of [place]atte with ADL's: 6/6    Rebecca-Ted Instrumental ADL's: 8/8     Strength Frailty Test: 19 kg (mean age/ gender 11-28 kg)    5 Meter Walk Test: will compete when ambulating with PT    STS risk of mortality with open AVR:  4.2%    Creatinine Clearance:  53.3 ccs/min    Assessment/ Plan:     1.  Acute diastolic heart failure.  NYHA class II-III    2.  Severe aortic stenosis    3.  CAD s/p CABG x  5 in 2017 per Dr. Calvillo    4.  Type 2 diabetes mellitus with long-term current use of insulin (CMS/[facility])    5.  Mixed hyperlipidemia w/ statin intolerance    6.  Essential hypertension        Reviewed TAVR educational materials with patient, spouse, and one daughter present at bedside.  Will plan to complete her CTA and carotid today as inpatient.  DC home per Cardiology/ Hospital Medicine when medically stable (currently on NTG drip).  Plan outpatient cardiac cath (when Dr. Valdez is available) and ELIZABETH same day.  Second opinion consult with Dr. Gordon afterwards.  Patient has vacation plans in early May, therefore, as long as she remains symptomatically stable, will plan for TAVR on 5/23/19.            Minerva Arvizu, APRN  04/10/19  9:19 AM

## 2019-04-10 NOTE — PLAN OF CARE
Problem: Patient Care Overview  Goal: Plan of Care Review  Outcome: Ongoing (interventions implemented as appropriate)   04/10/19 1412   Coping/Psychosocial   Plan of Care Reviewed With patient   Plan of Care Review   Progress improving   OTHER   Outcome Summary Able to amb. 2 laps w/ PCT & 200 addnl ft w/ PT, w/ SBA, & instructed in ther ex, but limited by Signif incr. BP & HR,and pulm edema(diuresed 1 ltr. since yest,& Lasix ordered)

## 2019-04-10 NOTE — THERAPY TREATMENT NOTE
Acute Care - Physical Therapy Treatment Note  [facility]     Patient Name: Nancy Goodman  : 1940  MRN: [id number]  Today's Date: 4/10/2019  Onset of Illness/Injury or Date of Surgery: 19  Date of Referral to PT: 19  Referring Physician: DOMINGO Ariza MD    Admit Date: 2019    Visit Dx:    ICD-10-CM ICD-9-CM   1. Impaired functional mobility, balance, gait, and endurance Z74.09 V49.89     Patient Active Problem List   Diagnosis   • Type 2 diabetes mellitus without complication, with long-term current use of insulin (CMS/[facility])   • Mixed hyperlipidemia   • Essential hypertension   • Vitamin D deficiency   • CAD (coronary artery disease)   • Aortic stenosis, severe   • S/P CABG (coronary artery bypass graft)   • Localized edema   • Pulmonary edema   • Diastolic CHF (CMS/[facility])       Therapy Treatment    Rehabilitation Treatment Summary     Row Name 04/10/19 1338             Treatment Time/Intention    Discipline  physical therapist  -DM      Document Type  therapy note (daily note)  -DM      Subjective Information  complains of;weakness;swelling diuresed 1 ltr. since yest;LasixOrdered;has amb2 laps w/PCT  -DM      Mode of Treatment  individual therapy;physical therapy  -DM      Patient/Family Observations  UIC;bp elev;starting lasix for pulm ed.;tele,RA, IV hep locked  -DM      Care Plan Review  care plan/treatment goals reviewed;patient/other agree to care plan  -DM      Therapy Frequency (PT Clinical Impression)  daily  -DM      Patient Effort  good  -DM      Existing Precautions/Restrictions  no known precautions/restrictions  -DM      Recorded by [DM] Maylin Mena, PT 04/10/19 1410      Row Name 04/10/19 1338             Vital Signs    Pre Systolic BP Rehab  125  -DM      Pre Treatment Diastolic BP  69  -DM      Intra Systolic BP Rehab  146  -DM      Intra Treatment Diastolic BP  71  -DM      Post Systolic BP Rehab  170  -DM      Post Treatment Diastolic BP  78  -DM      Pretreatment  Heart Rate (beats/min)  68  -DM      Intratreatment Heart Rate (beats/min)  95  -DM      Posttreatment Heart Rate (beats/min)  87  -DM      Pre SpO2 (%)  93  -DM      O2 Delivery Pre Treatment  room air  -DM      O2 Delivery Intra Treatment  room air  -DM      O2 Delivery Post Treatment  room air  -DM      Pre Patient Position  Sitting  -DM      Intra Patient Position  Standing  -DM      Post Patient Position  Sitting  -DM      Recorded by [DM] Maylin Mena, PT 04/10/19 1410      Row Name 04/10/19 1338             Cognitive Assessment/Intervention    Additional Documentation  Cognitive Assessment/Intervention (Group)  -DM      Recorded by [DM] Maylin Mena, PT 04/10/19 1410      Row Name 04/10/19 1338             Cognitive Assessment/Intervention- PT/OT    Affect/Mental Status (Cognitive)  WFL  -DM      Orientation Status (Cognition)  oriented x 4  -DM      Follows Commands (Cognition)  WNL  -DM      Cognitive Function (Cognitive)  WNL  -DM      Personal Safety Interventions  fall prevention program maintained;gait belt;nonskid shoes/slippers when out of bed  -DM      Recorded by [DM] Maylin Mena, PT 04/10/19 1410      Row Name 04/10/19 1338             Safety Issues, Functional Mobility    Impairments Affecting Function (Mobility)  endurance/activity tolerance;strength  -DM      Recorded by [DM] Maylin Mena, PT 04/10/19 1410      Row Name 04/10/19 1338             Bed Mobility Assessment/Treatment    Comment (Bed Mobility)  UIC  -DM      Recorded by [DM] Maylin Mena, PT 04/10/19 1410      Row Name 04/10/19 1338             Transfer Assessment/Treatment    Transfer Assessment/Treatment  sit-stand transfer;stand-sit transfer  -DM      Comment (Transfers)  CUES for HP  -DM      Recorded by [DM] Maylin Mena, PT 04/10/19 1410      Row Name 04/10/19 1338             Sit-Stand Transfer    Sit-Stand [place] (Transfers)  supervision  -DM      Assistive Device (Sit-Stand Transfers)  other  (see comments) GT belt  -DM      Recorded by [DM] Maylin Mena, PT 04/10/19 1410      Row Name 04/10/19 1337             Stand-Sit Transfer    Stand-Sit [place] (Transfers)  supervision  -DM      Assistive Device (Stand-Sit Transfers)  other (see comments) gt belt  -DM      Recorded by [DM] Maylin Mena, PT 04/10/19 1410      Row Name 04/10/19 1338             Gait/Stairs Assessment/Training    34347 - Gait Training Minutes   10  -DM      Gait/Stairs Assessment/Training  gait/ambulation independence  -DM      [place] Level (Gait)  contact guard  -DM      Assistive Device (Gait)  other (see comments) gt belt  -DM      Distance in Feet (Gait)  200 W/C arrived for transport to CT scan;amb pt to it  -DM      Pattern (Gait)  step-through  -DM      Deviations/Abnormal Patterns (Gait)  santi decreased;other (see comments)  -DM      Bilateral Gait Deviations  heel strike decreased  -DM      Comment (Gait/Stairs)  cues for incr. step length,PLB  -DM      Recorded by [DM] Maylin Mena, PT 04/10/19 1410      Row Name 04/10/19 1336             Motor Skills Assessment/Interventions    Additional Documentation  Balance (Group);Balance Interventions (Group);Therapeutic Exercise (Group);Therapeutic Exercise Interventions (Group)  -DM      Recorded by [DM] Maylin Mena, PT 04/10/19 1410      Row Name 04/10/19 133             Therapeutic Exercise    Upper Extremity Range of Motion (Therapeutic Exercise)  elbow flexion/extension, bilateral  -DM      Lower Extremity (Therapeutic Exercise)  LAQ (long arc quad), bilateral;marching while seated  -DM      Lower Extremity Range of Motion (Therapeutic Exercise)  ankle dorsiflexion/plantar flexion, bilateral  -DM      Exercise Type (Therapeutic Exercise)  AROM (active range of motion)  -DM      Position (Therapeutic Exercise)  seated  -DM      Sets/Reps (Therapeutic Exercise)  1/10  -DM      Recorded by [DM] Maylin Mena, PT 04/10/19 1410      Row Name  04/10/19 1338             Balance    Balance  static sitting balance;static standing balance;dynamic standing balance  -DM      Recorded by [DM] Maylin Mena, PT 04/10/19 1410      Row Name 04/10/19 1338             Static Sitting Balance    Level of [place] (Unsupported Sitting, Static Balance)  independent  -DM      Sitting Position (Unsupported Sitting, Static Balance)  sitting in chair  -DM      Time Able to Maintain Position (Unsupported Sitting, Static Balance)  more than 5 minutes  -DM      Comment (Unsupported Sitting, Static Balance)  LE exer;BP taken  -DM      Recorded by [DM] Maylin Mena, PT 04/10/19 1410      Row Name 04/10/19 1338             Static Standing Balance    Level of [place] (Supported Standing, Static Balance)  independent  -DM      Time Able to Maintain Position (Supported Standing, Static Balance)  45 to 60 seconds  -DM      Assistive Device Utilized (Supported Standing, Static Balance)  other (see comments) gt belt  -DM      Comment (Supported Standing, Static Balance)  PLB; Trunk ext  -DM      Recorded by [DM] Maylin Mena, PT 04/10/19 1410      Row Name 04/10/19 1338             Dynamic Standing Balance    Level of [place], Reaches Outside Midline (Standing, Dynamic Balance)  supervision  -DM      Time Able to Maintain Position, Reaches Outside Midline (Standing, Dynamic Balance)  45 to 60 seconds  -DM      Assistive Device Utilized (Supported Standing, Dynamic Balance)  other (see comments) gt belt  -DM      Comment, Reaches Outside Midline (Standing, Dynamic Balance)  WS TO align with w/c  -DM      Recorded by [DM] Maylin Mena, PT 04/10/19 1410      Row Name 04/10/19 133             Positioning and Restraints    Pre-Treatment Position  sitting in chair/recliner  -DM      Post Treatment Position  wheelchair  -DM      In Wheelchair  notified nsg;sitting;call light within reach;with other staff;legs elevated  -DM      Recorded by [DM] Maylin Mena,  PT 04/10/19 1410      Row Name 04/10/19 1338             Pain Assessment    Additional Documentation  Pain Scale: Numbers Pre/Post-Treatment (Group)  -DM      Recorded by [DM] Maylin Mena, PT 04/10/19 1410      Row Name 04/10/19 1338             Pain Scale: Numbers Pre/Post-Treatment    Pain Scale: Numbers, Pretreatment  0/10 - no pain  -DM      Pain Scale: Numbers, Post-Treatment  0/10 - no pain  -DM      Recorded by [DM] Maylin Mena, PT 04/10/19 1410      Row Name 04/10/19 1338             Plan of Care Review    Plan of Care Reviewed With  patient  -DM      Recorded by [DM] Maylin Mena, PT 04/10/19 1410      Row Name 04/10/19 1338             Outcome Summary/Treatment Plan (PT)    Daily Summary of Progress (PT)  progress toward functional goals is good  -DM      Anticipated Discharge Disposition (PT)  home with assist will have OP ELIZABETH & LHC;being worked up for ? TAVR  -DM      Recorded by [DM] Maylin Mena, PT 04/10/19 1410        User Key  (r) = Recorded By, (t) = Taken By, (c) = Cosigned By    Initials Name Effective Dates Discipline    DM Maylin Mena, PT 06/19/15 -  PT                   Physical Therapy Education     Title: PT OT SLP Therapies (In Progress)     Topic: Physical Therapy (In Progress)     Point: Mobility training (In Progress)     Learning Progress Summary           Patient Eager, E,D,H, NR by DM at 4/10/2019  2:11 PM    Acceptance, E,D, VU by MB at 4/9/2019  4:02 PM   Family Acceptance, E,D, VU by MB at 4/9/2019  4:02 PM                   Point: Home exercise program (In Progress)     Learning Progress Summary           Patient Eager, E,D,H, NR by LOAN at 4/10/2019  2:11 PM    Acceptance, E,D, VU by MB at 4/9/2019  4:02 PM   Family Acceptance, E,D, VU by MB at 4/9/2019  4:02 PM                   Point: Body mechanics (In Progress)     Learning Progress Summary           Patient Eager, E,D,H, NR by LOAN at 4/10/2019  2:11 PM    Acceptance, E,D, VU by MB at 4/9/2019  4:02 PM    Family Acceptance, E,D, VU by MB at 4/9/2019  4:02 PM                   Point: Precautions (In Progress)     Learning Progress Summary           Patient Eager, E,D,H, NR by LOAN at 4/10/2019  2:11 PM    Acceptance, E,D, VU by MB at 4/9/2019  4:02 PM   Family Acceptance, E,D, VU by MB at 4/9/2019  4:02 PM                               User Key     Initials Effective Dates Name Provider Type Discipline    LOAN 06/19/15 -  Maylin Mena, PT Physical Therapist PT    MB 03/14/16 -  Radha Da Silva, PT Physical Therapist PT                PT Recommendation and Plan  Anticipated Discharge Disposition (PT): home with assist(will have OP ELIZABETH & LHC;being worked up for ? TAVR)  Therapy Frequency (PT Clinical Impression): daily  Outcome Summary/Treatment Plan (PT)  Daily Summary of Progress (PT): progress toward functional goals is good  Anticipated Discharge Disposition (PT): home with assist(will have OP ELIZABETH & LHC;being worked up for ? TAVR)  Plan of Care Reviewed With: patient  Progress: improving  Outcome Summary: Able to amb. 2 laps w/ PCT & 200 addnl ft w/ PT, w/ SBA, & instructed in ther ex, but limited by Signif incr. BP & HR,and pulm edema(diuresed 1 ltr. since yest,& Lasix ordered)   Outcome Measures     Row Name 04/10/19 1338 04/09/19 1400          How much help from another person do you currently need...    Turning from your back to your side while in flat bed without using bedrails?  4  -DM  4  -MB     Moving from lying on back to sitting on the side of a flat bed without bedrails?  4  -DM  4  -MB     Moving to and from a bed to a chair (including a wheelchair)?  4  -DM  4  -MB     Standing up from a chair using your arms (e.g., wheelchair, bedside chair)?  4  -DM  4  -MB     Climbing 3-5 steps with a railing?  3  -DM  3  -MB     To walk in hospital room?  4  -DM  3  -MB     AM-PAC 6 Clicks Score  23  -DM  22  -MB        Functional Assessment    Outcome Measure Options  AM-PAC 6 Clicks Basic Mobility (PT)   -DM  AM-PAC 6 Clicks Basic Mobility (PT)  -MB       User Key  (r) = Recorded By, (t) = Taken By, (c) = Cosigned By    Initials Name Provider Type    Maylin Cat, PT Physical Therapist    Radha Lofton, PT Physical Therapist         Time Calculation:   PT Charges     Row Name 04/10/19 1415 04/10/19 1338          Time Calculation    Start Time  1338  -DM  --     PT Received On  04/10/19  -DM  --     PT Goal Re-Cert Due Date  04/19/19  -DM  --        Time Calculation- PT    Total Timed Code Minutes- PT  23 minute(s)  -DM  --        Timed Charges    39017 - PT Therapeutic Exercise Minutes  --  13  -DM     30231 - Gait Training Minutes   --  10  -DM       User Key  (r) = Recorded By, (t) = Taken By, (c) = Cosigned By    Initials Name Provider Type    Maylin Cat, PT Physical Therapist        Therapy Charges for Today     Code Description Service Date Service Provider Modifiers Qty    03746346530 HC PT THER PROC EA 15 MIN 4/10/2019 Maylin Mena, PT GP 1    80587664171 HC GAIT TRAINING EA 15 MIN 4/10/2019 Maylin Mena, PT GP 1          PT G-Codes  Outcome Measure Options: AM-PAC 6 Clicks Basic Mobility (PT)  AM-PAC 6 Clicks Score: 23    Maylin Mena, PT  4/10/2019

## 2019-04-10 NOTE — PROGRESS NOTES
[facility] Medicine Services  PROGRESS NOTE    Patient Name: Nancy Goodman  : 1940  MRN: [id number]    Date of Admission: 2019  Length of Stay: 1  Primary Care Physician: Wayne Jorge MD    Subjective   Subjective     CC:  Dyspnea     HPI:  Patient is resting in bed in NAD with family at bedside. She feels good this am. Denies any soa. Slept well. Has been up in halls. No acute events overnight per nursing.     Review of Systems  Gen- No fevers, chills  CV- No chest pain, palpitations  Resp- No cough, dyspnea  GI- No N/V/D, abd pain    Otherwise ROS is negative except as mentioned in the HPI.    Objective   Objective     Vital Signs:   Temp:  [98.3 °F (36.8 °C)] 98.3 °F (36.8 °C)  Heart Rate:  [62-93] 68  Resp:  [18] 18  BP: ()/(55-99) 125/69        Physical Exam:  Constitutional: Awake, alert  Eyes: PERRLA, sclerae anicteric, no conjunctival injection  HENT: NCAT, mucous membranes moist  Neck: Supple, trachea midline  Respiratory: Clear to auscultation bilaterally, nonlabored respirations, room air   Cardiovascular: RRR, + murmur, rubs, or gallops, palpable pedal pulses bilaterally  Gastrointestinal: Positive bowel sounds, soft, nontender, nondistended  Musculoskeletal: trace bilateral ankle edema, no clubbing or cyanosis to extremities  Psychiatric: Appropriate affect, cooperative  Neurologic: Oriented x 3, strength symmetric in all extremities, Cranial Nerves grossly intact to confrontation, speech clear  Skin: No rashes    Results Reviewed:  I have personally reviewed current lab, radiology, and data and agree.    Results from last 7 days   Lab Units 04/10/19  0443 19  0830   WBC 10*3/mm3 8.22 9.44   HEMOGLOBIN g/dL 13.6 13.9   HEMATOCRIT % 42.6 42.9   PLATELETS 10*3/mm3 202 218     Results from last 7 days   Lab Units 04/10/19  0443 19  1031   SODIUM mmol/L 139 137   POTASSIUM mmol/L 3.8 4.5   CHLORIDE mmol/L 100 99   CO2 mmol/L 27.0 23.0   BUN  mg/dL 21 22   CREATININE mg/dL 0.98 1.20*   GLUCOSE mg/dL 171* 205*   CALCIUM mg/dL 9.0 9.1     Estimated Creatinine Clearance: 43.9 mL/min (by C-G formula based on SCr of 0.98 mg/dL).    No results found for: BNP    Microbiology Results Abnormal     None          Imaging Results (last 24 hours)     Procedure Component Value Units Date/Time    XR Chest 1 View [[id number]] Collected:  04/09/19 0925     Updated:  04/09/19 1042    Narrative:       EXAMINATION: XR CHEST 1 VW-      INDICATION: Dyspnea.      COMPARISON: 05/26/2017.     FINDINGS: Cardiac silhouette unchanged and within normal limits.  Pulmonary vascularity grossly within normal limits. No focal  consolidation or opacification. No pneumothorax or pleural effusion.           Impression:       Mild chronic changes without acute cardiopulmonary process.     D:  04/09/2019  E:  04/09/2019     This report was finalized on 4/9/2019 10:38 AM by Dr. Christopher Landis.             Results for orders placed during the hospital encounter of 04/09/19   Adult Transthoracic Echo Complete W/ Cont if Necessary Per Protocol    Narrative · Estimated EF = 65%.  · Mild mitral valve regurgitation is present  · Mild aortic valve regurgitation is present.  · Aortic valve maximum pressure gradient is 66.4 mmHg.  · Aortic valve mean pressure gradient is 38.4 mmHg.          I have reviewed the medications:    Current Facility-Administered Medications:   •  aspirin chewable tablet 81 mg, 81 mg, Oral, Daily, Kwasi Ariza MD, 81 mg at 04/09/19 1037  •  dextrose (D50W) 25 g/ 50mL Intravenous Solution 25 g, 25 g, Intravenous, Q15 Min PRN, Kwasi Ariza MD  •  dextrose (GLUTOSE) oral gel 15 g, 15 g, Oral, Q15 Min PRN, Kwasi Ariza MD  •  glucagon (human recombinant) (GLUCAGEN DIAGNOSTIC) injection 1 mg, 1 mg, Subcutaneous, PRN, Kwasi Ariza MD  •  heparin (porcine) 5000 UNIT/ML injection 5,000 Units, 5,000 Units, Subcutaneous, Q8H, Kwasi Ariza MD, 5,000 Units at 04/10/19  0549  •  insulin lispro (humaLOG) injection 0-7 Units, 0-7 Units, Subcutaneous, 4x Daily With Meals & Nightly, Kwasi Ariza MD, 2 Units at 04/09/19 2103  •  losartan (COZAAR) tablet 25 mg, 25 mg, Oral, Q24H, Kwasi Ariza MD, 25 mg at 04/09/19 1036  •  metoprolol tartrate (LOPRESSOR) half tablet 12.5 mg, 12.5 mg, Oral, Q12H, Kwasi Ariza MD, 12.5 mg at 04/09/19 2101  •  nitroglycerin 50 mg/250 mL (0.2 mg/mL) infusion, 10 mcg/min, Intravenous, Titrated, Marek oJnes MD, Last Rate: 1.5 mL/hr at 04/10/19 0249, 5 mcg/min at 04/10/19 0249  •  pantoprazole (PROTONIX) EC tablet 40 mg, 40 mg, Oral, Q AM, Kwasi Ariza MD, 40 mg at 04/10/19 0549  •  sodium chloride 0.9 % flush 3 mL, 3 mL, Intravenous, Q12H, Kwasi Ariza MD  •  sodium chloride 0.9 % flush 3-10 mL, 3-10 mL, Intravenous, PRN, Kwasi Ariza MD      Assessment/Plan   Assessment / Plan     Active Hospital Problems    Diagnosis POA   • **Pulmonary edema [J81.1] Yes   • Diastolic CHF (CMS/[facility]) [I50.30] Unknown   • CAD (coronary artery disease) [I25.10] Unknown   • Localized edema [R60.0] Yes   • S/P CABG (coronary artery bypass graft) [Z95.1] Not Applicable   • VHD (valvular heart disease) [I38] Yes     · Mild aoritc valve stenosis by Echo 5/16/17     • Essential hypertension [I10] Yes   • Mixed hyperlipidemia [E78.2] Yes   • Type 2 diabetes mellitus without complication, with long-term current use of insulin (CMS/[facility]) [E11.9, Z79.4] Not Applicable          Brief Hospital Course to date:  Nancy Goodman is a 78 y.o. female with history of CAD s/p CABG, HTN, DHF, here with acute dyspnea last night. She forgot to take her diuretic yesterday, and by the time she remembered to take it, it was evening and she didn't want going to the bathroom to disturb her sleep. She then woke up acutely dyspneic and orthopneic. No chest pain, but heaviness noted and L arm aching. No pain. Now. She went to [facility] ED, was treated and transferred  here. Still mildly SOA now with cough/nonproductive. No f/c. No n/v. No diarrhea. In her usual state of health. States that she has gained 5 lbs recently and notes some edema.       A/C DHF, with acute pulmonary edema and hypoxia  --lasix IV x 1  --continue BB/ARB,ASA  --ECHO showed aortic stenosis peak gradient 66  -- cards following   -- CTS consulted for TAVR    Chest pain  --hx of CAD/CABG  -- ECHO showed aortic stenosis peak gradient 66  -- CTS consulted plan for TAVR   --troponin 0.017    HTN  -- on bb/arb   -- nitro drip to keep < 140  -- cards following     HL    DM  -- A1c 7.60%  -- cont s/s   -- on lantus at home resume as needed     VHD       DVT Prophylaxis:  Heparin     Disposition: I expect the patient to be discharged TBD    CODE STATUS:   Code Status and Medical Interventions:   Ordered at: 04/09/19 0743     Level Of Support Discussed With:    Patient     Code Status:    CPR     Medical Interventions (Level of Support Prior to Arrest):    Full         Electronically signed by BETH Crespo, 04/10/19, 8:08 AM.

## 2019-04-10 NOTE — CONSULTS
Consult Note  Nancy Goodman  [id number]  1940    Referring Provider:  Reason for Consultation: Aortic stenosis    Patient Care Team:  Wayne Jorge MD as PCP - General  Eyal Cervantes MD as PCP - [facility] Attributed  Fidel Valdez MD as Consulting Physician (Cardiology)    Chief complaint: Shortness of breath, congestive heart failure      History of present illness: I have been asked see this patient with Dr. Fidel Valdez.  The patient is a 78-year-old female who presents at this time with congestive heart failure.  She is been very short of breath and had increasing fatigue.  She is been evaluated she had a echocardiogram which reveals a V-max greater than 400 cm a second.  She is had previous coronary bypass surgery by me approximately 2 years ago.  She had moderate aortic stenosis at that time.  We elected to follow this.  She now presents with what appears to be severe aortic stenosis.    Review of Systems    The following systems were reviewed and negative;  constitution, eyes, ENT, gastrointestinal, genitourinary, integument, breast, hematologic / lymphatic, musculoskeletal, neurological, behavioral/psych, endocrine and allergies / immunologic    History  Past Medical History:   Diagnosis Date   • Allergic rhinitis    • Arthritis    • Cellulitis of finger    • Diabetes mellitus (CMS/formerly [facility])     dx 12 years ago- checks fsbs daily   • Dizziness    • Edema    • GERD (gastroesophageal reflux disease)    • Hyperlipidemia    • Hypertension    • Ingrown nail    • PAT (paroxysmal atrial tachycardia) (CMS/HCC)    • Rosacea    • Vitamin D deficiency    • Wears dentures    • Wears eyeglasses    • Wears partial dentures    , Past Surgical History:   Procedure Laterality Date   • CARDIAC CATHETERIZATION N/A 5/16/2017    Procedure: Left Heart Cath;  Surgeon: Fidel Valdez MD;  Location: [facility] CATH INVASIVE LOCATION;  Service:    • CORONARY ARTERY BYPASS GRAFT N/A 5/24/2017    Procedure: CORONARY ARTERY  BYPASS x  5 WITH INTERNAL MAMMARY ARTERY GRAFT and EVH of the Right leg;  Surgeon: Fidel Calvillo MD;  Location: [facility];  Service:    • FINGER NAIL SURGERY     • TUBAL ABDOMINAL LIGATION     • VARICOSE VEIN SURGERY     , Family History   Problem Relation Age of Onset   • Cancer Mother         Lung cancer   , Social History     Tobacco Use   • Smoking status: Never Smoker   • Smokeless tobacco: Never Used   Substance Use Topics   • Alcohol use: No   • Drug use: No   , Medications Prior to Admission   Medication Sig Dispense Refill Last Dose   • aspirin 81 MG chewable tablet Chew 1 tablet Daily.  11 Taking   • Cholecalciferol (VITAMIN D3) 5000 units capsule capsule Take 5,000 Units by mouth Daily.      • furosemide (LASIX) 20 MG tablet Take 1 tablet by mouth Daily. 90 tablet 1    • glucose blood test strip Check QD and  each 5    • Insulin Glargine (LANTUS SOLOSTAR) 100 UNIT/ML injection pen Inject 40 Units under the skin into the appropriate area as directed Every Morning. 15 pen 1    • Insulin Pen Needle (B-D UF III MINI PEN NEEDLES) 31G X 5 MM misc Use daily as directed with insulin. 100 each 3    • irbesartan (AVAPRO) 75 MG tablet Take 1 tablet by mouth Every Night. 90 tablet 1    • KLOR-CON 10 MEQ CR tablet AT THE START OF THERAPY, TAKE 2 TABLETS DAILY FOR 2 WEEKS, THEN DECREASE TO 1 TABLET DAILY THEREAFTER 90 tablet 3    • Lancets (FREESTYLE) lancets Check QD and  each 11    • melatonin 3 MG tablet Take  by mouth Every Night.      • metoprolol tartrate (LOPRESSOR) 25 MG tablet Take 0.5 tablets by mouth Every Night. 45 tablet 3    • Omega-3 Fatty Acids (SALMON OIL PO) Take 1,200 mg by mouth Daily.      • omeprazole (priLOSEC) 40 MG capsule TAKE 1 CAPSULE DAILY 90 capsule 1 Taking   • Probiotic Product (PROBIOTIC ADVANCED PO) Take 1 tablet by mouth Daily.   Taking   • Specialty Vitamins Products (HEART SAVIOR PO) Take  by mouth. 2 every other day      • Ubiquinol 100 MG capsule Take 100 mg by  "mouth Daily.   Taking      Scheduled Meds:    aspirin 81 mg Oral Daily   heparin (porcine) 5,000 Units Subcutaneous Q8H   insulin lispro 0-7 Units Subcutaneous 4x Daily With Meals & Nightly   losartan 25 mg Oral Q24H   metoprolol tartrate 12.5 mg Oral Q12H   pantoprazole 40 mg Oral Q AM   sodium chloride 3 mL Intravenous Q12H      Continuous Infusions:    nitroglycerin 10 mcg/min Last Rate: 5 mcg/min (04/10/19 0249)      PRN Meds:  dextrose  •  dextrose  •  glucagon (human recombinant)  •  sodium chloride and Allergies:  Amlodipine; Ciprofloxacin; and Statins    Objective     Vital Sign Min/Max for last 24 hours  Temp  Min: 98.3 °F (36.8 °C)  Max: 98.3 °F (36.8 °C)   BP  Min: 91/55  Max: 207/99   Pulse  Min: 62  Max: 93   Resp  Min: 18  Max: 18   SpO2  Min: 90 %  Max: 97 %   No Data Recorded   No Data Recorded     Flowsheet Rows      First Filed Value   Admission Height  158.8 cm (62.5\") Documented at 04/09/2019 0500   Admission Weight  72.5 kg (159 lb 13.3 oz) Documented at 04/09/2019 0500          Physical Exam:     General Appearance:    Alert, cooperative, in no acute distress   Head:    Normocephalic, without obvious abnormality, atraumatic   Eyes:            Lids and lashes normal, conjunctivae and sclerae normal, no   icterus, no pallor, corneas clear, PERRLA   Ears:    Ears appear intact with no abnormalities noted   Throat:   No oral lesions, no thrush, oral mucosa moist   Neck:   No adenopathy, supple, trachea midline, no thyromegaly, no   carotid bruit, no JVD   Back:     No kyphosis present, no scoliosis present, no skin lesions,      erythema or scars, no tenderness to percussion or                   palpation,   range of motion normal   Lungs:     Clear to auscultation,respirations regular, even and                  unlabored    Heart:    Regular rhythm and normal rate, normal S1 and S2,             murmur, no gallop, no rub, no click   Chest Wall:    No abnormalities observed   Abdomen:     Normal " bowel sounds, no masses, no organomegaly, soft        non-tender, non-distended, no guarding, no rebound                tenderness   Rectal:     Deferred   Extremities:   Moves all extremities well, no edema, no cyanosis, no             redness   Pulses:   Pulses palpable and equal bilaterally   Skin:   No bleeding, bruising or rash   Lymph nodes:   No palpable adenopathy   Neurologic:   Cranial nerves 2 - 12 grossly intact, sensation intact, DTR       present and equal bilaterally             Assessment/Plan       Pulmonary edema    Type 2 diabetes mellitus without complication, with long-term current use of insulin (CMS/[facility])    Mixed hyperlipidemia    Essential hypertension    VHD (valvular heart disease)    S/P CABG (coronary artery bypass graft)    Localized edema    Diastolic CHF (CMS/HCC)    CAD (coronary artery disease)      Been asked see this patient by Dr. Sharma.  The patient is a 78-year-old white female who I have seen in the past.  The patient previous coronary bypass surgery.  This time she presents with severe congestive heart failure and aortic stenosis.  Her Vmax is greater than 400 cm a second.  I have obtained and reviewed everything discussed this in detail with Dr. Sharma.  Certainly this is a very difficult case in view of the patient's age and overall condition.  After careful review and discussions with him I feel perhaps TAVR will be the patient's best option.  We will get [facility] involved.  The patient will obviously need a cardiac catheterization and a CT of the chest and abdomen as well as a transesophageal echocardiogram.  I have had a long discussion with the patient and family and they appear to understand this and agreeable with the process.  Like thank you for this consultation.    I discussed the patients findings and my recommendations with patient, family and consulting provider      Please note that portions of this note were completed with a voice recognition program. Efforts  were made to edit the dictations, but words may be mistranscribed    Fidel Calvillo MD  04/10/19  7:53 AM

## 2019-04-10 NOTE — PLAN OF CARE
Problem: Patient Care Overview  Goal: Plan of Care Review  Outcome: Ongoing (interventions implemented as appropriate)   04/10/19 0110   Coping/Psychosocial   Plan of Care Reviewed With patient   Plan of Care Review   Progress improving   OTHER   Outcome Summary titrating nitro gtt for bp no issues/complaints overnight will monitor       Problem: Fluid Volume Excess (Adult)  Goal: Identify Related Risk Factors and Signs and Symptoms  Outcome: Ongoing (interventions implemented as appropriate)

## 2019-04-11 VITALS
TEMPERATURE: 98.3 F | OXYGEN SATURATION: 94 % | DIASTOLIC BLOOD PRESSURE: 64 MMHG | HEART RATE: 65 BPM | BODY MASS INDEX: 28.32 KG/M2 | HEIGHT: 63 IN | SYSTOLIC BLOOD PRESSURE: 141 MMHG | WEIGHT: 159.83 LBS | RESPIRATION RATE: 16 BRPM

## 2019-04-11 PROBLEM — R60.0 LOCALIZED EDEMA: Status: RESOLVED | Noted: 2018-04-20 | Resolved: 2019-04-11

## 2019-04-11 LAB
GLUCOSE BLDC GLUCOMTR-MCNC: 184 MG/DL (ref 70–130)
GLUCOSE BLDC GLUCOMTR-MCNC: 196 MG/DL (ref 70–130)

## 2019-04-11 PROCEDURE — 99239 HOSP IP/OBS DSCHRG MGMT >30: CPT | Performed by: NURSE PRACTITIONER

## 2019-04-11 PROCEDURE — 25010000002 HEPARIN (PORCINE) PER 1000 UNITS: Performed by: HOSPITALIST

## 2019-04-11 PROCEDURE — 82962 GLUCOSE BLOOD TEST: CPT

## 2019-04-11 RX ORDER — IRBESARTAN 75 MG/1
150 TABLET ORAL NIGHTLY
Qty: 30 TABLET | Refills: 1 | Status: SHIPPED | OUTPATIENT
Start: 2019-04-11 | End: 2019-06-04 | Stop reason: SDUPTHER

## 2019-04-11 RX ADMIN — SODIUM CHLORIDE, PRESERVATIVE FREE 3 ML: 5 INJECTION INTRAVENOUS at 09:26

## 2019-04-11 RX ADMIN — PANTOPRAZOLE SODIUM 40 MG: 40 TABLET, DELAYED RELEASE ORAL at 06:51

## 2019-04-11 RX ADMIN — METOPROLOL TARTRATE 25 MG: 25 TABLET ORAL at 09:23

## 2019-04-11 RX ADMIN — ASPIRIN 81 MG 81 MG: 81 TABLET ORAL at 09:23

## 2019-04-11 RX ADMIN — LOSARTAN POTASSIUM 25 MG: 25 TABLET, FILM COATED ORAL at 09:23

## 2019-04-11 RX ADMIN — FUROSEMIDE 20 MG: 20 TABLET ORAL at 09:23

## 2019-04-11 RX ADMIN — INSULIN LISPRO 2 UNITS: 100 INJECTION, SOLUTION INTRAVENOUS; SUBCUTANEOUS at 12:28

## 2019-04-11 RX ADMIN — INSULIN LISPRO 2 UNITS: 100 INJECTION, SOLUTION INTRAVENOUS; SUBCUTANEOUS at 09:25

## 2019-04-11 RX ADMIN — HEPARIN SODIUM 5000 UNITS: 5000 INJECTION INTRAVENOUS; SUBCUTANEOUS at 06:51

## 2019-04-11 NOTE — DISCHARGE SUMMARY
[facility] Medicine Services  DISCHARGE SUMMARY    Patient Name: Nancy Goodman  : 1940  MRN: [id number]    Date of Admission: 2019  Date of Discharge: 2019  Primary Care Physician: Wayne Jorge MD    Consults     Date and Time Order Name Status Description    4/10/2019 0758 Inpatient Cardiothoracic Surgery Consult      2019 0823 Inpatient Cardiology Consult Completed           Hospital Course     Presenting Problem:   Acute respiratory failure (CMS/[facility]) [J96.00]  CHF (congestive heart failure) (CMS/HCC) [I50.9]  Flash pulmonary edema (CMS/HCC) [J81.0]  Pulmonary edema [J81.1]    Active Hospital Problems    Diagnosis  POA   • **Pulmonary edema [J81.1]  Yes   • Diastolic CHF (CMS/HCC) [I50.30]  Yes   • Aortic stenosis, severe [I35.0]  Yes   • CAD (coronary artery disease) [I25.10]  Yes   • Essential hypertension [I10]  Yes   • Mixed hyperlipidemia [E78.2]  Yes   • Type 2 diabetes mellitus without complication, with long-term current use of insulin (CMS/[facility]) [E11.9, Z79.4]  Not Applicable      Resolved Hospital Problems    Diagnosis Date Resolved POA   • Localized edema [R60.0] 2019 Yes          Hospital Course:  Nancy Goodman is a 78 y.o. female who presented with acute dyspnea.  The night before she forgot to take her diuretic.  When she woke up she dyspneic and orthopneic with chest heaviness and arm aching.  She presented to [facility] ED and was transferred to [facility] for higher level of care.  She was treated with IV lasix and nitro with good diuresis and symptom improvement.  Cardiology and CTS were consulted due to her aortic stenosis.  She was able to be quickly transitioned to oral diuretics and felt to be a good TAVR candidate.  She will complete remainder of TAVR work-up on an outpatient basis.        Discharge Follow Up Recommendations for labs/diagnostics:  Minerva Arvizu for continued TAVR work-up  Courtney as scheduled  PCP 1 week    Day of  Discharge     HPI:   No complaints  Denies dyspnea      Review of Systems  Gen- No fevers, chills  CV- No chest pain, palpitations  Resp- No cough, dyspnea  GI- No N/V/D, abd pain    Otherwise ROS is negative except as mentioned in the HPI.    Vital Signs:   Temp:  [98.1 °F (36.7 °C)-98.2 °F (36.8 °C)] 98.1 °F (36.7 °C)  Heart Rate:  [66-88] 66  Resp:  [16-18] 18  BP: (170-199)/(74-97) 170/74     Physical Exam:  Constitutional: No acute distress, awake, alert,  at bedside  HENT: NCAT, mucous membranes moist  Respiratory: Clear to auscultation bilaterally, respiratory effort normal   Cardiovascular: RRR, +murmur, palpable pedal pulses bilaterally  Gastrointestinal: Positive bowel sounds, soft, nontender, nondistended  Musculoskeletal: No bilateral ankle edema  Psychiatric: Appropriate affect, cooperative  Neurologic: Oriented x 3, TRUONG, speech clear  Skin: No rashes      Pertinent  and/or Most Recent Results     Results from last 7 days   Lab Units 04/10/19  0443 04/09/19  1031 04/09/19  0830   WBC 10*3/mm3 8.22  --  9.44   HEMOGLOBIN g/dL 13.6  --  13.9   HEMATOCRIT % 42.6  --  42.9   PLATELETS 10*3/mm3 202  --  218   SODIUM mmol/L 139 137  --    POTASSIUM mmol/L 3.8 4.5  --    CHLORIDE mmol/L 100 99  --    CO2 mmol/L 27.0 23.0  --    BUN mg/dL 21 22  --    CREATININE mg/dL 0.98 1.20*  --    GLUCOSE mg/dL 171* 205*  --    CALCIUM mg/dL 9.0 9.1  --            Invalid input(s): PROT, LABALBU        Invalid input(s): TG, LDLCALC, LDLREALC  Results from last 7 days   Lab Units 04/09/19  0830   HEMOGLOBIN A1C % 7.60*       Brief Urine Lab Results  (Last result in the past 365 days)      Color   Clarity   Blood   Leuk Est   Nitrite   Protein   CREAT   Urine HCG        11/26/18 0939             300             Microbiology Results Abnormal     None          Imaging Results (all)     Procedure Component Value Units Date/Time    CT Angio TAVR Chest Abdomen Pelvis With & Without Contrast [[id number]] Collected:  04/10/19  1547     Updated:  04/10/19 1608    Narrative:       EXAMINATION: CT ANGIO TAVR CHEST ABDOMEN PELVIS W WO CONTRAST-  04/10/2019      INDICATION: severe AS/ TAVR planning; Z74.09-Other reduced mobility     TECHNIQUE:  Axial CT data of the chest, abdomen and pelvis were acquired  helically before and following IV contrast per CTA TAVR protocol.  MPR,  MIP and 3-D volume rendered images were generated and reviewed. The  radiation dose reduction device was turned on for each scan per the  ALARA (As Low as Reasonably Achievable) protocol     COMPARISONS:  Chest radiograph 04/09/2019     FINDINGS:       Vascular:     Thoracic aorta and great vessels: Calcified 3 leaflet aortic valve  noted. The coronary origins are orthotopic. The aortic root measures 2.1  cm at the annulus, 3.0 cm at the sinuses of Valsalva and 2.4 cm at the  sinotubular junction. The mid ascending aorta measures 3.1 cm maximally  and the high ascending segment 2.9 cm. Atherosclerotic arch with  three-vessel branching pattern great vessel origins are patent. The arch  measures 2.3 cm maximally at the isthmus. The descending segment  measures 2.2 cm maximally at the diaphragm hiatus. There is evidence of  prior CABG with saphenous aortocoronary graft. Heart is enlarged.     Abdominal aorta and visceral branches: Abdominal aorta is  atherosclerotic but normal in caliber without significant narrowing,  aneurysmal dilatation or evidence of dissection. The celiac origin is  patent and there is classic hepatic arterial supply. SMA is patent.  There are 2 patent renal arteries on both sides; accessory artery  supplies the superior pole on the left and the inferior pole on the  right. The MICEHLLE is patent.     Right iliofemoral: Atherosclerotic common, external and internal iliac  arteries without significant narrowing or aneurysmal dilatation. There  is minimal atherosclerosis of the CFA and SFA proximally but no  significant narrowing of its imaged portion.      Left iliofemoral: Atherosclerotic common, external and internal iliac  arteries without significant narrowing or aneurysmal dilatation. There  is minimal atherosclerosis of the CFA with less than 50% narrowing.  There is no significant narrowing of the imaged portion of the SFA.     Nonvascular:     Patent central airways. No enlarged lymph node. Mild pulmonary  interstitial edema. No pleural effusion or pneumothorax. No noncalcified  lung nodule or mass. The liver, gallbladder, left kidney, both adrenal  glands and the pancreas are unremarkable. There is age-related CBD  ectasia. There is scarring at the superior pole of the right kidney and  a tiny nonobstructing calculus at its inferior pole. Uterus and adnexal  structures are unremarkable in CT appearance. There is no dilated small  or large bowel. There is sigmoid colonic diverticulosis without evidence  of diverticulitis. The appendix is normal. No free fluid, free air or  enlarged lymph noted. Chest and body wall soft tissues are unremarkable.  Imaged vertebral body heights are normal. Incidental benign osseous  hemangioma at L3.       Impression:       Aortoiliac atherosclerosis with no significant narrowing  that would preclude transcatheter aortic valve replacement.     D:  04/10/2019  E:  04/10/2019     This report was finalized on 4/10/2019 4:05 PM by Flaquito Mooney.       XR Chest 1 View [[id number]] Collected:  04/09/19 0925     Updated:  04/09/19 1042    Narrative:       EXAMINATION: XR CHEST 1 VW-      INDICATION: Dyspnea.      COMPARISON: 05/26/2017.     FINDINGS: Cardiac silhouette unchanged and within normal limits.  Pulmonary vascularity grossly within normal limits. No focal  consolidation or opacification. No pneumothorax or pleural effusion.           Impression:       Mild chronic changes without acute cardiopulmonary process.     D:  04/09/2019  E:  04/09/2019     This report was finalized on 4/9/2019 10:38 AM by Dr. Christopher Landis.              Results for orders placed during the hospital encounter of 04/09/19   Duplex Carotid Ultrasound CAR    Narrative · Left internal carotid artery stenosis of 0-49%.  · Right internal carotid artery stenosis of 50-69%.          Results for orders placed during the hospital encounter of 04/09/19   Duplex Carotid Ultrasound CAR    Narrative · Left internal carotid artery stenosis of 0-49%.  · Right internal carotid artery stenosis of 50-69%.          Results for orders placed during the hospital encounter of 04/09/19   Adult Transthoracic Echo Complete W/ Cont if Necessary Per Protocol    Narrative · Estimated EF = 65%.  · Mild mitral valve regurgitation is present  · Mild aortic valve regurgitation is present.  · Aortic valve maximum pressure gradient is 66.4 mmHg.  · Aortic valve mean pressure gradient is 38.4 mmHg.            Discharge Details        Discharge Medications      Changes to Medications      Instructions Start Date   irbesartan 75 MG tablet  Commonly known as:  AVAPRO  What changed:  how much to take   150 mg, Oral, Nightly      KLOR-CON 10 MEQ CR tablet  Generic drug:  potassium chloride  What changed:  See the new instructions.   AT THE START OF THERAPY, TAKE 2 TABLETS DAILY FOR 2 WEEKS, THEN DECREASE TO 1 TABLET DAILY THEREAFTER      metoprolol tartrate 25 MG tablet  Commonly known as:  LOPRESSOR  What changed:    · how much to take  · when to take this   25 mg, Oral, 2 Times Daily      omeprazole 40 MG capsule  Commonly known as:  priLOSEC  What changed:    · how much to take  · how to take this  · when to take this   TAKE 1 CAPSULE DAILY         Continue These Medications      Instructions Start Date   aspirin 81 MG chewable tablet   81 mg, Oral, Daily      freestyle lancets   Check QD and PRN      furosemide 20 MG tablet  Commonly known as:  LASIX   20 mg, Oral, Daily      glucose blood test strip   Check QD and PRN      Insulin Glargine 100 UNIT/ML injection pen  Commonly known as:  LANTUS  SOLOSTAR   40 Units, Subcutaneous, Every Morning      Insulin Pen Needle 31G X 5 MM misc  Commonly known as:  B-D UF III MINI PEN NEEDLES   Use daily as directed with insulin.      PROBIOTIC ADVANCED PO   1 tablet, Oral, Daily      SALMON OIL PO   1,200 mg, Oral, Daily      Ubiquinol 100 MG capsule   100 mg, Oral, Daily      vitamin D3 5000 units capsule capsule   5,000 Units, Oral, Daily             Allergies   Allergen Reactions   • Amlodipine      sickness   • Ciprofloxacin      Pt not sure of reaction (stomach problems)   • Statins Nausea Only     Stomach problem         Discharge Disposition:  Home or Self Care    Discharge Diet:  Diet Order   Procedures   • Diet Regular; Consistent Carbohydrate         Discharge Activity:   Activity Instructions     Activity as Tolerated              CODE STATUS:    Code Status and Medical Interventions:   Ordered at: 04/09/19 0743     Level Of Support Discussed With:    Patient     Code Status:    CPR     Medical Interventions (Level of Support Prior to Arrest):    Full         Future Appointments   Date Time Provider Department Center   4/17/2019  8:00 AM [facility]   4/24/2019  9:15 AM Fidel Valdez MD [facility] None   6/4/2019  8:45 AM Eyal Cervantes MD [facility] None   8/12/2019 10:15 AM Eyal Cervantes MD [facility] None       Additional Instructions for the Follow-ups that You Need to Schedule     Discharge Follow-up with PCP   As directed       Currently Documented PCP:    Wayne Jorge MD    PCP Phone Number:    [phone]     Follow Up Details:  1 week         Discharge Follow-up with Specified Provider: Minerva Arvizu; 1 Week   As directed      To:  Minerva Arvizu    Follow Up:  1 Week         Discharge Follow-up with Specified Provider: cardiology as scheduled   As directed      To:  cardiology as scheduled               Time Spent on Discharge:  35 minutes    Electronically signed by BETH Leon, 04/11/19, 10:59  AM.

## 2019-04-11 NOTE — PLAN OF CARE
Problem: Patient Care Overview  Goal: Plan of Care Review  Outcome: Ongoing (interventions implemented as appropriate)      Problem: Fluid Volume Excess (Adult)  Goal: Identify Related Risk Factors and Signs and Symptoms  Outcome: Ongoing (interventions implemented as appropriate)

## 2019-04-11 NOTE — PROGRESS NOTES
Continued Stay Note  [facility]     Patient Name: Nancy Goodman  MRN: [id number]  Today's Date: 4/11/2019    Admit Date: 4/9/2019    Discharge Plan     Row Name 04/11/19 1043       Plan    Plan  HOME    Patient/Family in Agreement with Plan  yes    Plan Comments  Met with pt and  at bedside to f/u DCP.  Goal remains to return home upon DC.  No immediate needs identified/voiced.  CM will cont to follow.    Final Discharge Disposition Code  01 - home or self-care        Discharge Codes    No documentation.       Expected Discharge Date and Time     Expected Discharge Date Expected Discharge Time    Apr 11, 2019             Azalia Tamez

## 2019-04-12 ENCOUNTER — TELEPHONE (OUTPATIENT)
Dept: CARDIOLOGY | Facility: HOSPITAL | Age: 79
End: 2019-04-12

## 2019-04-12 ENCOUNTER — TRANSITIONAL CARE MANAGEMENT TELEPHONE ENCOUNTER (OUTPATIENT)
Dept: FAMILY MEDICINE CLINIC | Facility: CLINIC | Age: 79
End: 2019-04-12

## 2019-04-12 ENCOUNTER — EPISODE CHANGES (OUTPATIENT)
Dept: CASE MANAGEMENT | Facility: OTHER | Age: 79
End: 2019-04-12

## 2019-04-12 ENCOUNTER — READMISSION MANAGEMENT (OUTPATIENT)
Dept: CALL CENTER | Facility: HOSPITAL | Age: 79
End: 2019-04-12

## 2019-04-12 DIAGNOSIS — I25.10 CORONARY ARTERY DISEASE INVOLVING NATIVE CORONARY ARTERY OF NATIVE HEART WITHOUT ANGINA PECTORIS: ICD-10-CM

## 2019-04-12 DIAGNOSIS — I50.31 ACUTE DIASTOLIC CONGESTIVE HEART FAILURE (HCC): ICD-10-CM

## 2019-04-12 DIAGNOSIS — I35.0 AORTIC STENOSIS, SEVERE: Primary | ICD-10-CM

## 2019-04-12 NOTE — TELEPHONE ENCOUNTER
Spoke with spouse, Bobby, who took the information about upcoming cath/ ELIZABETH in preparation for TAVR.  She is scheduled for 4/23 arrival 7 AM in [facility].  NPO after MN.  Bring  and medications.  ELIZABETH @ 9 AM and Cath @ 10 am.  I let him know that I cancelled the 4/17 outpatient echo and 4/24 clinic visit w/ Courtney (these were scheduled prior to hospitalization 4/9).  Only remaining appointment needed is consult with Heidi.  Will notify patient when this is scheduled.  Mr. Goodman verbalized understanding and read back to me the above information correctly.

## 2019-04-13 ENCOUNTER — READMISSION MANAGEMENT (OUTPATIENT)
Dept: CALL CENTER | Facility: HOSPITAL | Age: 79
End: 2019-04-13

## 2019-04-13 NOTE — OUTREACH NOTE
Prep Survey      Responses   Facility patient discharged from?  [facility]   Is patient eligible?  Yes   Discharge diagnosis  Pulmonary edema, Diastolic CHF, CAD, localized edema, S/P CABG, VHD, essential HTN, Mixed HLD, IDDM II w/o complication   Does the patient have one of the following disease processes/diagnoses(primary or secondary)?  CHF   Does the patient have Home health ordered?  No   Is there a DME ordered?  No   Comments regarding appointments  See AVS   Prep survey completed?  Yes          Krystal Marina RN

## 2019-04-13 NOTE — OUTREACH NOTE
CHF Week 1 Survey      Responses   Facility patient discharged from?  [facility]   Does the patient have one of the following disease processes/diagnoses(primary or secondary)?  CHF   Is there a successful TCM telephone encounter documented?  No   CHF Week 1 attempt successful?  Yes   Call start time  1522   Call end time  1525   Discharge diagnosis  Pulmonary edema, Diastolic CHF, CAD, localized edema, S/P CABG, VHD, essential HTN, Mixed HLD, IDDM II w/o complication   Is patient permission given to speak with other caregiver?  Yes   List who call center can speak with     Person spoke with today (if not patient) and relationship     Meds reviewed with patient/caregiver?  Yes   Is the patient having any side effects they believe may be caused by any medication additions or changes?  No   Does the patient have all medications ordered at discharge?  Yes   Is the patient taking all medications as directed (includes completed medication regime)?  Yes   Does the patient have a primary care provider?   Yes   Does the patient have an appointment with their PCP within 7 days of discharge?  Yes   Has the patient kept scheduled appointments due by today?  N/A   Psychosocial issues?  No   Comments  doing great   Did the patient receive a copy of their discharge instructions?  Yes   Nursing interventions  Reviewed instructions with patient   What is the patient's perception of their health status since discharge?  Improving   Nursing interventions  Nurse provided patient education   Is the patient weighing daily?  No   Does the patient have scales?  Yes   Daily weight interventions  Education provided on importance of daily weight   Is the patient able to teach back Heart Failure diet management?  Yes   Is the patient able to teach back Heart Failure Zones?  Yes   Is the patient able to teach back signs and symptoms of worsening condition? (i.e. weight gain, shortness of air, etc.)  Yes   Is the patient/caregiver  able to teach back the hierarchy of who to call/visit for symptoms/problems? PCP, Specialist, Home health nurse, Urgent Care, ED, 911  Yes    CHF Week 1 call completed?  Yes          Mey Skinner RN

## 2019-04-15 ENCOUNTER — EPISODE CHANGES (OUTPATIENT)
Dept: CASE MANAGEMENT | Facility: OTHER | Age: 79
End: 2019-04-15

## 2019-04-15 ENCOUNTER — PREP FOR SURGERY (OUTPATIENT)
Dept: OTHER | Facility: HOSPITAL | Age: 79
End: 2019-04-15

## 2019-04-15 DIAGNOSIS — I35.0 AORTIC STENOSIS, SEVERE: Primary | ICD-10-CM

## 2019-04-15 RX ORDER — SODIUM CHLORIDE 0.9 % (FLUSH) 0.9 %
1-10 SYRINGE (ML) INJECTION AS NEEDED
Status: CANCELLED | OUTPATIENT
Start: 2019-04-15

## 2019-04-15 RX ORDER — ONDANSETRON 2 MG/ML
4 INJECTION INTRAMUSCULAR; INTRAVENOUS EVERY 6 HOURS PRN
Status: CANCELLED | OUTPATIENT
Start: 2019-04-15

## 2019-04-15 RX ORDER — SODIUM CHLORIDE 0.9 % (FLUSH) 0.9 %
3 SYRINGE (ML) INJECTION EVERY 12 HOURS SCHEDULED
Status: CANCELLED | OUTPATIENT
Start: 2019-04-15

## 2019-04-15 RX ORDER — NITROGLYCERIN 0.4 MG/1
0.4 TABLET SUBLINGUAL
Status: CANCELLED | OUTPATIENT
Start: 2019-04-15

## 2019-04-15 RX ORDER — ASPIRIN 325 MG
325 TABLET, DELAYED RELEASE (ENTERIC COATED) ORAL DAILY
Status: CANCELLED | OUTPATIENT
Start: 2019-04-16

## 2019-04-15 RX ORDER — ASPIRIN 325 MG
325 TABLET ORAL ONCE
Status: CANCELLED | OUTPATIENT
Start: 2019-04-15 | End: 2019-04-15

## 2019-04-16 NOTE — OUTREACH NOTE
"TRISTAN call completed.  Please refer to TCM call flowsheet for call documentation.    The patient states she has \"felt pretty good\" since discharge. She states she is breathing better and BLE edema improved. She states she has been mindful of her diet, eating well and drinking well. She denies any fever, chills, n/v. Bowels are moving. Reviewed medications and post-discharge appts. PCP appt confirmed. Pt denies any questions, concerns, or needs at time of call.  "

## 2019-04-17 ENCOUNTER — APPOINTMENT (OUTPATIENT)
Dept: CARDIOLOGY | Facility: HOSPITAL | Age: 79
End: 2019-04-17

## 2019-04-23 ENCOUNTER — READMISSION MANAGEMENT (OUTPATIENT)
Dept: CALL CENTER | Facility: HOSPITAL | Age: 79
End: 2019-04-23

## 2019-04-23 ENCOUNTER — HOSPITAL ENCOUNTER (OUTPATIENT)
Facility: HOSPITAL | Age: 79
Discharge: HOME | End: 2019-04-24
Attending: INTERNAL MEDICINE | Admitting: INTERNAL MEDICINE

## 2019-04-23 ENCOUNTER — HOSPITAL ENCOUNTER (OUTPATIENT)
Dept: CARDIOLOGY | Facility: HOSPITAL | Age: 79
Discharge: HOME | End: 2019-04-23

## 2019-04-23 ENCOUNTER — EPISODE CHANGES (OUTPATIENT)
Dept: CASE MANAGEMENT | Facility: OTHER | Age: 79
End: 2019-04-23

## 2019-04-23 ENCOUNTER — APPOINTMENT (OUTPATIENT)
Dept: CARDIOLOGY | Facility: HOSPITAL | Age: 79
End: 2019-04-23

## 2019-04-23 DIAGNOSIS — I35.0 AORTIC STENOSIS, SEVERE: ICD-10-CM

## 2019-04-23 DIAGNOSIS — I25.10 CORONARY ARTERY DISEASE INVOLVING NATIVE CORONARY ARTERY OF NATIVE HEART WITHOUT ANGINA PECTORIS: ICD-10-CM

## 2019-04-23 DIAGNOSIS — I50.31 ACUTE DIASTOLIC CONGESTIVE HEART FAILURE (HCC): ICD-10-CM

## 2019-04-23 LAB
ACT BLD: 202 SECONDS (ref 82–152)
ACT BLD: 329 SECONDS (ref 82–152)
ACT BLD: 494 SECONDS (ref 82–152)
BH CV ECHO MEAS - AO MAX PG (FULL): 54.5 MMHG
BH CV ECHO MEAS - AO MAX PG: 56.9 MMHG
BH CV ECHO MEAS - AO MEAN PG (FULL): 35.4 MMHG
BH CV ECHO MEAS - AO MEAN PG: 36.7 MMHG
BH CV ECHO MEAS - AO V2 MAX: 377.1 CM/SEC
BH CV ECHO MEAS - AO V2 MEAN: 285.5 CM/SEC
BH CV ECHO MEAS - AO V2 VTI: 96.9 CM
BH CV ECHO MEAS - AVA(I,A): 0.44 CM^2
BH CV ECHO MEAS - AVA(I,D): 0.44 CM^2
BH CV ECHO MEAS - AVA(V,A): 0.46 CM^2
BH CV ECHO MEAS - AVA(V,D): 0.46 CM^2
BH CV ECHO MEAS - BSA(HAYCOCK): 1.8 M^2
BH CV ECHO MEAS - BSA: 1.7 M^2
BH CV ECHO MEAS - BZI_BMI: 29.4 KILOGRAMS/M^2
BH CV ECHO MEAS - BZI_METRIC_HEIGHT: 157.5 CM
BH CV ECHO MEAS - BZI_METRIC_WEIGHT: 73 KG
BH CV ECHO MEAS - LV MAX PG: 2.4 MMHG
BH CV ECHO MEAS - LV MEAN PG: 1.3 MMHG
BH CV ECHO MEAS - LV V1 MAX: 76.7 CM/SEC
BH CV ECHO MEAS - LV V1 MEAN: 53.3 CM/SEC
BH CV ECHO MEAS - LV V1 VTI: 18.8 CM
BH CV ECHO MEAS - LVOT AREA (M): 2.3 CM^2
BH CV ECHO MEAS - LVOT AREA: 2.3 CM^2
BH CV ECHO MEAS - LVOT DIAM: 1.7 CM
BH CV ECHO MEAS - SI(LVOT): 24.5 ML/M^2
BH CV ECHO MEAS - SV(LVOT): 42.7 ML
BH CV XLRA - RV MID: 3.07 CM
CHOLEST SERPL-MCNC: 206 MG/DL (ref 0–200)
GLUCOSE BLDC GLUCOMTR-MCNC: 127 MG/DL (ref 70–130)
GLUCOSE BLDC GLUCOMTR-MCNC: 127 MG/DL (ref 70–130)
GLUCOSE BLDC GLUCOMTR-MCNC: 88 MG/DL (ref 70–130)
HBA1C MFR BLD: 7.7 % (ref 4.8–5.6)
HDLC SERPL-MCNC: 30 MG/DL (ref 40–60)
LDLC SERPL CALC-MCNC: 120 MG/DL (ref 0–100)
LDLC/HDLC SERPL: 4.01 {RATIO}
LV EF 2D ECHO EST: 60 %
TRIGL SERPL-MCNC: 278 MG/DL (ref 0–150)
TROPONIN T SERPL-MCNC: <0.01 NG/ML (ref 0–0.03)
VLDLC SERPL-MCNC: 55.6 MG/DL

## 2019-04-23 PROCEDURE — 25010000002 FENTANYL CITRATE (PF) 100 MCG/2ML SOLUTION: Performed by: INTERNAL MEDICINE

## 2019-04-23 PROCEDURE — 93455 CORONARY ART/GRFT ANGIO S&I: CPT | Performed by: INTERNAL MEDICINE

## 2019-04-23 PROCEDURE — 93312 ECHO TRANSESOPHAGEAL: CPT

## 2019-04-23 PROCEDURE — A9270 NON-COVERED ITEM OR SERVICE: HCPCS | Performed by: INTERNAL MEDICINE

## 2019-04-23 PROCEDURE — 99152 MOD SED SAME PHYS/QHP 5/>YRS: CPT

## 2019-04-23 PROCEDURE — 0 IOPAMIDOL PER 1 ML: Performed by: INTERNAL MEDICINE

## 2019-04-23 PROCEDURE — 93321 DOPPLER ECHO F-UP/LMTD STD: CPT | Performed by: INTERNAL MEDICINE

## 2019-04-23 PROCEDURE — C1725 CATH, TRANSLUMIN NON-LASER: HCPCS | Performed by: INTERNAL MEDICINE

## 2019-04-23 PROCEDURE — C1887 CATHETER, GUIDING: HCPCS | Performed by: INTERNAL MEDICINE

## 2019-04-23 PROCEDURE — 99153 MOD SED SAME PHYS/QHP EA: CPT

## 2019-04-23 PROCEDURE — 63710000001 CLOPIDOGREL 75 MG TABLET: Performed by: INTERNAL MEDICINE

## 2019-04-23 PROCEDURE — 93325 DOPPLER ECHO COLOR FLOW MAPG: CPT | Performed by: INTERNAL MEDICINE

## 2019-04-23 PROCEDURE — 63710000001 INSULIN DETEMIR PER 5 UNITS: Performed by: INTERNAL MEDICINE

## 2019-04-23 PROCEDURE — 85347 COAGULATION TIME ACTIVATED: CPT

## 2019-04-23 PROCEDURE — 99152 MOD SED SAME PHYS/QHP 5/>YRS: CPT | Performed by: INTERNAL MEDICINE

## 2019-04-23 PROCEDURE — 93005 ELECTROCARDIOGRAM TRACING: CPT | Performed by: INTERNAL MEDICINE

## 2019-04-23 PROCEDURE — 63710000001 PANTOPRAZOLE 40 MG TABLET DELAYED-RELEASE: Performed by: INTERNAL MEDICINE

## 2019-04-23 PROCEDURE — C1769 GUIDE WIRE: HCPCS | Performed by: INTERNAL MEDICINE

## 2019-04-23 PROCEDURE — 63710000001 METOPROLOL TARTRATE 25 MG TABLET: Performed by: INTERNAL MEDICINE

## 2019-04-23 PROCEDURE — 63710000001 HYDROCODONE-ACETAMINOPHEN 7.5-325 MG TABLET: Performed by: INTERNAL MEDICINE

## 2019-04-23 PROCEDURE — 92937 PRQ TRLUML REVSC CAB GRF 1: CPT | Performed by: INTERNAL MEDICINE

## 2019-04-23 PROCEDURE — C1894 INTRO/SHEATH, NON-LASER: HCPCS | Performed by: INTERNAL MEDICINE

## 2019-04-23 PROCEDURE — 82962 GLUCOSE BLOOD TEST: CPT

## 2019-04-23 PROCEDURE — 80061 LIPID PANEL: CPT | Performed by: NURSE PRACTITIONER

## 2019-04-23 PROCEDURE — 93321 DOPPLER ECHO F-UP/LMTD STD: CPT

## 2019-04-23 PROCEDURE — C1724 CATH, TRANS ATHEREC,ROTATION: HCPCS | Performed by: INTERNAL MEDICINE

## 2019-04-23 PROCEDURE — 63710000001 LOSARTAN 25 MG TABLET: Performed by: INTERNAL MEDICINE

## 2019-04-23 PROCEDURE — 83036 HEMOGLOBIN GLYCOSYLATED A1C: CPT | Performed by: NURSE PRACTITIONER

## 2019-04-23 PROCEDURE — 84484 ASSAY OF TROPONIN QUANT: CPT | Performed by: INTERNAL MEDICINE

## 2019-04-23 PROCEDURE — 25010000002 HEPARIN (PORCINE) PER 1000 UNITS: Performed by: INTERNAL MEDICINE

## 2019-04-23 PROCEDURE — 36415 COLL VENOUS BLD VENIPUNCTURE: CPT

## 2019-04-23 PROCEDURE — 25010000002 MIDAZOLAM PER 1 MG: Performed by: INTERNAL MEDICINE

## 2019-04-23 PROCEDURE — C1874 STENT, COATED/COV W/DEL SYS: HCPCS | Performed by: INTERNAL MEDICINE

## 2019-04-23 PROCEDURE — 93325 DOPPLER ECHO COLOR FLOW MAPG: CPT

## 2019-04-23 PROCEDURE — C9602 PERC D-E COR STENT ATHER S: HCPCS | Performed by: INTERNAL MEDICINE

## 2019-04-23 PROCEDURE — 93312 ECHO TRANSESOPHAGEAL: CPT | Performed by: INTERNAL MEDICINE

## 2019-04-23 DEVICE — XIENCE SIERRA™ EVEROLIMUS ELUTING CORONARY STENT SYSTEM 2.50 MM X 28 MM / RAPID-EXCHANGE
Type: IMPLANTABLE DEVICE | Status: FUNCTIONAL
Brand: XIENCE SIERRA™

## 2019-04-23 RX ORDER — FLUMAZENIL 0.1 MG/ML
INJECTION INTRAVENOUS
Status: DISCONTINUED
Start: 2019-04-23 | End: 2019-04-23 | Stop reason: WASHOUT

## 2019-04-23 RX ORDER — LOSARTAN POTASSIUM 25 MG/1
25 TABLET ORAL
Status: DISCONTINUED | OUTPATIENT
Start: 2019-04-23 | End: 2019-04-24 | Stop reason: HOSPADM

## 2019-04-23 RX ORDER — FENTANYL CITRATE 50 UG/ML
INJECTION, SOLUTION INTRAMUSCULAR; INTRAVENOUS
Status: COMPLETED | OUTPATIENT
Start: 2019-04-23 | End: 2019-04-23

## 2019-04-23 RX ORDER — ASPIRIN 81 MG/1
81 TABLET ORAL EVERY MORNING
COMMUNITY

## 2019-04-23 RX ORDER — PANTOPRAZOLE SODIUM 40 MG/1
40 TABLET, DELAYED RELEASE ORAL EVERY MORNING
Status: DISCONTINUED | OUTPATIENT
Start: 2019-04-23 | End: 2019-04-24 | Stop reason: HOSPADM

## 2019-04-23 RX ORDER — ONDANSETRON 2 MG/ML
4 INJECTION INTRAMUSCULAR; INTRAVENOUS EVERY 6 HOURS PRN
Status: DISCONTINUED | OUTPATIENT
Start: 2019-04-23 | End: 2019-04-23 | Stop reason: HOSPADM

## 2019-04-23 RX ORDER — NALOXONE HYDROCHLORIDE 0.4 MG/ML
INJECTION, SOLUTION INTRAMUSCULAR; INTRAVENOUS; SUBCUTANEOUS
Status: DISCONTINUED
Start: 2019-04-23 | End: 2019-04-23 | Stop reason: WASHOUT

## 2019-04-23 RX ORDER — CLOPIDOGREL BISULFATE 75 MG/1
75 TABLET ORAL DAILY
Status: DISCONTINUED | OUTPATIENT
Start: 2019-04-24 | End: 2019-04-24 | Stop reason: HOSPADM

## 2019-04-23 RX ORDER — HEPARIN SODIUM 1000 [USP'U]/ML
INJECTION, SOLUTION INTRAVENOUS; SUBCUTANEOUS AS NEEDED
Status: DISCONTINUED | OUTPATIENT
Start: 2019-04-23 | End: 2019-04-23 | Stop reason: HOSPADM

## 2019-04-23 RX ORDER — ASPIRIN 81 MG/1
81 TABLET ORAL DAILY
Status: DISCONTINUED | OUTPATIENT
Start: 2019-04-24 | End: 2019-04-24 | Stop reason: HOSPADM

## 2019-04-23 RX ORDER — MULTIVIT-MIN/IRON/FOLIC ACID/K 18-600-40
4000 CAPSULE ORAL DAILY
COMMUNITY
End: 2019-05-12

## 2019-04-23 RX ORDER — ASPIRIN 325 MG
325 TABLET ORAL ONCE
Status: DISCONTINUED | OUTPATIENT
Start: 2019-04-23 | End: 2019-04-23

## 2019-04-23 RX ORDER — MORPHINE SULFATE 2 MG/ML
1 INJECTION, SOLUTION INTRAMUSCULAR; INTRAVENOUS EVERY 4 HOURS PRN
Status: DISCONTINUED | OUTPATIENT
Start: 2019-04-23 | End: 2019-04-24 | Stop reason: HOSPADM

## 2019-04-23 RX ORDER — SODIUM CHLORIDE 9 MG/ML
50 INJECTION, SOLUTION INTRAVENOUS CONTINUOUS
Status: ACTIVE | OUTPATIENT
Start: 2019-04-23 | End: 2019-04-23

## 2019-04-23 RX ORDER — BISACODYL 10 MG
10 SUPPOSITORY, RECTAL RECTAL DAILY PRN
Status: DISCONTINUED | OUTPATIENT
Start: 2019-04-23 | End: 2019-04-24 | Stop reason: HOSPADM

## 2019-04-23 RX ORDER — CLOPIDOGREL BISULFATE 75 MG/1
TABLET ORAL AS NEEDED
Status: DISCONTINUED | OUTPATIENT
Start: 2019-04-23 | End: 2019-04-23 | Stop reason: HOSPADM

## 2019-04-23 RX ORDER — FENTANYL CITRATE 50 UG/ML
INJECTION, SOLUTION INTRAMUSCULAR; INTRAVENOUS
Status: DISCONTINUED
Start: 2019-04-23 | End: 2019-04-24 | Stop reason: HOSPADM

## 2019-04-23 RX ORDER — NALOXONE HCL 0.4 MG/ML
0.4 VIAL (ML) INJECTION
Status: DISCONTINUED | OUTPATIENT
Start: 2019-04-23 | End: 2019-04-24 | Stop reason: HOSPADM

## 2019-04-23 RX ORDER — LIDOCAINE HYDROCHLORIDE 10 MG/ML
INJECTION, SOLUTION EPIDURAL; INFILTRATION; INTRACAUDAL; PERINEURAL AS NEEDED
Status: DISCONTINUED | OUTPATIENT
Start: 2019-04-23 | End: 2019-04-23 | Stop reason: HOSPADM

## 2019-04-23 RX ORDER — MIDAZOLAM HYDROCHLORIDE 1 MG/ML
INJECTION INTRAMUSCULAR; INTRAVENOUS
Status: DISCONTINUED
Start: 2019-04-23 | End: 2019-04-24 | Stop reason: HOSPADM

## 2019-04-23 RX ORDER — ASPIRIN 81 MG/1
81 TABLET ORAL EVERY MORNING
Status: DISCONTINUED | OUTPATIENT
Start: 2019-04-24 | End: 2019-04-23

## 2019-04-23 RX ORDER — HYDROCODONE BITARTRATE AND ACETAMINOPHEN 7.5; 325 MG/1; MG/1
1 TABLET ORAL EVERY 4 HOURS PRN
Status: DISCONTINUED | OUTPATIENT
Start: 2019-04-23 | End: 2019-04-24 | Stop reason: HOSPADM

## 2019-04-23 RX ORDER — ACETAMINOPHEN 325 MG/1
650 TABLET ORAL EVERY 4 HOURS PRN
Status: DISCONTINUED | OUTPATIENT
Start: 2019-04-23 | End: 2019-04-24 | Stop reason: HOSPADM

## 2019-04-23 RX ORDER — NITROGLYCERIN 0.4 MG/1
0.4 TABLET SUBLINGUAL
Status: DISCONTINUED | OUTPATIENT
Start: 2019-04-23 | End: 2019-04-23 | Stop reason: HOSPADM

## 2019-04-23 RX ORDER — SODIUM CHLORIDE 0.9 % (FLUSH) 0.9 %
3 SYRINGE (ML) INJECTION EVERY 12 HOURS SCHEDULED
Status: DISCONTINUED | OUTPATIENT
Start: 2019-04-23 | End: 2019-04-23 | Stop reason: HOSPADM

## 2019-04-23 RX ORDER — NICARDIPINE HCL-0.9% SOD CHLOR 1 MG/10 ML
SYRINGE (ML) INTRAVENOUS AS NEEDED
Status: DISCONTINUED | OUTPATIENT
Start: 2019-04-23 | End: 2019-04-23 | Stop reason: HOSPADM

## 2019-04-23 RX ORDER — ASPIRIN 325 MG
325 TABLET, DELAYED RELEASE (ENTERIC COATED) ORAL DAILY
Status: DISCONTINUED | OUTPATIENT
Start: 2019-04-24 | End: 2019-04-23 | Stop reason: HOSPADM

## 2019-04-23 RX ORDER — MIDAZOLAM HYDROCHLORIDE 1 MG/ML
INJECTION INTRAMUSCULAR; INTRAVENOUS
Status: COMPLETED | OUTPATIENT
Start: 2019-04-23 | End: 2019-04-23

## 2019-04-23 RX ORDER — SODIUM CHLORIDE 0.9 % (FLUSH) 0.9 %
1-10 SYRINGE (ML) INJECTION AS NEEDED
Status: DISCONTINUED | OUTPATIENT
Start: 2019-04-23 | End: 2019-04-23 | Stop reason: HOSPADM

## 2019-04-23 RX ORDER — SODIUM CHLORIDE 9 MG/ML
250 INJECTION, SOLUTION INTRAVENOUS ONCE AS NEEDED
Status: DISCONTINUED | OUTPATIENT
Start: 2019-04-23 | End: 2019-04-24 | Stop reason: HOSPADM

## 2019-04-23 RX ORDER — ALPRAZOLAM 0.25 MG/1
0.25 TABLET ORAL 3 TIMES DAILY PRN
Status: DISCONTINUED | OUTPATIENT
Start: 2019-04-23 | End: 2019-04-24 | Stop reason: HOSPADM

## 2019-04-23 RX ORDER — CLOPIDOGREL BISULFATE 75 MG/1
600 TABLET ORAL ONCE
Status: DISCONTINUED | OUTPATIENT
Start: 2019-04-23 | End: 2019-04-23 | Stop reason: SDUPTHER

## 2019-04-23 RX ADMIN — METOPROLOL TARTRATE 25 MG: 25 TABLET ORAL at 21:47

## 2019-04-23 RX ADMIN — MIDAZOLAM HYDROCHLORIDE 2 MG: 1 INJECTION, SOLUTION INTRAMUSCULAR; INTRAVENOUS at 09:18

## 2019-04-23 RX ADMIN — LOSARTAN POTASSIUM 25 MG: 25 TABLET, FILM COATED ORAL at 17:17

## 2019-04-23 RX ADMIN — INSULIN DETEMIR 40 UNITS: 100 INJECTION, SOLUTION SUBCUTANEOUS at 21:47

## 2019-04-23 RX ADMIN — PANTOPRAZOLE SODIUM 40 MG: 40 TABLET, DELAYED RELEASE ORAL at 17:17

## 2019-04-23 RX ADMIN — MIDAZOLAM HYDROCHLORIDE 2 MG: 1 INJECTION, SOLUTION INTRAMUSCULAR; INTRAVENOUS at 09:14

## 2019-04-23 RX ADMIN — HYDROCODONE BITARTRATE AND ACETAMINOPHEN 1 TABLET: 7.5; 325 TABLET ORAL at 17:17

## 2019-04-23 RX ADMIN — FENTANYL CITRATE 50 MCG: 50 INJECTION, SOLUTION INTRAMUSCULAR; INTRAVENOUS at 09:18

## 2019-04-23 RX ADMIN — FENTANYL CITRATE 50 MCG: 50 INJECTION, SOLUTION INTRAMUSCULAR; INTRAVENOUS at 09:14

## 2019-04-23 NOTE — H&P
Updated History and Physical 04/23/19    Referring Provider: Dr. Kwasi Ariza/ Fidel Valdez     Reason for Consultation: CHF     Patient Care Team:  Wayne Jorge MD as PCP - General  Eyal Cervantes MD as PCP - Claims Attributed  Fidel Valdez MD as Consulting Physician (Cardiology)      PROBLEM LIST:  1. Coronary artery disease  a. 5/16/17 angina, cardiac catheter severe ostial LAD and LCx of these.  Ostial RPL and RCA.  b. 5 vessel CABG ([facility]) LIMA to LAD, SVG to diagonal and LCx, and SVG to PDA and PL. 05/24/17  2. Aortic stenosis  a. Peak gradient of 27 by cath 5/17  b. 4/19 peak gradient by echo of 66  3. Hypertension  4. Dyslipidemia  5. Diabetes mellitus  6. GERD  7. Varicose veins  8. Arthritis  9. Surgeries:  a. Tubal ligation  b. Varicose vein surgery  c. CABG              Allergies   Allergen Reactions   • Amlodipine         sickness   • Ciprofloxacin         Pt not sure of reaction (stomach problems)   • Statins Nausea Only       Stomach problem        Current Facility-Administered Medications:   •  aspirin chewable tablet 81 mg, 81 mg, Oral, Daily, Kwasi Ariza MD, 81 mg at 04/09/19 1037  •  dextrose (D50W) 25 g/ 50mL Intravenous Solution 25 g, 25 g, Intravenous, Q15 Min PRN, Kwasi Ariza MD  •  dextrose (GLUTOSE) oral gel 15 g, 15 g, Oral, Q15 Min PRN, Kwasi Ariza MD  •  glucagon (human recombinant) (GLUCAGEN DIAGNOSTIC) injection 1 mg, 1 mg, Subcutaneous, PRN, Kwasi Ariza MD  •  heparin (porcine) 5000 UNIT/ML injection 5,000 Units, 5,000 Units, Subcutaneous, Q8H, Kwasi Ariza MD, 5,000 Units at 04/09/19 1037  •  insulin lispro (humaLOG) injection 0-7 Units, 0-7 Units, Subcutaneous, 4x Daily With Meals & Nightly, Kwasi Ariza MD, 2 Units at 04/09/19 1037  •  losartan (COZAAR) tablet 25 mg, 25 mg, Oral, Q24H, Kwasi Ariza MD, 25 mg at 04/09/19 1036  •  metoprolol tartrate (LOPRESSOR) half tablet 12.5 mg, 12.5 mg, Oral, Q12H, Kwasi Ariza MD, 12.5 mg at  04/09/19 1036  •  nitroglycerin 50 mg/250 mL (0.2 mg/mL) infusion, 10 mcg/min, Intravenous, Titrated, Marek Jones MD, Last Rate: 3 mL/hr at 04/09/19 0649, 10 mcg/min at 04/09/19 0649  •  pantoprazole (PROTONIX) EC tablet 40 mg, 40 mg, Oral, Q AM, Kwasi Ariza MD, 40 mg at 04/09/19 1036  •  sodium chloride 0.9 % flush 3 mL, 3 mL, Intravenous, Q12H, Kwasi Ariza MD  •  sodium chloride 0.9 % flush 3-10 mL, 3-10 mL, Intravenous, PRN, Kwasi Ariza MD        nitroglycerin 10 mcg/min Last Rate: 10 mcg/min (04/09/19 0649)         Prescriptions Prior to Admission           Medications Prior to Admission   Medication Sig Dispense Refill Last Dose   • aspirin 81 MG chewable tablet Chew 1 tablet Daily.   11 Taking   • Cholecalciferol (VITAMIN D3) 5000 units capsule capsule Take 5,000 Units by mouth Daily.         • furosemide (LASIX) 20 MG tablet Take 1 tablet by mouth Daily. 90 tablet 1     • glucose blood test strip Check QD and  each 5     • Insulin Glargine (LANTUS SOLOSTAR) 100 UNIT/ML injection pen Inject 40 Units under the skin into the appropriate area as directed Every Morning. 15 pen 1     • Insulin Pen Needle (B-D UF III MINI PEN NEEDLES) 31G X 5 MM misc Use daily as directed with insulin. 100 each 3     • irbesartan (AVAPRO) 75 MG tablet Take 1 tablet by mouth Every Night. 90 tablet 1     • KLOR-CON 10 MEQ CR tablet AT THE START OF THERAPY, TAKE 2 TABLETS DAILY FOR 2 WEEKS, THEN DECREASE TO 1 TABLET DAILY THEREAFTER 90 tablet 3     • Lancets (FREESTYLE) lancets Check QD and  each 11     • melatonin 3 MG tablet Take  by mouth Every Night.         • metoprolol tartrate (LOPRESSOR) 25 MG tablet Take 0.5 tablets by mouth Every Night. 45 tablet 3     • Omega-3 Fatty Acids (SALMON OIL PO) Take 1,200 mg by mouth Daily.         • omeprazole (priLOSEC) 40 MG capsule TAKE 1 CAPSULE DAILY 90 capsule 1 Taking   • Probiotic Product (PROBIOTIC ADVANCED PO) Take 1 tablet by mouth Daily.      Taking   • Specialty Vitamins Products (HEART SAVIOR PO) Take  by mouth. 2 every other day         • Ubiquinol 100 MG capsule Take 100 mg by mouth Daily.     Taking                  Subjective    .   History of present illness:    Patient is a 78-year-old  female who we are asked to see today for further evaluation of pulmonary edema/congestive heart failure.  She has history of coronary artery disease with previous coronary artery bypass grafting performed about 2 years ago.  Since that time she notes to have been doing fairly well.  She is quite active.  Her and her  recently traveled to [facility] and she notes that she did well there.  She was in her usual state of health up until last evening.  She notes that she missed her Lasix dose yesterday.  States also that she likely had extra sodium and volume.  When she remembered that she had not taken her dose it was time for her to go to bed.  She did not wish to be using the bathroom all night and decided not to take the medication.  She then awoke in the middle the night significantly short of breath.  She then presented to the [facility] for further evaluation.  By record review there she was noted to be 71% on room air.  She was placed on BiPAP and given IV diuretics.  She diuresed and was transferred [facility] for higher level of care.  Upon arrival here BiPAP was discontinued.  She was noted to be saturating well on room air and supplemental oxygen was not administered.  She was also noted to be hypertensive on arrival to the outside facility.  Currently she is on a nitroglycerin drip.  Echocardiogram has been ordered and is pending.  She denies any chest pain, pressure, tightness.  Does note that while she was recently on vacation that she did have some shortness of breath walking up inclines and hills.  She denies any syncope, near syncope.  She has intermittent lower extremity edema related to her varicose veins  that has not been worsening as of late.  Due to her symptoms and history we were asked to see her for further evaluation.        Social History   Social History            Socioeconomic History   • Marital status:        Spouse name: Not on file   • Number of children: Not on file   • Years of education: Not on file   • Highest education level: Not on file   Occupational History       Employer: RETIRED   Tobacco Use   • Smoking status: Never Smoker   • Smokeless tobacco: Never Used   Substance and Sexual Activity   • Alcohol use: No   • Drug use: No   • Sexual activity: Defer       Comment:    Social History Narrative     ** Merged History Encounter **           Patient consumes 1 cup hot chocolate daily.   Patient lives at home with .                   Family History   Problem Relation Age of Onset   • Cancer Mother           Lung cancer            Review of Systems:  Review of Systems   Constitution: Positive for malaise/fatigue. Negative for fever and weakness.   HENT: Negative for nosebleeds.    Eyes: Negative for redness and visual disturbance.   Cardiovascular: Negative for orthopnea, palpitations and paroxysmal nocturnal dyspnea.   Respiratory: Positive for shortness of breath. Negative for cough, snoring, sputum production and wheezing.    Hematologic/Lymphatic: Negative for bleeding problem.   Skin: Negative for flushing, itching and rash.   Musculoskeletal: Positive for arthritis and joint pain. Negative for falls and muscle cramps.   Gastrointestinal: Negative for abdominal pain, diarrhea, heartburn, nausea and vomiting.   Genitourinary: Negative for hematuria.   Neurological: Negative for excessive daytime sleepiness, dizziness, headaches and tremors.   Psychiatric/Behavioral: Negative for substance abuse. The patient is not nervous/anxious.         Objective      Vitals:  Blood pressure 165/78, pulse 80, temperature 97.9 °F (36.6 °C), temperature source Axillary, resp. rate 14,  "height 158.8 cm (62.5\"), weight 72.5 kg (159 lb 13.3 oz), SpO2 99 %.         Physical Exam   Constitutional: She is oriented to person, place, and time. She appears well-developed and well-nourished. No distress.   HENT:   Head: Normocephalic and atraumatic.   Eyes: Right eye exhibits no discharge. Left eye exhibits no discharge.   Neck: No JVD present. No tracheal deviation present.   Cardiovascular: Normal rate, regular rhythm and intact distal pulses. Exam reveals no friction rub.   Murmur (3/6 systolic ejection murmur with radiation to bilateral carotids) heard.  Pulmonary/Chest: Effort normal and breath sounds normal. No respiratory distress.   Abdominal: Soft. Bowel sounds are normal. There is no tenderness.   Musculoskeletal: She exhibits edema (Trace lower extremity edema). She exhibits no deformity.   Neurological: She is alert and oriented to person, place, and time.   Skin: Skin is warm and dry.              Social History            Socioeconomic History   • Marital status:        Spouse name: Not on file   • Number of children: Not on file   • Years of education: Not on file   • Highest education level: Not on file   Occupational History       Employer: RETIRED   Tobacco Use   • Smoking status: Never Smoker   • Smokeless tobacco: Never Used   Substance and Sexual Activity   • Alcohol use: No   • Drug use: No   • Sexual activity: Defer       Comment:    Social History Narrative     ** Merged History Encounter **           Patient consumes 1 cup hot chocolate daily.   Patient lives at home with .                   Family History   Problem Relation Age of Onset   • Cancer Mother           Lung cancer            Review of Systems:  Review of Systems   Constitution: Positive for malaise/fatigue. Negative for fever and weakness.   HENT: Negative for nosebleeds.    Eyes: Negative for redness and visual disturbance.   Cardiovascular: Negative for orthopnea, palpitations and paroxysmal " "nocturnal dyspnea.   Respiratory: Positive for shortness of breath. Negative for cough, snoring, sputum production and wheezing.    Hematologic/Lymphatic: Negative for bleeding problem.   Skin: Negative for flushing, itching and rash.   Musculoskeletal: Positive for arthritis and joint pain. Negative for falls and muscle cramps.   Gastrointestinal: Negative for abdominal pain, diarrhea, heartburn, nausea and vomiting.   Genitourinary: Negative for hematuria.   Neurological: Negative for excessive daytime sleepiness, dizziness, headaches and tremors.   Psychiatric/Behavioral: Negative for substance abuse. The patient is not nervous/anxious.         Objective      Vitals:  Blood pressure 165/78, pulse 80, temperature 97.9 °F (36.6 °C), temperature source Axillary, resp. rate 14, height 158.8 cm (62.5\"), weight 72.5 kg (159 lb 13.3 oz), SpO2 99 %.         Physical Exam   Constitutional: She is oriented to person, place, and time. She appears well-developed and well-nourished. No distress.   HENT:   Head: Normocephalic and atraumatic.   Eyes: Right eye exhibits no discharge. Left eye exhibits no discharge.   Neck: No JVD present. No tracheal deviation present.   Cardiovascular: Normal rate, regular rhythm and intact distal pulses. Exam reveals no friction rub.   Murmur (3/6 systolic ejection murmur with radiation to bilateral carotids) heard.  Pulmonary/Chest: Effort normal and breath sounds normal. No respiratory distress.   Abdominal: Soft. Bowel sounds are normal. There is no tenderness.   Musculoskeletal: She exhibits edema (Trace lower extremity edema). She exhibits no deformity.   Neurological: She is alert and oriented to person, place, and time.   Skin: Skin is warm and dry.                 Results Review:  I reviewed the patient's new clinical results.       Results from last 7 days   Lab Units 04/09/19  0830   WBC 10*3/mm3 9.44   HEMOGLOBIN g/dL 13.9   HEMATOCRIT % 42.9   PLATELETS 10*3/mm3 218     BMP " 4/10/19  Glucose 65 - 99 mg/dL 171 Abnormally high     BUN 8 - 23 mg/dL 21    Creatinine 0.57 - 1.00 mg/dL 0.98    Sodium 136 - 145 mmol/L 139    Potassium 3.5 - 5.2 mmol/L 3.8    Chloride 98 - 107 mmol/L 100    CO2 22.0 - 29.0 mmol/L 27.0    Calcium 8.6 - 10.5 mg/dL 9.0    eGFR Non African Amer >60 mL/min/1.73 55 Abnormally low     BUN/Creatinine Ratio 7.0 - 25.0 21.4        Tele:  SR     EKG: SR with lateral ST changes     · ECHO: 04/09/19  · Estimated EF = 65%.  · Mild mitral valve regurgitation is present  · Mild aortic valve regurgitation is present.  · Aortic valve maximum pressure gradient is 66.4 mmHg.  · Aortic valve mean pressure gradient is 38.4 mmHg.       Carotid Duplex 04/10/19    ·  Left internal carotid artery stenosis of 0-49%.  · Right internal carotid artery stenosis of 50-69%.     CT Angio- Pre- TAVR    Aortoiliac atherosclerosis with no significant narrowing  that would preclude transcatheter aortic valve replacement.     Assessment/Plan      1. Acute heart failure,LVEF 65 %  2. Coronary artery disease with previous coronary artery bypass grafting.05/24/17  3. Aortic stenosis Mean gradient 38.4, Peak 66.4  4. Hypertension  5. Dyslipidemia, on statin.        Plan:     1. Aortic stenosis, patient is a good TAVR candidate now that she is symptomatic with heart failure.  We will consult Dr. Calvillo  2. Consider transition to PO diuretics tomorrow (given IV today).        BETH Fisher obtained past medical, family history, social history, review of systems and functioned as a scribe for the remainder of the dictation for Dr. Valdez        Electronically signed by BETH Fisher, 04/09/19, 10:42 AM.  I, Fidel Valdez MD, personally performed the services described in this documentation as scribed by the above individual in my presence, and it is both accurate and complete     Vital signs 04/23/19   Temp 98.1, B/P 210/92, HR 70, RR 16    Physical Exam 4/23/19 per Dr. Hankins  revealed, normal cardiac rate and rhythm with a 2-3/6 SKYE that radiates to both carotids, lungs clear bilaterally, JADE, head normocephalic and atraumatic, no thyromegaly, Abd soft and non-distended with audible bowel sounds X 4.  Trace pedal pulses with no evidence of edema. Alert and oriented X3.    Updated plan: Patient will undergo Pre- TAVR ELIZABETH and cardiac cath today.    Mindy Luna, RN, BSN    I Anushka Hankins MD personally performed the services described in this documentation as scribed by the above individual in my presence, and it is both accurate and complete.    Anushka Hankins MD, FACC

## 2019-04-24 ENCOUNTER — DOCUMENTATION (OUTPATIENT)
Dept: CARDIAC REHAB | Facility: HOSPITAL | Age: 79
End: 2019-04-24

## 2019-04-24 ENCOUNTER — EPISODE CHANGES (OUTPATIENT)
Dept: CASE MANAGEMENT | Facility: OTHER | Age: 79
End: 2019-04-24

## 2019-04-24 VITALS
DIASTOLIC BLOOD PRESSURE: 54 MMHG | SYSTOLIC BLOOD PRESSURE: 126 MMHG | HEART RATE: 69 BPM | HEIGHT: 62 IN | RESPIRATION RATE: 16 BRPM | OXYGEN SATURATION: 96 % | WEIGHT: 161.16 LBS | TEMPERATURE: 98.1 F | BODY MASS INDEX: 29.66 KG/M2

## 2019-04-24 LAB
ANION GAP SERPL CALCULATED.3IONS-SCNC: 12 MMOL/L
BUN BLD-MCNC: 16 MG/DL (ref 8–23)
BUN/CREAT SERPL: 20.5 (ref 7–25)
CALCIUM SPEC-SCNC: 8.1 MG/DL (ref 8.6–10.5)
CHLORIDE SERPL-SCNC: 107 MMOL/L (ref 98–107)
CO2 SERPL-SCNC: 24 MMOL/L (ref 22–29)
CREAT BLD-MCNC: 0.78 MG/DL (ref 0.57–1)
GFR SERPL CREATININE-BSD FRML MDRD: 71 ML/MIN/1.73
GLUCOSE BLD-MCNC: 96 MG/DL (ref 65–99)
POTASSIUM BLD-SCNC: 3.7 MMOL/L (ref 3.5–5.2)
SODIUM BLD-SCNC: 143 MMOL/L (ref 136–145)

## 2019-04-24 PROCEDURE — 63710000001 PANTOPRAZOLE 40 MG TABLET DELAYED-RELEASE: Performed by: INTERNAL MEDICINE

## 2019-04-24 PROCEDURE — A9270 NON-COVERED ITEM OR SERVICE: HCPCS | Performed by: INTERNAL MEDICINE

## 2019-04-24 PROCEDURE — 63710000001 CLOPIDOGREL 75 MG TABLET: Performed by: INTERNAL MEDICINE

## 2019-04-24 PROCEDURE — 93005 ELECTROCARDIOGRAM TRACING: CPT | Performed by: INTERNAL MEDICINE

## 2019-04-24 PROCEDURE — 63710000001 METOPROLOL TARTRATE 25 MG TABLET: Performed by: INTERNAL MEDICINE

## 2019-04-24 PROCEDURE — 63710000001 HYDROCODONE-ACETAMINOPHEN 7.5-325 MG TABLET: Performed by: INTERNAL MEDICINE

## 2019-04-24 PROCEDURE — 93010 ELECTROCARDIOGRAM REPORT: CPT | Performed by: INTERNAL MEDICINE

## 2019-04-24 PROCEDURE — 80048 BASIC METABOLIC PNL TOTAL CA: CPT | Performed by: INTERNAL MEDICINE

## 2019-04-24 PROCEDURE — 99217 PR OBSERVATION CARE DISCHARGE MANAGEMENT: CPT | Performed by: INTERNAL MEDICINE

## 2019-04-24 RX ORDER — CLOPIDOGREL BISULFATE 75 MG/1
75 TABLET ORAL DAILY
Qty: 30 TABLET | Refills: 11 | Status: SHIPPED | OUTPATIENT
Start: 2019-04-24 | End: 2019-06-04 | Stop reason: SDUPTHER

## 2019-04-24 RX ADMIN — CLOPIDOGREL BISULFATE 75 MG: 75 TABLET ORAL at 07:53

## 2019-04-24 RX ADMIN — PANTOPRAZOLE SODIUM 40 MG: 40 TABLET, DELAYED RELEASE ORAL at 07:53

## 2019-04-24 RX ADMIN — HYDROCODONE BITARTRATE AND ACETAMINOPHEN 1 TABLET: 7.5; 325 TABLET ORAL at 07:52

## 2019-04-24 RX ADMIN — METOPROLOL TARTRATE 25 MG: 25 TABLET ORAL at 07:52

## 2019-04-24 NOTE — PROGRESS NOTES
"[facility] Dischg Note       LOS: 0 days   Patient Care Team:  Wayne Jorge MD as PCP - General  Eyal Cervantes MD as PCP - Claims Attributed  Fidel Valdez MD as Consulting Physician (Cardiology)  Queenie Castillo, RN as Care Coordinator (Population Health)    Chief Complaint: Dyspnea    Subjective     Subjective: No complaints this morning.  Ready to go home.  Slightly sore in her right groin    Review of Systems:   As above.    Medications:    aspirin 81 mg Oral Daily   clopidogrel 75 mg Oral Daily   fentaNYL citrate (PF)      insulin detemir 40 Units Subcutaneous Nightly   losartan 25 mg Oral Q24H   metoprolol tartrate 25 mg Oral BID   midazolam      pantoprazole 40 mg Oral QAM   polyethylene glycol 17 g Oral Daily       Objective     Vital Sign Min/Max for last 24 hours  Temp  Min: 98.1 °F (36.7 °C)  Max: 98.1 °F (36.7 °C)   BP  Min: 87/41  Max: 215/89   Pulse  Min: 54  Max: 82   Resp  Min: 16  Max: 16   SpO2  Min: 92 %  Max: 100 %   Flow (L/min)  Min: 3  Max: 3   Weight  Min: 73.1 kg (161 lb 2.5 oz)  Max: 73.1 kg (161 lb 2.5 oz)      Intake/Output Summary (Last 24 hours) at 4/24/2019 0708  Last data filed at 4/23/2019 2000  Gross per 24 hour   Intake 360 ml   Output --   Net 360 ml        Flowsheet Rows      First Filed Value   Admission Height  157.5 cm (62\") Documented at 04/23/2019 0752   Admission Weight  73.1 kg (161 lb 2.5 oz) Documented at 04/23/2019 0752          Physical Exam:    General: Alert and oriented.   Cardiovascular: Heart has a nondisplaced focal PMI. Regular rate and rhythm with 2-3/6 SE murmur, no gallop or rub.  Lungs: Clear without rales or wheezes. Equal expansion is noted.   Abdomen: Soft, nontender.  Extremities: Show no edema. No right femoral bruit  Skin: warm and dry.     Results Review:    I reviewed the patient's new clinical results.  EKG:  Tele: Normal sinus rhythm    Labs:    Results from last 7 days   Lab Units 04/24/19  0339   SODIUM mmol/L 143   POTASSIUM " mmol/L 3.7   CHLORIDE mmol/L 107   CO2 mmol/L 24.0   BUN mg/dL 16   CREATININE mg/dL 0.78   CALCIUM mg/dL 8.1*   GLUCOSE mg/dL 96           Invalid input(s): NEUTOPHILPCT,  EOSPCT  Lab Results   Component Value Date    TROPONINT <0.010 04/23/2019    TROPONINT 0.017 04/09/2019     Lab Results   Component Value Date    CHOL 206 (H) 04/23/2019    CHOL 261 (H) 05/16/2017     Lab Results   Component Value Date    TRIG 278 (H) 04/23/2019    TRIG 478 (H) 11/26/2018    TRIG 311 (H) 04/20/2018     Lab Results   Component Value Date    HDL 30 (L) 04/23/2019    HDL 41 11/26/2018    HDL 35 (L) 04/20/2018     No components found for: LDLCALC  Lab Results   Component Value Date    INR 1.13 05/25/2017    INR 1.27 05/24/2017    INR 1.01 05/23/2017    PROTIME 12.4 (H) 05/25/2017    PROTIME 14.0 (H) 05/24/2017    PROTIME 11.0 05/23/2017         Ejection Fraction:    Assessment   Assessment:  1.  Severe aortic stenosis.  TAVR work-up in process  2.  Coronary artery disease with previous bypass grafting 5/27/2017.  Cardiac catheterization 4/23/2019 showed an occluded sequential limb to the PLB of the SVG to the PDA and PLB.  Now status post rotational atherectomy and stent placement to the posterolateral branch as well as balloon angioplasty to the anastomotic site of the SVG to the PDA.  3.  Hypertension  4.  Dyslipidemia      Plan:    Home today on all of her usual medications plus Plavix 75 mg daily.  The patient was given a prescription to take to [facility] but 30 days will be called into [facility].    Anushka Hankins MD  04/24/19  7:08 AM

## 2019-04-24 NOTE — PLAN OF CARE
Problem: Cardiac Catheterization (Diagnostic/Interventional) (Adult)  Goal: Signs and Symptoms of Listed Potential Problems Will be Absent, Minimized or Managed (Cardiac Catheterization)  Outcome: Ongoing (interventions implemented as appropriate)    Goal: Anesthesia/Sedation Recovery  Outcome: Outcome(s) achieved Date Met: 04/23/19

## 2019-04-24 NOTE — PROGRESS NOTES
Patient qualifies for Phase II Cardiac Rehab.  She will be returning for TAVR in the future.  Staff will follow up with patient after her surgery.

## 2019-04-24 NOTE — OUTREACH NOTE
CHF Week 2 Survey      Responses   Facility patient discharged from?  [place]   Does the patient have one of the following disease processes/diagnoses(primary or secondary)?  CHF   Week 2 attempt successful?  No   Revoke  Readmitted          Porsche Cox RN

## 2019-04-25 LAB
ACT BLD: 158 SECONDS (ref 82–152)
ACT BLD: 169 SECONDS (ref 82–152)
ACT BLD: 191 SECONDS (ref 82–152)
ACT BLD: 219 SECONDS (ref 82–152)

## 2019-04-26 ENCOUNTER — PATIENT OUTREACH (OUTPATIENT)
Dept: CASE MANAGEMENT | Facility: OTHER | Age: 79
End: 2019-04-26

## 2019-04-26 NOTE — OUTREACH NOTE
Patient Outreach Note  Talked with patient's spouse. Patient lives with spouse; independent with ADL's; receiving assistance as needed with activities and transportation. He states patient is compliant with medications; medical appointments;monitoring of blood pressure; blood sugars and daily weights. He states blood pressure values are WNL's and blood sugar between 115 to 130. He states patient has appointment with Dr. Gordon 4/30/19 and TAVR scheduled for 5/13/19.  Reviewed with patient's spouse 24/7 Nurse Line Telephone number; [place] contact information; Advance Directives (states to have information); My Chart( Active); gaps in care (addressed); Medicare Annual Wellness Visit (states scheduled for 8/12/19) and Care Advising program. He verbalized understanding. He appreciated phone call and states to decline to participate. No further questions or concerns voiced at this time.       Queenie Castillo RN    4/26/2019, 4:20 PM

## 2019-04-30 ENCOUNTER — OFFICE VISIT (OUTPATIENT)
Dept: CARDIAC SURGERY | Facility: CLINIC | Age: 79
End: 2019-04-30

## 2019-04-30 VITALS
WEIGHT: 159 LBS | SYSTOLIC BLOOD PRESSURE: 125 MMHG | HEIGHT: 62 IN | DIASTOLIC BLOOD PRESSURE: 60 MMHG | HEART RATE: 83 BPM | TEMPERATURE: 98.6 F | BODY MASS INDEX: 29.26 KG/M2 | OXYGEN SATURATION: 97 %

## 2019-04-30 DIAGNOSIS — I35.0 AORTIC STENOSIS, SEVERE: Primary | ICD-10-CM

## 2019-04-30 PROCEDURE — 99215 OFFICE O/P EST HI 40 MIN: CPT | Performed by: THORACIC SURGERY (CARDIOTHORACIC VASCULAR SURGERY)

## 2019-04-30 NOTE — PROGRESS NOTES
04/30/2019  Patient Information  Nancy Goodman                                                                                          [address] [facility] [place] [address]   1940  'PCP/Referring Physician'  Wayne Jorge MD  [phone]  Minerva Arvizu, APRN  [phone]  Chief Complaint   Patient presents with   • New Patient     referred by Minerva Arvizu for possible TAVR   • Coronary Artery Disease   • Aortic Valve Disease       History of Present Illness: 78-year-old  female with a history of hypertension, hyperlipidemia, diabetes mellitus and coronary artery disease status post CABG who presents with shortness of breath.  Over the past several years, the patient notes worsening fatigue.  She does have associated shortness of breath with exertion, such as walking 2 blocks.  The patient denies chest pain or syncope.      Patient Active Problem List   Diagnosis   • Type 2 diabetes mellitus without complication, with long-term current use of insulin (CMS/HCC)   • Mixed hyperlipidemia   • Essential hypertension   • Vitamin D deficiency   • CAD (coronary artery disease)   • Aortic stenosis, severe   • S/P CABG (coronary artery bypass graft)   • Pulmonary edema   • Diastolic CHF (CMS/HCC)   • Coronary artery disease involving native coronary artery of native heart without angina pectoris     Past Medical History:   Diagnosis Date   • Allergic rhinitis    • Arthritis    • Cellulitis of finger    • Diabetes mellitus (CMS/HCC)     dx 12 years ago- checks fsbs daily   • Dizziness    • Edema    • GERD (gastroesophageal reflux disease)    • Hyperlipidemia    • Hypertension    • Ingrown nail    • PAT (paroxysmal atrial tachycardia) (CMS/HCC)    • Rosacea    • Vitamin D deficiency    • Wears dentures    • Wears eyeglasses    • Wears partial dentures      Past Surgical History:   Procedure Laterality Date   • CARDIAC CATHETERIZATION N/A 5/16/2017    Procedure: Left Heart Cath;  Surgeon:  Fidel Valdez MD;  Location:  [facility];  Service:    • CARDIAC CATHETERIZATION Left 4/23/2019    Procedure: Cardiac Catheterization/Vascular Study;  Surgeon: Anushka Hankins MD;  Location:  [facility];  Service: Cardiovascular   • CORONARY ARTERY BYPASS GRAFT N/A 5/24/2017    Procedure: CORONARY ARTERY BYPASS x  5 WITH INTERNAL MAMMARY ARTERY GRAFT and EVH of the Right leg;  Surgeon: Fidel Calvillo MD;  Location:  [facility] OR;  Service:    • FINGER NAIL SURGERY     • TUBAL ABDOMINAL LIGATION     • VARICOSE VEIN SURGERY         Current Outpatient Medications:   •  aspirin 81 MG EC tablet, Take 81 mg by mouth Every Morning., Disp: , Rfl:   •  Cholecalciferol (VITAMIN D) 2000 units capsule, Take 4,000 Units by mouth Daily., Disp: , Rfl:   •  clopidogrel (PLAVIX) 75 MG tablet, Take 1 tablet by mouth Daily., Disp: 30 tablet, Rfl: 11  •  CRANBERRY PO, Take 1 tablet by mouth Daily., Disp: , Rfl:   •  furosemide (LASIX) 20 MG tablet, Take 1 tablet by mouth Daily. (Patient taking differently: Take 20 mg by mouth Every Morning.), Disp: 90 tablet, Rfl: 1  •  glucose blood test strip, Check QD and PRN, Disp: 100 each, Rfl: 5  •  Insulin Glargine (LANTUS SOLOSTAR) 100 UNIT/ML injection pen, Inject 40 Units under the skin into the appropriate area as directed Every Morning. (Patient taking differently: Inject 40 Units under the skin into the appropriate area as directed Every Night.), Disp: 15 pen, Rfl: 1  •  Insulin Pen Needle (B-D UF III MINI PEN NEEDLES) 31G X 5 MM misc, Use daily as directed with insulin., Disp: 100 each, Rfl: 3  •  irbesartan (AVAPRO) 75 MG tablet, Take 2 tablets by mouth Every Night., Disp: 30 tablet, Rfl: 1  •  KLOR-CON 10 MEQ CR tablet, AT THE START OF THERAPY, TAKE 2 TABLETS DAILY FOR 2 WEEKS, THEN DECREASE TO 1 TABLET DAILY THEREAFTER (Patient taking differently: Take one tablet by mouth daily), Disp: 90 tablet, Rfl: 3  •  Lancets (FREESTYLE) lancets,  Check QD and PRN, Disp: 300 each, Rfl: 11  •  metoprolol tartrate (LOPRESSOR) 25 MG tablet, Take 1 tablet by mouth 2 (Two) Times a Day., Disp: 60 tablet, Rfl: 0  •  Omega-3 Fatty Acids (SALMON OIL PO), Take 1,200 mg by mouth 2 (Two) Times a Day., Disp: , Rfl:   •  omeprazole (priLOSEC) 40 MG capsule, TAKE 1 CAPSULE DAILY (Patient taking differently: TAKE 1 CAPSULE qhs), Disp: 90 capsule, Rfl: 1  •  Probiotic Product (PROBIOTIC ADVANCED PO), Take 1 tablet by mouth Every Night., Disp: , Rfl:   •  Ubiquinol 100 MG capsule, Take 100 mg by mouth Daily., Disp: , Rfl:   Allergies   Allergen Reactions   • Amlodipine      sickness   • Ciprofloxacin      Pt not sure of reaction (stomach problems)   • Statins Nausea Only     Stomach problem     Social History     Socioeconomic History   • Marital status:      Spouse name: Not on file   • Number of children: 6   • Years of education: Not on file   • Highest education level: Not on file   Occupational History     Employer: RETIRED   Tobacco Use   • Smoking status: Never Smoker   • Smokeless tobacco: Never Used   Substance and Sexual Activity   • Alcohol use: No   • Drug use: No   • Sexual activity: Defer     Comment:    Social History Narrative    ** Merged History Encounter **         Patient consumes 1 cup hot chocolate daily.     Patient lives at home with .     Lives in [place], [place]     Family History   Problem Relation Age of Onset   • Cancer Mother         Lung cancer     Review of Systems   Constitution: Positive for malaise/fatigue. Negative for chills, diaphoresis, fever, weakness, night sweats, weight gain and weight loss.   HENT: Negative for congestion, ear pain, hearing loss, nosebleeds and sore throat.    Eyes: Negative for blurred vision and double vision.   Cardiovascular: Positive for dyspnea on exertion. Negative for chest pain, claudication, leg swelling, near-syncope, palpitations and syncope.   Respiratory: Negative for cough,  "hemoptysis, shortness of breath and wheezing.    Hematologic/Lymphatic: Does not bruise/bleed easily.   Skin: Negative for itching, poor wound healing and rash.   Musculoskeletal: Positive for back pain, muscle cramps and neck pain. Negative for arthritis, falls, joint pain, joint swelling and muscle weakness.   Gastrointestinal: Negative for abdominal pain, constipation, diarrhea, dysphagia, hematochezia, nausea and vomiting.   Genitourinary: Negative for frequency, hematuria, hesitancy, incomplete emptying and urgency.   Neurological: Positive for dizziness and loss of balance. Negative for headaches, light-headedness, numbness, seizures and tremors.   Psychiatric/Behavioral: Negative for depression and suicidal ideas. The patient is not nervous/anxious.    Allergic/Immunologic: Positive for environmental allergies. Negative for HIV exposure.     Vitals:    04/30/19 0934   BP: 125/60   BP Location: Right arm   Patient Position: Sitting   Pulse: 83   Temp: 98.6 °F (37 °C)   SpO2: 97%   Weight: 72.1 kg (159 lb)   Height: 157.5 cm (62\")      Physical Exam   Constitutional: She is oriented to person, place, and time. She appears well-developed and well-nourished. No distress.    female who appears younger than stated age.  She is present with her .   HENT:   Head: Normocephalic and atraumatic.   Eyes: Conjunctivae are normal. No scleral icterus.   Neck: Normal range of motion. No JVD present. Carotid bruit is not present. No tracheal deviation present.   Cardiovascular: Normal rate and regular rhythm. Exam reveals no gallop and no friction rub.   Murmur heard.  III/VI systolic ejection murmur at the left sternal border   Pulmonary/Chest: Effort normal and breath sounds normal. No stridor. No respiratory distress. She has no wheezes. She has no rales.   Abdominal: Soft. She exhibits no distension and no mass. There is no tenderness. There is no rebound and no guarding.   Musculoskeletal: Normal range of " motion. She exhibits edema.   Trace pedal edema bilaterally.   Neurological: She is alert and oriented to person, place, and time.   Skin: Skin is warm and dry. No rash noted. She is not diaphoretic. No erythema.   Psychiatric: She has a normal mood and affect. Her behavior is normal. Judgment and thought content normal.       Labs/Imaging:  -Cardiac catheterization performed 4/23/2019, personally reviewed, demonstrates 50% distal left main coronary artery stenosis, 50 to 60% circumflex stenosis after the second obtuse marginal, mid LAD subtotal occlusion, 90% stenosis of the LAD after the third diagonal branch, PDA occlusion and 90% mid posterior lateral branch stenosis.  The graft to the PDA is patent with an distal anastomotic stenosis of 90%.  The sequential to the posterior lateral branch is occluded.  The saphenous vein graft to the diagonal and circumflex is patent.  The LIMA to LAD graft is patent.  -Transesophageal echocardiogram performed 4/23/2019, personally reviewed, demonstrates ejection fraction of 60%, severe aortic stenosis with a valve area of 0.44 cm² and peak velocity of 377.1 cm/s.  There is mild to moderate mitral regurgitation and trace tricuspid regurgitation present.  - Carotid duplex performed 4/10/2019, demonstrates 50 to 69% right internal carotid artery stenosis with a peak systolic velocity of the internal carotid artery 130 cm/s with an ICA to CCA ratio of 1.  There is 0 to 49% left internal carotid artery stenosis.  Antegrade vertebral flow is present bilaterally.  -CT TAVR performed 4/10/2019, personally reviewed, demonstrates scattered aortoiliac disease.    Assessment/Plan:  78-year-old  female with a history of hypertension, hyperlipidemia, diabetes mellitus and coronary artery disease status post CABG who presents with fatigue and shortness of breath.  She has severe symptomatic aortic stenosis.  Her calculated STS risk of mortality with surgical aortic valve replacement  is 4.2%, placing her in the intermediate risk category.  The patient is a reasonable candidate for transcatheter aortic valve replacement.  I do feel that she is a high risk candidate for surgical aortic valve replacement given her age and dependency on patent bypass grafts.  The risks and benefits of TAVR were discussed with the patient and her  including pain, bleeding, infection, renal failure, stroke, heart block requiring a pacemaker and death.  The patient understood these risks and wished to proceed with surgery.    Patient Active Problem List   Diagnosis   • Type 2 diabetes mellitus without complication, with long-term current use of insulin (CMS/[facility])   • Mixed hyperlipidemia   • Essential hypertension   • Vitamin D deficiency   • CAD (coronary artery disease)   • Aortic stenosis, severe   • S/P CABG (coronary artery bypass graft)   • Pulmonary edema   • Diastolic CHF (CMS/[facility])   • Coronary artery disease involving native coronary artery of native heart without angina pectoris

## 2019-05-06 ENCOUNTER — PREP FOR SURGERY (OUTPATIENT)
Dept: OTHER | Facility: HOSPITAL | Age: 79
End: 2019-05-06

## 2019-05-06 DIAGNOSIS — I35.0 SEVERE AORTIC STENOSIS: Primary | ICD-10-CM

## 2019-05-06 DIAGNOSIS — I35.9 AVD (AORTIC VALVE DISEASE): Primary | ICD-10-CM

## 2019-05-06 RX ORDER — CHLORHEXIDINE GLUCONATE 500 MG/1
1 CLOTH TOPICAL EVERY 12 HOURS PRN
Status: CANCELLED | OUTPATIENT
Start: 2019-05-13

## 2019-05-06 RX ORDER — CHLORHEXIDINE GLUCONATE 500 MG/1
1 CLOTH TOPICAL EVERY 12 HOURS PRN
Status: CANCELLED | OUTPATIENT
Start: 2019-05-12

## 2019-05-06 RX ORDER — NITROGLYCERIN 0.4 MG/1
0.4 TABLET SUBLINGUAL
Status: CANCELLED | OUTPATIENT
Start: 2019-05-13

## 2019-05-06 RX ORDER — ACETAMINOPHEN 325 MG/1
650 TABLET ORAL EVERY 4 HOURS PRN
Status: CANCELLED | OUTPATIENT
Start: 2019-05-13

## 2019-05-06 RX ORDER — ASPIRIN 325 MG
325 TABLET ORAL NIGHTLY
Status: CANCELLED | OUTPATIENT
Start: 2019-05-12 | End: 2019-05-13

## 2019-05-06 RX ORDER — CHLORHEXIDINE GLUCONATE 0.12 MG/ML
15 RINSE ORAL ONCE
Status: CANCELLED | OUTPATIENT
Start: 2019-05-13 | End: 2019-05-13

## 2019-05-12 ENCOUNTER — APPOINTMENT (OUTPATIENT)
Dept: PREADMISSION TESTING | Facility: HOSPITAL | Age: 79
End: 2019-05-12

## 2019-05-12 ENCOUNTER — ANESTHESIA EVENT (OUTPATIENT)
Dept: PERIOP | Facility: HOSPITAL | Age: 79
End: 2019-05-12

## 2019-05-12 ENCOUNTER — HOSPITAL ENCOUNTER (OUTPATIENT)
Dept: GENERAL RADIOLOGY | Facility: HOSPITAL | Age: 79
Discharge: HOME | End: 2019-05-12
Admitting: PHYSICIAN ASSISTANT

## 2019-05-12 ENCOUNTER — HOSPITAL ENCOUNTER (OUTPATIENT)
Dept: PULMONOLOGY | Facility: HOSPITAL | Age: 79
Discharge: HOME | End: 2019-05-12

## 2019-05-12 VITALS — BODY MASS INDEX: 28.52 KG/M2 | HEIGHT: 63 IN | WEIGHT: 160.94 LBS

## 2019-05-12 DIAGNOSIS — I35.9 AVD (AORTIC VALVE DISEASE): ICD-10-CM

## 2019-05-12 LAB
ABO GROUP BLD: NORMAL
ALBUMIN SERPL-MCNC: 4.3 G/DL (ref 3.5–5.2)
ALBUMIN/GLOB SERPL: 1.5 G/DL
ALP SERPL-CCNC: 78 U/L (ref 39–117)
ALT SERPL W P-5'-P-CCNC: 17 U/L (ref 1–33)
AMPHET+METHAMPHET UR QL: NEGATIVE
AMPHETAMINES UR QL: NEGATIVE
ANION GAP SERPL CALCULATED.3IONS-SCNC: 11 MMOL/L
APTT PPP: 30.4 SECONDS (ref 24–37)
AST SERPL-CCNC: 16 U/L (ref 1–32)
BARBITURATES UR QL SCN: NEGATIVE
BASOPHILS # BLD AUTO: 0.02 10*3/MM3 (ref 0–0.2)
BASOPHILS NFR BLD AUTO: 0.3 % (ref 0–1.5)
BENZODIAZ UR QL SCN: NEGATIVE
BILIRUB SERPL-MCNC: 0.4 MG/DL (ref 0.2–1.2)
BLD GP AB SCN SERPL QL: NEGATIVE
BUN BLD-MCNC: 19 MG/DL (ref 8–23)
BUN/CREAT SERPL: 17.4 (ref 7–25)
BUPRENORPHINE SERPL-MCNC: NEGATIVE NG/ML
CALCIUM SPEC-SCNC: 9.9 MG/DL (ref 8.6–10.5)
CANNABINOIDS SERPL QL: NEGATIVE
CHLORIDE SERPL-SCNC: 102 MMOL/L (ref 98–107)
CO2 SERPL-SCNC: 28 MMOL/L (ref 22–29)
COCAINE UR QL: NEGATIVE
CREAT BLD-MCNC: 1.09 MG/DL (ref 0.57–1)
DEPRECATED RDW RBC AUTO: 50 FL (ref 37–54)
EOSINOPHIL # BLD AUTO: 0.36 10*3/MM3 (ref 0–0.4)
EOSINOPHIL NFR BLD AUTO: 5.4 % (ref 0.3–6.2)
ERYTHROCYTE [DISTWIDTH] IN BLOOD BY AUTOMATED COUNT: 14.2 % (ref 12.3–15.4)
GFR SERPL CREATININE-BSD FRML MDRD: 48 ML/MIN/1.73
GLOBULIN UR ELPH-MCNC: 2.9 GM/DL
GLUCOSE BLD-MCNC: 117 MG/DL (ref 65–99)
HBA1C MFR BLD: 7.3 % (ref 4.8–5.6)
HCT VFR BLD AUTO: 38.9 % (ref 34–46.6)
HGB BLD-MCNC: 12.6 G/DL (ref 12–15.9)
IMM GRANULOCYTES # BLD AUTO: 0.01 10*3/MM3 (ref 0–0.05)
IMM GRANULOCYTES NFR BLD AUTO: 0.2 % (ref 0–0.5)
INR PPP: 0.96 (ref 0.85–1.16)
LYMPHOCYTES # BLD AUTO: 2.08 10*3/MM3 (ref 0.7–3.1)
LYMPHOCYTES NFR BLD AUTO: 31.3 % (ref 19.6–45.3)
MAGNESIUM SERPL-MCNC: 2.1 MG/DL (ref 1.6–2.4)
MCH RBC QN AUTO: 31.1 PG (ref 26.6–33)
MCHC RBC AUTO-ENTMCNC: 32.4 G/DL (ref 31.5–35.7)
MCV RBC AUTO: 96 FL (ref 79–97)
METHADONE UR QL SCN: NEGATIVE
MONOCYTES # BLD AUTO: 0.5 10*3/MM3 (ref 0.1–0.9)
MONOCYTES NFR BLD AUTO: 7.5 % (ref 5–12)
NEUTROPHILS # BLD AUTO: 3.69 10*3/MM3 (ref 1.7–7)
NEUTROPHILS NFR BLD AUTO: 55.5 % (ref 42.7–76)
OPIATES UR QL: NEGATIVE
OXYCODONE UR QL SCN: NEGATIVE
PA ADP PRP-ACNC: 195 PRU
PCP UR QL SCN: NEGATIVE
PLATELET # BLD AUTO: 221 10*3/MM3 (ref 140–450)
PMV BLD AUTO: 10 FL (ref 6–12)
POTASSIUM BLD-SCNC: 4.2 MMOL/L (ref 3.5–5.2)
PROPOXYPH UR QL: NEGATIVE
PROT SERPL-MCNC: 7.2 G/DL (ref 6–8.5)
PROTHROMBIN TIME: 12.3 SECONDS (ref 11.2–14.3)
RBC # BLD AUTO: 4.05 10*6/MM3 (ref 3.77–5.28)
RH BLD: POSITIVE
SODIUM BLD-SCNC: 141 MMOL/L (ref 136–145)
T&S EXPIRATION DATE: NORMAL
TRICYCLICS UR QL SCN: NEGATIVE
WBC NRBC COR # BLD: 6.65 10*3/MM3 (ref 3.4–10.8)

## 2019-05-12 PROCEDURE — 85025 COMPLETE CBC W/AUTO DIFF WBC: CPT | Performed by: PHYSICIAN ASSISTANT

## 2019-05-12 PROCEDURE — 71046 X-RAY EXAM CHEST 2 VIEWS: CPT

## 2019-05-12 PROCEDURE — 86923 COMPATIBILITY TEST ELECTRIC: CPT

## 2019-05-12 PROCEDURE — 85576 BLOOD PLATELET AGGREGATION: CPT | Performed by: PHYSICIAN ASSISTANT

## 2019-05-12 PROCEDURE — 86900 BLOOD TYPING SEROLOGIC ABO: CPT | Performed by: PHYSICIAN ASSISTANT

## 2019-05-12 PROCEDURE — 86901 BLOOD TYPING SEROLOGIC RH(D): CPT | Performed by: PHYSICIAN ASSISTANT

## 2019-05-12 PROCEDURE — 94010 BREATHING CAPACITY TEST: CPT | Performed by: INTERNAL MEDICINE

## 2019-05-12 PROCEDURE — 86850 RBC ANTIBODY SCREEN: CPT | Performed by: PHYSICIAN ASSISTANT

## 2019-05-12 PROCEDURE — 83735 ASSAY OF MAGNESIUM: CPT | Performed by: PHYSICIAN ASSISTANT

## 2019-05-12 PROCEDURE — 36415 COLL VENOUS BLD VENIPUNCTURE: CPT

## 2019-05-12 PROCEDURE — 80306 DRUG TEST PRSMV INSTRMNT: CPT | Performed by: PHYSICIAN ASSISTANT

## 2019-05-12 PROCEDURE — 94010 BREATHING CAPACITY TEST: CPT

## 2019-05-12 PROCEDURE — 80053 COMPREHEN METABOLIC PANEL: CPT | Performed by: PHYSICIAN ASSISTANT

## 2019-05-12 PROCEDURE — 85610 PROTHROMBIN TIME: CPT | Performed by: PHYSICIAN ASSISTANT

## 2019-05-12 PROCEDURE — 85730 THROMBOPLASTIN TIME PARTIAL: CPT | Performed by: PHYSICIAN ASSISTANT

## 2019-05-12 PROCEDURE — 83036 HEMOGLOBIN GLYCOSYLATED A1C: CPT | Performed by: PHYSICIAN ASSISTANT

## 2019-05-12 RX ORDER — FUROSEMIDE 40 MG/1
40 TABLET ORAL DAILY
COMMUNITY
End: 2019-05-30

## 2019-05-12 RX ORDER — ASPIRIN 325 MG
325 TABLET ORAL NIGHTLY
Status: SHIPPED | OUTPATIENT
Start: 2019-05-12 | End: 2019-05-13

## 2019-05-12 RX ORDER — POTASSIUM CHLORIDE 750 MG/1
10 TABLET, EXTENDED RELEASE ORAL DAILY
COMMUNITY
End: 2021-02-23 | Stop reason: SDUPTHER

## 2019-05-12 RX ORDER — CHLORHEXIDINE GLUCONATE 500 MG/1
1 CLOTH TOPICAL EVERY 12 HOURS PRN
Status: DISCONTINUED | OUTPATIENT
Start: 2019-05-12 | End: 2021-02-23

## 2019-05-13 ENCOUNTER — ANESTHESIA (OUTPATIENT)
Dept: PERIOP | Facility: HOSPITAL | Age: 79
End: 2019-05-13

## 2019-05-13 ENCOUNTER — ANCILLARY PROCEDURE (OUTPATIENT)
Dept: PERIOP | Facility: HOSPITAL | Age: 79
End: 2019-05-13

## 2019-05-13 ENCOUNTER — HOSPITAL ENCOUNTER (INPATIENT)
Facility: HOSPITAL | Age: 79
LOS: 2 days | Discharge: HOME | End: 2019-05-15
Attending: THORACIC SURGERY (CARDIOTHORACIC VASCULAR SURGERY) | Admitting: THORACIC SURGERY (CARDIOTHORACIC VASCULAR SURGERY)

## 2019-05-13 ENCOUNTER — APPOINTMENT (OUTPATIENT)
Dept: GENERAL RADIOLOGY | Facility: HOSPITAL | Age: 79
End: 2019-05-13

## 2019-05-13 DIAGNOSIS — I35.0 SEVERE AORTIC STENOSIS: ICD-10-CM

## 2019-05-13 DIAGNOSIS — Z74.09 IMPAIRED FUNCTIONAL MOBILITY, BALANCE, GAIT, AND ENDURANCE: Primary | ICD-10-CM

## 2019-05-13 DIAGNOSIS — I35.9 AVD (AORTIC VALVE DISEASE): ICD-10-CM

## 2019-05-13 LAB
ACT BLD: 109 SECONDS (ref 82–152)
ACT BLD: 136 SECONDS (ref 82–152)
ACT BLD: 147 SECONDS (ref 82–152)
ACT BLD: 153 SECONDS (ref 82–152)
ACT BLD: 389 SECONDS (ref 82–152)
ALBUMIN SERPL-MCNC: 3.1 G/DL (ref 3.5–5.2)
ALBUMIN SERPL-MCNC: 3.4 G/DL (ref 3.5–5.2)
ANION GAP SERPL CALCULATED.3IONS-SCNC: 13 MMOL/L
ANION GAP SERPL CALCULATED.3IONS-SCNC: 13 MMOL/L
APTT PPP: 99.6 SECONDS (ref 24–37)
BASE EXCESS BLDA CALC-SCNC: -2 MMOL/L (ref -5–5)
BASE EXCESS BLDA CALC-SCNC: 2 MMOL/L (ref -5–5)
BASE EXCESS BLDA CALC-SCNC: 4 MMOL/L (ref -5–5)
BUN BLD-MCNC: 16 MG/DL (ref 8–23)
BUN BLD-MCNC: 18 MG/DL (ref 8–23)
BUN/CREAT SERPL: 21.3 (ref 7–25)
BUN/CREAT SERPL: 25.4 (ref 7–25)
CA-I BLDA-SCNC: 1.09 MMOL/L (ref 1.2–1.32)
CA-I BLDA-SCNC: 1.15 MMOL/L (ref 1.2–1.32)
CA-I BLDA-SCNC: 1.18 MMOL/L (ref 1.2–1.32)
CA-I SERPL ISE-MCNC: 1.25 MMOL/L (ref 1.12–1.32)
CALCIUM SPEC-SCNC: 8.4 MG/DL (ref 8.6–10.5)
CALCIUM SPEC-SCNC: 8.4 MG/DL (ref 8.6–10.5)
CHLORIDE SERPL-SCNC: 105 MMOL/L (ref 98–107)
CHLORIDE SERPL-SCNC: 108 MMOL/L (ref 98–107)
CO2 BLDA-SCNC: 24 MMOL/L (ref 24–29)
CO2 BLDA-SCNC: 27 MMOL/L (ref 24–29)
CO2 BLDA-SCNC: 29 MMOL/L (ref 24–29)
CO2 SERPL-SCNC: 22 MMOL/L (ref 22–29)
CO2 SERPL-SCNC: 23 MMOL/L (ref 22–29)
CREAT BLD-MCNC: 0.71 MG/DL (ref 0.57–1)
CREAT BLD-MCNC: 0.75 MG/DL (ref 0.57–1)
DEPRECATED RDW RBC AUTO: 48.8 FL (ref 37–54)
DEPRECATED RDW RBC AUTO: 49.9 FL (ref 37–54)
ERYTHROCYTE [DISTWIDTH] IN BLOOD BY AUTOMATED COUNT: 14.2 % (ref 12.3–15.4)
ERYTHROCYTE [DISTWIDTH] IN BLOOD BY AUTOMATED COUNT: 14.3 % (ref 12.3–15.4)
GFR SERPL CREATININE-BSD FRML MDRD: 75 ML/MIN/1.73
GFR SERPL CREATININE-BSD FRML MDRD: 79 ML/MIN/1.73
GLUCOSE BLD-MCNC: 161 MG/DL (ref 65–99)
GLUCOSE BLD-MCNC: 185 MG/DL (ref 65–99)
GLUCOSE BLDC GLUCOMTR-MCNC: 143 MG/DL (ref 70–130)
GLUCOSE BLDC GLUCOMTR-MCNC: 143 MG/DL (ref 70–130)
GLUCOSE BLDC GLUCOMTR-MCNC: 168 MG/DL (ref 70–130)
GLUCOSE BLDC GLUCOMTR-MCNC: 173 MG/DL (ref 70–130)
GLUCOSE BLDC GLUCOMTR-MCNC: 191 MG/DL (ref 70–130)
HCO3 BLDA-SCNC: 23.1 MMOL/L (ref 22–26)
HCO3 BLDA-SCNC: 26.1 MMOL/L (ref 22–26)
HCO3 BLDA-SCNC: 27.4 MMOL/L (ref 22–26)
HCT VFR BLD AUTO: 31.7 % (ref 34–46.6)
HCT VFR BLD AUTO: 33.4 % (ref 34–46.6)
HCT VFR BLDA CALC: 23 % (ref 38–51)
HCT VFR BLDA CALC: 29 % (ref 38–51)
HCT VFR BLDA CALC: 34 % (ref 38–51)
HGB BLD-MCNC: 10.4 G/DL (ref 12–15.9)
HGB BLD-MCNC: 10.9 G/DL (ref 12–15.9)
HGB BLDA-MCNC: 11.6 G/DL (ref 12–17)
HGB BLDA-MCNC: 7.8 G/DL (ref 12–17)
HGB BLDA-MCNC: 9.9 G/DL (ref 12–17)
INR PPP: 1.17 (ref 0.85–1.16)
MAGNESIUM SERPL-MCNC: 1.6 MG/DL (ref 1.6–2.4)
MAGNESIUM SERPL-MCNC: 1.6 MG/DL (ref 1.6–2.4)
MCH RBC QN AUTO: 30.9 PG (ref 26.6–33)
MCH RBC QN AUTO: 31.1 PG (ref 26.6–33)
MCHC RBC AUTO-ENTMCNC: 32.6 G/DL (ref 31.5–35.7)
MCHC RBC AUTO-ENTMCNC: 32.8 G/DL (ref 31.5–35.7)
MCV RBC AUTO: 94.1 FL (ref 79–97)
MCV RBC AUTO: 95.2 FL (ref 79–97)
NT-PROBNP SERPL-MCNC: 340.5 PG/ML (ref 5–1800)
PCO2 BLDA: 37.6 MM HG (ref 35–45)
PCO2 BLDA: 37.9 MM HG (ref 35–45)
PCO2 BLDA: 38.1 MM HG (ref 35–45)
PH BLDA: 7.39 PH UNITS (ref 7.35–7.6)
PH BLDA: 7.45 PH UNITS (ref 7.35–7.6)
PH BLDA: 7.46 PH UNITS (ref 7.35–7.6)
PHOSPHATE SERPL-MCNC: 2.6 MG/DL (ref 2.5–4.5)
PHOSPHATE SERPL-MCNC: 3.2 MG/DL (ref 2.5–4.5)
PLATELET # BLD AUTO: 149 10*3/MM3 (ref 140–450)
PLATELET # BLD AUTO: 159 10*3/MM3 (ref 140–450)
PMV BLD AUTO: 10.2 FL (ref 6–12)
PMV BLD AUTO: 9.7 FL (ref 6–12)
PO2 BLDA: 157 MMHG (ref 80–105)
PO2 BLDA: 378 MMHG (ref 80–105)
PO2 BLDA: 460 MMHG (ref 80–105)
POTASSIUM BLD-SCNC: 3.5 MMOL/L (ref 3.5–5.2)
POTASSIUM BLD-SCNC: 3.9 MMOL/L (ref 3.5–5.2)
POTASSIUM BLDA-SCNC: 3.1 MMOL/L (ref 3.5–4.9)
POTASSIUM BLDA-SCNC: 3.5 MMOL/L (ref 3.5–4.9)
POTASSIUM BLDA-SCNC: 3.7 MMOL/L (ref 3.5–4.9)
PROTHROMBIN TIME: 14.3 SECONDS (ref 11.2–14.3)
RBC # BLD AUTO: 3.37 10*6/MM3 (ref 3.77–5.28)
RBC # BLD AUTO: 3.51 10*6/MM3 (ref 3.77–5.28)
SAO2 % BLDA: 100 % (ref 95–98)
SAO2 % BLDA: 100 % (ref 95–98)
SAO2 % BLDA: 99 % (ref 95–98)
SODIUM BLD-SCNC: 141 MMOL/L (ref 136–145)
SODIUM BLD-SCNC: 143 MMOL/L (ref 136–145)
SODIUM BLDA-SCNC: 139 MMOL/L (ref 138–146)
SODIUM BLDA-SCNC: 141 MMOL/L (ref 138–146)
SODIUM BLDA-SCNC: 141 MMOL/L (ref 138–146)
WBC NRBC COR # BLD: 6.64 10*3/MM3 (ref 3.4–10.8)
WBC NRBC COR # BLD: 7.05 10*3/MM3 (ref 3.4–10.8)

## 2019-05-13 PROCEDURE — 82330 ASSAY OF CALCIUM: CPT

## 2019-05-13 PROCEDURE — 93355 ECHO TRANSESOPHAGEAL (TEE): CPT

## 2019-05-13 PROCEDURE — 33361 REPLACE AORTIC VALVE PERQ: CPT | Performed by: THORACIC SURGERY (CARDIOTHORACIC VASCULAR SURGERY)

## 2019-05-13 PROCEDURE — 83735 ASSAY OF MAGNESIUM: CPT | Performed by: PHYSICIAN ASSISTANT

## 2019-05-13 PROCEDURE — C1769 GUIDE WIRE: HCPCS | Performed by: THORACIC SURGERY (CARDIOTHORACIC VASCULAR SURGERY)

## 2019-05-13 PROCEDURE — 93010 ELECTROCARDIOGRAM REPORT: CPT | Performed by: INTERNAL MEDICINE

## 2019-05-13 PROCEDURE — 84295 ASSAY OF SERUM SODIUM: CPT

## 2019-05-13 PROCEDURE — 93005 ELECTROCARDIOGRAM TRACING: CPT | Performed by: PHYSICIAN ASSISTANT

## 2019-05-13 PROCEDURE — 25010000002 PROTAMINE SULFATE PER 10 MG: Performed by: NURSE ANESTHETIST, CERTIFIED REGISTERED

## 2019-05-13 PROCEDURE — 85014 HEMATOCRIT: CPT

## 2019-05-13 PROCEDURE — 85610 PROTHROMBIN TIME: CPT | Performed by: PHYSICIAN ASSISTANT

## 2019-05-13 PROCEDURE — 25010000002 HEPARIN (PORCINE) PER 1000 UNITS: Performed by: THORACIC SURGERY (CARDIOTHORACIC VASCULAR SURGERY)

## 2019-05-13 PROCEDURE — 02RF38Z REPLACEMENT OF AORTIC VALVE WITH ZOOPLASTIC TISSUE, PERCUTANEOUS APPROACH: ICD-10-PCS | Performed by: THORACIC SURGERY (CARDIOTHORACIC VASCULAR SURGERY)

## 2019-05-13 PROCEDURE — 82947 ASSAY GLUCOSE BLOOD QUANT: CPT

## 2019-05-13 PROCEDURE — 25010000002 NEOSTIGMINE 10 MG/10ML SOLUTION: Performed by: NURSE ANESTHETIST, CERTIFIED REGISTERED

## 2019-05-13 PROCEDURE — 25010000002 PHENYLEPHRINE PER 1 ML: Performed by: NURSE ANESTHETIST, CERTIFIED REGISTERED

## 2019-05-13 PROCEDURE — C1760 CLOSURE DEV, VASC: HCPCS | Performed by: THORACIC SURGERY (CARDIOTHORACIC VASCULAR SURGERY)

## 2019-05-13 PROCEDURE — 83880 ASSAY OF NATRIURETIC PEPTIDE: CPT | Performed by: NURSE PRACTITIONER

## 2019-05-13 PROCEDURE — 82330 ASSAY OF CALCIUM: CPT | Performed by: PHYSICIAN ASSISTANT

## 2019-05-13 PROCEDURE — 84132 ASSAY OF SERUM POTASSIUM: CPT

## 2019-05-13 PROCEDURE — 85347 COAGULATION TIME ACTIVATED: CPT

## 2019-05-13 PROCEDURE — 25810000003 DEXTROSE 5 % WITH KCL 20 MEQ 20-5 MEQ/L-% SOLUTION: Performed by: PHYSICIAN ASSISTANT

## 2019-05-13 PROCEDURE — 5A1223Z PERFORMANCE OF CARDIAC PACING, CONTINUOUS: ICD-10-PCS | Performed by: THORACIC SURGERY (CARDIOTHORACIC VASCULAR SURGERY)

## 2019-05-13 PROCEDURE — B246ZZ4 ULTRASONOGRAPHY OF RIGHT AND LEFT HEART, TRANSESOPHAGEAL: ICD-10-PCS | Performed by: THORACIC SURGERY (CARDIOTHORACIC VASCULAR SURGERY)

## 2019-05-13 PROCEDURE — 25010000002 FENTANYL CITRATE (PF) 100 MCG/2ML SOLUTION: Performed by: NURSE ANESTHETIST, CERTIFIED REGISTERED

## 2019-05-13 PROCEDURE — 99233 SBSQ HOSP IP/OBS HIGH 50: CPT | Performed by: INTERNAL MEDICINE

## 2019-05-13 PROCEDURE — 85730 THROMBOPLASTIN TIME PARTIAL: CPT | Performed by: PHYSICIAN ASSISTANT

## 2019-05-13 PROCEDURE — 85027 COMPLETE CBC AUTOMATED: CPT | Performed by: PHYSICIAN ASSISTANT

## 2019-05-13 PROCEDURE — 82803 BLOOD GASES ANY COMBINATION: CPT

## 2019-05-13 PROCEDURE — 71045 X-RAY EXAM CHEST 1 VIEW: CPT

## 2019-05-13 PROCEDURE — B3101ZZ FLUOROSCOPY OF THORACIC AORTA USING LOW OSMOLAR CONTRAST: ICD-10-PCS | Performed by: THORACIC SURGERY (CARDIOTHORACIC VASCULAR SURGERY)

## 2019-05-13 PROCEDURE — 25010000002 CEFUROXIME: Performed by: PHYSICIAN ASSISTANT

## 2019-05-13 PROCEDURE — 80069 RENAL FUNCTION PANEL: CPT | Performed by: PHYSICIAN ASSISTANT

## 2019-05-13 PROCEDURE — C1894 INTRO/SHEATH, NON-LASER: HCPCS | Performed by: THORACIC SURGERY (CARDIOTHORACIC VASCULAR SURGERY)

## 2019-05-13 PROCEDURE — 25010000002 MORPHINE PER 10 MG: Performed by: THORACIC SURGERY (CARDIOTHORACIC VASCULAR SURGERY)

## 2019-05-13 PROCEDURE — 25010000002 HEPARIN (PORCINE) PER 1000 UNITS: Performed by: NURSE ANESTHETIST, CERTIFIED REGISTERED

## 2019-05-13 DEVICE — VLV HEART TRNSCATH SAPIEN3 23MM: Type: IMPLANTABLE DEVICE | Site: HEART | Status: FUNCTIONAL

## 2019-05-13 RX ORDER — BISACODYL 5 MG/1
10 TABLET, DELAYED RELEASE ORAL DAILY PRN
Status: DISCONTINUED | OUTPATIENT
Start: 2019-05-13 | End: 2019-05-15 | Stop reason: HOSPADM

## 2019-05-13 RX ORDER — PROMETHAZINE HYDROCHLORIDE 25 MG/1
25 TABLET ORAL ONCE AS NEEDED
Status: DISCONTINUED | OUTPATIENT
Start: 2019-05-13 | End: 2019-05-13

## 2019-05-13 RX ORDER — SODIUM CHLORIDE 0.9 % (FLUSH) 0.9 %
30 SYRINGE (ML) INJECTION ONCE AS NEEDED
Status: DISCONTINUED | OUTPATIENT
Start: 2019-05-13 | End: 2019-05-13

## 2019-05-13 RX ORDER — PANTOPRAZOLE SODIUM 40 MG/1
40 TABLET, DELAYED RELEASE ORAL
Status: DISCONTINUED | OUTPATIENT
Start: 2019-05-14 | End: 2019-05-15 | Stop reason: HOSPADM

## 2019-05-13 RX ORDER — ALBUMIN, HUMAN INJ 5% 5 %
500 SOLUTION INTRAVENOUS AS NEEDED
Status: DISCONTINUED | OUTPATIENT
Start: 2019-05-13 | End: 2019-05-15 | Stop reason: HOSPADM

## 2019-05-13 RX ORDER — NOREPINEPHRINE BIT/0.9 % NACL 8 MG/250ML
.02-.3 INFUSION BOTTLE (ML) INTRAVENOUS CONTINUOUS PRN
Status: DISCONTINUED | OUTPATIENT
Start: 2019-05-13 | End: 2019-05-15 | Stop reason: HOSPADM

## 2019-05-13 RX ORDER — LIDOCAINE HYDROCHLORIDE 10 MG/ML
0.5 INJECTION, SOLUTION EPIDURAL; INFILTRATION; INTRACAUDAL; PERINEURAL ONCE AS NEEDED
Status: COMPLETED | OUTPATIENT
Start: 2019-05-13 | End: 2019-05-13

## 2019-05-13 RX ORDER — PROTAMINE SULFATE 10 MG/ML
50 INJECTION, SOLUTION INTRAVENOUS ONCE
Status: DISCONTINUED | OUTPATIENT
Start: 2019-05-13 | End: 2019-05-13

## 2019-05-13 RX ORDER — PHENYLEPHRINE HCL IN 0.9% NACL 0.5 MG/5ML
.5-3 SYRINGE (ML) INTRAVENOUS CONTINUOUS PRN
Status: DISCONTINUED | OUTPATIENT
Start: 2019-05-13 | End: 2019-05-15 | Stop reason: HOSPADM

## 2019-05-13 RX ORDER — SODIUM CHLORIDE 0.9 % (FLUSH) 0.9 %
1-10 SYRINGE (ML) INJECTION AS NEEDED
Status: DISCONTINUED | OUTPATIENT
Start: 2019-05-13 | End: 2019-05-15 | Stop reason: HOSPADM

## 2019-05-13 RX ORDER — MAGNESIUM SULFATE HEPTAHYDRATE 40 MG/ML
2 INJECTION, SOLUTION INTRAVENOUS AS NEEDED
Status: CANCELLED | OUTPATIENT
Start: 2019-05-13

## 2019-05-13 RX ORDER — PROMETHAZINE HYDROCHLORIDE 25 MG/1
25 SUPPOSITORY RECTAL ONCE AS NEEDED
Status: DISCONTINUED | OUTPATIENT
Start: 2019-05-13 | End: 2019-05-13

## 2019-05-13 RX ORDER — FUROSEMIDE 40 MG/1
40 TABLET ORAL DAILY
Status: DISCONTINUED | OUTPATIENT
Start: 2019-05-14 | End: 2019-05-15 | Stop reason: HOSPADM

## 2019-05-13 RX ORDER — CLOPIDOGREL BISULFATE 75 MG/1
75 TABLET ORAL DAILY
Status: DISCONTINUED | OUTPATIENT
Start: 2019-05-14 | End: 2019-05-15 | Stop reason: HOSPADM

## 2019-05-13 RX ORDER — GLYCOPYRROLATE 0.2 MG/ML
INJECTION INTRAMUSCULAR; INTRAVENOUS AS NEEDED
Status: DISCONTINUED | OUTPATIENT
Start: 2019-05-13 | End: 2019-05-13 | Stop reason: SURG

## 2019-05-13 RX ORDER — SODIUM CHLORIDE 9 MG/ML
30 INJECTION, SOLUTION INTRAVENOUS CONTINUOUS PRN
Status: DISCONTINUED | OUTPATIENT
Start: 2019-05-13 | End: 2019-05-13

## 2019-05-13 RX ORDER — DOPAMINE HYDROCHLORIDE 160 MG/100ML
2-20 INJECTION, SOLUTION INTRAVENOUS CONTINUOUS PRN
Status: DISCONTINUED | OUTPATIENT
Start: 2019-05-13 | End: 2019-05-15 | Stop reason: HOSPADM

## 2019-05-13 RX ORDER — MORPHINE SULFATE 2 MG/ML
2 INJECTION, SOLUTION INTRAMUSCULAR; INTRAVENOUS EVERY 4 HOURS PRN
Status: DISCONTINUED | OUTPATIENT
Start: 2019-05-13 | End: 2019-05-15 | Stop reason: HOSPADM

## 2019-05-13 RX ORDER — DEXTROSE MONOHYDRATE 25 G/50ML
25 INJECTION, SOLUTION INTRAVENOUS
Status: DISCONTINUED | OUTPATIENT
Start: 2019-05-13 | End: 2019-05-15 | Stop reason: HOSPADM

## 2019-05-13 RX ORDER — HYDRALAZINE HYDROCHLORIDE 20 MG/ML
5 INJECTION INTRAMUSCULAR; INTRAVENOUS
Status: DISCONTINUED | OUTPATIENT
Start: 2019-05-13 | End: 2019-05-13

## 2019-05-13 RX ORDER — METOPROLOL TARTRATE 5 MG/5ML
2.5 INJECTION INTRAVENOUS EVERY 6 HOURS SCHEDULED
Status: DISCONTINUED | OUTPATIENT
Start: 2019-05-13 | End: 2019-05-13

## 2019-05-13 RX ORDER — POTASSIUM CHLORIDE 29.8 MG/ML
20 INJECTION INTRAVENOUS
Status: DISCONTINUED | OUTPATIENT
Start: 2019-05-13 | End: 2019-05-15 | Stop reason: HOSPADM

## 2019-05-13 RX ORDER — SODIUM CHLORIDE 9 MG/ML
INJECTION, SOLUTION INTRAVENOUS AS NEEDED
Status: DISCONTINUED | OUTPATIENT
Start: 2019-05-13 | End: 2019-05-13 | Stop reason: HOSPADM

## 2019-05-13 RX ORDER — LIDOCAINE HYDROCHLORIDE 40 MG/ML
SOLUTION TOPICAL AS NEEDED
Status: DISCONTINUED | OUTPATIENT
Start: 2019-05-13 | End: 2019-05-13 | Stop reason: SURG

## 2019-05-13 RX ORDER — HYDROCODONE BITARTRATE AND ACETAMINOPHEN 7.5; 325 MG/1; MG/1
1 TABLET ORAL EVERY 6 HOURS PRN
Status: DISCONTINUED | OUTPATIENT
Start: 2019-05-13 | End: 2019-05-15 | Stop reason: HOSPADM

## 2019-05-13 RX ORDER — POTASSIUM CHLORIDE 750 MG/1
10 CAPSULE, EXTENDED RELEASE ORAL DAILY
Status: DISCONTINUED | OUTPATIENT
Start: 2019-05-14 | End: 2019-05-15 | Stop reason: HOSPADM

## 2019-05-13 RX ORDER — IPRATROPIUM BROMIDE AND ALBUTEROL SULFATE 2.5; .5 MG/3ML; MG/3ML
3 SOLUTION RESPIRATORY (INHALATION) ONCE AS NEEDED
Status: DISCONTINUED | OUTPATIENT
Start: 2019-05-13 | End: 2019-05-15 | Stop reason: HOSPADM

## 2019-05-13 RX ORDER — OMEPRAZOLE 40 MG/1
40 CAPSULE, DELAYED RELEASE ORAL NIGHTLY
COMMUNITY
End: 2019-09-17 | Stop reason: SDUPTHER

## 2019-05-13 RX ORDER — SODIUM CHLORIDE, SODIUM LACTATE, POTASSIUM CHLORIDE, CALCIUM CHLORIDE 600; 310; 30; 20 MG/100ML; MG/100ML; MG/100ML; MG/100ML
9 INJECTION, SOLUTION INTRAVENOUS CONTINUOUS
Status: DISCONTINUED | OUTPATIENT
Start: 2019-05-13 | End: 2019-05-13

## 2019-05-13 RX ORDER — HYDROCODONE BITARTRATE AND ACETAMINOPHEN 5; 325 MG/1; MG/1
1 TABLET ORAL ONCE AS NEEDED
Status: DISCONTINUED | OUTPATIENT
Start: 2019-05-13 | End: 2019-05-13

## 2019-05-13 RX ORDER — POTASSIUM CHLORIDE 1.5 G/1.77G
40 POWDER, FOR SOLUTION ORAL AS NEEDED
Status: DISCONTINUED | OUTPATIENT
Start: 2019-05-13 | End: 2019-05-15 | Stop reason: HOSPADM

## 2019-05-13 RX ORDER — CHLORHEXIDINE GLUCONATE 0.12 MG/ML
15 RINSE ORAL EVERY 12 HOURS SCHEDULED
Status: DISCONTINUED | OUTPATIENT
Start: 2019-05-13 | End: 2019-05-14

## 2019-05-13 RX ORDER — FENTANYL CITRATE 50 UG/ML
INJECTION, SOLUTION INTRAMUSCULAR; INTRAVENOUS AS NEEDED
Status: DISCONTINUED | OUTPATIENT
Start: 2019-05-13 | End: 2019-05-13 | Stop reason: SURG

## 2019-05-13 RX ORDER — NITROGLYCERIN 0.4 MG/1
0.4 TABLET SUBLINGUAL
Status: DISCONTINUED | OUTPATIENT
Start: 2019-05-13 | End: 2019-05-13 | Stop reason: HOSPADM

## 2019-05-13 RX ORDER — LABETALOL HYDROCHLORIDE 5 MG/ML
5 INJECTION, SOLUTION INTRAVENOUS
Status: DISCONTINUED | OUTPATIENT
Start: 2019-05-13 | End: 2019-05-13

## 2019-05-13 RX ORDER — ALBUTEROL SULFATE 2.5 MG/3ML
2.5 SOLUTION RESPIRATORY (INHALATION) EVERY 4 HOURS PRN
Status: ACTIVE | OUTPATIENT
Start: 2019-05-13 | End: 2019-05-14

## 2019-05-13 RX ORDER — ACETAMINOPHEN 325 MG/1
650 TABLET ORAL EVERY 4 HOURS PRN
Status: DISCONTINUED | OUTPATIENT
Start: 2019-05-13 | End: 2019-05-13 | Stop reason: HOSPADM

## 2019-05-13 RX ORDER — POTASSIUM CHLORIDE, DEXTROSE MONOHYDRATE 150; 5 MG/100ML; G/100ML
30 INJECTION, SOLUTION INTRAVENOUS CONTINUOUS
Status: DISCONTINUED | OUTPATIENT
Start: 2019-05-13 | End: 2019-05-15 | Stop reason: HOSPADM

## 2019-05-13 RX ORDER — MAGNESIUM SULFATE HEPTAHYDRATE 40 MG/ML
4 INJECTION, SOLUTION INTRAVENOUS AS NEEDED
Status: DISCONTINUED | OUTPATIENT
Start: 2019-05-13 | End: 2019-05-15 | Stop reason: HOSPADM

## 2019-05-13 RX ORDER — SODIUM CHLORIDE 0.9 % (FLUSH) 0.9 %
3 SYRINGE (ML) INJECTION EVERY 12 HOURS SCHEDULED
Status: DISCONTINUED | OUTPATIENT
Start: 2019-05-13 | End: 2019-05-15 | Stop reason: HOSPADM

## 2019-05-13 RX ORDER — NALOXONE HCL 0.4 MG/ML
0.4 VIAL (ML) INJECTION AS NEEDED
Status: DISCONTINUED | OUTPATIENT
Start: 2019-05-13 | End: 2019-05-15 | Stop reason: HOSPADM

## 2019-05-13 RX ORDER — SODIUM CHLORIDE 0.9 % (FLUSH) 0.9 %
3 SYRINGE (ML) INJECTION EVERY 12 HOURS SCHEDULED
Status: DISCONTINUED | OUTPATIENT
Start: 2019-05-13 | End: 2019-05-13 | Stop reason: HOSPADM

## 2019-05-13 RX ORDER — ASPIRIN 81 MG/1
81 TABLET ORAL DAILY
Status: DISCONTINUED | OUTPATIENT
Start: 2019-05-14 | End: 2019-05-15 | Stop reason: HOSPADM

## 2019-05-13 RX ORDER — MAGNESIUM SULFATE HEPTAHYDRATE 40 MG/ML
4 INJECTION, SOLUTION INTRAVENOUS AS NEEDED
Status: CANCELLED | OUTPATIENT
Start: 2019-05-13

## 2019-05-13 RX ORDER — NICOTINE POLACRILEX 4 MG
15 LOZENGE BUCCAL
Status: DISCONTINUED | OUTPATIENT
Start: 2019-05-13 | End: 2019-05-15 | Stop reason: HOSPADM

## 2019-05-13 RX ORDER — SODIUM CHLORIDE 0.9 % (FLUSH) 0.9 %
3-10 SYRINGE (ML) INJECTION AS NEEDED
Status: DISCONTINUED | OUTPATIENT
Start: 2019-05-13 | End: 2019-05-13 | Stop reason: HOSPADM

## 2019-05-13 RX ORDER — NITROGLYCERIN 20 MG/100ML
5-200 INJECTION INTRAVENOUS CONTINUOUS PRN
Status: DISCONTINUED | OUTPATIENT
Start: 2019-05-13 | End: 2019-05-15 | Stop reason: HOSPADM

## 2019-05-13 RX ORDER — ASPIRIN 325 MG
325 TABLET ORAL ONCE
Status: DISCONTINUED | OUTPATIENT
Start: 2019-05-13 | End: 2019-05-13

## 2019-05-13 RX ORDER — ROCURONIUM BROMIDE 10 MG/ML
INJECTION, SOLUTION INTRAVENOUS AS NEEDED
Status: DISCONTINUED | OUTPATIENT
Start: 2019-05-13 | End: 2019-05-13 | Stop reason: SURG

## 2019-05-13 RX ORDER — MEPERIDINE HYDROCHLORIDE 25 MG/ML
12.5 INJECTION INTRAMUSCULAR; INTRAVENOUS; SUBCUTANEOUS
Status: DISCONTINUED | OUTPATIENT
Start: 2019-05-13 | End: 2019-05-13

## 2019-05-13 RX ORDER — DOCUSATE SODIUM 100 MG/1
100 CAPSULE, LIQUID FILLED ORAL 2 TIMES DAILY PRN
Status: DISCONTINUED | OUTPATIENT
Start: 2019-05-13 | End: 2019-05-15 | Stop reason: HOSPADM

## 2019-05-13 RX ORDER — ASPIRIN 325 MG
325 TABLET, DELAYED RELEASE (ENTERIC COATED) ORAL DAILY
Status: DISCONTINUED | OUTPATIENT
Start: 2019-05-14 | End: 2019-05-13

## 2019-05-13 RX ORDER — HEPARIN SODIUM 1000 [USP'U]/ML
INJECTION, SOLUTION INTRAVENOUS; SUBCUTANEOUS AS NEEDED
Status: DISCONTINUED | OUTPATIENT
Start: 2019-05-13 | End: 2019-05-13 | Stop reason: SURG

## 2019-05-13 RX ORDER — ONDANSETRON 2 MG/ML
4 INJECTION INTRAMUSCULAR; INTRAVENOUS EVERY 6 HOURS PRN
Status: DISCONTINUED | OUTPATIENT
Start: 2019-05-13 | End: 2019-05-15 | Stop reason: HOSPADM

## 2019-05-13 RX ORDER — NEOSTIGMINE METHYLSULFATE 1 MG/ML
INJECTION, SOLUTION INTRAVENOUS AS NEEDED
Status: DISCONTINUED | OUTPATIENT
Start: 2019-05-13 | End: 2019-05-13 | Stop reason: SURG

## 2019-05-13 RX ORDER — LIDOCAINE HYDROCHLORIDE 10 MG/ML
INJECTION, SOLUTION INFILTRATION; PERINEURAL AS NEEDED
Status: DISCONTINUED | OUTPATIENT
Start: 2019-05-13 | End: 2019-05-13 | Stop reason: SURG

## 2019-05-13 RX ORDER — FAMOTIDINE 20 MG/1
20 TABLET, FILM COATED ORAL ONCE
Status: COMPLETED | OUTPATIENT
Start: 2019-05-13 | End: 2019-05-13

## 2019-05-13 RX ORDER — DOBUTAMINE HYDROCHLORIDE 100 MG/100ML
2-20 INJECTION INTRAVENOUS CONTINUOUS PRN
Status: DISCONTINUED | OUTPATIENT
Start: 2019-05-13 | End: 2019-05-15 | Stop reason: HOSPADM

## 2019-05-13 RX ORDER — MAGNESIUM SULFATE HEPTAHYDRATE 40 MG/ML
2 INJECTION, SOLUTION INTRAVENOUS AS NEEDED
Status: DISCONTINUED | OUTPATIENT
Start: 2019-05-13 | End: 2019-05-15 | Stop reason: HOSPADM

## 2019-05-13 RX ORDER — SODIUM CHLORIDE 9 MG/ML
9 INJECTION, SOLUTION INTRAVENOUS CONTINUOUS PRN
Status: DISCONTINUED | OUTPATIENT
Start: 2019-05-13 | End: 2019-05-13 | Stop reason: HOSPADM

## 2019-05-13 RX ORDER — FENTANYL CITRATE 50 UG/ML
50 INJECTION, SOLUTION INTRAMUSCULAR; INTRAVENOUS
Status: DISCONTINUED | OUTPATIENT
Start: 2019-05-13 | End: 2019-05-13

## 2019-05-13 RX ORDER — CHLORHEXIDINE GLUCONATE 0.12 MG/ML
15 RINSE ORAL ONCE
Status: COMPLETED | OUTPATIENT
Start: 2019-05-13 | End: 2019-05-13

## 2019-05-13 RX ORDER — POTASSIUM CHLORIDE 750 MG/1
40 CAPSULE, EXTENDED RELEASE ORAL AS NEEDED
Status: DISCONTINUED | OUTPATIENT
Start: 2019-05-13 | End: 2019-05-15 | Stop reason: HOSPADM

## 2019-05-13 RX ORDER — FAMOTIDINE 10 MG/ML
20 INJECTION, SOLUTION INTRAVENOUS ONCE
Status: CANCELLED | OUTPATIENT
Start: 2019-05-13 | End: 2019-05-13

## 2019-05-13 RX ORDER — CHLORHEXIDINE GLUCONATE 500 MG/1
1 CLOTH TOPICAL EVERY 12 HOURS PRN
Status: DISCONTINUED | OUTPATIENT
Start: 2019-05-13 | End: 2019-05-13 | Stop reason: HOSPADM

## 2019-05-13 RX ORDER — ATORVASTATIN CALCIUM 40 MG/1
40 TABLET, FILM COATED ORAL NIGHTLY
Status: DISCONTINUED | OUTPATIENT
Start: 2019-05-13 | End: 2019-05-13

## 2019-05-13 RX ORDER — PROTAMINE SULFATE 10 MG/ML
INJECTION, SOLUTION INTRAVENOUS AS NEEDED
Status: DISCONTINUED | OUTPATIENT
Start: 2019-05-13 | End: 2019-05-13 | Stop reason: SURG

## 2019-05-13 RX ORDER — ETOMIDATE 2 MG/ML
INJECTION INTRAVENOUS AS NEEDED
Status: DISCONTINUED | OUTPATIENT
Start: 2019-05-13 | End: 2019-05-13 | Stop reason: SURG

## 2019-05-13 RX ORDER — SENNA AND DOCUSATE SODIUM 50; 8.6 MG/1; MG/1
2 TABLET, FILM COATED ORAL 2 TIMES DAILY
Status: DISCONTINUED | OUTPATIENT
Start: 2019-05-13 | End: 2019-05-15 | Stop reason: HOSPADM

## 2019-05-13 RX ORDER — PROMETHAZINE HYDROCHLORIDE 25 MG/ML
6.25 INJECTION, SOLUTION INTRAMUSCULAR; INTRAVENOUS ONCE AS NEEDED
Status: DISCONTINUED | OUTPATIENT
Start: 2019-05-13 | End: 2019-05-13

## 2019-05-13 RX ORDER — ONDANSETRON 2 MG/ML
4 INJECTION INTRAMUSCULAR; INTRAVENOUS ONCE AS NEEDED
Status: DISCONTINUED | OUTPATIENT
Start: 2019-05-13 | End: 2019-05-13

## 2019-05-13 RX ORDER — BISACODYL 10 MG
10 SUPPOSITORY, RECTAL RECTAL DAILY PRN
Status: DISCONTINUED | OUTPATIENT
Start: 2019-05-14 | End: 2019-05-15 | Stop reason: HOSPADM

## 2019-05-13 RX ORDER — HYDROMORPHONE HYDROCHLORIDE 1 MG/ML
0.5 INJECTION, SOLUTION INTRAMUSCULAR; INTRAVENOUS; SUBCUTANEOUS
Status: DISCONTINUED | OUTPATIENT
Start: 2019-05-13 | End: 2019-05-13

## 2019-05-13 RX ADMIN — PHENYLEPHRINE HYDROCHLORIDE 50 MCG: 10 INJECTION INTRAVENOUS at 07:51

## 2019-05-13 RX ADMIN — HYDROCODONE BITARTRATE AND ACETAMINOPHEN 1 TABLET: 7.5; 325 TABLET ORAL at 20:12

## 2019-05-13 RX ADMIN — PHENYLEPHRINE HYDROCHLORIDE 50 MCG: 10 INJECTION INTRAVENOUS at 07:47

## 2019-05-13 RX ADMIN — FAMOTIDINE 20 MG: 20 TABLET ORAL at 06:19

## 2019-05-13 RX ADMIN — INSULIN LISPRO 2 UNITS: 100 INJECTION, SOLUTION INTRAVENOUS; SUBCUTANEOUS at 12:30

## 2019-05-13 RX ADMIN — MAGNESIUM SULFATE HEPTAHYDRATE 4 G: 40 INJECTION, SOLUTION INTRAVENOUS at 16:21

## 2019-05-13 RX ADMIN — PHENYLEPHRINE HYDROCHLORIDE 50 MCG: 10 INJECTION INTRAVENOUS at 07:43

## 2019-05-13 RX ADMIN — INSULIN LISPRO 2 UNITS: 100 INJECTION, SOLUTION INTRAVENOUS; SUBCUTANEOUS at 20:40

## 2019-05-13 RX ADMIN — ETOMIDATE 20 MG: 2 INJECTION, SOLUTION INTRAVENOUS at 07:27

## 2019-05-13 RX ADMIN — PROTAMINE SULFATE 50 MG: 10 INJECTION, SOLUTION INTRAVENOUS at 08:38

## 2019-05-13 RX ADMIN — POTASSIUM CHLORIDE AND DEXTROSE MONOHYDRATE 30 ML/HR: 150; 5 INJECTION, SOLUTION INTRAVENOUS at 12:04

## 2019-05-13 RX ADMIN — MUPIROCIN 1 APPLICATION: 20 OINTMENT TOPICAL at 06:19

## 2019-05-13 RX ADMIN — PHENYLEPHRINE HYDROCHLORIDE 100 MCG: 10 INJECTION INTRAVENOUS at 07:33

## 2019-05-13 RX ADMIN — GLYCOPYRROLATE 0.4 MG: 0.2 INJECTION, SOLUTION INTRAMUSCULAR; INTRAVENOUS at 08:43

## 2019-05-13 RX ADMIN — ETOMIDATE 20 MG: 2 INJECTION, SOLUTION INTRAVENOUS at 07:28

## 2019-05-13 RX ADMIN — LIDOCAINE HYDROCHLORIDE 40 MG: 10 INJECTION, SOLUTION INFILTRATION; PERINEURAL at 07:28

## 2019-05-13 RX ADMIN — FENTANYL CITRATE 50 MCG: 50 INJECTION, SOLUTION INTRAMUSCULAR; INTRAVENOUS at 08:58

## 2019-05-13 RX ADMIN — NEOSTIGMINE METHYLSULFATE 2 MG: 1 INJECTION, SOLUTION INTRAVENOUS at 08:44

## 2019-05-13 RX ADMIN — CHLORHEXIDINE GLUCONATE 15 ML: 1.2 RINSE ORAL at 06:19

## 2019-05-13 RX ADMIN — CEFUROXIME 1.5 G: 1.5 INJECTION, POWDER, FOR SOLUTION INTRAVENOUS at 07:31

## 2019-05-13 RX ADMIN — MORPHINE SULFATE 2 MG: 2 INJECTION, SOLUTION INTRAMUSCULAR; INTRAVENOUS at 20:12

## 2019-05-13 RX ADMIN — LIDOCAINE HYDROCHLORIDE 1 EACH: 40 SOLUTION TOPICAL at 07:28

## 2019-05-13 RX ADMIN — LIDOCAINE HYDROCHLORIDE 0.5 ML: 10 INJECTION, SOLUTION EPIDURAL; INFILTRATION; INTRACAUDAL; PERINEURAL at 06:19

## 2019-05-13 RX ADMIN — HEPARIN SODIUM 15000 UNITS: 1000 INJECTION, SOLUTION INTRAVENOUS; SUBCUTANEOUS at 08:07

## 2019-05-13 RX ADMIN — PHENYLEPHRINE HYDROCHLORIDE 50 MCG: 10 INJECTION INTRAVENOUS at 07:39

## 2019-05-13 RX ADMIN — ROCURONIUM BROMIDE 40 MG: 10 INJECTION INTRAVENOUS at 07:28

## 2019-05-13 RX ADMIN — SODIUM CHLORIDE: 9 INJECTION, SOLUTION INTRAVENOUS at 07:21

## 2019-05-13 RX ADMIN — MORPHINE SULFATE 2 MG: 2 INJECTION, SOLUTION INTRAMUSCULAR; INTRAVENOUS at 10:50

## 2019-05-13 RX ADMIN — SODIUM CHLORIDE 9 ML/HR: 9 INJECTION, SOLUTION INTRAVENOUS at 06:21

## 2019-05-13 RX ADMIN — PHENYLEPHRINE HYDROCHLORIDE 100 MCG: 10 INJECTION INTRAVENOUS at 07:36

## 2019-05-13 RX ADMIN — FENTANYL CITRATE 50 MCG: 50 INJECTION, SOLUTION INTRAMUSCULAR; INTRAVENOUS at 09:07

## 2019-05-13 RX ADMIN — NICARDIPINE HYDROCHLORIDE 2.5 MG/HR: 0.1 INJECTION, SOLUTION INTRAVENOUS at 17:34

## 2019-05-13 NOTE — OP NOTE
PROCEDURE(S):   1. 6F femoral arterial sheath placement.  2. 8F femoral venous sheath placement.  3. Temporary transvenous pacemaker insertion.  4. Catheter placement in the aortic root.  5. Multiple intraprocedural aortograms.   6. Balloon aortic valvuloplasty.  7. Transcatheter aortic valve replacement with 23 mm Bah LEONEL 3 pericardial  prosthesis.     INDICATIONS:   1. Critical aortic stenosis.  2. Patient is not a suitable candidate for conventional surgery.    INTERVENTIONAL CARDIOLOGISTS:  Anushka Hankins MD.  Melissa Humphrey MD.    CARDIAC SURGEONS:   Fidel Calvillo MD.  Mino Gordon MD    DESCRIPTION OF PROCEDURE:   After informed consent, the patient was brought to the OR in a fasting condition. The patient was prepped and draped from level of chin to  knees by standard surgical technique. Right femoral arterial access was obtained  by percutaneous anterior wall puncture technique and an 6-Moldovan arterial  sheath was placed. Venous access was obtained in similar fashion and a 8-Moldovan  venous sheath was placed.   Temporary transvenous pacing electrode was inserted via the left femoral  venous access and advanced to the right ventricular apex and excellent  pacing/sensing was noted. A pigtail catheter was advanced from the femoral  arterial access and this was advanced to the aortic root, and intraprocedural aortography was performed.     At this time, Left femoral arterial access was obtained by  Dr. Calvillo and Dr. Gordon and an Bah sheath was placed (see  separate note). Using this access, AL1 catheter, and straight wire, the aortic valve was crossed. The catheter was advanced into the left ventricle and the wire was exchanged for a Safari wire. At this time, balloon aortic valvuloplasty was performed with a 20 mm balloon. Subsequently, a 23 mm tissue Bah LEONEL valve was advanced using standard delivery system. This was positioned under fluoroscopy. After the satisfactory position was  confirmed, the valve was expanded under rapid pacing protocol. Satisfactory position was noted. Post implant images revealed trivial aortic insufficiency which was paravalvular. No significant central regurgitation was noted.  The patient did have intermittent complete heart block following the procedure.  For that reason the pacing wire will be left in place overnight.     At this time, the delivery system was removed. The arterial sheath was removed  and the arteriotomy was closed by the surgeons (see separate note). The  patient tolerated the procedure well and without complications.    FINAL IMPRESSION:   · Successful transcatheter aortic valve replacement with 23 mm        Bah LEONEL tissue valve.   · Watch conduction overnight.  May need permanent pacemaker    Anushka Hankins MD, FACC

## 2019-05-13 NOTE — ANESTHESIA POSTPROCEDURE EVALUATION
Patient: Nancy Goodman    Procedure Summary     Date:  05/13/19 Room / Location:   [facility] OR 15 /  [facility]    Anesthesia Start:  0721 Anesthesia Stop:  0912    Procedures:       TRANSCATHETER AORTIC VALVE REPLACEMENT, ELIZABETH (N/A Chest)      Transapical Transcatheter Aortic Valve Replacement (N/A ) Diagnosis:       Severe aortic stenosis      (Severe aortic stenosis [I35.0])    Surgeon:  Fidel Calvillo MD; Anushka Hankins MD Provider:  Michael Perez MD    Anesthesia Type:  general ASA Status:  4          Anesthesia Type: general  Last vitals  BP   129/45   Temp   97.6 °F (36.4 °C) (05/13/19 0623)   Pulse 65   Resp 14   SpO2 95%     Post Anesthesia Care and Evaluation    Patient location during evaluation: PACU  Patient participation: complete - patient participated  Level of consciousness: sleepy but conscious  Pain score: 0  Pain management: adequate  Airway patency: patent  Anesthetic complications: No anesthetic complications  PONV Status: none  Cardiovascular status: hemodynamically stable and acceptable  Respiratory status: nonlabored ventilation, acceptable and nasal cannula  Hydration status: acceptable

## 2019-05-13 NOTE — PLAN OF CARE
Problem: Patient Care Overview  Goal: Plan of Care Review  Outcome: Ongoing (interventions implemented as appropriate)   05/13/19 1501   Coping/Psychosocial   Plan of Care Reviewed With patient   Plan of Care Review   Progress improving   OTHER   Outcome Summary Patient is A&O on RA, NSR HR 80-90's. On bed rest due to venous sheath in place with transvenous pacemaker. Will continue to monitor       Problem: Cardiac Surgery (Adult)  Goal: Signs and Symptoms of Listed Potential Problems Will be Absent, Minimized or Managed (Cardiac Surgery)  Outcome: Ongoing (interventions implemented as appropriate)   05/13/19 1501   Goal/Outcome Evaluation   Problems Assessed (Cardiac Surgery) all;situational response   Problems Present (Cardiac Surgery) situational response;pain       Problem: Skin Injury Risk (Adult)  Goal: Identify Related Risk Factors and Signs and Symptoms  Outcome: Ongoing (interventions implemented as appropriate)   05/13/19 1501   Skin Injury Risk (Adult)   Related Risk Factors (Skin Injury Risk) critical care admission     Goal: Skin Health and Integrity  Outcome: Ongoing (interventions implemented as appropriate)   05/13/19 1501   Skin Injury Risk (Adult)   Skin Health and Integrity making progress toward outcome       Problem: Fall Risk (Adult)  Goal: Identify Related Risk Factors and Signs and Symptoms  Outcome: Ongoing (interventions implemented as appropriate)   05/13/19 1501   Fall Risk (Adult)   Related Risk Factors (Fall Risk) environment unfamiliar;history of falls   Signs and Symptoms (Fall Risk) presence of risk factors     Goal: Absence of Fall  Outcome: Ongoing (interventions implemented as appropriate)   05/13/19 1501   Fall Risk (Adult)   Absence of Fall making progress toward outcome

## 2019-05-13 NOTE — PROGRESS NOTES
[facility]  In-Patient TAVR APRN Progress Note   LOS: 0 days   Patient Care Team:  Wayne Jorge MD as PCP - General  Fidel Valdez MD as Consulting Physician (Cardiology)    Chief Complaint:  Aortic Stenosis s/p TAVR    Subjective :  CHB intermittently immediately post TAVR procedure.  Temporary PM in place but not currently being utilized.  Only c/o is back pain.  No vasoactive drips in use.      Objective   Patient Active Problem List   Diagnosis   • Type 2 diabetes mellitus without complication, with long-term current use of insulin (CMS/[facility])   • Mixed hyperlipidemia   • Essential hypertension   • Vitamin D deficiency   • CAD (coronary artery disease)   • Aortic stenosis, severe   • S/P CABG (coronary artery bypass graft)   • Pulmonary edema   • Diastolic CHF (CMS/HCC)         Tele: Sinus Rhythym in the 80's    Vitals:  Blood pressure 124/60, pulse 80, temperature 97.6 °F (36.4 °C), temperature source Oral, resp. rate 18, SpO2 97 %.     Intake/Output Summary (Last 24 hours) at 5/13/2019 1536  Last data filed at 5/13/2019 1000  Gross per 24 hour   Intake 1400 ml   Output 550 ml   Net 850 ml       Physical Exam:    General: alert, no acute distress   Chest: Clear auscultation bilaterally   CV: Heart sounds are regular.  RRR without murmur, gallop or rub.   Abdomen: Soft, non-tender, normal bowel sounds   Extremities: left groin vascular access site free from hematoma or bleeding.  SafGuard covering CDI.  Right groin access site has venous temporary PM wire/sheath.  Pedal pulses intact distally   Neuro: Alert and oriented x 3, no focal deficits    Results Review:     I reviewed the patient's new clinical results.    Results from last 7 days   Lab Units 05/13/19  1322   WBC 10*3/mm3 6.64   HEMOGLOBIN g/dL 10.9*   HEMATOCRIT % 33.4*   PLATELETS 10*3/mm3 159   ·   Results from last 7 days   Lab Units 05/13/19  1322  05/12/19  1215   SODIUM mmol/L 141   < > 141   POTASSIUM mmol/L 3.9   < > 4.2    CHLORIDE mmol/L 105   < > 102   CO2 mmol/L 23.0   < > 28.0   BUN mg/dL 16   < > 19   CREATININE mg/dL 0.75   < > 1.09*   CALCIUM mg/dL 8.4*   < > 9.9   BILIRUBIN mg/dL  --   --  0.4   ALK PHOS U/L  --   --  78   ALT (SGPT) U/L  --   --  17   AST (SGOT) U/L  --   --  16   GLUCOSE mg/dL 185*   < > 117*    < > = values in this interval not displayed.   ·   Results from last 7 days   Lab Units 05/13/19  0936 05/12/19  1215   INR  1.17* 0.96   ·   ·     ·     Results from last 7 days   Lab Units 05/13/19  0936   PROBNP pg/mL 340.5   ·     Scheduled Meds:  [START ON 5/14/2019] aspirin 81 mg Oral Daily   chlorhexidine 15 mL Mouth/Throat Q12H   [START ON 5/14/2019] clopidogrel 75 mg Oral Daily   [START ON 5/14/2019] furosemide 40 mg Oral Daily   insulin lispro 0-9 Units Subcutaneous 4x Daily With Meals & Nightly   losartan 75 mg Oral Nightly   metoprolol tartrate 25 mg Oral BID   [START ON 5/14/2019] pantoprazole 40 mg Oral Q AM   [START ON 5/14/2019] potassium chloride 10 mEq Oral Daily   sennosides-docusate sodium 2 tablet Oral BID   sodium chloride 3 mL Intravenous Q12H     TAVR HEART FAILURE ACUITY  SYMPTOMS:  no new symptoms of dizziness, syncopy, angina, or chest pain SIGNS:  + new arrhythmia (intermittent CHB)  TESTING: no cardiomegaly on CXR, and normal pro-BNP   OTHER SUPPORTIVE INDICATIORS: No need for intravenous diuretics      ASSESSMENT/PLAN:  1. Severe aortic stenosis s/p TAVR today with 23 mm Bah Tobias 3 prosthesis.  Hemodynamically stable post procedure.     2. Intermittent CHB.  Temporary PM transvenous in place overnight. Re-evaluate in the AM per Cardiology. Hold BB   3. CAD.  S/p CABG x 5 per Dr. Calvillo 2017.  PCI to mid PLB and vein graft to PDA anastomosis per Dr. Hankins on 4/23/19   4. Chronic diastolic heart failure due to valvular disease.  No s/sx of acute exacerbation as noted above.   5. Type 2 DM.     6. HTN     Will follow along with other TAVR team members throughout hospital  stay and after DC 30 days and one year.      Minerva Arvizu, APRN - 5/13/2019, 3:36 PM

## 2019-05-13 NOTE — OP NOTE
Operative Report  Nancy Goodman  [id number]  1940    Preop Diagnosis: Severe aortic stenosis        Postop Diagnosis same        Procedure: TAVR with a Bah Tobias #23 aortic valve tissue        Surgeons: Fidel Calvillo        Assistant: Mino Gordon cardiologist Dr. Rishabh Hankins, Fco Humphrey        Operative Findings:         Description: Patient was brought to the operating room placed under general anesthesia.  Transesophageal echo was carried out with Dr. Jim as well.  Dr. Hankins placed an arterial and venous sheath in the right groin with a temporary pacemaker wire.  At this time the left groin was stuck.  A 7 Bur[place]ian sheath was inserted after 2 Perclose this is been placed in a standard fashion.  The patient was heparinized.  Magic torque wire navigated the iliac and aorta.  This was exchanged for an Amplatz wire.  A 14 Burundian sheath was inserted and sutured in place.  At this time a BAV size 20 balloon was placed and deployed with rapid ventricular pacing.  At this time the device was then placed up.  It was re-contracted and advanced across the valve.  Then with rapid ventricular pacing the valve was deployed without difficulty.  Completion transesophageal echo revealed a excellent result with only trivial aortic insufficiency.  Aortic root shot also revealed only trivial aortic insufficiency.  At this time the device was removed.  The pigtail catheter was pulled back and the sheath was pulled back.  The iliac was visualized with no evidence of any leak.  The sheath was removed and the Perclose is were tied down.  Completion angiogram revealed excellent runoff to the foot.  The patient was reversed with protamine.  The fluoroscopy time was 10 minutes 48 seconds, radiation dose 642, and contrast was Isovue 3 70 80 cc.  There was excellent pulses in the feet by Doppler.        EBL: 200 cc      Please note that portions of this note were completed with a voice recognition program.  Efforts were made to edit the dictations, but words may be mistranscribed      Fidel Calvillo MD  05/13/19 8:43 AM

## 2019-05-13 NOTE — ANESTHESIA PREPROCEDURE EVALUATION
Anesthesia Evaluation     NPO Solid Status: > 8 hours  NPO Liquid Status: > 8 hours           Airway   Mallampati: II  TM distance: >3 FB  Neck ROM: full  No difficulty expected  Dental    (+) edentulous    Pulmonary    (+) decreased breath sounds,   (-) asthma, not a smoker  Cardiovascular     Rhythm: regular    (+) hypertension, CABG (5/2017 5 vessel cabg.  Then stent placed 3/2019 ) >6 Months, murmur,       Neuro/Psych  (-) seizures, TIA, CVA  GI/Hepatic/Renal/Endo    (+)   diabetes mellitus (IDDM),   (-) liver disease, no renal disease    Musculoskeletal     Abdominal    Substance History      OB/GYN          Other                      Anesthesia Plan    ASA 4     general   ([place]  ELIZABETH)  intravenous induction     Plan discussed with CRNA.

## 2019-05-13 NOTE — INTERVAL H&P NOTE
Pre-Op H&P (See Recent Office Note Attached for Full H&P)    Chief complaint: BROWN, syncope    Review of Systems:  General ROS:  no fever, chills, rashes, No change since last office visit  Cardiovascular ROS: + chest pain x 1 and +dyspnea on exertion.  4/23/19 LHC:  S/P stent to CLAY and balloon SVG to PDA/SVG  Respiratory ROS: no cough, +shortness of breath, or wheezing    Meds:    No current facility-administered medications on file prior to encounter.      Current Outpatient Medications on File Prior to Encounter   Medication Sig Dispense Refill   • aspirin 81 MG EC tablet Take 81 mg by mouth Every Morning.     • clopidogrel (PLAVIX) 75 MG tablet Take 1 tablet by mouth Daily. 30 tablet 11   • CRANBERRY PO Take 450 mg by mouth Daily.     • Insulin Glargine (LANTUS SOLOSTAR) 100 UNIT/ML injection pen Inject 40 Units under the skin into the appropriate area as directed Every Morning. (Patient taking differently: Inject 38 Units under the skin into the appropriate area as directed Daily.) 15 pen 1   • irbesartan (AVAPRO) 75 MG tablet Take 2 tablets by mouth Every Night. 30 tablet 1   • metoprolol tartrate (LOPRESSOR) 25 MG tablet Take 1 tablet by mouth 2 (Two) Times a Day. 60 tablet 0   • Omega-3 Fatty Acids (SALMON OIL PO) Take 1,200 mg by mouth 2 (Two) Times a Day.     • omeprazole (priLOSEC) 40 MG capsule TAKE 1 CAPSULE DAILY (Patient taking differently: TAKE 1 CAPSULE qhs) 90 capsule 1   • Probiotic Product (PROBIOTIC ADVANCED PO) Take 1 tablet by mouth Every Night.     • Ubiquinol 100 MG capsule Take 100 mg by mouth Daily. Patient stated she has not taken in 2 to 3 months         Vital Signs:  BP (!) 193/85 (BP Location: Left arm, Patient Position: Lying)   Pulse 85   Temp 97.6 °F (36.4 °C) (Temporal)   Resp 18   SpO2 97%     Physical Exam:    CV:  S1S2 regular rate and rhythm, 3/6 harsh systolic murmur               Resp:  Clear to auscultation; respirations regular, even and unlabored    Results Review:      Lab Results   Component Value Date    WBC 6.65 05/12/2019    HGB 12.6 05/12/2019    HCT 38.9 05/12/2019    MCV 96.0 05/12/2019     05/12/2019    NEUTROABS 3.69 05/12/2019    GLUCOSE 117 (H) 05/12/2019    BUN 19 05/12/2019    CREATININE 1.09 (H) 05/12/2019    EGFRIFNONA 48 (L) 05/12/2019    EGFRIFAFRI 58 (L) 02/26/2019     05/12/2019    K 4.2 05/12/2019     05/12/2019    CO2 28.0 05/12/2019    MG 2.1 05/12/2019    PHOS 3.2 05/26/2017    CALCIUM 9.9 05/12/2019    ALBUMIN 4.30 05/12/2019    AST 16 05/12/2019    ALT 17 05/12/2019    BILITOT 0.4 05/12/2019        I reviewed the patient's new clinical results.    Cancer Staging (if applicable)  Cancer Patient: __ yes _x_no __unknown; If yes, clinical stage T:__ N:__M:__, stage group or __N/A    Assessment/Plan:    78-year-old  female with a history of hypertension, hyperlipidemia, diabetes mellitus and coronary artery disease status post CABG who presents with fatigue and shortness of breath.  She has severe symptomatic aortic stenosis.  Her calculated STS risk of mortality with surgical aortic valve replacement is 4.2%, placing her in the intermediate risk category.  The patient is a reasonable candidate for transcatheter aortic valve replacement.  I do feel that she is a high risk candidate for surgical aortic valve replacement given her age and dependency on patent bypass grafts.  The risks and benefits of TAVR were discussed with the patient and her  including pain, bleeding, infection, renal failure, stroke, heart block requiring a pacemaker and death.  The patient understood these risks and wished to proceed with surgery.    Cesia Reynolds, APRN  5/13/2019   6:41 AM

## 2019-05-13 NOTE — ANESTHESIA PROCEDURE NOTES
Airway  Urgency: elective    Airway not difficult    General Information and Staff    Patient location during procedure: OR  CRNA: Adam Walker CRNA    Indications and Patient Condition  Indications for airway management: airway protection    Preoxygenated: yes  MILS not maintained throughout  Mask difficulty assessment: 1 - vent by mask    Final Airway Details  Final airway type: endotracheal airway      Successful airway: ETT  Cuffed: yes   Successful intubation technique: direct laryngoscopy  Endotracheal tube insertion site: oral  Blade: Nitin  Blade size: 3  ETT size (mm): 7.0  Cormack-Lehane Classification: grade I - full view of glottis  Placement verified by: chest auscultation and capnometry   Cuff volume (mL): 5  Measured from: lips  ETT to lips (cm): 20  Number of attempts at approach: 1    Additional Comments  Negative epigastric sounds, Breath sound equal bilaterally with symmetric chest rise and fall.

## 2019-05-13 NOTE — PROGRESS NOTES
INTENSIVIST NOTE     Hospital Day: 0      Ms. Nancy Goodman, 78 y.o. female is followed for:   Postoperative management of medical comorbidities       SUBJECTIVE     78-year-old female with history of coronary disease and prior CABG who presented with severe symptomatic aortic stenosis with fatigue and shortness of breath.  She has a history of hypertension, dyslipidemia, type 2 diabetes mellitus, and coronary disease.  She was an intermediate risk category for surgical aortic valve replacement and therefore she underwent TAVR on 5/13/2019.    Interval history:    Seen in ICU immediately post procedure.  Hemodynamically stable but requiring ongoing pacing.  Has had intermittent heart block since the procedure.  Is awake and alert with acceptable respiratory comfort.    The patient's relevant past medical, surgical and social history were reviewed and updated in Epic as appropriate.       OBJECTIVE     Vital Sign Min/Max for last 24 hours  Temp  Min: 97.6 °F (36.4 °C)  Max: 97.6 °F (36.4 °C)   BP  Min: 117/78  Max: 195/85   Pulse  Min: 65  Max: 91   Resp  Min: 16  Max: 18   SpO2  Min: 93 %  Max: 100 %   No Data Recorded   Weight  Min: 73 kg (160 lb 15 oz)  Max: 73 kg (160 lb 15 oz)      Intake/Output Summary (Last 24 hours) at 5/13/2019 1139  Last data filed at 5/13/2019 1000  Gross per 24 hour   Intake 1400 ml   Output 550 ml   Net 850 ml         There were no vitals filed for this visit.         Objective:  General Appearance:  In no acute distress.    Vital signs: (most recent): Blood pressure 163/68, pulse 91, temperature 97.6 °F (36.4 °C), temperature source Oral, resp. rate 16, SpO2 100 %.    HEENT: Normal HEENT exam.  (Nasal cannula O2)    Lungs:  Normal effort and normal respiratory rate.  She is not in respiratory distress.  There are decreased breath sounds.  No rales, wheezes or rhonchi.    Heart: Normal rate.  Regular rhythm.  S1 normal and S2 normal.  No murmur, gallop or friction rub.   Chest:  Symmetric chest wall expansion.   Abdomen: Abdomen is soft and non-distended.  Bowel sounds are normal.   There is no abdominal tenderness.   There is no mass. There is no splenomegaly. There is no hepatomegaly.   Extremities: There is no deformity or dependent edema.  (Right femoral arterial and venous lines with venous pacemaker in place  Right femoral lines removed with sandbag in place and small hematoma)  Neurological: Patient is alert and oriented to person, place and time.    Pupils:  Pupils are equal, round, and reactive to light.    Skin:  Warm and dry.              I reviewed the patient's new clinical results.  I reviewed the patient's new imaging results/reports including actual images and agree with reports.      Chest X-Ray: Prior CABG and vascular congestion    INFUSIONS    dexmedetomidine 0.2-1.5 mcg/kg/hr   dextrose 5 % with KCl 20 mEq 30 mL/hr   DOBUTamine 2-20 mcg/kg/min   DOPamine 2-20 mcg/kg/min   EPINEPHrine 0.02-0.3 mcg/kg/min   insulin 0-50 Units/hr   niCARdipine 5-15 mg/hr   nitroglycerin 5-200 mcg/min   norepinephrine 0.02-0.3 mcg/kg/min   phenylephrine 0.5-3 mcg/kg/min   sodium chloride 30 mL/hr   vasopressin 0.02-0.1 Units/min       Results from last 7 days   Lab Units 05/13/19  0936 05/13/19  0844 05/13/19  0813  05/12/19  1215   WBC 10*3/mm3 7.05  --   --   --  6.65   HEMOGLOBIN g/dL 10.4*  --   --   --  12.6   HEMOGLOBIN, POC g/dL  --  7.8* 9.9*   < >  --    HEMATOCRIT % 31.7*  --   --   --  38.9   HEMATOCRIT POC %  --  23* 29*   < >  --    PLATELETS 10*3/mm3 149  --   --   --  221    < > = values in this interval not displayed.     Results from last 7 days   Lab Units 05/13/19  0936 05/12/19  1215   SODIUM mmol/L 143 141   POTASSIUM mmol/L 3.5 4.2   CHLORIDE mmol/L 108* 102   CO2 mmol/L 22.0 28.0   BUN mg/dL 18 19   GLUCOSE mg/dL 161* 117*   CREATININE mg/dL 0.71 1.09*   MAGNESIUM mg/dL 1.6 2.1   CALCIUM mg/dL 8.4* 9.9         Results from last 7 days   Lab Units 05/13/19  0810  05/13/19  0813 05/13/19  0728   PH, ARTERIAL pH units 7.39 7.46 7.45         [facility] Ventilation:      I reviewed the patient's medications.    Assessment/Plan   ASSESSMENT      Active Hospital Problems    Diagnosis   • **Aortic stenosis, severe     1. Mild aoritc valve stenosis by Echo 5/16/17    2. Echo 4-9-19  · Aortic valve maximum pressure gradient is 66.4 mmHg.  · Aortic valve mean pressure gradient is 38.4 mmHg.    3.  s/p TAVR 5/13/19     • CAD (coronary artery disease)     a. 5/16/17 angina, cardiac catheter severe ostial LAD and LCx of these.  Ostial RPL and RCA.  b. 5 vessel CABG (Rajinder) LIMA to LAD, SVG to diagonal and LCx, and SVG to PDA and PL.       c.   Cardiac catheterization 4/23/2019 showed an occluded sequential limb to the PLB of the SVG to the PDA and PLB. Now status post rotational atherectomy and stent placement to the posterolateral branch as well as balloon angioplasty to the             anastomotic site of the SVG to the PDA         • Type 2 diabetes mellitus without complication, with long-term current use of insulin (CMS/[facility])   • Mixed hyperlipidemia   • Essential hypertension         78-year-old female with past my history of CAD with prior CABG, type 2 diabetes mellitus, hypertension, and dyslipidemia presents with symptomatic aortic stenosis and was in the intermediate risk category so underwent TAVR on 5/13/2019.    Has required pacing post procedure.        PLAN     1. Continue pacemaker for now and discontinue as tolerated  2. Monitor postoperative hemodynamics  3. Aspirin  4. Plavix  5. Hold beta-blockade  6. Pulmonary toilet         I discussed the patient's findings and my recommendations with patient and nursing staff     Plan of care and goals reviewed with multidisciplinary team at daily rounds.    High level of risk due to:  illness with threat to life or bodily function.    Navdeep Sainz MD  Pulmonary and Critical Care Medicine  05/13/19 11:39 AM

## 2019-05-14 ENCOUNTER — APPOINTMENT (OUTPATIENT)
Dept: GENERAL RADIOLOGY | Facility: HOSPITAL | Age: 79
End: 2019-05-14

## 2019-05-14 LAB
ALBUMIN SERPL-MCNC: 3.1 G/DL (ref 3.5–5.2)
ANION GAP SERPL CALCULATED.3IONS-SCNC: 10 MMOL/L
BASOPHILS # BLD AUTO: 0.03 10*3/MM3 (ref 0–0.2)
BASOPHILS NFR BLD AUTO: 0.4 % (ref 0–1.5)
BUN BLD-MCNC: 14 MG/DL (ref 8–23)
BUN/CREAT SERPL: 17.9 (ref 7–25)
CALCIUM SPEC-SCNC: 8.3 MG/DL (ref 8.6–10.5)
CHLORIDE SERPL-SCNC: 102 MMOL/L (ref 98–107)
CO2 SERPL-SCNC: 24 MMOL/L (ref 22–29)
CREAT BLD-MCNC: 0.78 MG/DL (ref 0.57–1)
DEPRECATED RDW RBC AUTO: 49.7 FL (ref 37–54)
EOSINOPHIL # BLD AUTO: 0.38 10*3/MM3 (ref 0–0.4)
EOSINOPHIL NFR BLD AUTO: 5.5 % (ref 0.3–6.2)
ERYTHROCYTE [DISTWIDTH] IN BLOOD BY AUTOMATED COUNT: 14.4 % (ref 12.3–15.4)
GFR SERPL CREATININE-BSD FRML MDRD: 71 ML/MIN/1.73
GLUCOSE BLD-MCNC: 175 MG/DL (ref 65–99)
GLUCOSE BLDC GLUCOMTR-MCNC: 163 MG/DL (ref 70–130)
GLUCOSE BLDC GLUCOMTR-MCNC: 180 MG/DL (ref 70–130)
GLUCOSE BLDC GLUCOMTR-MCNC: 190 MG/DL (ref 70–130)
GLUCOSE BLDC GLUCOMTR-MCNC: 211 MG/DL (ref 70–130)
HCT VFR BLD AUTO: 29.8 % (ref 34–46.6)
HGB BLD-MCNC: 9.8 G/DL (ref 12–15.9)
IMM GRANULOCYTES # BLD AUTO: 0.01 10*3/MM3 (ref 0–0.05)
IMM GRANULOCYTES NFR BLD AUTO: 0.1 % (ref 0–0.5)
INR PPP: 1.1 (ref 0.85–1.16)
LYMPHOCYTES # BLD AUTO: 1.42 10*3/MM3 (ref 0.7–3.1)
LYMPHOCYTES NFR BLD AUTO: 20.6 % (ref 19.6–45.3)
MAGNESIUM SERPL-MCNC: 2.3 MG/DL (ref 1.6–2.4)
MCH RBC QN AUTO: 31 PG (ref 26.6–33)
MCHC RBC AUTO-ENTMCNC: 32.9 G/DL (ref 31.5–35.7)
MCV RBC AUTO: 94.3 FL (ref 79–97)
MONOCYTES # BLD AUTO: 0.57 10*3/MM3 (ref 0.1–0.9)
MONOCYTES NFR BLD AUTO: 8.3 % (ref 5–12)
NEUTROPHILS # BLD AUTO: 4.49 10*3/MM3 (ref 1.7–7)
NEUTROPHILS NFR BLD AUTO: 65.1 % (ref 42.7–76)
PHOSPHATE SERPL-MCNC: 2.9 MG/DL (ref 2.5–4.5)
PLATELET # BLD AUTO: 146 10*3/MM3 (ref 140–450)
PMV BLD AUTO: 9.8 FL (ref 6–12)
POTASSIUM BLD-SCNC: 4 MMOL/L (ref 3.5–5.2)
PROTHROMBIN TIME: 13.7 SECONDS (ref 11.2–14.3)
RBC # BLD AUTO: 3.16 10*6/MM3 (ref 3.77–5.28)
SODIUM BLD-SCNC: 136 MMOL/L (ref 136–145)
WBC NRBC COR # BLD: 6.9 10*3/MM3 (ref 3.4–10.8)

## 2019-05-14 PROCEDURE — 97161 PT EVAL LOW COMPLEX 20 MIN: CPT

## 2019-05-14 PROCEDURE — 93010 ELECTROCARDIOGRAM REPORT: CPT | Performed by: INTERNAL MEDICINE

## 2019-05-14 PROCEDURE — 82962 GLUCOSE BLOOD TEST: CPT

## 2019-05-14 PROCEDURE — 93005 ELECTROCARDIOGRAM TRACING: CPT | Performed by: PHYSICIAN ASSISTANT

## 2019-05-14 PROCEDURE — 83735 ASSAY OF MAGNESIUM: CPT | Performed by: PHYSICIAN ASSISTANT

## 2019-05-14 PROCEDURE — 99232 SBSQ HOSP IP/OBS MODERATE 35: CPT | Performed by: INTERNAL MEDICINE

## 2019-05-14 PROCEDURE — 99231 SBSQ HOSP IP/OBS SF/LOW 25: CPT | Performed by: THORACIC SURGERY (CARDIOTHORACIC VASCULAR SURGERY)

## 2019-05-14 PROCEDURE — 71045 X-RAY EXAM CHEST 1 VIEW: CPT

## 2019-05-14 PROCEDURE — 63710000001 INSULIN LISPRO (HUMAN) PER 5 UNITS: Performed by: INTERNAL MEDICINE

## 2019-05-14 PROCEDURE — 80069 RENAL FUNCTION PANEL: CPT | Performed by: PHYSICIAN ASSISTANT

## 2019-05-14 PROCEDURE — 85025 COMPLETE CBC W/AUTO DIFF WBC: CPT | Performed by: PHYSICIAN ASSISTANT

## 2019-05-14 PROCEDURE — 85610 PROTHROMBIN TIME: CPT | Performed by: PHYSICIAN ASSISTANT

## 2019-05-14 RX ADMIN — CLOPIDOGREL BISULFATE 75 MG: 75 TABLET ORAL at 08:55

## 2019-05-14 RX ADMIN — HYDROCODONE BITARTRATE AND ACETAMINOPHEN 1 TABLET: 7.5; 325 TABLET ORAL at 14:01

## 2019-05-14 RX ADMIN — INSULIN LISPRO 2 UNITS: 100 INJECTION, SOLUTION INTRAVENOUS; SUBCUTANEOUS at 08:55

## 2019-05-14 RX ADMIN — POTASSIUM CHLORIDE 10 MEQ: 750 CAPSULE, EXTENDED RELEASE ORAL at 08:54

## 2019-05-14 RX ADMIN — METOPROLOL TARTRATE 25 MG: 25 TABLET, FILM COATED ORAL at 08:54

## 2019-05-14 RX ADMIN — INSULIN LISPRO 4 UNITS: 100 INJECTION, SOLUTION INTRAVENOUS; SUBCUTANEOUS at 12:45

## 2019-05-14 RX ADMIN — ASPIRIN 81 MG: 81 TABLET, COATED ORAL at 08:54

## 2019-05-14 RX ADMIN — PANTOPRAZOLE SODIUM 40 MG: 40 TABLET, DELAYED RELEASE ORAL at 05:00

## 2019-05-14 RX ADMIN — INSULIN LISPRO 2 UNITS: 100 INJECTION, SOLUTION INTRAVENOUS; SUBCUTANEOUS at 17:04

## 2019-05-14 RX ADMIN — INSULIN LISPRO 2 UNITS: 100 INJECTION, SOLUTION INTRAVENOUS; SUBCUTANEOUS at 21:27

## 2019-05-14 RX ADMIN — SODIUM CHLORIDE, PRESERVATIVE FREE 3 ML: 5 INJECTION INTRAVENOUS at 08:56

## 2019-05-14 RX ADMIN — DOCUSATE SODIUM AND SENNOSIDES 2 TABLET: 50; 8.6 TABLET ORAL at 08:54

## 2019-05-14 RX ADMIN — FUROSEMIDE 40 MG: 40 TABLET ORAL at 08:55

## 2019-05-14 RX ADMIN — HYDROCODONE BITARTRATE AND ACETAMINOPHEN 1 TABLET: 7.5; 325 TABLET ORAL at 05:00

## 2019-05-14 NOTE — THERAPY EVALUATION
Acute Care - Physical Therapy Initial Evaluation   [place]     Patient Name: Nancy Goodman  : 1940  MRN: [id number]  Today's Date: 2019   Onset of Illness/Injury or Date of Surgery: 19  Date of Referral to PT: 19  Referring Physician: MD Calvillo      Admit Date: 2019    Visit Dx:     ICD-10-CM ICD-9-CM   1. Impaired functional mobility, balance, gait, and endurance Z74.09 V49.89   2. AVD (aortic valve disease) I35.9 424.1   3. Severe aortic stenosis I35.0 424.1     Patient Active Problem List   Diagnosis   • Type 2 diabetes mellitus without complication, with long-term current use of insulin (CMS/[facility])   • Mixed hyperlipidemia   • Essential hypertension   • Vitamin D deficiency   • CAD (coronary artery disease)   • Aortic stenosis, severe   • S/P CABG (coronary artery bypass graft)   • Pulmonary edema   • Diastolic CHF (CMS/[facility])     Past Medical History:   Diagnosis Date   • Allergic rhinitis    • Arthritis    • Cellulitis of finger    • CHF (congestive heart failure) (CMS/[facility])    • Coronary artery disease     CAD   • Diabetes mellitus (CMS/[facility])     dx 12 years ago- checks fsbs daily   • Dizziness    • Edema    • GERD (gastroesophageal reflux disease)    • Hyperlipidemia    • Hypertension    • Ingrown nail    • PAT (paroxysmal atrial tachycardia) (CMS/[facility])    • Rosacea    • SOB (shortness of breath)    • Vitamin D deficiency    • Wears dentures    • Wears eyeglasses    • Wears partial dentures      Past Surgical History:   Procedure Laterality Date   • AORTIC VALVE REPAIR/REPLACEMENT N/A 2019    Procedure: TRANSCATHETER AORTIC VALVE REPLACEMENT, ELIZABETH;  Surgeon: Fidel Calvillo MD;  Location:  [facility];  Service: Cardiothoracic   • AORTIC VALVE REPAIR/REPLACEMENT N/A 2019    Procedure: Transapical Transcatheter Aortic Valve Replacement;  Surgeon: Anushka Hankins MD;  Location:  [facility];  Service: Cardiovascular   • CARDIAC  CATHETERIZATION N/A 5/16/2017    Procedure: Left Heart Cath;  Surgeon: Fidel Valdez MD;  Location:  [facility] CATH INVASIVE LOCATION;  Service:    • CARDIAC CATHETERIZATION Left 4/23/2019    Procedure: Cardiac Catheterization/Vascular Study;  Surgeon: Anushka Hankins MD;  Location:  [facility] CATH INVASIVE LOCATION;  Service: Cardiovascular   • CORONARY ARTERY BYPASS GRAFT N/A 5/24/2017    Procedure: CORONARY ARTERY BYPASS x  5 WITH INTERNAL MAMMARY ARTERY GRAFT and EVH of the Right leg;  Surgeon: Fidel Calvillo MD;  Location:  [facility] OR;  Service:    • FINGER NAIL SURGERY     • TUBAL ABDOMINAL LIGATION     • VARICOSE VEIN SURGERY          PT ASSESSMENT (last 12 hours)      Physical Therapy Evaluation     Row Name 05/14/19 1300          PT Evaluation Time/Intention    Subjective Information  no complaints  -CAMRYN     Document Type  discharge evaluation/summary  -CAMRYN     Mode of Treatment  physical therapy  -CAMRYN     Patient Effort  excellent  -CAMRYN     Symptoms Noted During/After Treatment  none  -CAMRYN     Row Name 05/14/19 1300          General Information    Patient Profile Reviewed?  yes  -CAMRYN     Onset of Illness/Injury or Date of Surgery  05/13/19  -CAMRYN     Referring Physician  MD Calvillo  -CAMRYN     Patient Observations  alert;cooperative;agree to therapy  -CAMRYN     Patient/Family Observations  family supportive  -CAMRYN     General Observations of Patient  patient is now off bed rest from groin line being pulled this am  -CAMRYN     Prior Level of Function  independent:;gait;transfer;bed mobility;ADL's  -CAMRYN     Equipment Currently Used at Home  none  -CAMRYN     Pertinent History of Current Functional Problem  patient was admitted with aortic stenosis and is now S/P TAVR on 5/13  -CAMRYN     Existing Precautions/Restrictions  cardiac  -CAMRYN     Risks Reviewed  patient:;LOB;increased discomfort;change in vital signs  -CAMRYN     Benefits Reviewed  patient:;improve function;increase balance;decrease risk of DVT  -CAMRYN     Barriers to Rehab  none  identified  -     Row Name 05/14/19 1300          Relationship/Environment    Lives With  spouse  -     Row Name 05/14/19 1300          Resource/Environmental Concerns    Current Living Arrangements  home/apartment/condo  -CAMRYN     Resource/Environmental Concerns  none  -     Row Name 05/14/19 1300          Stairs Within Home, Primary    Number of Stairs, Within Home, Primary  other (see comments) 12  -CAMRYN     Row Name 05/14/19 1300          Cognitive Assessment/Intervention- PT/OT    Orientation Status (Cognition)  oriented x 4  -CAMRYN     Follows Commands (Cognition)  WFL  -     Row Name 05/14/19 1300          Bed Mobility Assessment/Treatment    Comment (Bed Mobility)  patient is OOB and returns to the chair  -     Row Name 05/14/19 1300          Transfer Assessment/Treatment    Transfer Assessment/Treatment  sit-stand transfer;stand-sit transfer  -     Sit-Stand [place] (Transfers)  supervision  -CAMRYN     Stand-Sit [place] (Transfers)  supervision  -[facility] Name 05/14/19 1300          Gait/Stairs Assessment/Training    Gait/Stairs Assessment/Training  gait/ambulation independence  -CAMRYN     [place] Level (Gait)  supervision  -CAMRYN     Distance in Feet (Gait)  400  -CAMRYN     Pattern (Gait)  step-through  -CAMRYN     Negotiation (Stairs)  stairs independence  -CAMRYN     [place] Level (Stairs)  supervision  -CAMRYN     Handrail Location (Stairs)  right side (ascending)  -CAMRYN     Number of Steps (Stairs)  12  -     Row Name 05/14/19 1300          General ROM    GENERAL ROM COMMENTS  no ROM deficits  -[facility] Name 05/14/19 1300          MMT (Manual Muscle Testing)    General MMT Comments  no strength deficits per age  -     Row Name 05/14/19 1300          Motor Assessment/Intervention    Additional Documentation  Balance (Group);Therapeutic Exercise (Group);Therapeutic Exercise Interventions (Group)  -     Row Name 05/14/19 1300          Therapeutic Exercise    Therapeutic Exercise  seated, lower  extremities  -CAMRYN     Additional Documentation  Therapeutic Exercise (Row)  -     Row Name 05/14/19 1300          Lower Extremity Seated Therapeutic Exercise    Performed, Seated Lower Extremity (Therapeutic Exercise)  hip flexion/extension;knee flexion/extension;ankle dorsiflexion/plantarflexion  -     Exercise Type, Seated Lower Extremity (Therapeutic Exercise)  AROM (active range of motion)  -CAMRYN     Sets/Reps Detail, Seated Lower Extremity (Therapeutic Exercise)  1/10  -     Row Name 05/14/19 1300          Balance    Balance  static sitting balance;static standing balance  -     Row Name 05/14/19 1300          Static Sitting Balance    Level of [place] (Unsupported Sitting, Static Balance)  independent  -CAMRYN     Sitting Position (Unsupported Sitting, Static Balance)  sitting in chair  -CAMRYN     Time Able to Maintain Position (Unsupported Sitting, Static Balance)  more than 5 minutes  -     Row Name 05/14/19 1300          Static Standing Balance    Level of [place] (Supported Standing, Static Balance)  supervision  -CAMRYN     Time Able to Maintain Position (Supported Standing, Static Balance)  4 to 5 minutes  -     Row Name 05/14/19 1300          Pain Assessment    Additional Documentation  Pain Scale: Numbers Pre/Post-Treatment (Group)  -     Row Name 05/14/19 1300          Pain Scale: Numbers Pre/Post-Treatment    Pain Scale: Numbers, Pretreatment  0/10 - no pain  -     Pain Scale: Numbers, Post-Treatment  0/10 - no pain  -     Row Name             Wound 05/13/19 0841 Left groin incision    Wound - Properties Group Date first assessed: 05/13/19  - Time first assessed: 0841  - Side: Left  - Location: groin  - Type: incision  -    Row Name 05/14/19 1300          Coping    Observed Emotional State  calm;cooperative  -     Verbalized Emotional State  acceptance  -     Row Name 05/14/19 1300          Plan of Care Review    Plan of Care Reviewed With  patient  -     Row Name  05/14/19 1300          Physical Therapy Clinical Impression    Date of Referral to PT  05/14/19  -CAMRYN     PT Diagnosis (PT Clinical Impression)  impaired mobility  -CAMRYN     Patient/Family Goals Statement (PT Clinical Impression)  patient to go home with family assist  -CAMRYN     Criteria for Skilled Interventions Met (PT Clinical Impression)  yes;treatment indicated  -CAMRYN     Rehab Potential (PT Clinical Summary)  good, to achieve stated therapy goals  -CAMRYN     Care Plan Review (PT)  evaluation/treatment results reviewed;care plan/treatment goals reviewed;risks/benefits reviewed;patient/other agree to care plan  -CAMRYN     Care Plan Review, Other Participant (PT Clinical Impression)  family  -CAMRYN     Row Name 05/14/19 1300          Vital Signs    Pre Systolic BP Rehab  130  -CAMRYN     Pre Treatment Diastolic BP  62  -CAMRYN     Post Systolic BP Rehab  150  -CAMRYN     Post Treatment Diastolic BP  64  -CAMRYN     Pretreatment Heart Rate (beats/min)  79  -CAMRYN     Posttreatment Heart Rate (beats/min)  80  -CAMRYN     Pre SpO2 (%)  95  -CAMRYN     O2 Delivery Pre Treatment  room air  -CAMRYN     Post SpO2 (%)  96  -CAMRYN     O2 Delivery Post Treatment  room air  -CAMRYN     Pre Patient Position  Sitting  -CAMRYN     Intra Patient Position  Standing  -CAMRYN     Post Patient Position  Sitting  -CAMRYN     Row Name 05/14/19 1300          Physical Therapy Goals    Transfer Goal Selection (PT)  transfer, PT goal 1  -CAMRYN     Gait Training Goal Selection (PT)  gait training, PT goal 1  -CAMRYN     Stairs Goal Selection (PT)  stairs, PT goal 1  -CAMRYN     Additional Documentation  Stairs Goal Selection (PT) (Row)  -     Row Name 05/14/19 1300          Transfer Goal 1 (PT)    Activity/Assistive Device (Transfer Goal 1, PT)  sit-to-stand/stand-to-sit  -CAMRYN     [place] Level/Cues Needed (Transfer Goal 1, PT)  supervision required  -CAMRYN     Time Frame (Transfer Goal 1, PT)  1 day  -CAMRYN     Progress/Outcome (Transfer Goal 1, PT)  goal met  -     Row Name 05/14/19 1300          Gait Training  Goal 1 (PT)    Activity/Assistive Device (Gait Training Goal 1, PT)  gait (walking locomotion)  -CAMRYN     [place] Level (Gait Training Goal 1, PT)  supervision required  -CAMRYN     Distance (Gait Goal 1, PT)  400  -CAMRYN     Time Frame (Gait Training Goal 1, PT)  1 day  -CAMRYN     Progress/Outcome (Gait Training Goal 1, PT)  goal met  -CAMRYN     Row Name 05/14/19 1300          Stairs Goal 1 (PT)    Activity/Assistive Device (Stairs Goal 1, PT)  ascending stairs;descending stairs  -CAMRYN     [place] Level/Cues Needed (Stairs Goal 1, PT)  supervision required  -CAMRYN     Number of Stairs (Stairs Goal 1, PT)  12  -CAMRYN     Time Frame (Stairs Goal 1, PT)  1 day  -CAMRYN     Progress/Outcome (Stairs Goal 1, PT)  goal met  -CAMRYN     Row Name 05/14/19 1300          Patient Education Goal (PT)    Activity (Patient Education Goal, PT)  HEP  -CAMRYN     [place]/Cues/Accuracy (Memory Goal 2, PT)  verbalizes understanding  -CAMRYN     Time Frame (Patient Education Goal, PT)  1 day  -CAMRYN     Progress/Outcome (Patient Education Goal, PT)  goal met  -CAMRYN     Row Name 05/14/19 1300          Positioning and Restraints    Pre-Treatment Position  sitting in chair/recliner  -CAMRYN     Post Treatment Position  chair  -CAMRYN     In Chair  notified nsg;reclined;sitting;call light within reach;encouraged to call for assist;exit alarm on  -CAMRYN     Row Name 05/14/19 1300          Physical Therapy Discharge Summary    Additional Documentation  Discharge Summary, PT Eval (Group)  -CAMRYN     Row Name 05/14/19 1300          Discharge Summary, PT Eval    Reason for Discharge (PT Discharge Summary)  patient met all goals and outcomes;patient discharged from this facility;no further needs identified  -CAMRYN     Row Name 05/14/19 1300          Living Environment    Home Accessibility  stairs within home  -CAMRYN       User Key  (r) = Recorded By, (t) = Taken By, (c) = Cosigned By    Initials Name Provider Type    Lacey Thomson, PT Physical Therapist    Porsche Horan, RN  Registered Nurse        Physical Therapy Education     Title: PT OT SLP Therapies (In Progress)     Topic: Physical Therapy (Resolved)     Point: Mobility training (Resolved)     Learning Progress Summary           Patient Acceptance, E, VU by CAMRYN at 5/14/2019  1:00 PM                   Point: Home exercise program (Resolved)     Learning Progress Summary           Patient Acceptance, E, VU by CAMRYN at 5/14/2019  1:00 PM                   Point: Body mechanics (Resolved)     Learning Progress Summary           Patient Acceptance, E, VU by CAMRYN at 5/14/2019  1:00 PM                   Point: Precautions (Resolved)     Learning Progress Summary           Patient Acceptance, E, VU by CAMRYN at 5/14/2019  1:00 PM                               User Key     Initials Effective Dates Name Provider Type Discipline    CAMRYN 06/19/15 -  Lacey Kennedy PT Physical Therapist PT              PT Recommendation and Plan  Anticipated Discharge Disposition (PT): home with assist  Therapy Frequency (PT Clinical Impression): evaluation only  Outcome Summary/Treatment Plan (PT)  Anticipated Discharge Disposition (PT): home with assist  Reason for Discharge (PT Discharge Summary): patient met all goals and outcomes, patient discharged from this facility, no further needs identified  Plan of Care Reviewed With: patient  Outcome Summary: PT eval completed patient is able to ambulate 400 ft including going up and down 12 steps with stable vitals. we will D/C from PT patient to ambulate with RN staff. patient to go home with family assist at D/C  Outcome Measures     Row Name 05/14/19 1300             How much help from another person do you currently need...    Turning from your back to your side while in flat bed without using bedrails?  4  -CAMRYN      Moving from lying on back to sitting on the side of a flat bed without bedrails?  4  -CAMRYN      Moving to and from a bed to a chair (including a wheelchair)?  4  -CAMRYN      Standing up from a chair using  your arms (e.g., wheelchair, bedside chair)?  4  -CAMRNY      Climbing 3-5 steps with a railing?  3  -CAMRYN      To walk in hospital room?  4  -CAMRYN      AM-PAC 6 Clicks Score  23  -CAMRYN         Functional Assessment    Outcome Measure Options  AM-PAC 6 Clicks Basic Mobility (PT)  -CAMRYN        User Key  (r) = Recorded By, (t) = Taken By, (c) = Cosigned By    Initials Name Provider Type    Lacey Thomson PT Physical Therapist         Time Calculation:   PT Charges     Row Name 05/14/19 1300             Time Calculation    Start Time  1300  -CAMRYN      PT Received On  05/14/19  -CAMRYN      PT Goal Re-Cert Due Date  05/14/19  -CAMRYN        User Key  (r) = Recorded By, (t) = Taken By, (c) = Cosigned By    Initials Name Provider Type    Lacey Thomson PT Physical Therapist        Therapy Charges for Today     Code Description Service Date Service Provider Modifiers Qty    52775612857 HC PT EVAL LOW COMPLEXITY 4 5/14/2019 Lacey Kennedy, PT GP 1          PT G-Codes  Outcome Measure Options: AM-PAC 6 Clicks Basic Mobility (PT)  AM-PAC 6 Clicks Score: 23      Lacey Kennedy PT  5/14/2019

## 2019-05-14 NOTE — PROGRESS NOTES
Discharge Planning Assessment  [facility]     Patient Name: Nancy Goodman  MRN: [id number]  Today's Date: 5/14/2019    Admit Date: 5/13/2019    Discharge Needs Assessment     Row Name 05/14/19 1029       Living Environment    Lives With  spouse    Current Living Arrangements  home/apartment/condo    Provides Primary Care For  no one    Family Caregiver if Needed  child(juana), adult;spouse    Quality of Family Relationships  involved;helpful    Able to Return to Prior Arrangements  yes       Transition Planning    Patient/Family Anticipates Transition to  home with family    Patient/Family Anticipated Services at Transition  none    Transportation Anticipated  family or friend will provide       Discharge Needs Assessment    Readmission Within the Last 30 Days  no previous admission in last 30 days    Concerns to be Addressed  discharge planning    Equipment Currently Used at Home  none    Anticipated Changes Related to Illness  none    Equipment Needed After Discharge  none        Discharge Plan     Row Name 05/14/19 1029       Plan    Plan  home    Patient/Family in Agreement with Plan  yes    Plan Comments  Pt lives in [facility] with her . She reports she was independent with all ADLs prior to admit and denies use of any DME or HH. Pt is followed by his PCP and his medications are covered by insurance. At this time pt reports her plan for discharge is to return home. CM to follow for any discharge needs.        Destination      No service coordination in this encounter.      Durable Medical Equipment      No service coordination in this encounter.      Dialysis/Infusion      No service coordination in this encounter.      Home Medical Care      No service coordination in this encounter.      Therapy      No service coordination in this encounter.      Community Resources      No service coordination in this encounter.          Demographic Summary     Row Name 05/14/19 1028       General  Information    Admission Type  inpatient    Referral Source  physician    Reason for Consult  discharge planning    General Information Comments  PCP Wayne Jorge       Contact Information    Permission Granted to Share Info With  ;family/designee    Contact Information Comments  Bobby Goodman [phone]        Functional Status     Row Name 05/14/19 1029       Functional Status, IADL    Medications  independent    Meal Preparation  independent    Housekeeping  independent    Laundry  independent    Shopping  independent       Mental Status    General Appearance WDL  WDL       Mental Status Summary    Recent Changes in Mental Status/Cognitive Functioning  no changes        Psychosocial    No documentation.       Abuse/Neglect    No documentation.       Legal    No documentation.       Substance Abuse    No documentation.       Patient Forms    No documentation.           Rita Keating, RN

## 2019-05-14 NOTE — PLAN OF CARE
Problem: Patient Care Overview  Goal: Plan of Care Review  Outcome: Ongoing (interventions implemented as appropriate)   05/14/19 1655   Coping/Psychosocial   Plan of Care Reviewed With patient   Plan of Care Review   Progress improving   OTHER   Outcome Summary Pt improving; tranvenous pacing wires removed by Dr Hankins. Venous sheath and radial a.line removed with no issue. Pt on bedrest for 3 hours. Pt currently up in chair and pt tolerated. Pt ambulated in hallway and climbing stairs with PT. Pt pleasant and cooperative. Plans to discharge home 5/15. Pt instructed and encouraged to use IS. Pain controlled by PO meds. VSS; will continue to monitor.        Problem: Cardiac Surgery (Adult)  Goal: Anesthesia/Sedation Recovery  Outcome: Ongoing (interventions implemented as appropriate)   05/14/19 1655   Goal/Outcome Evaluation   Anesthesia/Sedation Recovery recovered to baseline       Problem: Skin Injury Risk (Adult)  Goal: Skin Health and Integrity  Outcome: Ongoing (interventions implemented as appropriate)   05/14/19 1655   Skin Injury Risk (Adult)   Skin Health and Integrity making progress toward outcome       Problem: Fall Risk (Adult)  Goal: Absence of Fall  Outcome: Ongoing (interventions implemented as appropriate)   05/14/19 1655   Fall Risk (Adult)   Absence of Fall making progress toward outcome

## 2019-05-14 NOTE — PROGRESS NOTES
[facility] Daily Note       LOS: 1 day   Patient Care Team:  Wayne Jorge MD as PCP - General  Eyal Cervantes MD as PCP - Claims Attributed  Fidel Valdez MD as Consulting Physician (Cardiology)    Chief Complaint: Status post TAVR/coronary artery disease    Subjective     Subjective: No complaints.  Did well overnight.  Currently 98% on 2 L oxygen.  Apparently her oxygen level fell last night she was given oxygen by nasal cannula.  She has minimal discomfort in her groins.  She does think that her breathing is significantly better than yesterday before her valve was placed.      Review of Systems:   As above.    Medications:    aspirin 81 mg Oral Daily   chlorhexidine 15 mL Mouth/Throat Q12H   clopidogrel 75 mg Oral Daily   furosemide 40 mg Oral Daily   insulin lispro 0-9 Units Subcutaneous 4x Daily With Meals & Nightly   losartan 75 mg Oral Nightly   metoprolol tartrate 25 mg Oral BID   pantoprazole 40 mg Oral Q AM   potassium chloride 10 mEq Oral Daily   sennosides-docusate sodium 2 tablet Oral BID   sodium chloride 3 mL Intravenous Q12H       Objective     Vital Sign Min/Max for last 24 hours  Temp  Min: 97.6 °F (36.4 °C)  Max: 98.9 °F (37.2 °C)   BP  Min: 95/42  Max: 163/68   Pulse  Min: 65  Max: 94   Resp  Min: 16  Max: 18   SpO2  Min: 91 %  Max: 100 %   Flow (L/min)  Min: 2  Max: 3   No Data Recorded      Intake/Output Summary (Last 24 hours) at 5/14/2019 0736  Last data filed at 5/14/2019 0600  Gross per 24 hour   Intake 3041 ml   Output 1800 ml   Net 1241 ml            Physical Exam:    General: Alert and oriented.   Cardiovascular: Heart has a nondisplaced focal PMI. Regular rate and rhythm without murmur, gallop or rub.  Lungs: Clear without rales or wheezes. Equal expansion is noted.   Abdomen: Soft, nontender.  Extremities: Show no edema.   Skin: warm and dry.     Results Review:    I reviewed the patient's new clinical results.  EKG:  Tele: Normal sinus rhythm.  The only notable AV  block on telemetry review was at 9:28 yesterday morning.    Labs:    Results from last 7 days   Lab Units 05/14/19 0316 05/13/19  1322 05/13/19  0936 05/12/19  1215   SODIUM mmol/L 136 141 143 141   POTASSIUM mmol/L 4.0 3.9 3.5 4.2   CHLORIDE mmol/L 102 105 108* 102   CO2 mmol/L 24.0 23.0 22.0 28.0   BUN mg/dL 14 16 18 19   CREATININE mg/dL 0.78 0.75 0.71 1.09*   CALCIUM mg/dL 8.3* 8.4* 8.4* 9.9   BILIRUBIN mg/dL  --   --   --  0.4   ALK PHOS U/L  --   --   --  78   ALT (SGPT) U/L  --   --   --  17   AST (SGOT) U/L  --   --   --  16   GLUCOSE mg/dL 175* 185* 161* 117*     Results from last 7 days   Lab Units 05/14/19 0316 05/13/19 1322 05/13/19  0936   WBC 10*3/mm3 6.90 6.64 7.05   HEMOGLOBIN g/dL 9.8* 10.9* 10.4*   HEMATOCRIT % 29.8* 33.4* 31.7*   PLATELETS 10*3/mm3 146 159 149     Lab Results   Component Value Date    TROPONINT <0.010 04/23/2019    TROPONINT 0.017 04/09/2019     Lab Results   Component Value Date    CHOL 206 (H) 04/23/2019    CHOL 261 (H) 05/16/2017     Lab Results   Component Value Date    TRIG 278 (H) 04/23/2019    TRIG 478 (H) 11/26/2018    TRIG 311 (H) 04/20/2018     Lab Results   Component Value Date    HDL 30 (L) 04/23/2019    HDL 41 11/26/2018    HDL 35 (L) 04/20/2018     No components found for: LDLCALC  Lab Results   Component Value Date    INR 1.10 05/14/2019    INR 1.17 (H) 05/13/2019    INR 0.96 05/12/2019    PROTIME 13.7 05/14/2019    PROTIME 14.3 05/13/2019    PROTIME 12.3 05/12/2019         Ejection Fraction: 60%    Assessment   Assessment:    1. Aortic stenosis  a. Peak gradient of 27 by cath 5/17  b. 4/19 peak gradient by echo of 66  c. Transesophageal echo 4/23/2019 with a mean aortic valve gradient of 37 mmHg normal LV function.  d. Status post 23 mm Tobias 3 TAVR 5/13/2019  2. Coronary artery disease  a. 5/16/17 angina, cardiac catheter severe ostial LAD and LCx of these.  Ostial RPL and RCA.  b. 5 vessel CABG (Rajinder) LIMA to LAD, SVG to diagonal and LCx, and SVG to PDA and  PL. 05/24/17  c. 4/23/2019 left heart catheterization Dr. Hankins: Successful PTCRA/PTCA and stent placement in the mid posterolateral branch using a 2.5 x 28 mm Carrie drug-eluting stent, Successful PTCA of the anastomosis between the vein graft and the PDA using a 2.0 x 15 mm mini trek balloon  3. Hypertension  4. Dyslipidemia  5. Diabetes mellitus  6. GERD      Plan:    The pacemaker wire was removed.  I think it is reasonable to pull the venous sheath and have her rest for a couple of hours after which she can get up to a chair.    Possible discharge tomorrow.    Anushka Hankins MD  05/14/19  7:36 AM

## 2019-05-14 NOTE — PROGRESS NOTES
Nanyc Goodman  [id number]  1940     LOS: 1 day   Patient Care Team:  Wayne Jorge MD as PCP - General  Eyal Cervantes MD as PCP - Claims Attributed  Fidel Valdez MD as Consulting Physician (Cardiology)    Chief Complaint: Aortic stenosis      Subjective: No specific complaints    Objective:     Vital Sign Min/Max for last 24 hours  Temp  Min: 97.6 °F (36.4 °C)  Max: 98.9 °F (37.2 °C)   BP  Min: 95/42  Max: 163/68   Pulse  Min: 65  Max: 94   Resp  Min: 16  Max: 18   SpO2  Min: 91 %  Max: 100 %   No Data Recorded   No Data Recorded         Physical Exam:    Wound: Satisfactory    Pulses:     Mediastinal and Chest Tube Drainage:       Results Review:   Results from last 7 days   Lab Units 05/14/19  0316   WBC 10*3/mm3 6.90   HEMOGLOBIN g/dL 9.8*   HEMATOCRIT % 29.8*   PLATELETS 10*3/mm3 146     Results from last 7 days   Lab Units 05/14/19  0316   SODIUM mmol/L 136   POTASSIUM mmol/L 4.0   CHLORIDE mmol/L 102   CO2 mmol/L 24.0   BUN mg/dL 14   CREATININE mg/dL 0.78   GLUCOSE mg/dL 175*   CALCIUM mg/dL 8.3*     Results from last 7 days   Lab Units 05/13/19  0844   PH, ARTERIAL pH units 7.39         Assessment      Aortic stenosis, severe    Type 2 diabetes mellitus without complication, with long-term current use of insulin (CMS/[facility])    Mixed hyperlipidemia    Essential hypertension    CAD (coronary artery disease)      Dr. Hankins will decide on the pacemaker.  Will ambulate after pacemaker is out and get up to chair.        Fidel Calvillo MD  05/14/19  6:33 AM      Please note that portions of this note were completed with a voice recognition program. Efforts were made to edit the dictations, but words may be mistranscribed

## 2019-05-14 NOTE — PROGRESS NOTES
INTENSIVIST NOTE     Hospital Day: 1      Ms. Nancy Goodman, 78 y.o. female is followed for:   Postoperative management of medical comorbidities       SUBJECTIVE     78-year-old female with history of coronary disease and prior CABG who presented with severe symptomatic aortic stenosis with fatigue and shortness of breath.  She has a history of hypertension, dyslipidemia, type 2 diabetes mellitus, and coronary disease.  She was an intermediate risk category for surgical aortic valve replacement and therefore she underwent TAVR on 5/13/2019.    Interval history:    Pacemaker discontinued.  In normal sinus rhythm.  Afebrile.  Fluid balance slightly positive.  On low-flow oxygen.  Femoral lines removed.    The patient's relevant past medical, surgical and social history were reviewed and updated in Epic as appropriate.       OBJECTIVE     Vital Sign Min/Max for last 24 hours  Temp  Min: 98.1 °F (36.7 °C)  Max: 98.9 °F (37.2 °C)   BP  Min: 95/42  Max: 163/68   Pulse  Min: 67  Max: 94   Resp  Min: 16  Max: 18   SpO2  Min: 91 %  Max: 100 %   No Data Recorded   No Data Recorded      Intake/Output Summary (Last 24 hours) at 5/14/2019 1124  Last data filed at 5/14/2019 0600  Gross per 24 hour   Intake 1641 ml   Output 1250 ml   Net 391 ml         There were no vitals filed for this visit.         Objective:  General Appearance:  In no acute distress.    Vital signs: (most recent): Blood pressure 115/56, pulse 71, temperature 98.2 °F (36.8 °C), temperature source Axillary, resp. rate 16, SpO2 96 %.    HEENT: Normal HEENT exam.  (Nasal cannula O2)    Lungs:  Normal effort and normal respiratory rate.  She is not in respiratory distress.  No rales, decreased breath sounds, wheezes or rhonchi.    Heart: Normal rate.  Regular rhythm.  S1 normal and S2 normal.  No murmur, gallop or friction rub.   Chest: Symmetric chest wall expansion.   Abdomen: Abdomen is soft and non-distended.  Bowel sounds are normal.   There is no  abdominal tenderness.   There is no mass. There is no splenomegaly. There is no hepatomegaly.   Extremities: There is no deformity or dependent edema.  (Femoral lines removed without pathologic hematoma)  Neurological: Patient is alert and oriented to person, place and time.    Pupils:  Pupils are equal, round, and reactive to light.    Skin:  Warm and dry.              I reviewed the patient's new clinical results.  I reviewed the patient's new imaging results/reports including actual images and agree with reports.      Chest X-Ray: Prior CABG.    INFUSIONS    dexmedetomidine 0.2-1.5 mcg/kg/hr    dextrose 5 % with KCl 20 mEq 30 mL/hr Last Rate: 30 mL/hr (05/13/19 1204)   DOBUTamine 2-20 mcg/kg/min    DOPamine 2-20 mcg/kg/min    EPINEPHrine 0.02-0.3 mcg/kg/min    niCARdipine 5-15 mg/hr Last Rate: 2.5 mg/hr (05/13/19 1064)   nitroglycerin 5-200 mcg/min    norepinephrine 0.02-0.3 mcg/kg/min    phenylephrine 0.5-3 mcg/kg/min    vasopressin 0.02-0.1 Units/min        Results from last 7 days   Lab Units 05/14/19  0316 05/13/19  1322 05/13/19  0936   WBC 10*3/mm3 6.90 6.64 7.05   HEMOGLOBIN g/dL 9.8* 10.9* 10.4*   HEMATOCRIT % 29.8* 33.4* 31.7*   PLATELETS 10*3/mm3 146 159 149     Results from last 7 days   Lab Units 05/14/19  0316 05/13/19  1322 05/13/19  0936   SODIUM mmol/L 136 141 143   POTASSIUM mmol/L 4.0 3.9 3.5   CHLORIDE mmol/L 102 105 108*   CO2 mmol/L 24.0 23.0 22.0   BUN mg/dL 14 16 18   GLUCOSE mg/dL 175* 185* 161*   CREATININE mg/dL 0.78 0.75 0.71   MAGNESIUM mg/dL 2.3 1.6 1.6   CALCIUM mg/dL 8.3* 8.4* 8.4*         Results from last 7 days   Lab Units 05/13/19  0844 05/13/19  0813 05/13/19  0728   PH, ARTERIAL pH units 7.39 7.46 7.45         Mech Ventilation:      I reviewed the patient's medications.    Assessment/Plan   ASSESSMENT      Active Hospital Problems    Diagnosis   • **Aortic stenosis, severe     1. Mild aoritc valve stenosis by Echo 5/16/17    2. Echo 4-9-19  · Aortic valve maximum pressure  gradient is 66.4 mmHg.  · Aortic valve mean pressure gradient is 38.4 mmHg.    3.  s/p TAVR 5/13/19     • CAD (coronary artery disease)     a. 5/16/17 angina, cardiac catheter severe ostial LAD and LCx of these.  Ostial RPL and RCA.  b. 5 vessel CABG (Rajinder) LIMA to LAD, SVG to diagonal and LCx, and SVG to PDA and PL.       c.   Cardiac catheterization 4/23/2019 showed an occluded sequential limb to the PLB of the SVG to the PDA and PLB. Now status post rotational atherectomy and stent placement to the posterolateral branch as well as balloon angioplasty to the             anastomotic site of the SVG to the PDA         • Type 2 diabetes mellitus without complication, with long-term current use of insulin (CMS/[facility])   • Mixed hyperlipidemia   • Essential hypertension         78-year-old female with past my history of CAD with prior CABG, type 2 diabetes mellitus, hypertension, and dyslipidemia presents with symptomatic aortic stenosis and was in the intermediate risk category so underwent TAVR on 5/13/2019.    Required temporary pacing after procedure but pacemaker has been removed and in normal sinus rhythm.        PLAN     1. Aspirin  2. Plavix  3. Pulmonary toilet  4. Mobilize  5. SSI  6. Tentative discharge in 24 hours         I discussed the patient's findings and my recommendations with patient and nursing staff     Plan of care and goals reviewed with multidisciplinary team at daily rounds.    .    Navdeep Sainz MD  Pulmonary and Critical Care Medicine  05/14/19 11:24 AM

## 2019-05-14 NOTE — PROGRESS NOTES
Clinical Nutrition     Multidisciplinary Rounds      Patient Name: Nancy Goodman  Date of Encounter: 05/14/19 10:24 AM  MRN: [id number]  Admission date: 5/13/2019      Reason for visit: MDR. RD to continue to follow per protocol.     Additional information obtained during MDR:  S/P TAVR 5/13.  Doing well this morning. Not requiring any pressors.  Diet started with breakfast meal.     Current diet: Diet Regular; Consistent Carbohydrate, Cardiac  No active supplement orders      EMR reviewed     Intervention:  Follow treatment plan  Care plan reviewed    Follow up:   Per protocol      Brooke Perez RD  10:24 AM  Time: 10min

## 2019-05-14 NOTE — PLAN OF CARE
Problem: Patient Care Overview  Goal: Plan of Care Review  Outcome: Ongoing (interventions implemented as appropriate)   05/14/19 1300   Coping/Psychosocial   Plan of Care Reviewed With patient   OTHER   Outcome Summary PT eval completed patient is able to ambulate 400 ft including going up and down 12 steps with stable vitals. we will D/C from PT patient to ambulate with RN staff. patient to go home with family assist at D/C

## 2019-05-15 ENCOUNTER — APPOINTMENT (OUTPATIENT)
Dept: GENERAL RADIOLOGY | Facility: HOSPITAL | Age: 79
End: 2019-05-15

## 2019-05-15 VITALS
HEART RATE: 83 BPM | TEMPERATURE: 98.8 F | OXYGEN SATURATION: 98 % | DIASTOLIC BLOOD PRESSURE: 67 MMHG | RESPIRATION RATE: 16 BRPM | SYSTOLIC BLOOD PRESSURE: 153 MMHG

## 2019-05-15 LAB
ANION GAP SERPL CALCULATED.3IONS-SCNC: 12 MMOL/L
BUN BLD-MCNC: 17 MG/DL (ref 8–23)
BUN/CREAT SERPL: 19.3 (ref 7–25)
CALCIUM SPEC-SCNC: 8.7 MG/DL (ref 8.6–10.5)
CHLORIDE SERPL-SCNC: 101 MMOL/L (ref 98–107)
CO2 SERPL-SCNC: 25 MMOL/L (ref 22–29)
CREAT BLD-MCNC: 0.88 MG/DL (ref 0.57–1)
DEPRECATED RDW RBC AUTO: 51.9 FL (ref 37–54)
ERYTHROCYTE [DISTWIDTH] IN BLOOD BY AUTOMATED COUNT: 14.5 % (ref 12.3–15.4)
GFR SERPL CREATININE-BSD FRML MDRD: 62 ML/MIN/1.73
GLUCOSE BLD-MCNC: 175 MG/DL (ref 65–99)
GLUCOSE BLDC GLUCOMTR-MCNC: 159 MG/DL (ref 70–130)
HCT VFR BLD AUTO: 32 % (ref 34–46.6)
HGB BLD-MCNC: 10.4 G/DL (ref 12–15.9)
MCH RBC QN AUTO: 31.6 PG (ref 26.6–33)
MCHC RBC AUTO-ENTMCNC: 32.5 G/DL (ref 31.5–35.7)
MCV RBC AUTO: 97.3 FL (ref 79–97)
PLATELET # BLD AUTO: 128 10*3/MM3 (ref 140–450)
PMV BLD AUTO: 10.3 FL (ref 6–12)
POTASSIUM BLD-SCNC: 4.4 MMOL/L (ref 3.5–5.2)
RBC # BLD AUTO: 3.29 10*6/MM3 (ref 3.77–5.28)
SODIUM BLD-SCNC: 138 MMOL/L (ref 136–145)
WBC NRBC COR # BLD: 7.02 10*3/MM3 (ref 3.4–10.8)

## 2019-05-15 PROCEDURE — 71045 X-RAY EXAM CHEST 1 VIEW: CPT

## 2019-05-15 PROCEDURE — 80048 BASIC METABOLIC PNL TOTAL CA: CPT | Performed by: PHYSICIAN ASSISTANT

## 2019-05-15 PROCEDURE — 85027 COMPLETE CBC AUTOMATED: CPT | Performed by: PHYSICIAN ASSISTANT

## 2019-05-15 PROCEDURE — 82962 GLUCOSE BLOOD TEST: CPT

## 2019-05-15 PROCEDURE — 99232 SBSQ HOSP IP/OBS MODERATE 35: CPT | Performed by: INTERNAL MEDICINE

## 2019-05-15 PROCEDURE — 93005 ELECTROCARDIOGRAM TRACING: CPT | Performed by: PHYSICIAN ASSISTANT

## 2019-05-15 PROCEDURE — 99239 HOSP IP/OBS DSCHRG MGMT >30: CPT | Performed by: PHYSICIAN ASSISTANT

## 2019-05-15 PROCEDURE — 93010 ELECTROCARDIOGRAM REPORT: CPT | Performed by: INTERNAL MEDICINE

## 2019-05-15 PROCEDURE — 99231 SBSQ HOSP IP/OBS SF/LOW 25: CPT | Performed by: THORACIC SURGERY (CARDIOTHORACIC VASCULAR SURGERY)

## 2019-05-15 RX ADMIN — POTASSIUM CHLORIDE 10 MEQ: 750 CAPSULE, EXTENDED RELEASE ORAL at 08:01

## 2019-05-15 RX ADMIN — METOPROLOL TARTRATE 25 MG: 25 TABLET, FILM COATED ORAL at 08:01

## 2019-05-15 RX ADMIN — DOCUSATE SODIUM AND SENNOSIDES 2 TABLET: 50; 8.6 TABLET ORAL at 08:00

## 2019-05-15 RX ADMIN — FUROSEMIDE 40 MG: 40 TABLET ORAL at 08:00

## 2019-05-15 RX ADMIN — PANTOPRAZOLE SODIUM 40 MG: 40 TABLET, DELAYED RELEASE ORAL at 05:38

## 2019-05-15 RX ADMIN — CLOPIDOGREL BISULFATE 75 MG: 75 TABLET ORAL at 08:01

## 2019-05-15 RX ADMIN — ASPIRIN 81 MG: 81 TABLET, COATED ORAL at 08:01

## 2019-05-15 NOTE — PROGRESS NOTES
Case Management Discharge Note    Final Note: Discharge home.  No discharge needs.    Destination      No service has been selected for the patient.      Durable Medical Equipment      No service has been selected for the patient.      Dialysis/Infusion      No service has been selected for the patient.      Home Medical Care      No service has been selected for the patient.      Therapy      No service has been selected for the patient.      Community Resources      No service has been selected for the patient.             Final Discharge Disposition Code: 01 - home or self-care

## 2019-05-15 NOTE — PLAN OF CARE
Problem: Patient Care Overview  Goal: Plan of Care Review  Outcome: Ongoing (interventions implemented as appropriate)   05/15/19 0059   Coping/Psychosocial   Plan of Care Reviewed With patient   Plan of Care Review   Progress improving   OTHER   Outcome Summary pt. continues to improve. no acute changes. VSS. plan to possibly d/c home in AM. will continue to monitor.       Problem: Cardiac Surgery (Adult)  Goal: Signs and Symptoms of Listed Potential Problems Will be Absent, Minimized or Managed (Cardiac Surgery)  Outcome: Ongoing (interventions implemented as appropriate)    Goal: Anesthesia/Sedation Recovery  Outcome: Outcome(s) achieved Date Met: 05/15/19      Problem: Skin Injury Risk (Adult)  Goal: Identify Related Risk Factors and Signs and Symptoms  Outcome: Ongoing (interventions implemented as appropriate)    Goal: Skin Health and Integrity  Outcome: Ongoing (interventions implemented as appropriate)      Problem: Fall Risk (Adult)  Goal: Identify Related Risk Factors and Signs and Symptoms  Outcome: Ongoing (interventions implemented as appropriate)    Goal: Absence of Fall  Outcome: Ongoing (interventions implemented as appropriate)

## 2019-05-15 NOTE — DISCHARGE INSTR - ACTIVITY
Resume normal activity as tolerated.  No heavy lifting over 10 lbs until cleared by your physician.

## 2019-05-15 NOTE — DISCHARGE SUMMARY
CTS Discharge Summary    Patient Care Team:  Wayne Jorge MD as PCP - General  Eyal Cevrantes MD as PCP - Claims Attributed  Fidel Valdez MD as Consulting Physician (Cardiology)      Date of Admission: 5/13/2019  5:43 AM  Date of Discharge: 5/15/2019    Discharge Diagnosis  Past Medical History:   Diagnosis Date   • Allergic rhinitis    • Arthritis    • Cellulitis of finger    • CHF (congestive heart failure) (CMS/[facility])    • Coronary artery disease     CAD   • Diabetes mellitus (CMS/[facility])     dx 12 years ago- checks fsbs daily   • Dizziness    • Edema    • GERD (gastroesophageal reflux disease)    • Hyperlipidemia    • Hypertension    • Ingrown nail    • PAT (paroxysmal atrial tachycardia) (CMS/[facility])    • Rosacea    • SOB (shortness of breath)    • Vitamin D deficiency    • Wears dentures    • Wears eyeglasses    • Wears partial dentures          Aortic stenosis, severe    Type 2 diabetes mellitus without complication, with long-term current use of insulin (CMS/[facility])    Mixed hyperlipidemia    Essential hypertension    CAD (coronary artery disease)    Patient Active Problem List   Diagnosis   • Type 2 diabetes mellitus without complication, with long-term current use of insulin (CMS/[facility])   • Mixed hyperlipidemia   • Essential hypertension   • Vitamin D deficiency   • CAD (coronary artery disease)   • Aortic stenosis, severe   • S/P CABG (coronary artery bypass graft)   • Pulmonary edema   • Diastolic CHF (CMS/[facility])         History of Present Illness78-year-old  female with a history of hypertension, hyperlipidemia, diabetes mellitus and coronary artery disease status post CABG who presents with fatigue and shortness of breath.  She has severe symptomatic aortic stenosis.  Her calculated STS risk of mortality with surgical aortic valve replacement is 4.2%, placing her in the intermediate risk category.  The patient is a reasonable candidate for transcatheter aortic valve replacement.  I do feel that she  is a high risk candidate for surgical aortic valve replacement given her age and dependency on patent bypass grafts.  The risks and benefits of TAVR were discussed with the patient and her  including pain, bleeding, infection, renal failure, stroke, heart block requiring a pacemaker and death.  The patient understood these risks and wished to proceed with surgery.            Hospital Course  Patient is a 78 y.o. female admitted secondary to aortic stenosis.  After admission was taken operating suite and underwent a TAVR.  Patient tolerated procedure well.  Was taken to the cardiothoracic unit in stable condition.  Intra-and early postop patient had some complete heart block.  Patient was paced.  By the following morning the patient was back to a regular rhythm.  Transvenous pacemaking was DC'd.  Patient ambulated well in the physical therapy for the next 24 hours postop day #2 was able to be discharged home with no further arrhythmia disturbances.    Procedures Performed  Procedure(s):  TRANSCATHETER AORTIC VALVE REPLACEMENT, ELIZABETH  Transapical Transcatheter Aortic Valve Replacement       Consults:   Consults     No orders found from 4/14/2019 to 5/14/2019.            Discharge Medications     Discharge Medications      Continue These Medications      Instructions Start Date   aspirin 81 MG EC tablet   81 mg, Oral, Every Morning      clopidogrel 75 MG tablet  Commonly known as:  PLAVIX   75 mg, Oral, Daily      CRANBERRY PO   450 mg, Oral, Daily      furosemide 40 MG tablet  Commonly known as:  LASIX   40 mg, Oral, Daily      insulin detemir 100 UNIT/ML injection  Commonly known as:  LEVEMIR   38 Units, Subcutaneous, Daily      irbesartan 75 MG tablet  Commonly known as:  AVAPRO   150 mg, Oral, Nightly      metoprolol tartrate 25 MG tablet  Commonly known as:  LOPRESSOR   25 mg, Oral, 2 Times Daily      MULTIVITAMIN ADULT PO   1 tablet, Oral, Daily      NON FORMULARY   Daily, Super cayenne 100,ooo HU every day        omeprazole 40 MG capsule  Commonly known as:  priLOSEC   40 mg, Oral, Nightly      potassium chloride 10 MEQ CR tablet  Commonly known as:  K-DUR,KLOR-CON   10 mEq, Oral, Daily      PROBIOTIC ADVANCED PO   1 tablet, Oral, Nightly      SALMON OIL PO   1,200 mg, Oral, 2 Times Daily             Discharge Diet:   Diet Instructions     Continue a normal heart healthy and diabetic diet.             Patient may shower,    Activity at Discharge:   Activity Instructions     Resume normal activity as tolerated.  No heavy lifting over 10 lbs until cleared by your physician.               This discharge took greater than 30 minutes to compile  Do not drive while taking narcotics    Follow-up Appointments  Future Appointments   Date Time Provider Department Center   6/4/2019  8:45 AM Eyal Cervantes MD [facility] None   6/12/2019 10:30 AM Fidel Valdez MD [facility] None   8/12/2019 10:15 AM Eyal Cervantes MD [facility] None          DANIEL Jackson  05/15/19  2:36 PM

## 2019-05-15 NOTE — PLAN OF CARE
Problem: Patient Care Overview  Goal: Plan of Care Review  Outcome: Ongoing (interventions implemented as appropriate)   05/15/19 0859   Coping/Psychosocial   Plan of Care Reviewed With patient   Plan of Care Review   Progress improving   OTHER   Outcome Summary PT TO BE DISCHARGED       Problem: Fall Risk (Adult)  Goal: Absence of Fall  Outcome: Ongoing (interventions implemented as appropriate)

## 2019-05-15 NOTE — DISCHARGE INSTRUCTIONS
-YOU MAY REMOVE THE DRESSINGS FROM  YOUR GROINS AND RIGHT WRIST TOMORROW, 5/16/2019.---  DO NOT SUBMERGE IN BATH WATER OR STANDING WATER UNTIL CLEARED BY YOUR PHYSICIAN.  YOU MAY SHOWER, TAKE CAUTION WHEN WASHING AROUND THE GROIN SITES, DO NOT SCRUB AREA, YOU SHOULD PAT TO DRY.

## 2019-05-15 NOTE — PROGRESS NOTES
[facility] Daily Note       LOS: 2 days   Patient Care Team:  Wayne Jorge MD as PCP - General  Eyal Cervantes MD as PCP - Claims Attributed  Fidel Valdez MD as Consulting Physician (Cardiology)    Chief Complaint: Status post TAVR/coronary artery disease        Subjective     Subjective: No complaints.  Overall doing well.  Maintaining sinus rhythm.  No further pauses.  Walked and use the stairs with PT yesterday.   Review of Systems:   As above.    Medications:    aspirin 81 mg Oral Daily   clopidogrel 75 mg Oral Daily   furosemide 40 mg Oral Daily   insulin lispro 0-9 Units Subcutaneous 4x Daily With Meals & Nightly   losartan 75 mg Oral Nightly   metoprolol tartrate 25 mg Oral BID   pantoprazole 40 mg Oral Q AM   potassium chloride 10 mEq Oral Daily   sennosides-docusate sodium 2 tablet Oral BID   sodium chloride 3 mL Intravenous Q12H       Objective     Vital Sign Min/Max for last 24 hours  Temp  Min: 98.2 °F (36.8 °C)  Max: 99.3 °F (37.4 °C)   BP  Min: 95/55  Max: 161/67   Pulse  Min: 61  Max: 95   Resp  Min: 16  Max: 22   SpO2  Min: 92 %  Max: 100 %   Flow (L/min)  Min: 2  Max: 2   No Data Recorded      Intake/Output Summary (Last 24 hours) at 5/15/2019 0747  Last data filed at 5/15/2019 0400  Gross per 24 hour   Intake 1200 ml   Output 1750 ml   Net -550 ml            Physical Exam:    General: Alert and oriented  Cardiovascular: Heart has a nondisplaced focal PMI. Regular rate and rhythm without murmur, gallop or rub.  Lungs: Clear without rales or wheezes. Equal expansion is noted.   Extremities: Show no edema.  Both groins look good with no hematoma  Skin: warm and dry.  Neurologic: nonfocal     Results Review:    I reviewed the patient's new clinical results.    Tele:  Sinus @ 96    Labs:    Results from last 7 days   Lab Units 05/15/19  0337 05/14/19  0316 05/13/19  1322  05/12/19  1215   SODIUM mmol/L 138 136 141   < > 141   POTASSIUM mmol/L 4.4 4.0 3.9   < > 4.2   CHLORIDE mmol/L  101 102 105   < > 102   CO2 mmol/L 25.0 24.0 23.0   < > 28.0   BUN mg/dL 17 14 16   < > 19   CREATININE mg/dL 0.88 0.78 0.75   < > 1.09*   CALCIUM mg/dL 8.7 8.3* 8.4*   < > 9.9   BILIRUBIN mg/dL  --   --   --   --  0.4   ALK PHOS U/L  --   --   --   --  78   ALT (SGPT) U/L  --   --   --   --  17   AST (SGOT) U/L  --   --   --   --  16   GLUCOSE mg/dL 175* 175* 185*   < > 117*    < > = values in this interval not displayed.     Results from last 7 days   Lab Units 05/15/19  0337 05/14/19  0316 05/13/19  1322   WBC 10*3/mm3 7.02 6.90 6.64   HEMOGLOBIN g/dL 10.4* 9.8* 10.9*   HEMATOCRIT % 32.0* 29.8* 33.4*   PLATELETS 10*3/mm3 128* 146 159     Lab Results   Component Value Date    TROPONINT <0.010 04/23/2019    TROPONINT 0.017 04/09/2019     Lab Results   Component Value Date    CHOL 206 (H) 04/23/2019    CHOL 261 (H) 05/16/2017     Lab Results   Component Value Date    TRIG 278 (H) 04/23/2019    TRIG 478 (H) 11/26/2018    TRIG 311 (H) 04/20/2018     Lab Results   Component Value Date    HDL 30 (L) 04/23/2019    HDL 41 11/26/2018    HDL 35 (L) 04/20/2018     No components found for: LDLCALC  Lab Results   Component Value Date    INR 1.10 05/14/2019    INR 1.17 (H) 05/13/2019    INR 0.96 05/12/2019    PROTIME 13.7 05/14/2019    PROTIME 14.3 05/13/2019    PROTIME 12.3 05/12/2019         Ejection Fraction: 60%    Assessment   Assessment:  1. Aortic stenosis  a. Peak gradient of 27 by cath 5/17  b. 4/19 peak gradient by echo of 66  c. Transesophageal echo 4/23/2019 with a mean aortic valve gradient of 37 mmHg normal LV function.  d. Status post 23 mm Tobias 3 TAVR 5/13/2019  2. Coronary artery disease  a. 5/16/17 angina, cardiac catheter severe ostial LAD and LCx of these.  Ostial RPL and RCA.  b. 5 vessel CABG (Rajinder) LIMA to LAD, SVG to diagonal and LCx, and SVG to PDA and PL. 05/24/17  c. 4/23/2019 left heart catheterization Dr. Hankins: Successful PTCRA/PTCA and stent placement in the mid posterolateral branch  using a 2.5 x 28 mm Carrie drug-eluting stent, Successful PTCA of the anastomosis between the vein graft and the PDA using a 2.0 x 15 mm mini trek balloon  3. Hypertension  4. Dyslipidemia  5. Diabetes mellitus  6. GERD        Plan:    Discharge home today  Follow-up with Dr. Valdez in 4 weeks     Samantha Almeida, APRN  05/15/19  7:47 AM    I, Anushka Hankins MD, have reviewed the note in full and agree with all aspects of the above including physical exam, assessment, labs and plan with changes made accordingly.    Anushka Hankins MD, FACC

## 2019-05-15 NOTE — PROGRESS NOTES
Nancy Goodman  [id number]  1940     LOS: 2 days   Patient Care Team:  Wayne Jorge MD as PCP - General  Eyal Cervantes MD as PCP - Claims Attributed  Fidel Valdez MD as Consulting Physician (Cardiology)    Chief Complaint: Aortic stenosis      Subjective: Resting comfortably, no complaints    Objective:     Vital Sign Min/Max for last 24 hours  Temp  Min: 98.2 °F (36.8 °C)  Max: 99.3 °F (37.4 °C)   BP  Min: 95/55  Max: 161/67   Pulse  Min: 61  Max: 95   Resp  Min: 16  Max: 22   SpO2  Min: 92 %  Max: 100 %   No Data Recorded   No Data Recorded         Physical Exam:    Wound: Satisfactory, pulses intact    Pulses:     Mediastinal and Chest Tube Drainage:       Results Review:   Results from last 7 days   Lab Units 05/15/19  0337   WBC 10*3/mm3 7.02   HEMOGLOBIN g/dL 10.4*   HEMATOCRIT % 32.0*   PLATELETS 10*3/mm3 128*     Results from last 7 days   Lab Units 05/15/19  0337   SODIUM mmol/L 138   POTASSIUM mmol/L 4.4   CHLORIDE mmol/L 101   CO2 mmol/L 25.0   BUN mg/dL 17   CREATININE mg/dL 0.88   GLUCOSE mg/dL 175*   CALCIUM mg/dL 8.7     Results from last 7 days   Lab Units 05/13/19  0844   PH, ARTERIAL pH units 7.39         Assessment      Aortic stenosis, severe    Type 2 diabetes mellitus without complication, with long-term current use of insulin (CMS/[facility])    Mixed hyperlipidemia    Essential hypertension    CAD (coronary artery disease)      Seen patient for aortic stenosis.  Discharged when agreeable with Dr. Nhi Calvillo MD  05/15/19  6:46 AM      Please note that portions of this note were completed with a voice recognition program. Efforts were made to edit the dictations, but words may be mistranscribed

## 2019-05-16 ENCOUNTER — READMISSION MANAGEMENT (OUTPATIENT)
Dept: CALL CENTER | Facility: HOSPITAL | Age: 79
End: 2019-05-16

## 2019-05-16 ENCOUNTER — DOCUMENTATION (OUTPATIENT)
Dept: CARDIAC REHAB | Facility: HOSPITAL | Age: 79
End: 2019-05-16

## 2019-05-16 ENCOUNTER — TRANSITIONAL CARE MANAGEMENT TELEPHONE ENCOUNTER (OUTPATIENT)
Dept: FAMILY MEDICINE CLINIC | Facility: CLINIC | Age: 79
End: 2019-05-16

## 2019-05-16 LAB
ABO + RH BLD: NORMAL
ABO + RH BLD: NORMAL
BH BB BLOOD EXPIRATION DATE: NORMAL
BH BB BLOOD EXPIRATION DATE: NORMAL
BH BB BLOOD TYPE BARCODE: 5100
BH BB BLOOD TYPE BARCODE: 5100
BH BB DISPENSE STATUS: NORMAL
BH BB DISPENSE STATUS: NORMAL
BH BB PRODUCT CODE: NORMAL
BH BB PRODUCT CODE: NORMAL
BH BB UNIT NUMBER: NORMAL
BH BB UNIT NUMBER: NORMAL
UNIT  ABO: NORMAL
UNIT  ABO: NORMAL
UNIT  RH: NORMAL
UNIT  RH: NORMAL

## 2019-05-16 NOTE — OUTREACH NOTE
Prep Survey      Responses   Facility patient discharged from?  [facility]   Is patient eligible?  Yes   Discharge diagnosis  status post TAVR for severe symptomatic aortic stenosis   Does the patient have one of the following disease processes/diagnoses(primary or secondary)?  Cardiothoracic surgery   Does the patient have Home health ordered?  No   Is there a DME ordered?  No   Prep survey completed?  Yes          Machelle Holliday RN

## 2019-05-16 NOTE — OUTREACH NOTE
TRISTAN call completed.  Please refer to TCM call flowsheet for call documentation.  Patient reports that she is doing well.  She denied n/v/d/c, pain, and questions regarding meds and d/c instructions from hospital.  I confirmed that she is supposed to take plavix per AVS.  Patient declined to be seen before 6/4/19.  TCM is applicable until 5/29/19.

## 2019-05-17 ENCOUNTER — READMISSION MANAGEMENT (OUTPATIENT)
Dept: CALL CENTER | Facility: HOSPITAL | Age: 79
End: 2019-05-17

## 2019-05-17 NOTE — OUTREACH NOTE
CT Surgery Week 1 Survey      Responses   Facility patient discharged from?  [facility]   Does the patient have one of the following disease processes/diagnoses(primary or secondary)?  Cardiothoracic surgery   Is there a successful TCM telephone encounter documented?  Yes            Parul Doty RN

## 2019-05-21 LAB
BH CV ECHO MEAS - MV MAX PG: 5 MMHG
BH CV ECHO MEAS - MV MEAN PG: 3 MMHG
LV EF 2D ECHO EST: 55 %

## 2019-05-22 ENCOUNTER — OFFICE VISIT (OUTPATIENT)
Dept: CARDIOLOGY | Facility: HOSPITAL | Age: 79
End: 2019-05-22

## 2019-05-22 VITALS
HEIGHT: 63 IN | TEMPERATURE: 96.6 F | WEIGHT: 159.38 LBS | BODY MASS INDEX: 28.24 KG/M2 | DIASTOLIC BLOOD PRESSURE: 60 MMHG | OXYGEN SATURATION: 96 % | RESPIRATION RATE: 18 BRPM | HEART RATE: 69 BPM | SYSTOLIC BLOOD PRESSURE: 148 MMHG

## 2019-05-22 DIAGNOSIS — I50.32 CHRONIC DIASTOLIC CONGESTIVE HEART FAILURE (HCC): ICD-10-CM

## 2019-05-22 DIAGNOSIS — I35.0 AORTIC STENOSIS, SEVERE: Primary | ICD-10-CM

## 2019-05-22 PROCEDURE — 99213 OFFICE O/P EST LOW 20 MIN: CPT | Performed by: NURSE PRACTITIONER

## 2019-05-22 NOTE — PROGRESS NOTES
[facility]  [facility]  TAVR Clinic    Encounter Date:05/22/2019     Nancy Goodman  [address] [place] [id number]  1940    Wayne Jorge MD    Nancy Goodman is a 78 y.o. female.      Subjective:     Chief Complaint:  Establish Care (TAVR)     HPI:  Ms. Goodman is one week post discharge after TAVR done on 5/13/19.  She is accompanied by spouse.  Overall, she states she is feeling better each day.  No BROWN or pain.  Groin soreness is getting better each day.  She has been walking frequently but not going to the gym.  She has three more days to take the additional lasix then looks forward to getting back on the once daily schedule. She denies any bleeding or new bruising.  She wonders if her doses of Avapro and Lopressor will remain the same long term.      Past Medical History:   Diagnosis Date   • Allergic rhinitis    • Arthritis    • Cellulitis of finger    • CHF (congestive heart failure) (CMS/[facility])    • Coronary artery disease     CAD   • Diabetes mellitus (CMS/[facility])     dx 12 years ago- checks fsbs daily   • Dizziness    • Edema    • GERD (gastroesophageal reflux disease)    • Hyperlipidemia    • Hypertension    • Ingrown nail    • PAT (paroxysmal atrial tachycardia) (CMS/[facility])    • Rosacea    • SOB (shortness of breath)    • Vitamin D deficiency    • Wears dentures    • Wears eyeglasses    • Wears partial dentures      Past Surgical History:   Procedure Laterality Date   • AORTIC VALVE REPAIR/REPLACEMENT N/A 5/13/2019    Procedure: TRANSCATHETER AORTIC VALVE REPLACEMENT, ELIZABETH;  Surgeon: Fidel Calvillo MD;  Location: [facility] HYBRID OR 15;  Service: Cardiothoracic   • AORTIC VALVE REPAIR/REPLACEMENT N/A 5/13/2019    Procedure: Transapical Transcatheter Aortic Valve Replacement;  Surgeon: Anushka Hankins MD;  Location: [facility] HYBRID OR 15;  Service: Cardiovascular   • CARDIAC CATHETERIZATION N/A 5/16/2017    Procedure: Left Heart Cath;  Surgeon:  Fidel Valdez MD;  Location:  [facility] CATH INVASIVE LOCATION;  Service:    • CARDIAC CATHETERIZATION Left 4/23/2019    Procedure: Cardiac Catheterization/Vascular Study;  Surgeon: Anushka Hankins MD;  Location:  [facility] CATH INVASIVE LOCATION;  Service: Cardiovascular   • CORONARY ARTERY BYPASS GRAFT N/A 5/24/2017    Procedure: CORONARY ARTERY BYPASS x  5 WITH INTERNAL MAMMARY ARTERY GRAFT and EVH of the Right leg;  Surgeon: Fidel Calvillo MD;  Location:  [facility] OR;  Service:    • FINGER NAIL SURGERY     • TUBAL ABDOMINAL LIGATION     • VARICOSE VEIN SURGERY         Allergies   Allergen Reactions   • Amlodipine      sickness   • Benazepril Nausea Only   • Ciprofloxacin      Pt not sure of reaction (stomach problems)   • Statins Nausea Only     Stomach problem         Current Outpatient Medications:   •  aspirin 81 MG EC tablet, Take 81 mg by mouth Every Morning., Disp: , Rfl:   •  clopidogrel (PLAVIX) 75 MG tablet, Take 1 tablet by mouth Daily., Disp: 30 tablet, Rfl: 11  •  CRANBERRY PO, Take 450 mg by mouth Daily., Disp: , Rfl:   •  furosemide (LASIX) 40 MG tablet, Take 40 mg by mouth Daily., Disp: , Rfl:   •  insulin detemir (LEVEMIR) 100 UNIT/ML injection, Inject 38 Units under the skin into the appropriate area as directed Daily., Disp: , Rfl:   •  irbesartan (AVAPRO) 75 MG tablet, Take 2 tablets by mouth Every Night., Disp: 30 tablet, Rfl: 1  •  metoprolol tartrate (LOPRESSOR) 25 MG tablet, Take 1 tablet by mouth 2 (Two) Times a Day., Disp: 60 tablet, Rfl: 0  •  Multiple Vitamins-Minerals (MULTIVITAMIN ADULT PO), Take 1 tablet by mouth Daily., Disp: , Rfl:   •  NON FORMULARY, Daily. Super cayenne 100,ooo HU every day, Disp: , Rfl:   •  Omega-3 Fatty Acids (SALMON OIL PO), Take 1,200 mg by mouth 2 (Two) Times a Day., Disp: , Rfl:   •  omeprazole (priLOSEC) 40 MG capsule, Take 40 mg by mouth Every Night., Disp: , Rfl:   •  potassium chloride (K-DUR,KLOR-CON) 10 MEQ CR tablet, Take 10 mEq by mouth Daily.,  "Disp: , Rfl:   •  Probiotic Product (PROBIOTIC ADVANCED PO), Take 1 tablet by mouth Every Night., Disp: , Rfl:   No current facility-administered medications for this visit.     Facility-Administered Medications Ordered in Other Visits:   •  Chlorhexidine Gluconate Cloth 2 % pads 1 application, 1 application, Topical, Q12H PRN, Eduardo Bain PA    The following portions of the patient's history were reviewed and updated as appropriate in Epic:  Problem list, allergies, current medications, past medical and surgical history, past social and family history.     Review of Systems   Constitution: Positive for malaise/fatigue.   HENT: Positive for congestion.    Eyes: Negative.    Cardiovascular: Positive for leg swelling. Negative for chest pain, dyspnea on exertion, orthopnea, palpitations and syncope.   Respiratory: Negative.    Hematologic/Lymphatic: Negative.    Skin: Negative.    Musculoskeletal: Positive for arthritis and joint pain.   Gastrointestinal: Positive for bloating and heartburn.   Genitourinary: Negative.    Neurological: Positive for excessive daytime sleepiness and light-headedness.   Psychiatric/Behavioral: The patient has insomnia.         Patient states only when she takes naps during the day   Allergic/Immunologic: Positive for environmental allergies.       Objective:     Vitals:    05/22/19 1021 05/22/19 1022 05/22/19 1023   BP: 146/66 134/65 148/60   BP Location: Right arm Left arm Left arm   Patient Position: Sitting Sitting Standing   Cuff Size: Adult Adult Adult   Pulse: 66 66 69   Resp: 18     Temp: 96.6 °F (35.9 °C)     TempSrc: Temporal     SpO2: 95% 95% 96%   Weight: 72.3 kg (159 lb 6 oz)     Height: 158.8 cm (62.5\")           Physical Exam   Constitutional: She is oriented to person, place, and time. She appears well-developed and well-nourished. No distress.   HENT:   Head: Normocephalic and atraumatic.   Eyes: Conjunctivae are normal. Pupils are equal, round, and reactive to light. " No scleral icterus.   Neck: Normal range of motion. Neck supple. No JVD present.   Cardiovascular: Normal rate, regular rhythm and intact distal pulses. Exam reveals no gallop and no friction rub.   Murmur heard.  Systolic murmur II/ VI    Pulmonary/Chest: Effort normal and breath sounds normal. No respiratory distress. She has no wheezes. She has no rales. She exhibits no tenderness.   Musculoskeletal: Normal range of motion. She exhibits edema.   Bilateral light compression stockings in use.  Trace generalized lower extremity edema noted   Neurological: She is alert and oriented to person, place, and time. No cranial nerve deficit.   Skin: Skin is warm and dry.   Ecchymosis to left groin is resolving distally and no palpable tenderness. Right groin non-tender, no erythema/ tenderness/ or ecchymosis.     Psychiatric: She has a normal mood and affect. Her behavior is normal. Judgment and thought content normal.   Vitals reviewed.      Lab and Diagnostic Review: DC summary    Assessment and Plan:     1. Aortic stenosis, severe  - TAVR 5/13/19 with 23 mm Bah Tobias 3 prosthesis  - Brief CHB post valve implant but no symptoms of recurrence  - Groin access sites healing without difficulty  - Will coordinate 30-45 post procedure required echo with Cardiology visit scheduled for 6/12/19  - CTS follow up on 6/10/19  - Continue to avoid driving and avoid weight based exercises at the gym until seen by CTS.  May walk as much as tolerated until then.      2. Chronic diastolic congestive heart failure (CMS/HCC)  - NYHA class II  - Continue ARB, BB, lasix and Kdur  - Continue exercise and low sodium diet    3.  CAD  - Hx of CABG 5/2017  - Pre-TAVR cath with successful PTCRA/ PTCA and stent placement in mid- posterolateral branch.  PTCA of anastomosis between SVG and PDA  - ASA, plavix, BB    4.  HTN  - Keep BP/ pulse log QOD at home until 6/10 OV with Cardiology.  - If higher doses of lopressor and Avapro continue to  indicate good control, new Rx per Cardiology        *Please note that portions of this note were completed with a voice recognition program. Efforts were made to edit the dictations, but occasionally words are mistranscribed.

## 2019-05-23 ENCOUNTER — READMISSION MANAGEMENT (OUTPATIENT)
Dept: CALL CENTER | Facility: HOSPITAL | Age: 79
End: 2019-05-23

## 2019-05-23 NOTE — OUTREACH NOTE
CT Surgery Week 2 Survey      Responses   Facility patient discharged from?  [facility]   Does the patient have one of the following disease processes/diagnoses(primary or secondary)?  Cardiothoracic surgery   Week 2 attempt successful?  Yes   Call start time  1155   Call end time  1202   Discharge diagnosis  status post TAVR for severe symptomatic aortic stenosis   Is patient permission given to speak with other caregiver?  Yes   List who call center can speak with     Person spoke with today (if not patient) and relationship     Meds reviewed with patient/caregiver?  Yes   Is the patient having any side effects they believe may be caused by any medication additions or changes?  No   Does the patient have all medications related to this admission filled (includes all antibiotics, pain medications, cardiac medications, etc.)  N/A   Is the patient taking all medications as directed (includes completed medication regime)?  Yes   Medication comments  No changes   Does the patient have a primary care provider?   Yes   Does the patient have an appointment scheduled with their C/T surgeon?  Yes   Has the patient kept scheduled appointments due by today?  Yes   Has home health visited the patient within 72 hours of discharge?  N/A   Psychosocial issues?  No   Did the patient receive a copy of their discharge instructions?  Yes   Nursing interventions  Reviewed instructions with patient   What is the patient's perception of their health status since discharge?  Improving   Nursing interventions  Nurse provided patient education   Is the patient/caregiver able to teach back normal signs of recovery?  Nausea and lack of appetite, Pain or discomfort at incisional site, Depression or irritability, Constipation [none of these at this time]   Nursing interventions  Reassured on normal signs of recovery   Is the patient /caregiver able to teach back basic post-op care?  Lifting as instructed by MD in discharge  instructions, Continue use of incentive spirometry at least 1 week post discharge, Drive as instructed by MD in discharge instructions, Take showers only when approved by MD-sponge bathe until then, No tub bath, swimming, or hot tub until instructed by MD, Keep incision areas clean, dry and protected, Do not remove steri-strips   Is the patient/caregiver able to teach back signs and symptoms of incisional infection?  Increased redness, swelling or pain at the incisonal site, Increased drainage or bleeding, Incisional warmth, Pus or odor from incision, Fever   Is the patient/caregiver able to teach back steps to recovery at home?  Set small, achievable goals for return to baseline health, Rest and rebuild strength, gradually increase activity, Eat a well-balance diet, Make a list of questions for surgeon's appointment, Weigh daily   Is the patient /caregiver able to teach back the importance of cardiac rehab?  -- [states they are starting back at [employer] and will just be doing walking until she has been seen again]   Is the patient/caregiver able to teach back the hierarchy of who to call/visit for symptoms/problems? PCP, Specialist, Home health nurse, Urgent Care, ED, 911  Yes   Week 2 call completed?  Yes          Tri Doty RN

## 2019-05-29 DIAGNOSIS — I25.10 CORONARY ARTERY DISEASE INVOLVING NATIVE CORONARY ARTERY OF NATIVE HEART WITHOUT ANGINA PECTORIS: ICD-10-CM

## 2019-05-29 NOTE — TELEPHONE ENCOUNTER
Patient last seen on 5/22/19; she called and needed a refill for metoprolol tartrate 25mg twice daily that was prescribed in the hospital. She will establish with Dr. Valdez on 6/12/19, a month refill for metoprolol was sent and she is aware to follow up with Dr. Valdez for further refills.    Nadia Suarez, HollieD

## 2019-05-30 ENCOUNTER — OFFICE VISIT (OUTPATIENT)
Dept: FAMILY MEDICINE CLINIC | Facility: CLINIC | Age: 79
End: 2019-05-30

## 2019-05-30 ENCOUNTER — TELEPHONE (OUTPATIENT)
Dept: CARDIAC SURGERY | Facility: CLINIC | Age: 79
End: 2019-05-30

## 2019-05-30 ENCOUNTER — NURSE TRIAGE (OUTPATIENT)
Dept: CALL CENTER | Facility: HOSPITAL | Age: 79
End: 2019-05-30

## 2019-05-30 VITALS
RESPIRATION RATE: 18 BRPM | HEART RATE: 72 BPM | HEIGHT: 63 IN | WEIGHT: 159 LBS | DIASTOLIC BLOOD PRESSURE: 82 MMHG | BODY MASS INDEX: 28.17 KG/M2 | TEMPERATURE: 97.5 F | SYSTOLIC BLOOD PRESSURE: 132 MMHG | OXYGEN SATURATION: 99 %

## 2019-05-30 DIAGNOSIS — L25.9 CONTACT DERMATITIS, UNSPECIFIED CONTACT DERMATITIS TYPE, UNSPECIFIED TRIGGER: Primary | ICD-10-CM

## 2019-05-30 PROCEDURE — 99213 OFFICE O/P EST LOW 20 MIN: CPT | Performed by: PHYSICIAN ASSISTANT

## 2019-05-30 PROCEDURE — 96372 THER/PROPH/DIAG INJ SC/IM: CPT | Performed by: PHYSICIAN ASSISTANT

## 2019-05-30 RX ORDER — FUROSEMIDE 20 MG/1
TABLET ORAL
COMMUNITY
Start: 2019-05-27 | End: 2019-07-05 | Stop reason: SDUPTHER

## 2019-05-30 RX ORDER — TRIAMCINOLONE ACETONIDE 40 MG/ML
40 INJECTION, SUSPENSION INTRA-ARTICULAR; INTRAMUSCULAR ONCE
Status: COMPLETED | OUTPATIENT
Start: 2019-05-30 | End: 2019-05-30

## 2019-05-30 RX ADMIN — TRIAMCINOLONE ACETONIDE 40 MG: 40 INJECTION, SUSPENSION INTRA-ARTICULAR; INTRAMUSCULAR at 10:46

## 2019-05-30 NOTE — TELEPHONE ENCOUNTER
Cristy Siu a  PA with [facility] called to ask Dr Calvillo if Ms Goodman could have a steroid shot for poison ivy this soon after surgery. She stated that she got poison ivy working outside. I spoke to Dr Calvillo by phone and he said it would be ok for her to have a steroid. I relayed his response to Cristy Siu.

## 2019-05-30 NOTE — TELEPHONE ENCOUNTER
" is going to reschedule an appointment    Reason for Disposition  • Health Information question, no triage required and triager able to answer question    Additional Information  • Negative: [1] Caller is not with the adult (patient) AND [2] reporting urgent symptoms  • Negative: Lab result questions  • Negative: Medication questions  • Negative: Caller cannot be reached by phone  • Negative: Caller has already spoken to PCP or another triager  • Negative: RN needs further essential information from caller in order to complete triage  • Negative: Requesting regular office appointment  • Negative: [1] Caller requesting NON-URGENT health information AND [2] PCP's office is the best resource    Answer Assessment - Initial Assessment Questions  1. REASON FOR CALL or QUESTION: \"What is your reason for calling today?\" or \"How can I best help you?\" or \"What question do you have that I can help answer?\"      Needing to reschedule her appointment    Protocols used: INFORMATION ONLY CALL-ADULT-      "

## 2019-05-30 NOTE — PROGRESS NOTES
"Subjective   Nancy Cece Goodman is a 78 y.o. female.     History of Present Illness   Pt presents with CC of wide spread rash going on the third day. Rash is on face,both arms , lower abdomen. Face is exteremly red, swollen and itchy all over. Eyes are almost swollen shut  Was pulling weeds in garden on Tuesday, symptoms developed later that evening. No known posion ivy contact, however patient states she is highly allergic to poison ivy  Recent bovine aortic valve replacement surgery on 5/13. Surgeon is Dr. Calvillo   Pt is a diabetic. Recent A1c of 7.3 two weeks ago. Is on insulin therapy.   No swelling of mouth, tongue. No shortness of breath or chest pain. No eye pain or vision changes.   Has tried lemon juice, lavender essential oil,  and frankincense essential oil without relief. Has not taken any antihistamines   Only new medication after surgery was Plavix. Tolerating well     The following portions of the patient's history were reviewed and updated as appropriate: allergies, current medications, past family history, past medical history, past social history, past surgical history and problem list.    Review of Systems   Constitutional: Negative for chills, diaphoresis, fatigue and fever.   HENT: Positive for facial swelling. Negative for congestion and trouble swallowing.    Eyes: Negative for photophobia, pain, redness and visual disturbance.   Respiratory: Negative for cough, chest tightness and shortness of breath.    Cardiovascular: Negative for chest pain.   Gastrointestinal: Negative for constipation, diarrhea, nausea and vomiting.   Skin: Positive for rash.   Neurological: Negative for dizziness.       Objective    Blood pressure 132/82, pulse 72, temperature 97.5 °F (36.4 °C), resp. rate 18, height 158.8 cm (62.5\"), weight 72.1 kg (159 lb), SpO2 99 %.     Physical Exam   Constitutional: She is oriented to person, place, and time. She appears well-developed and well-nourished.   HENT:   Head: " Normocephalic and atraumatic.   Right Ear: Tympanic membrane, external ear and ear canal normal.   Left Ear: Tympanic membrane, external ear and ear canal normal.   Nose: Nose normal.   Mouth/Throat: Oropharynx is clear and moist. No oropharyngeal exudate.   Eyes: Conjunctivae are normal.   Upper and lower eyelids swollen bilaterally    Neck: Normal range of motion. Neck supple. No tracheal deviation present. No thyromegaly present.   Cardiovascular: Normal rate, regular rhythm and normal heart sounds.   Pulmonary/Chest: Effort normal and breath sounds normal. No respiratory distress. She has no wheezes. She has no rales. She exhibits no tenderness.   Lymphadenopathy:     She has no cervical adenopathy.   Neurological: She is alert and oriented to person, place, and time.   Skin: Skin is warm and dry.   Significant erythema and edema of face throughout   Eyes almost swollen shut     Erythematous, inflamed, vesicular skin rash on UE bilaterally and along  abdomen    Psychiatric: She has a normal mood and affect. Her behavior is normal. Judgment and thought content normal.   Nursing note and vitals reviewed.        Assessment/Plan   Nancy was seen today for facial swelling.    Diagnoses and all orders for this visit:    Contact dermatitis, unspecified contact dermatitis type, unspecified trigger  -     triamcinolone acetonide (KENALOG-40) injection 40 mg      Rash is significant. Contacted Dr. Nguyen's office and spoke with Godfrey to make sure it was safe to give patient steroid injection so soon after cardiac surgery. Doctor was in surgery, but Loly spoke with him and he gave the okay.   Patient aware that injection can increase blood sugar readings. Pt will monitor closely and adjust insulin as needed   Any new or worsening symptoms report to ER.   Encourage benadryl.   F/u in office tomorrow for recheck.

## 2019-05-31 ENCOUNTER — READMISSION MANAGEMENT (OUTPATIENT)
Dept: CALL CENTER | Facility: HOSPITAL | Age: 79
End: 2019-05-31

## 2019-05-31 ENCOUNTER — OFFICE VISIT (OUTPATIENT)
Dept: FAMILY MEDICINE CLINIC | Facility: CLINIC | Age: 79
End: 2019-05-31

## 2019-05-31 VITALS
HEIGHT: 63 IN | RESPIRATION RATE: 16 BRPM | DIASTOLIC BLOOD PRESSURE: 90 MMHG | SYSTOLIC BLOOD PRESSURE: 124 MMHG | HEART RATE: 74 BPM | BODY MASS INDEX: 28.17 KG/M2 | WEIGHT: 159 LBS | TEMPERATURE: 98.5 F

## 2019-05-31 DIAGNOSIS — L23.7 ALLERGIC CONTACT DERMATITIS DUE TO PLANTS, EXCEPT FOOD: Primary | ICD-10-CM

## 2019-05-31 PROCEDURE — 99213 OFFICE O/P EST LOW 20 MIN: CPT | Performed by: FAMILY MEDICINE

## 2019-05-31 PROCEDURE — 96372 THER/PROPH/DIAG INJ SC/IM: CPT | Performed by: FAMILY MEDICINE

## 2019-05-31 RX ORDER — TRIAMCINOLONE ACETONIDE 40 MG/ML
40 INJECTION, SUSPENSION INTRA-ARTICULAR; INTRAMUSCULAR ONCE
Status: COMPLETED | OUTPATIENT
Start: 2019-05-31 | End: 2019-05-31

## 2019-05-31 RX ORDER — PREDNISONE 20 MG/1
40 TABLET ORAL DAILY
Qty: 10 TABLET | Refills: 0 | Status: SHIPPED | OUTPATIENT
Start: 2019-05-31 | End: 2019-06-04

## 2019-05-31 RX ADMIN — TRIAMCINOLONE ACETONIDE 40 MG: 40 INJECTION, SUSPENSION INTRA-ARTICULAR; INTRAMUSCULAR at 10:31

## 2019-05-31 NOTE — OUTREACH NOTE
CT Surgery Week 3 Survey      Responses   Facility patient discharged from?  [facility]   Does the patient have one of the following disease processes/diagnoses(primary or secondary)?  Cardiothoracic surgery   Week 3 attempt successful?  Yes   Call start time  1444   Call end time  1452   Discharge diagnosis  status post TAVR for severe symptomatic aortic stenosis   Meds reviewed with patient/caregiver?  Yes   Is the patient having any side effects they believe may be caused by any medication additions or changes?  No   Does the patient have all medications related to this admission filled (includes all antibiotics, pain medications, cardiac medications, etc.)  Yes   Is the patient taking all medications as directed (includes completed medication regime)?  Yes   Does the patient have a primary care provider?   Yes   Does the patient have an appointment scheduled with their C/T surgeon?  Yes   Comments regarding PCP  Pt saw Dr. Cervantes today and yesterday due to allergic issue.     Has the patient kept scheduled appointments due by today?  Yes   Psychosocial issues?  No   Comments  Pt is having allergies due to picking weeds in the yard.  Pt saw MD yesteday and today and  has had steroid shots and prednisone   Did the patient receive a copy of their discharge instructions?  Yes   Nursing interventions  Reviewed instructions with patient   What is the patient's perception of their health status since discharge?  Improving   Is the patient/caregiver able to teach back steps to recovery at home?  Set small, achievable goals for return to baseline health, Rest and rebuild strength, gradually increase activity, Weigh daily, Practice good oral hygiene, Eat a well-balance diet, Make a list of questions for surgeon's appointment   Is the patient /caregiver able to teach back the importance of cardiac rehab?  Yes   Is the patient/caregiver able to teach back the hierarchy of who to call/visit for symptoms/problems? PCP,  Specialist, Home health nurse, Urgent Care, ED, 911  Yes   Additional teach back comments  Patient states she has questions regarding echocardiogram and was given Dr. Valdez's office number for further assistance.   Week 3 call completed?  Yes          Elda Najera RN

## 2019-05-31 NOTE — PROGRESS NOTES
Subjective   Nancyviridiana Goodman is a 78 y.o. female.     History of Present Illness     Started with 5/28/19, she had a skin rash  This started on her face  This has been worse on her face  She came in 5/30/19 and was given a steroid injection.  She thinks this has helped a little    She had aortic valve replacement on 5/13/19  She has DM as well      Review of Systems   Skin: Positive for rash.       Objective   Physical Exam   Constitutional: She appears well-developed and well-nourished. No distress.   HENT:   Head:       Cardiovascular: Normal rate, regular rhythm and normal heart sounds.   Pulmonary/Chest: Effort normal and breath sounds normal.   Psychiatric: She has a normal mood and affect. Her behavior is normal.   Nursing note and vitals reviewed.      Assessment/Plan   Nancy was seen today for follow-up and dermatitis.    Diagnoses and all orders for this visit:    Allergic contact dermatitis due to plants, except food  -     triamcinolone acetonide (KENALOG-40) injection 40 mg    Other orders  -     predniSONE (DELTASONE) 20 MG tablet; Take 2 tablets by mouth Daily.    with her facial swelling we need to decrease irritations. Will repeat steroid injection and give PO pills.  Surgeon had given ok for steroids and simply no other treatment for the significant facial swelling she has

## 2019-06-03 ENCOUNTER — TELEPHONE (OUTPATIENT)
Dept: CARDIOLOGY | Facility: HOSPITAL | Age: 79
End: 2019-06-03

## 2019-06-03 NOTE — TELEPHONE ENCOUNTER
LM with patient re: change date/location on post TAVR echo from 6/12 to 6/13 @ [facility] 0830 AM.

## 2019-06-04 ENCOUNTER — OFFICE VISIT (OUTPATIENT)
Dept: FAMILY MEDICINE CLINIC | Facility: CLINIC | Age: 79
End: 2019-06-04

## 2019-06-04 ENCOUNTER — DOCUMENTATION (OUTPATIENT)
Dept: CARDIAC REHAB | Facility: HOSPITAL | Age: 79
End: 2019-06-04

## 2019-06-04 VITALS
RESPIRATION RATE: 16 BRPM | SYSTOLIC BLOOD PRESSURE: 122 MMHG | HEART RATE: 68 BPM | BODY MASS INDEX: 27.82 KG/M2 | TEMPERATURE: 97.8 F | HEIGHT: 63 IN | WEIGHT: 157 LBS | DIASTOLIC BLOOD PRESSURE: 82 MMHG

## 2019-06-04 DIAGNOSIS — L24.7 IRRITANT CONTACT DERMATITIS DUE TO PLANTS, EXCEPT FOOD: ICD-10-CM

## 2019-06-04 DIAGNOSIS — E11.9 TYPE 2 DIABETES MELLITUS WITHOUT COMPLICATION, WITH LONG-TERM CURRENT USE OF INSULIN (HCC): Primary | ICD-10-CM

## 2019-06-04 DIAGNOSIS — I25.10 CORONARY ARTERY DISEASE INVOLVING NATIVE CORONARY ARTERY OF NATIVE HEART WITHOUT ANGINA PECTORIS: ICD-10-CM

## 2019-06-04 DIAGNOSIS — I10 ESSENTIAL HYPERTENSION: ICD-10-CM

## 2019-06-04 DIAGNOSIS — Z79.4 TYPE 2 DIABETES MELLITUS WITHOUT COMPLICATION, WITH LONG-TERM CURRENT USE OF INSULIN (HCC): Primary | ICD-10-CM

## 2019-06-04 PROCEDURE — 99214 OFFICE O/P EST MOD 30 MIN: CPT | Performed by: FAMILY MEDICINE

## 2019-06-04 RX ORDER — CLOPIDOGREL BISULFATE 75 MG/1
75 TABLET ORAL DAILY
Qty: 30 TABLET | Refills: 11 | Status: SHIPPED | OUTPATIENT
Start: 2019-06-04 | End: 2019-09-17 | Stop reason: SDUPTHER

## 2019-06-04 RX ORDER — LORATADINE 10 MG/1
10 TABLET ORAL DAILY
Qty: 30 TABLET | Refills: 2 | Status: SHIPPED | OUTPATIENT
Start: 2019-06-04 | End: 2020-10-06

## 2019-06-04 RX ORDER — FLUTICASONE PROPIONATE 0.05 %
CREAM (GRAM) TOPICAL 2 TIMES DAILY
Qty: 30 G | Refills: 0 | Status: SHIPPED | OUTPATIENT
Start: 2019-06-04 | End: 2019-09-17

## 2019-06-04 RX ORDER — IRBESARTAN 75 MG/1
150 TABLET ORAL NIGHTLY
Qty: 30 TABLET | Refills: 1 | Status: SHIPPED | OUTPATIENT
Start: 2019-06-04 | End: 2019-07-05 | Stop reason: SDUPTHER

## 2019-06-04 NOTE — PROGRESS NOTES
Pt. Referred for Phase II Cardiac Rehab. Staff discussed benefits of exercise, program protocol, and educational material provided. Teach back verified.  Permission granted from patient for staff to fax referral information to outlying program at this time.  Staff to fax referral info to [facility].

## 2019-06-05 DIAGNOSIS — E78.00 HYPERCHOLESTEROLEMIA: Primary | ICD-10-CM

## 2019-06-05 LAB
ALBUMIN SERPL-MCNC: 4.5 G/DL (ref 3.5–5.2)
ALBUMIN/GLOB SERPL: 1.7 G/DL
ALP SERPL-CCNC: 73 U/L (ref 39–117)
ALT SERPL-CCNC: 12 U/L (ref 1–33)
AST SERPL-CCNC: 10 U/L (ref 1–32)
BASOPHILS # BLD AUTO: 0.04 10*3/MM3 (ref 0–0.2)
BASOPHILS NFR BLD AUTO: 0.4 % (ref 0–1.5)
BILIRUB SERPL-MCNC: 0.4 MG/DL (ref 0.2–1.2)
BUN SERPL-MCNC: 29 MG/DL (ref 8–23)
BUN/CREAT SERPL: 24.2 (ref 7–25)
CALCIUM SERPL-MCNC: 10.5 MG/DL (ref 8.6–10.5)
CHLORIDE SERPL-SCNC: 101 MMOL/L (ref 98–107)
CHOLEST SERPL-MCNC: 243 MG/DL (ref 0–200)
CO2 SERPL-SCNC: 27.5 MMOL/L (ref 22–29)
CREAT SERPL-MCNC: 1.2 MG/DL (ref 0.57–1)
EOSINOPHIL # BLD AUTO: 0.19 10*3/MM3 (ref 0–0.4)
EOSINOPHIL NFR BLD AUTO: 2 % (ref 0.3–6.2)
ERYTHROCYTE [DISTWIDTH] IN BLOOD BY AUTOMATED COUNT: 14.8 % (ref 12.3–15.4)
GLOBULIN SER CALC-MCNC: 2.6 GM/DL
GLUCOSE SERPL-MCNC: 119 MG/DL (ref 65–99)
HBA1C MFR BLD: 7.34 % (ref 4.8–5.6)
HCT VFR BLD AUTO: 42.6 % (ref 34–46.6)
HDLC SERPL-MCNC: 43 MG/DL (ref 40–60)
HGB BLD-MCNC: 13 G/DL (ref 12–15.9)
IMM GRANULOCYTES # BLD AUTO: 0.03 10*3/MM3 (ref 0–0.05)
IMM GRANULOCYTES NFR BLD AUTO: 0.3 % (ref 0–0.5)
LDLC SERPL CALC-MCNC: 123 MG/DL (ref 0–100)
LYMPHOCYTES # BLD AUTO: 3.25 10*3/MM3 (ref 0.7–3.1)
LYMPHOCYTES NFR BLD AUTO: 34.7 % (ref 19.6–45.3)
MCH RBC QN AUTO: 30.6 PG (ref 26.6–33)
MCHC RBC AUTO-ENTMCNC: 30.5 G/DL (ref 31.5–35.7)
MCV RBC AUTO: 100.2 FL (ref 79–97)
MONOCYTES # BLD AUTO: 0.68 10*3/MM3 (ref 0.1–0.9)
MONOCYTES NFR BLD AUTO: 7.3 % (ref 5–12)
NEUTROPHILS # BLD AUTO: 5.17 10*3/MM3 (ref 1.7–7)
NEUTROPHILS NFR BLD AUTO: 55.3 % (ref 42.7–76)
NRBC BLD AUTO-RTO: 0 /100 WBC (ref 0–0.2)
PLATELET # BLD AUTO: 242 10*3/MM3 (ref 140–450)
POTASSIUM SERPL-SCNC: 4.1 MMOL/L (ref 3.5–5.2)
PROT SERPL-MCNC: 7.1 G/DL (ref 6–8.5)
RBC # BLD AUTO: 4.25 10*6/MM3 (ref 3.77–5.28)
SODIUM SERPL-SCNC: 141 MMOL/L (ref 136–145)
TRIGL SERPL-MCNC: 383 MG/DL (ref 0–150)
VLDLC SERPL CALC-MCNC: 76.6 MG/DL
WBC # BLD AUTO: 9.36 10*3/MM3 (ref 3.4–10.8)

## 2019-06-05 RX ORDER — MONTELUKAST SODIUM 4 MG/1
2 TABLET, CHEWABLE ORAL DAILY
Qty: 180 TABLET | Refills: 1 | Status: SHIPPED | OUTPATIENT
Start: 2019-06-05 | End: 2019-09-17 | Stop reason: SDUPTHER

## 2019-06-10 ENCOUNTER — READMISSION MANAGEMENT (OUTPATIENT)
Dept: CALL CENTER | Facility: HOSPITAL | Age: 79
End: 2019-06-10

## 2019-06-10 ENCOUNTER — OFFICE VISIT (OUTPATIENT)
Dept: CARDIAC SURGERY | Facility: CLINIC | Age: 79
End: 2019-06-10

## 2019-06-10 VITALS
HEART RATE: 80 BPM | SYSTOLIC BLOOD PRESSURE: 131 MMHG | TEMPERATURE: 98.2 F | OXYGEN SATURATION: 100 % | DIASTOLIC BLOOD PRESSURE: 61 MMHG | WEIGHT: 160.6 LBS | BODY MASS INDEX: 29.55 KG/M2 | HEIGHT: 62 IN

## 2019-06-10 DIAGNOSIS — I35.0 AORTIC STENOSIS, SEVERE: Primary | ICD-10-CM

## 2019-06-10 PROCEDURE — 99213 OFFICE O/P EST LOW 20 MIN: CPT | Performed by: PHYSICIAN ASSISTANT

## 2019-06-10 NOTE — OUTREACH NOTE
CT Surgery Week 4 Survey      Responses   Facility patient discharged from?  [facility]   Does the patient have one of the following disease processes/diagnoses(primary or secondary)?  Cardiothoracic surgery   Week 4 attempt successful?  Yes   Call start time  1702   Call end time  1705   Discharge diagnosis  status post TAVR for severe symptomatic aortic stenosis   Meds reviewed with patient/caregiver?  Yes   Is the patient having any side effects they believe may be caused by any medication additions or changes?  No   Is the patient taking all medications as directed (includes completed medication regime)?  N/A   Has the patient kept scheduled appointments due by today?  Yes   Is the patient still receiving Home Health Services?  N/A   Psychosocial issues?  No   Comments  states feels great, allergies has subsided   What is the patient's perception of their health status since discharge?  Improving   Nursing interventions  Nurse provided patient education   Is the patient/caregiver able to teach back normal signs of recovery?  Pain or discomfort at incisional site   Nursing interventions  Reassured on normal signs of recovery   Is the patient/caregiver able to teach back steps to recovery at home?  Set small, achievable goals for return to baseline health, Rest and rebuild strength, gradually increase activity   Is the patient/caregiver able to teach back the hierarchy of who to call/visit for symptoms/problems? PCP, Specialist, Home health nurse, Urgent Care, ED, 911  Yes   Week 4 Call Completed?  Yes   Would the patient like one additional call?  No   Graduated  Yes   Did the patient feel the follow up calls were helpful during their recovery period?  Yes   Was the number of calls appropriate?  Yes          Sailaja Ramirez RN

## 2019-06-10 NOTE — PROGRESS NOTES
06/10/2019  Patient Information  Nancy Goodman                                                                                          [address] [facility] [address]   1940  'PCP/Referring Physician'  Wayne Jorge MD  [phone]  No ref. provider found    Chief Complaint   Patient presents with   • Post-op Follow-up     Hospital follow-up s/p TAVR 5/13/19 for aortic stenosis       History of Present Illness:  Patient is a 78-year-old  female with history of hypertension, hyperlipidemia, type 2 diabetes mellitus, coronary artery disease status post CABG with aortic valve stenosis status post recent TAVR performed 5/13/2019 who presents to office for postoperative follow-up visit.  The patient denies any incisional pain, shortness of breath or difficulty with ambulation.  She states that she has been breathing better since the surgery was performed.  She is scheduled to follow-up with her cardiologist, Dr. Valdez, on 6/12/2019 and is currently enrolled in cardiac rehab at [place], [place].  The patient is otherwise doing well.    Patient Active Problem List   Diagnosis   • Type 2 diabetes mellitus without complication, with long-term current use of insulin (CMS/[facility])   • Mixed hyperlipidemia   • Essential hypertension   • Vitamin D deficiency   • CAD (coronary artery disease)   • Aortic stenosis, severe   • S/P CABG (coronary artery bypass graft)   • Pulmonary edema   • Diastolic CHF (CMS/[facility])     Past Medical History:   Diagnosis Date   • Allergic rhinitis    • Arthritis    • Cellulitis of finger    • CHF (congestive heart failure) (CMS/[facility])    • Coronary artery disease     CAD   • Diabetes mellitus (CMS/[facility])     dx 12 years ago- checks fsbs daily   • Dizziness    • Edema    • GERD (gastroesophageal reflux disease)    • Hyperlipidemia    • Hypertension    • Ingrown nail    • PAT (paroxysmal atrial tachycardia) (CMS/[facility])    • Rosacea    • SOB (shortness of breath)    • Vitamin D  deficiency    • Wears dentures    • Wears eyeglasses    • Wears partial dentures      Past Surgical History:   Procedure Laterality Date   • AORTIC VALVE REPAIR/REPLACEMENT N/A 5/13/2019    Procedure: TRANSCATHETER AORTIC VALVE REPLACEMENT, ELIZABETH;  Surgeon: Fidel Calvillo MD;  Location:  [facility];  Service: Cardiothoracic   • AORTIC VALVE REPAIR/REPLACEMENT N/A 5/13/2019    Procedure: Transapical Transcatheter Aortic Valve Replacement;  Surgeon: Anushka Hankins MD;  Location:  [facility];  Service: Cardiovascular   • CARDIAC CATHETERIZATION N/A 5/16/2017    Procedure: Left Heart Cath;  Surgeon: Fidel Valdez MD;  Location:  [facility];  Service:    • CARDIAC CATHETERIZATION Left 4/23/2019    Procedure: Cardiac Catheterization/Vascular Study;  Surgeon: Anushka Hankins MD;  Location:  [facility];  Service: Cardiovascular   • CORONARY ARTERY BYPASS GRAFT N/A 5/24/2017    Procedure: CORONARY ARTERY BYPASS x  5 WITH INTERNAL MAMMARY ARTERY GRAFT and EVH of the Right leg;  Surgeon: Fidel Calvillo MD;  Location: [facility] OR;  Service:    • FINGER NAIL SURGERY     • TUBAL ABDOMINAL LIGATION     • VARICOSE VEIN SURGERY         Current Outpatient Medications:   •  aspirin 81 MG EC tablet, Take 81 mg by mouth Every Morning., Disp: , Rfl:   •  clopidogrel (PLAVIX) 75 MG tablet, Take 1 tablet by mouth Daily., Disp: 30 tablet, Rfl: 11  •  colestipol (COLESTID) 1 g tablet, Take 2 tablets by mouth Daily., Disp: 180 tablet, Rfl: 1  •  CRANBERRY PO, Take 450 mg by mouth Daily., Disp: , Rfl:   •  fluticasone (CUTIVATE) 0.05 % cream, Apply  topically to the appropriate area as directed 2 (Two) Times a Day., Disp: 30 g, Rfl: 0  •  furosemide (LASIX) 20 MG tablet, , Disp: , Rfl:   •  insulin detemir (LEVEMIR) 100 UNIT/ML injection, Inject 42 Units under the skin into the appropriate area as directed Daily., Disp: 10 pen, Rfl: 5  •  irbesartan (AVAPRO) 75 MG  tablet, Take 2 tablets by mouth Every Night., Disp: 30 tablet, Rfl: 1  •  loratadine (CLARITIN) 10 MG tablet, Take 1 tablet by mouth Daily., Disp: 30 tablet, Rfl: 2  •  metoprolol tartrate (LOPRESSOR) 25 MG tablet, Take 1 tablet by mouth 2 (Two) Times a Day., Disp: 60 tablet, Rfl: 0  •  Multiple Vitamins-Minerals (MULTIVITAMIN ADULT PO), Take 1 tablet by mouth Daily., Disp: , Rfl:   •  NON FORMULARY, Daily. Super cayenne 100,ooo HU every day, Disp: , Rfl:   •  Omega-3 Fatty Acids (SALMON OIL PO), Take 1,200 mg by mouth 2 (Two) Times a Day., Disp: , Rfl:   •  omeprazole (priLOSEC) 40 MG capsule, Take 40 mg by mouth Every Night., Disp: , Rfl:   •  potassium chloride (K-DUR,KLOR-CON) 10 MEQ CR tablet, Take 10 mEq by mouth Daily., Disp: , Rfl:   •  Probiotic Product (PROBIOTIC ADVANCED PO), Take 1 tablet by mouth Every Night., Disp: , Rfl:   No current facility-administered medications for this visit.     Facility-Administered Medications Ordered in Other Visits:   •  Chlorhexidine Gluconate Cloth 2 % pads 1 application, 1 application, Topical, Q12H PRN, Eduardo Bain, PA  Allergies   Allergen Reactions   • Amlodipine      sickness   • Benazepril Nausea Only   • Ciprofloxacin      Pt not sure of reaction (stomach problems)   • Statins Nausea Only     Stomach problem   • Pokeweed [Phytolacca] Swelling and Rash     Social History     Socioeconomic History   • Marital status:      Spouse name: Not on file   • Number of children: 6   • Years of education: Not on file   • Highest education level: Not on file   Occupational History   • Occupation: housewife     Employer: RETIRED   Tobacco Use   • Smoking status: Never Smoker   • Smokeless tobacco: Never Used   Substance and Sexual Activity   • Alcohol use: No   • Drug use: No   • Sexual activity: Defer     Comment:    Social History Narrative    ** Merged History Encounter **         Patient consumes 1 cup decaf coffee daily.     Patient lives at home with  ".     Lives in [place], [place]     Family History   Problem Relation Age of Onset   • Cancer Mother         Lung cancer   • Heart attack Father    • No Known Problems Maternal Grandmother    • No Known Problems Maternal Grandfather      ROS: As per HPI, otherwise negative.  Vitals:    06/10/19 1432   BP: 131/61   BP Location: Right arm   Patient Position: Sitting   Pulse: 80   Temp: 98.2 °F (36.8 °C)   TempSrc: Temporal   SpO2: 100%   Weight: 72.8 kg (160 lb 9.6 oz)   Height: 157.5 cm (62\")      Physical Exam:  Gen - NAD, pleasant, cooperative  CV - Regular rate and rhythm, no murmur gallop or rub  Pulm - Lungs clear to auscultation without wheeze or rhonchi   GI - Soft, normoactive bowel sounds, non-tender  Ext - Without edema  Incision - Sternum stable, no evidence of incisional dehiscence or cellulitis  Neuro - CN II - XII grossly intact, tongue midline, voice normal    Labs/Imagin/10/19 CXR  FINDINGS: Two-view chest reveals cardiac and mediastinal silhouettes to  be within normal limits. The lung fields are grossly clear. No focal  parenchymal opacification present.  No pleural effusion or pneumothorax.  The bony structures are unremarkable. Pulmonary vascularity is within  normal limits.         IMPRESSION:  No acute cardiopulmonary disease.    Assessment/Plan:  Patient is a 78-year-old  female with history of hypertension, hyperlipidemia, type 2 diabetes mellitus, coronary artery disease status post CABG with aortic valve stenosis status post recent TAVR performed 2019 who presents to office for postoperative follow-up visit.  The patient is having a steady postoperative course.  Her incision is healed well, her chest x-ray is within normal limits and she states that she has improvement in her breathing status.  She is scheduled to follow-up with her cardiologist and is currently enrolled in cardiac rehab. The patient may follow up as needed, but should call with any questions or " concerns should any arise during the interval. If the patient experiences any acultey worsening symptoms, they should present to the nearest emergency department possible.     Patient Active Problem List   Diagnosis   • Type 2 diabetes mellitus without complication, with long-term current use of insulin (CMS/[facility])   • Mixed hyperlipidemia   • Essential hypertension   • Vitamin D deficiency   • CAD (coronary artery disease)   • Aortic stenosis, severe   • S/P CABG (coronary artery bypass graft)   • Pulmonary edema   • Diastolic CHF (CMS/[facility])

## 2019-06-11 ENCOUNTER — EPISODE CHANGES (OUTPATIENT)
Dept: CASE MANAGEMENT | Facility: OTHER | Age: 79
End: 2019-06-11

## 2019-06-11 NOTE — PROGRESS NOTES
Subjective:     Encounter Date:06/12/2019    Primary Care Physician: Wayne Jorge MD      Patient ID: Nancy Goodman is a 78 y.o. female.    Chief Complaint:Follow-up    PROBLEM LIST:  1. Coronary artery disease  a. 5/16/17 angina, cardiac catheter severe ostial LAD and LCx of these.  Ostial RPL and RCA.  b. 5 vessel CABG (Calvillo) LIMA to LAD, SVG to diagonal and LCx, and SVG to PDA and PL.  2. Aortic stenosis  a. Peak gradient of 27 by cath 5/17  b. 4/19 peak gradient by echo of 66  c. 5/13/19 TAVR #23 Bah Tobias tissue valve  3. Hypertension  4. Dyslipidemia  5. Diabetes mellitus  6. GERD  7. Varicose veins  8. Arthritis  9. Surgeries:  a. Tubal ligation  b. Varicose vein surgery  c. CABG        Allergies   Allergen Reactions   • Amlodipine      sickness   • Benazepril Nausea Only   • Ciprofloxacin      Pt not sure of reaction (stomach problems)   • Statins Nausea Only     Stomach problem   • Pokeweed [Phytolacca] Swelling and Rash         Current Outpatient Medications:   •  aspirin 81 MG EC tablet, Take 81 mg by mouth Every Morning., Disp: , Rfl:   •  clopidogrel (PLAVIX) 75 MG tablet, Take 1 tablet by mouth Daily., Disp: 30 tablet, Rfl: 11  •  colestipol (COLESTID) 1 g tablet, Take 2 tablets by mouth Daily., Disp: 180 tablet, Rfl: 1  •  CRANBERRY PO, Take 450 mg by mouth Daily., Disp: , Rfl:   •  fluticasone (CUTIVATE) 0.05 % cream, Apply  topically to the appropriate area as directed 2 (Two) Times a Day., Disp: 30 g, Rfl: 0  •  furosemide (LASIX) 20 MG tablet, , Disp: , Rfl:   •  Insulin Glargine (LANTUS SOLOSTAR) 100 UNIT/ML injection pen, Inject 45 Units under the skin into the appropriate area as directed Daily., Disp: , Rfl:   •  irbesartan (AVAPRO) 75 MG tablet, Take 2 tablets by mouth Every Night., Disp: 30 tablet, Rfl: 1  •  loratadine (CLARITIN) 10 MG tablet, Take 1 tablet by mouth Daily., Disp: 30 tablet, Rfl: 2  •  metoprolol tartrate (LOPRESSOR) 25 MG tablet, Take 1 tablet by mouth 2  (Two) Times a Day., Disp: 60 tablet, Rfl: 0  •  Multiple Vitamins-Minerals (MULTIVITAMIN ADULT PO), Take 1 tablet by mouth Daily., Disp: , Rfl:   •  NON FORMULARY, Daily. Super cayenne 100,ooo HU every day, Disp: , Rfl:   •  Omega-3 Fatty Acids (SALMON OIL PO), Take 1,200 mg by mouth 2 (Two) Times a Day., Disp: , Rfl:   •  omeprazole (priLOSEC) 40 MG capsule, Take 40 mg by mouth Every Night. Taking it as needed, Disp: , Rfl:   •  potassium chloride (K-DUR,KLOR-CON) 10 MEQ CR tablet, Take 10 mEq by mouth Daily., Disp: , Rfl:   •  Probiotic Product (PROBIOTIC ADVANCED PO), Take 1 tablet by mouth Every Night., Disp: , Rfl:   No current facility-administered medications for this visit.     Facility-Administered Medications Ordered in Other Visits:   •  Chlorhexidine Gluconate Cloth 2 % pads 1 application, 1 application, Topical, Q12H PRN, Eduardo Bain PA        History of Present Illness    For one-month follow-up status post TAVR.  Since discharge patient notes her strength is slowly increasing.  She is ready to start rehab.  She has some questions about her medication.  She notes her dyspnea is improved, though not at baseline yet.  Her only new issue is a rash on her upper extremities which she has determined is due to some weeds in her backyard.  No chest pain or presyncope    The following portions of the patient's history were reviewed and updated as appropriate: allergies, current medications, past family history, past medical history, past social history, past surgical history and problem list.      Social History     Tobacco Use   • Smoking status: Never Smoker   • Smokeless tobacco: Never Used   Substance Use Topics   • Alcohol use: No   • Drug use: No         Review of Systems   Constitution: Negative.   HENT: Positive for tinnitus.    Eyes: Positive for blurred vision.   Cardiovascular: Negative.    Respiratory: Positive for snoring.    Endocrine: Positive for cold intolerance, heat intolerance and  "polydipsia.   Hematologic/Lymphatic: Negative for bleeding problem. Does not bruise/bleed easily.   Skin: Positive for dry skin and itching. Negative for rash.   Musculoskeletal: Positive for arthritis, back pain and neck pain. Negative for muscle weakness and myalgias.   Gastrointestinal: Negative for heartburn, nausea and vomiting.   Neurological: Positive for excessive daytime sleepiness.          Objective:    height is 157.5 cm (62\") and weight is 72.6 kg (160 lb). Her blood pressure is 130/70 and her pulse is 80. Her oxygen saturation is 97%.         Physical Exam   Constitutional: She is oriented to person, place, and time. She appears well-developed and well-nourished.   HENT:   Mouth/Throat: Oropharynx is clear and moist.   Neck: No JVD present. Carotid bruit is not present. No thyromegaly present.   Cardiovascular: Regular rhythm, S1 normal, S2 normal and intact distal pulses. Exam reveals no gallop, no S3 and no S4.   Murmur heard.   Systolic murmur is present with a grade of 1/6 at the upper right sternal border and upper left sternal border.  Pulses:       Carotid pulses are 2+ on the right side, and 2+ on the left side.       Radial pulses are 2+ on the right side, and 2+ on the left side.   Pulmonary/Chest: Breath sounds normal.   Abdominal: Soft. Bowel sounds are normal. She exhibits no mass. There is no tenderness.   Musculoskeletal: She exhibits no edema.   Neurological: She is alert and oriented to person, place, and time.   Skin: Skin is warm and dry. No rash noted.       Procedures          Assessment:   Assessment/Plan      Nancy was seen today for follow-up.    Diagnoses and all orders for this visit:    Coronary artery disease involving native coronary artery of native heart without angina pectoris    Aortic stenosis, severe    Essential hypertension    Mixed hyperlipidemia      1.  Aortic stenosis, status post TAVR.  Normal functioning by echo.  2.  Coronary artery disease, status post " recent PCI.  Angina free.  3.  Hypertension well-controlled  4.  Dyslipidemia on high-dose statin  5.  Lower extremity edema and venous insufficiency    Recommendations patient may switch her omeprazole to as needed basis only.    2.  patient's will attempt to decrease her furosemide dose as tolerated.  3.  Start cardiac rehab.  4.  Continue Plavix for now, patient was instructed if the rash does not clear within the next 1 to 2 weeks to contact us for possible other antiplatelet agent.  .  Revisit 6 months time or as needed symptom change     Fidel Valdez MD      Dictated utilizing Dragon dictation

## 2019-06-12 ENCOUNTER — APPOINTMENT (OUTPATIENT)
Dept: CARDIOLOGY | Facility: HOSPITAL | Age: 79
End: 2019-06-12

## 2019-06-12 ENCOUNTER — OFFICE VISIT (OUTPATIENT)
Dept: CARDIOLOGY | Facility: CLINIC | Age: 79
End: 2019-06-12

## 2019-06-12 VITALS
OXYGEN SATURATION: 97 % | SYSTOLIC BLOOD PRESSURE: 130 MMHG | WEIGHT: 160 LBS | BODY MASS INDEX: 29.44 KG/M2 | HEART RATE: 80 BPM | HEIGHT: 62 IN | DIASTOLIC BLOOD PRESSURE: 70 MMHG

## 2019-06-12 DIAGNOSIS — I35.0 AORTIC STENOSIS, SEVERE: ICD-10-CM

## 2019-06-12 DIAGNOSIS — E78.2 MIXED HYPERLIPIDEMIA: ICD-10-CM

## 2019-06-12 DIAGNOSIS — I10 ESSENTIAL HYPERTENSION: ICD-10-CM

## 2019-06-12 DIAGNOSIS — I25.10 CORONARY ARTERY DISEASE INVOLVING NATIVE CORONARY ARTERY OF NATIVE HEART WITHOUT ANGINA PECTORIS: Primary | ICD-10-CM

## 2019-06-12 PROCEDURE — 99214 OFFICE O/P EST MOD 30 MIN: CPT | Performed by: INTERNAL MEDICINE

## 2019-06-13 ENCOUNTER — TELEPHONE (OUTPATIENT)
Dept: FAMILY MEDICINE CLINIC | Facility: CLINIC | Age: 79
End: 2019-06-13

## 2019-06-13 ENCOUNTER — HOSPITAL ENCOUNTER (OUTPATIENT)
Dept: CARDIOLOGY | Facility: HOSPITAL | Age: 79
Discharge: HOME | End: 2019-06-13
Admitting: NURSE PRACTITIONER

## 2019-06-13 VITALS — WEIGHT: 160 LBS | BODY MASS INDEX: 29.44 KG/M2 | HEIGHT: 62 IN

## 2019-06-13 DIAGNOSIS — I35.0 AORTIC STENOSIS, SEVERE: ICD-10-CM

## 2019-06-13 LAB
BH CV ECHO MEAS - AO MAX PG (FULL): 28.6 MMHG
BH CV ECHO MEAS - AO MAX PG: 34.8 MMHG
BH CV ECHO MEAS - AO MEAN PG (FULL): 18.5 MMHG
BH CV ECHO MEAS - AO MEAN PG: 22 MMHG
BH CV ECHO MEAS - AO ROOT AREA (BSA CORRECTED): 1.5
BH CV ECHO MEAS - AO ROOT AREA: 5.3 CM^2
BH CV ECHO MEAS - AO ROOT DIAM: 2.6 CM
BH CV ECHO MEAS - AO V2 MAX: 295 CM/SEC
BH CV ECHO MEAS - AO V2 MEAN: 220 CM/SEC
BH CV ECHO MEAS - AO V2 VTI: 72 CM
BH CV ECHO MEAS - AVA(I,A): 1.1 CM^2
BH CV ECHO MEAS - AVA(I,D): 1.1 CM^2
BH CV ECHO MEAS - AVA(V,A): 1.2 CM^2
BH CV ECHO MEAS - AVA(V,D): 1.2 CM^2
BH CV ECHO MEAS - BSA(HAYCOCK): 1.8 M^2
BH CV ECHO MEAS - BSA: 1.7 M^2
BH CV ECHO MEAS - BZI_BMI: 29.3 KILOGRAMS/M^2
BH CV ECHO MEAS - BZI_METRIC_HEIGHT: 157.5 CM
BH CV ECHO MEAS - BZI_METRIC_WEIGHT: 72.6 KG
BH CV ECHO MEAS - EDV(CUBED): 124.3 ML
BH CV ECHO MEAS - EDV(MOD-SP2): 49 ML
BH CV ECHO MEAS - EDV(MOD-SP4): 52 ML
BH CV ECHO MEAS - EDV(TEICH): 117.7 ML
BH CV ECHO MEAS - EF(CUBED): 70.3 %
BH CV ECHO MEAS - EF(MOD-BP): 66 %
BH CV ECHO MEAS - EF(MOD-SP2): 59.2 %
BH CV ECHO MEAS - EF(MOD-SP4): 69.2 %
BH CV ECHO MEAS - EF(TEICH): 61.7 %
BH CV ECHO MEAS - ESV(CUBED): 36.9 ML
BH CV ECHO MEAS - ESV(MOD-SP2): 20 ML
BH CV ECHO MEAS - ESV(MOD-SP4): 16 ML
BH CV ECHO MEAS - ESV(TEICH): 45.1 ML
BH CV ECHO MEAS - FS: 33.3 %
BH CV ECHO MEAS - IVS/LVPW: 0.9
BH CV ECHO MEAS - IVSD: 0.78 CM
BH CV ECHO MEAS - LA DIMENSION: 4 CM
BH CV ECHO MEAS - LA/AO: 1.5
BH CV ECHO MEAS - LAD MAJOR: 5.2 CM
BH CV ECHO MEAS - LAT PEAK E' VEL: 7.8 CM/SEC
BH CV ECHO MEAS - LATERAL E/E' RATIO: 15.3
BH CV ECHO MEAS - LV DIASTOLIC VOL/BSA (35-75): 29.9 ML/M^2
BH CV ECHO MEAS - LV MASS(C)D: 140.9 GRAMS
BH CV ECHO MEAS - LV MASS(C)DI: 81 GRAMS/M^2
BH CV ECHO MEAS - LV MAX PG: 6.3 MMHG
BH CV ECHO MEAS - LV MEAN PG: 3.5 MMHG
BH CV ECHO MEAS - LV SYSTOLIC VOL/BSA (12-30): 9.2 ML/M^2
BH CV ECHO MEAS - LV V1 MAX: 125 CM/SEC
BH CV ECHO MEAS - LV V1 MEAN: 86.9 CM/SEC
BH CV ECHO MEAS - LV V1 VTI: 29.1 CM
BH CV ECHO MEAS - LVIDD: 5 CM
BH CV ECHO MEAS - LVIDS: 3.3 CM
BH CV ECHO MEAS - LVLD AP2: 6.2 CM
BH CV ECHO MEAS - LVLD AP4: 6.9 CM
BH CV ECHO MEAS - LVLS AP2: 5.6 CM
BH CV ECHO MEAS - LVLS AP4: 5.2 CM
BH CV ECHO MEAS - LVOT AREA (M): 2.8 CM^2
BH CV ECHO MEAS - LVOT AREA: 2.8 CM^2
BH CV ECHO MEAS - LVOT DIAM: 1.9 CM
BH CV ECHO MEAS - LVPWD: 0.87 CM
BH CV ECHO MEAS - MED PEAK E' VEL: 6.7 CM/SEC
BH CV ECHO MEAS - MEDIAL E/E' RATIO: 17.8
BH CV ECHO MEAS - MR ALIAS VEL: 30.8 CM/SEC
BH CV ECHO MEAS - MR ERO: 0.12 CM^2
BH CV ECHO MEAS - MR FLOW RATE: 69.7 CM^3/SEC
BH CV ECHO MEAS - MR MAX PG: 127 MMHG
BH CV ECHO MEAS - MR MAX VEL: 564 CM/SEC
BH CV ECHO MEAS - MR MEAN PG: 88 MMHG
BH CV ECHO MEAS - MR MEAN VEL: 448 CM/SEC
BH CV ECHO MEAS - MR PISA RADIUS: 0.6 CM
BH CV ECHO MEAS - MR PISA: 2.3 CM^2
BH CV ECHO MEAS - MR VOLUME: 25 ML
BH CV ECHO MEAS - MR VTI: 202 CM
BH CV ECHO MEAS - MV A MAX VEL: 112 CM/SEC
BH CV ECHO MEAS - MV AREA (1 DIAM): 5.7 CM^2
BH CV ECHO MEAS - MV DEC SLOPE: 362 CM/SEC^2
BH CV ECHO MEAS - MV DEC TIME: 0.29 SEC
BH CV ECHO MEAS - MV DIAM: 2.7 CM
BH CV ECHO MEAS - MV E MAX VEL: 119 CM/SEC
BH CV ECHO MEAS - MV E/A: 1.1
BH CV ECHO MEAS - MV FLOW AREA(1DIAM): 5.7 CM^2
BH CV ECHO MEAS - MV MAX PG: 6.2 MMHG
BH CV ECHO MEAS - MV MEAN PG: 3 MMHG
BH CV ECHO MEAS - MV P1/2T MAX VEL: 121 CM/SEC
BH CV ECHO MEAS - MV P1/2T: 97.9 MSEC
BH CV ECHO MEAS - MV V2 MAX: 124 CM/SEC
BH CV ECHO MEAS - MV V2 MEAN: 74.5 CM/SEC
BH CV ECHO MEAS - MV V2 VTI: 39.8 CM
BH CV ECHO MEAS - MVA P1/2T LCG: 1.8 CM^2
BH CV ECHO MEAS - MVA(P1/2T): 2.2 CM^2
BH CV ECHO MEAS - MVA(VTI): 2.1 CM^2
BH CV ECHO MEAS - PA ACC SLOPE: 494.5 CM/SEC^2
BH CV ECHO MEAS - PA ACC TIME: 0.13 SEC
BH CV ECHO MEAS - PA MAX PG: 4 MMHG
BH CV ECHO MEAS - PA PR(ACCEL): 18.9 MMHG
BH CV ECHO MEAS - PA V2 MAX: 99.6 CM/SEC
BH CV ECHO MEAS - PI END-D VEL: 61.9 CM/SEC
BH CV ECHO MEAS - RAP SYSTOLE: 3 MMHG
BH CV ECHO MEAS - RF(MV,AO)(1 DIAM): -0.68
BH CV ECHO MEAS - RF(MV,LVOT)(1DIAM): 0.64
BH CV ECHO MEAS - RVSP: 19 MMHG
BH CV ECHO MEAS - SI(AO): 219.9 ML/M^2
BH CV ECHO MEAS - SI(CUBED): 50.2 ML/M^2
BH CV ECHO MEAS - SI(LVOT): 47.4 ML/M^2
BH CV ECHO MEAS - SI(MOD-SP2): 16.7 ML/M^2
BH CV ECHO MEAS - SI(MOD-SP4): 20.7 ML/M^2
BH CV ECHO MEAS - SI(MV 1 DIAM): 131.1 ML/M^2
BH CV ECHO MEAS - SI(TEICH): 41.7 ML/M^2
BH CV ECHO MEAS - SV(AO): 382.3 ML
BH CV ECHO MEAS - SV(CUBED): 87.3 ML
BH CV ECHO MEAS - SV(LVOT): 82.4 ML
BH CV ECHO MEAS - SV(MOD-SP2): 29 ML
BH CV ECHO MEAS - SV(MOD-SP4): 36 ML
BH CV ECHO MEAS - SV(MV 1 DIAM): 227.9 ML
BH CV ECHO MEAS - SV(TEICH): 72.6 ML
BH CV ECHO MEAS - TAPSE (>1.6): 1.6 CM2
BH CV ECHO MEAS - TR MAX PG: 16 MMHG
BH CV ECHO MEAS - TR MAX VEL: 201 CM/SEC
BH CV ECHO MEASUREMENTS AVERAGE E/E' RATIO: 16.41
BH CV VAS BP LEFT ARM: NORMAL MMHG
BH CV XLRA - RV BASE: 2.7 CM
BH CV XLRA - RV LENGTH: 7.1 CM
BH CV XLRA - RV MID: 2.8 CM
BH CV XLRA - TDI S': 12 CM/SEC
LEFT ATRIUM VOLUME INDEX: 33.4 ML/M^2
LEFT ATRIUM VOLUME: 58 ML
LV EF 2D ECHO EST: 60 %
MAXIMAL PREDICTED HEART RATE: 141 BPM
MR PISA EROA: 0.17 CM2
MV REGURGITANT FRACTION: 8 %
MV VENA CONTRACTA: 0.81 CM
PISA ALIASING VEL: 3.1 M/S
PISA RADIUS: 0.7 CM
STRESS TARGET HR: 120 BPM

## 2019-06-13 PROCEDURE — 93306 TTE W/DOPPLER COMPLETE: CPT | Performed by: INTERNAL MEDICINE

## 2019-06-13 PROCEDURE — 93306 TTE W/DOPPLER COMPLETE: CPT

## 2019-06-13 NOTE — TELEPHONE ENCOUNTER
----- Message from Luis Escobedo sent at 6/13/2019 10:42 AM EDT -----  Contact: SHARRI / ZOILA MEZA WITH [facility] CALLED TO FOLLOW UP ON A PA FOR LEVAMIR 100UNITS/ML OR HAS  THE MEDICATION THERAPY CHANGED    [facility] - [phone]/SUSANA    USE THIS NUMBER WHEN YOU CALL  - REF KEY # [id number]

## 2019-06-14 ENCOUNTER — TELEPHONE (OUTPATIENT)
Dept: CARDIOLOGY | Facility: HOSPITAL | Age: 79
End: 2019-06-14

## 2019-06-14 NOTE — TELEPHONE ENCOUNTER
Telephone follow up one month post TAVR.  Reviewed echo report from Dr. Valdez's visit earlier this week.   Verbal completion of KCCQ12.  Score is 52/70 (was 45/70 pre-op).  Will follow up with patient at one year benrie.  Continue to follow with Dr. Valdez as scheduled.    Minerva CAMPOS

## 2019-06-17 NOTE — TELEPHONE ENCOUNTER
Pt say's, she does not know why Levamir was sent in, she has been on Lantus for a while now anyway. She still has refills on this at [facility] and doesn't need any sent in.

## 2019-07-01 ENCOUNTER — TELEPHONE (OUTPATIENT)
Dept: FAMILY MEDICINE CLINIC | Facility: CLINIC | Age: 79
End: 2019-07-01

## 2019-07-01 DIAGNOSIS — I25.10 CORONARY ARTERY DISEASE INVOLVING NATIVE CORONARY ARTERY OF NATIVE HEART WITHOUT ANGINA PECTORIS: ICD-10-CM

## 2019-07-01 NOTE — TELEPHONE ENCOUNTER
----- Message from Theresa Wilkinson sent at 7/1/2019  2:55 PM EDT -----  Contact: josue; med refill request    metoprolol tartrate (LOPRESSOR) 25 MG tablet Take 1 tablet by mouth 2 (Two) Times a Day.        [facility]      [address] - [phone]  - [phone] (Phone)  [phone] (Fax)

## 2019-07-05 ENCOUNTER — TELEPHONE (OUTPATIENT)
Dept: FAMILY MEDICINE CLINIC | Facility: CLINIC | Age: 79
End: 2019-07-05

## 2019-07-05 DIAGNOSIS — I10 ESSENTIAL HYPERTENSION: ICD-10-CM

## 2019-07-05 RX ORDER — IRBESARTAN 75 MG/1
150 TABLET ORAL NIGHTLY
Qty: 90 TABLET | Refills: 1 | Status: SHIPPED | OUTPATIENT
Start: 2019-07-05 | End: 2019-09-17 | Stop reason: SDUPTHER

## 2019-07-05 RX ORDER — FUROSEMIDE 20 MG/1
20 TABLET ORAL DAILY PRN
Qty: 90 TABLET | Refills: 0 | Status: SHIPPED | OUTPATIENT
Start: 2019-07-05 | End: 2019-09-17 | Stop reason: SDUPTHER

## 2019-07-05 NOTE — TELEPHONE ENCOUNTER
----- Message from Klaudia Winston sent at 7/5/2019  3:39 PM EDT -----  Contact: [facility] CALLED  REFILL irbesartan (AVAPRO) 75 MG tablet ON   AND LASIX 20MG  [facility]  [phone]

## 2019-08-07 ENCOUNTER — PATIENT OUTREACH (OUTPATIENT)
Dept: CASE MANAGEMENT | Facility: OTHER | Age: 79
End: 2019-08-07

## 2019-08-07 NOTE — OUTREACH NOTE
Care Plan Note      Responses   Lifestyle Goals  Routine follow-up with doctor(s), Self monitor blood pressure, Self monitor blood sugar, Record weight daily, Medication management   Barriers  Disease education   Self Management  Medication Adherence, Home BP Monitoring, Home Glucose Monitoring, Check Weight Daily, Other (See Comment) [Continue with Cardiac Rehab as directed]   Annual Wellness Visit:   -- [Patient has this scheduled for September 2019]   Care Gaps Addressed  Flu Shot   Flu Shot Status  -- [ states patient has not had flu vaccine  for a few years]   Other Patient Education/Resources   24/7 [facility] Nurse Call Line, Advanced Care Planning, [facility]   24/7 Nurse Call Line Education Method  Verbal   ACP Education Method  -- [Patient has information]   MyChart Education Method  Verbal [Patient active on My Chart]   Does patient have depression diagnosis?  No   Ed Visits past 12 months:  None   Hospitalizations past 12 months  2 or 3   Medication Adherence  Medications understood [ states patient understanding medicatios]        The main concerns and/or symptoms the patient would like to address are: Talked with patient's . Patient lives with spouse; independent with ADL's; meal preparation; receiving assistance with transportation and  ambulates without assistive device. He states patient is attending Cardiac Rehab 3 times per week and tolerating this well. Patient is compliant with low sodium diet; medications; medical appointments; monitoring of daily weight blood pressure and blood sugars.  states values are WNL's.     Education/instruction provided by Care Coordinator: Reviewed with patient's  24/7 Nurse Line Telephone number; [place] contact information; Advance Directives; My Chart; gaps in care; MWV and Care Advising program. Patient;s   verbalized understanding and declines further phone calls at this time.  No further questions or concerns voiced at this  time.     Follow Up Outreach Due: Follow up as needed.     Queenie Castillo RN    8/7/2019, 1:56 PM

## 2019-08-08 ENCOUNTER — EPISODE CHANGES (OUTPATIENT)
Dept: CASE MANAGEMENT | Facility: OTHER | Age: 79
End: 2019-08-08

## 2019-08-12 ENCOUNTER — OFFICE VISIT (OUTPATIENT)
Dept: FAMILY MEDICINE CLINIC | Facility: CLINIC | Age: 79
End: 2019-08-12

## 2019-08-12 ENCOUNTER — TELEPHONE (OUTPATIENT)
Dept: FAMILY MEDICINE CLINIC | Facility: CLINIC | Age: 79
End: 2019-08-12

## 2019-08-12 VITALS
DIASTOLIC BLOOD PRESSURE: 80 MMHG | HEART RATE: 78 BPM | HEIGHT: 62 IN | SYSTOLIC BLOOD PRESSURE: 124 MMHG | RESPIRATION RATE: 18 BRPM | TEMPERATURE: 97.8 F | BODY MASS INDEX: 29.72 KG/M2 | WEIGHT: 161.5 LBS

## 2019-08-12 DIAGNOSIS — Z00.00 MEDICARE ANNUAL WELLNESS VISIT, SUBSEQUENT: Primary | ICD-10-CM

## 2019-08-12 DIAGNOSIS — E11.9 TYPE 2 DIABETES MELLITUS WITHOUT COMPLICATION, WITH LONG-TERM CURRENT USE OF INSULIN (HCC): ICD-10-CM

## 2019-08-12 DIAGNOSIS — Z79.4 TYPE 2 DIABETES MELLITUS WITHOUT COMPLICATION, WITH LONG-TERM CURRENT USE OF INSULIN (HCC): ICD-10-CM

## 2019-08-12 DIAGNOSIS — E55.9 VITAMIN D DEFICIENCY: ICD-10-CM

## 2019-08-12 PROCEDURE — G0439 PPPS, SUBSEQ VISIT: HCPCS | Performed by: PHYSICIAN ASSISTANT

## 2019-08-12 RX ORDER — BLOOD-GLUCOSE METER
KIT MISCELLANEOUS
COMMUNITY
Start: 2019-06-13 | End: 2020-11-16 | Stop reason: SDUPTHER

## 2019-08-12 RX ORDER — FLURBIPROFEN SODIUM 0.3 MG/ML
SOLUTION/ DROPS OPHTHALMIC
COMMUNITY
Start: 2019-06-20 | End: 2020-06-16 | Stop reason: SDUPTHER

## 2019-08-12 NOTE — TELEPHONE ENCOUNTER
Dr. Cervantes,    Saw this patient today for medicare wellness. She had concerns about taking her plavix and colestipol after reading literature with her prescriptions. Only thing I am aware of is recommendation of taking other medications 1 hour before or 4 hours after colestipol. She is also anticipating coming off of the Plavix in December during follow up with Dr. Valdez after her aortic valve replacement earlier this year. She would appreciate call back by staff. YOVANY

## 2019-08-12 NOTE — PROGRESS NOTES
The ABCs of the Annual Wellness Visit  Subsequent Medicare Wellness Visit    Chief Complaint   Patient presents with   • Medicare Wellness-subsequent       Subjective   History of Present Illness:  Nancy Goodman is a 78 y.o. female who presents for a Subsequent Medicare Wellness Visit. PMH of HTN, hyperlipidemia, CHF, CAD- managed by cardiologist. Hx of Type 2 diabetes. Sees her PCP routinely labs are UTD     HEALTH RISK ASSESSMENT    Recent Hospitalizations:  Recently treated at the following:  [facility]. May aortic valve replacement, April had stent.  Mitral valve regurgitation they are monitoring.     Current Medical Providers:  Patient Care Team:  Eyal Cervantes MD as PCP - General (Family Medicine)  Eyal Cervantes MD as PCP - Claims Attributed  Fidel Valdez MD as Consulting Physician (Cardiology)  Dr. Ga- No hx of cataract surgery.     Smoking Status:  Social History     Tobacco Use   Smoking Status Never Smoker   Smokeless Tobacco Never Used       Alcohol Consumption:  Social History     Substance and Sexual Activity   Alcohol Use No       Depression Screen:   PHQ-2/PHQ-9 Depression Screening 8/12/2019   Little interest or pleasure in doing things 0   Feeling down, depressed, or hopeless 0   Total Score 0       Fall Risk Screen:  STEADI Fall Risk Assessment has not been completed.    Health Habits and Functional and Cognitive Screening:  Functional & Cognitive Status 8/12/2019   Do you have difficulty preparing food and eating? No   Do you have difficulty bathing yourself, getting dressed or grooming yourself? No   Do you have difficulty using the toilet? No   Do you have difficulty moving around from place to place? No   Do you have trouble with steps or getting out of a bed or a chair? Yes   Current Diet Well Balanced Diet   Dental Exam Up to date   Eye Exam Up to date   Exercise (times per week) 4 times per week   Current Exercise Activities Include Walking   Do you  need help using the phone?  No   Are you deaf or do you have serious difficulty hearing?  No   Do you need help with transportation? No   Do you need help shopping? No   Do you need help preparing meals?  No   Do you need help with housework?  No   Do you need help with laundry? No   Do you need help taking your medications? No   Do you need help managing money? No   Do you ever drive or ride in a car without wearing a seat belt? No         Does the patient have evidence of cognitive impairment? No    Asprin use counseling:Taking ASA appropriately as indicated    Age-appropriate Screening Schedule:  Refer to the list below for future screening recommendations based on patient's age, sex and/or medical conditions. Orders for these recommended tests are listed in the plan section. The patient has been provided with a written plan.    Health Maintenance   Topic Date Due   • INFLUENZA VACCINE  08/01/2019   • ZOSTER VACCINE (1 of 2) 08/12/2019 (Originally [birth year]/1989)   • PNEUMOCOCCAL VACCINES (65+ LOW/MEDIUM RISK) (1 of 2 - PCV13) 08/12/2020 (Originally 9/28/2004)   • MAMMOGRAM  08/12/2020 (Originally 5/3/2016)   • DXA SCAN  08/12/2020 (Originally 5/9/2016)   • TDAP/TD VACCINES (1 - Tdap) 08/12/2020 (Originally 9/28/1959)   • URINE MICROALBUMIN  11/26/2019   • HEMOGLOBIN A1C  12/04/2019   • LIPID PANEL  06/04/2020          The following portions of the patient's history were reviewed and updated as appropriate: allergies, current medications, past family history, past medical history, past social history, past surgical history and problem list.    Outpatient Medications Prior to Visit   Medication Sig Dispense Refill   • aspirin 81 MG EC tablet Take 81 mg by mouth Every Morning.     • B-D UF III MINI PEN NEEDLES 31G X 5 MM misc      • clopidogrel (PLAVIX) 75 MG tablet Take 1 tablet by mouth Daily. 30 tablet 11   • colestipol (COLESTID) 1 g tablet Take 2 tablets by mouth Daily. 180 tablet 1   • CRANBERRY PO Take 450 mg by  mouth Daily.     • fluticasone (CUTIVATE) 0.05 % cream Apply  topically to the appropriate area as directed 2 (Two) Times a Day. 30 g 0   • FREESTYLE LITE test strip      • furosemide (LASIX) 20 MG tablet Take 1 tablet by mouth Daily As Needed (swelling). 90 tablet 0   • Insulin Glargine (LANTUS SOLOSTAR) 100 UNIT/ML injection pen Inject 45 Units under the skin into the appropriate area as directed Daily. 5 pen 5   • irbesartan (AVAPRO) 75 MG tablet Take 2 tablets by mouth Every Night. 90 tablet 1   • loratadine (CLARITIN) 10 MG tablet Take 1 tablet by mouth Daily. 30 tablet 2   • metoprolol tartrate (LOPRESSOR) 25 MG tablet Take 1 tablet by mouth 2 (Two) Times a Day. 60 tablet 5   • Multiple Vitamins-Minerals (MULTIVITAMIN ADULT PO) Take 1 tablet by mouth Daily.     • NON FORMULARY Daily. Super cayenne 100,ooo HU every day     • Omega-3 Fatty Acids (SALMON OIL PO) Take 1,200 mg by mouth 2 (Two) Times a Day.     • omeprazole (priLOSEC) 40 MG capsule Take 40 mg by mouth Every Night. Taking it as needed     • potassium chloride (K-DUR,KLOR-CON) 10 MEQ CR tablet Take 10 mEq by mouth Daily.     • Probiotic Product (PROBIOTIC ADVANCED PO) Take 1 tablet by mouth Every Night.       Facility-Administered Medications Prior to Visit   Medication Dose Route Frequency Provider Last Rate Last Dose   • Chlorhexidine Gluconate Cloth 2 % pads 1 application  1 application Topical Q12H PRN Eduardo Bain, PA           Patient Active Problem List   Diagnosis   • Type 2 diabetes mellitus without complication, with long-term current use of insulin (CMS/[facility])   • Mixed hyperlipidemia   • Essential hypertension   • Vitamin D deficiency   • CAD (coronary artery disease)   • Aortic stenosis, severe   • S/P CABG (coronary artery bypass graft)   • Pulmonary edema   • Diastolic CHF (CMS/[facility])   • BMI 29.0-29.9,adult       Advanced Care Planning:  Patient does not have an advance directive - information provided to the patient today    Review of  "Systems   Constitutional: Negative.  Negative for chills, diaphoresis, fatigue and fever.   HENT: Negative.  Negative for congestion, ear discharge, ear pain, hearing loss, nosebleeds, postnasal drip, sinus pressure, sneezing and sore throat.    Eyes: Negative.    Respiratory: Negative.  Negative for cough, chest tightness, shortness of breath and wheezing.    Cardiovascular: Negative.  Negative for chest pain, palpitations and leg swelling.   Gastrointestinal: Negative for abdominal distention, abdominal pain, blood in stool, constipation, diarrhea, nausea and vomiting.   Genitourinary: Negative.  Negative for difficulty urinating, dysuria, flank pain, frequency, hematuria and urgency.   Musculoskeletal: Negative.  Negative for arthralgias, back pain, gait problem, joint swelling, myalgias, neck pain and neck stiffness.   Skin: Negative.  Negative for color change, pallor, rash and wound.   Neurological: Negative for dizziness, syncope, weakness, light-headedness, numbness and headaches.       Compared to one year ago, the patient feels her physical health is better after heart surgery SOB is doing much better.   Compared to one year ago, the patient feels her mental health is the same.    Reviewed chart for potential of high risk medication in the elderly: yes  Reviewed chart for potential of harmful drug interactions in the elderly:yes    Objective         Vitals:    08/12/19 0910   BP: 124/80   Pulse: 78   Resp: 18   Temp: 97.8 °F (36.6 °C)   Weight: 73.3 kg (161 lb 8 oz)   Height: 157.5 cm (62\")   PainSc: 0-No pain       Body mass index is 29.54 kg/m².  Discussed the patient's BMI with her. The BMI is above average; BMI management plan is completed.    Physical Exam   Constitutional: She is oriented to person, place, and time. She appears well-developed and well-nourished. No distress.   HENT:   Head: Normocephalic and atraumatic.   Right Ear: External ear normal.   Left Ear: External ear normal.   Nose: Nose " normal.   Eyes: Conjunctivae are normal. Right eye exhibits no discharge. Left eye exhibits no discharge. No scleral icterus.   Cardiovascular: Normal rate.   Pulmonary/Chest: Effort normal.   Neurological: She is alert and oriented to person, place, and time.   Psychiatric: She has a normal mood and affect. Her behavior is normal. Judgment and thought content normal.   Nursing note and vitals reviewed.      Lab Results   Component Value Date     (H) 06/04/2019    CHLPL 243 (H) 06/04/2019    TRIG 383 (H) 06/04/2019    HDL 43 06/04/2019     (H) 06/04/2019    VLDL 76.6 06/04/2019    HGBA1C 7.34 (H) 06/04/2019        Assessment/Plan   Medicare Risks and Personalized Health Plan  CMS Preventative Services Quick Reference  Cardiovascular risk  Obesity/Overweight     The above risks/problems have been discussed with the patient.  Pertinent information has been shared with the patient in the After Visit Summary.  Follow up plans and orders are seen below in the Assessment/Plan Section.    Diagnoses and all orders for this visit:    1. Medicare annual wellness visit, subsequent (Primary)    2. Type 2 diabetes mellitus without complication, with long-term current use of insulin (CMS/[facility])    3. Vitamin D deficiency    4. BMI 29.0-29.9,adult      Follow Up:  1 year for annual wellness visit     An After Visit Summary and PPPS were given to the patient.     Refuses most vaccines and additional imaging. Provided patient with more information about tetanus and pneumonia vaccines today. Pt to call if she changes her mind

## 2019-09-17 ENCOUNTER — OFFICE VISIT (OUTPATIENT)
Dept: FAMILY MEDICINE CLINIC | Facility: CLINIC | Age: 79
End: 2019-09-17

## 2019-09-17 VITALS
TEMPERATURE: 97.6 F | DIASTOLIC BLOOD PRESSURE: 82 MMHG | RESPIRATION RATE: 16 BRPM | HEIGHT: 62 IN | SYSTOLIC BLOOD PRESSURE: 130 MMHG | HEART RATE: 74 BPM | BODY MASS INDEX: 30.36 KG/M2 | WEIGHT: 165 LBS

## 2019-09-17 DIAGNOSIS — I10 ESSENTIAL HYPERTENSION: ICD-10-CM

## 2019-09-17 DIAGNOSIS — E78.00 HYPERCHOLESTEROLEMIA: ICD-10-CM

## 2019-09-17 DIAGNOSIS — E11.9 TYPE 2 DIABETES MELLITUS WITHOUT COMPLICATION, WITH LONG-TERM CURRENT USE OF INSULIN (HCC): Primary | ICD-10-CM

## 2019-09-17 DIAGNOSIS — Z79.4 TYPE 2 DIABETES MELLITUS WITHOUT COMPLICATION, WITH LONG-TERM CURRENT USE OF INSULIN (HCC): Primary | ICD-10-CM

## 2019-09-17 DIAGNOSIS — I25.10 CORONARY ARTERY DISEASE INVOLVING NATIVE CORONARY ARTERY OF NATIVE HEART WITHOUT ANGINA PECTORIS: ICD-10-CM

## 2019-09-17 PROCEDURE — 99214 OFFICE O/P EST MOD 30 MIN: CPT | Performed by: FAMILY MEDICINE

## 2019-09-17 RX ORDER — FUROSEMIDE 20 MG/1
20 TABLET ORAL DAILY PRN
Qty: 90 TABLET | Refills: 0 | Status: SHIPPED | OUTPATIENT
Start: 2019-09-17 | End: 2021-03-24 | Stop reason: SDUPTHER

## 2019-09-17 RX ORDER — CLOPIDOGREL BISULFATE 75 MG/1
75 TABLET ORAL DAILY
Qty: 90 TABLET | Refills: 1 | Status: SHIPPED | OUTPATIENT
Start: 2019-09-17 | End: 2020-04-15 | Stop reason: SDUPTHER

## 2019-09-17 RX ORDER — IRBESARTAN 75 MG/1
150 TABLET ORAL NIGHTLY
Qty: 90 TABLET | Refills: 1 | Status: SHIPPED | OUTPATIENT
Start: 2019-09-17 | End: 2020-05-15 | Stop reason: SDUPTHER

## 2019-09-17 RX ORDER — MONTELUKAST SODIUM 4 MG/1
2 TABLET, CHEWABLE ORAL DAILY
Qty: 180 TABLET | Refills: 1 | Status: SHIPPED | OUTPATIENT
Start: 2019-09-17 | End: 2020-05-15

## 2019-09-17 RX ORDER — OMEPRAZOLE 40 MG/1
40 CAPSULE, DELAYED RELEASE ORAL NIGHTLY
Qty: 90 CAPSULE | Refills: 3 | Status: SHIPPED | OUTPATIENT
Start: 2019-09-17

## 2019-09-17 NOTE — PROGRESS NOTES
Subjective   Nancy Goodman is a 78 y.o. female.     History of Present Illness     Diabetes Mellitus Type II, Follow-up:   Nancy Goodman is a 78 y.o. female who is here for follow-up of Type 2 diabetes mellitus.  Current symptoms/problems include none and have been stable. Patient is adherent with medications.  Known diabetic complications: none  Cardiovascular risk factors: advanced age (older than 55 for men, 65 for women), diabetes mellitus, dyslipidemia and hypertension  Current diabetic medications include lantus 32 units.   Current monitoring regimen: home blood tests - daily  Home blood sugar records: fasting range: has been doing well  Any episodes of hypoglycemia? no  Eye exam current (within one year): yes  She is on ACE inhibitor or angiotensin II receptor blocker. Patient is on a statin.   She has been skipping some does of lantus when her glucose is low      Nancy Goodman  is here for follow-up of hypertension of several years duration. She is exercising with cardiac rehab and is adherent to a low-salt diet. Patient does check his blood pressure. It is well controlled at home. Patient denies chest pain and palpitations. She is compliant with meds.        Nancy Goodman returns today for follow up of Hyperlipidemia  Nancy indicates her exercise level as cardiac rehab.  Diet: eating well  Patient is compliant with medications   Any side effects to medications:   chest pain No myalgia No memory change No  Pt is due for labs        The following portions of the patient's history were reviewed and updated as appropriate: allergies, current medications, past family history, past medical history, past social history, past surgical history and problem list.    Review of Systems   Constitutional: Positive for fatigue.   HENT: Negative.    Eyes: Negative.    Respiratory: Negative.  Negative for shortness of breath.    Cardiovascular: Negative.  Negative for chest pain.    Gastrointestinal: Negative.  Negative for constipation and diarrhea.   Musculoskeletal: Negative.    Skin: Negative.    Neurological: Negative.    Psychiatric/Behavioral: Negative.  Negative for dysphoric mood. The patient is not nervous/anxious.    All other systems reviewed and are negative.      Objective   Physical Exam   Constitutional: She is oriented to person, place, and time. She appears well-developed and well-nourished. No distress.   Cardiovascular: Normal rate, regular rhythm and normal heart sounds.   Pulmonary/Chest: Effort normal and breath sounds normal.   Neurological: She is alert and oriented to person, place, and time.   Psychiatric: She has a normal mood and affect. Her behavior is normal. Judgment and thought content normal.   Nursing note and vitals reviewed.      Assessment/Plan   Nancy was seen today for diabetes and med refill.    Diagnoses and all orders for this visit:    Type 2 diabetes mellitus without complication, with long-term current use of insulin (CMS/[facility])  -     Insulin Glargine (LANTUS SOLOSTAR) 100 UNIT/ML injection pen; Inject 45 Units under the skin into the appropriate area as directed Daily.  -     Comprehensive Metabolic Panel  -     Hemoglobin A1c    Essential hypertension  -     irbesartan (AVAPRO) 75 MG tablet; Take 2 tablets by mouth Every Night.  -     CBC & Differential  -     Comprehensive Metabolic Panel    Coronary artery disease involving native coronary artery of native heart without angina pectoris  -     metoprolol tartrate (LOPRESSOR) 25 MG tablet; Take 1 tablet by mouth 2 (Two) Times a Day.  -     clopidogrel (PLAVIX) 75 MG tablet; Take 1 tablet by mouth Daily.    Hypercholesterolemia  -     colestipol (COLESTID) 1 g tablet; Take 2 tablets by mouth Daily.  -     Comprehensive Metabolic Panel  -     Lipid Panel    Other orders  -     furosemide (LASIX) 20 MG tablet; Take 1 tablet by mouth Daily As Needed (swelling).  -     omeprazole (priLOSEC) 40 MG  capsule; Take 1 capsule by mouth Every Night. Taking it as needed    she has slowly been decreasing her insulin dosage.  Will recheck labs and she will monitor for lows.  I asked her to call with any further issues  Continue BP medicine, no issues with this  Recheck lipids to see how the colestipol is helping, f/u pending results

## 2019-09-18 LAB
ALBUMIN SERPL-MCNC: 4.8 G/DL (ref 3.5–5.2)
ALBUMIN/GLOB SERPL: 2.4 G/DL
ALP SERPL-CCNC: 78 U/L (ref 39–117)
ALT SERPL-CCNC: 24 U/L (ref 1–33)
AST SERPL-CCNC: 18 U/L (ref 1–32)
BASOPHILS # BLD AUTO: 0.05 10*3/MM3 (ref 0–0.2)
BASOPHILS NFR BLD AUTO: 0.9 % (ref 0–1.5)
BILIRUB SERPL-MCNC: 0.4 MG/DL (ref 0.2–1.2)
BUN SERPL-MCNC: 19 MG/DL (ref 8–23)
BUN/CREAT SERPL: 19.6 (ref 7–25)
CALCIUM SERPL-MCNC: 9.9 MG/DL (ref 8.6–10.5)
CHLORIDE SERPL-SCNC: 104 MMOL/L (ref 98–107)
CHOLEST SERPL-MCNC: 256 MG/DL (ref 0–200)
CO2 SERPL-SCNC: 28.4 MMOL/L (ref 22–29)
CREAT SERPL-MCNC: 0.97 MG/DL (ref 0.57–1)
EOSINOPHIL # BLD AUTO: 0.15 10*3/MM3 (ref 0–0.4)
EOSINOPHIL NFR BLD AUTO: 2.8 % (ref 0.3–6.2)
ERYTHROCYTE [DISTWIDTH] IN BLOOD BY AUTOMATED COUNT: 15.4 % (ref 12.3–15.4)
GLOBULIN SER CALC-MCNC: 2 GM/DL
GLUCOSE SERPL-MCNC: 109 MG/DL (ref 65–99)
HBA1C MFR BLD: 8.1 % (ref 4.8–5.6)
HCT VFR BLD AUTO: 47.4 % (ref 34–46.6)
HDLC SERPL-MCNC: 39 MG/DL (ref 40–60)
HGB BLD-MCNC: 14.2 G/DL (ref 12–15.9)
IMM GRANULOCYTES # BLD AUTO: 0.02 10*3/MM3 (ref 0–0.05)
IMM GRANULOCYTES NFR BLD AUTO: 0.4 % (ref 0–0.5)
LDLC SERPL CALC-MCNC: 160 MG/DL (ref 0–100)
LYMPHOCYTES # BLD AUTO: 1.95 10*3/MM3 (ref 0.7–3.1)
LYMPHOCYTES NFR BLD AUTO: 36.5 % (ref 19.6–45.3)
MCH RBC QN AUTO: 30 PG (ref 26.6–33)
MCHC RBC AUTO-ENTMCNC: 30 G/DL (ref 31.5–35.7)
MCV RBC AUTO: 100.2 FL (ref 79–97)
MONOCYTES # BLD AUTO: 0.44 10*3/MM3 (ref 0.1–0.9)
MONOCYTES NFR BLD AUTO: 8.2 % (ref 5–12)
NEUTROPHILS # BLD AUTO: 2.73 10*3/MM3 (ref 1.7–7)
NEUTROPHILS NFR BLD AUTO: 51.2 % (ref 42.7–76)
NRBC BLD AUTO-RTO: 0 /100 WBC (ref 0–0.2)
PLATELET # BLD AUTO: 169 10*3/MM3 (ref 140–450)
POTASSIUM SERPL-SCNC: 5.1 MMOL/L (ref 3.5–5.2)
PROT SERPL-MCNC: 6.8 G/DL (ref 6–8.5)
RBC # BLD AUTO: 4.73 10*6/MM3 (ref 3.77–5.28)
SODIUM SERPL-SCNC: 144 MMOL/L (ref 136–145)
TRIGL SERPL-MCNC: 284 MG/DL (ref 0–150)
VLDLC SERPL CALC-MCNC: 56.8 MG/DL (ref 5–40)
WBC # BLD AUTO: 5.34 10*3/MM3 (ref 3.4–10.8)

## 2019-10-03 ENCOUNTER — TELEPHONE (OUTPATIENT)
Dept: FAMILY MEDICINE CLINIC | Facility: CLINIC | Age: 79
End: 2019-10-03

## 2019-10-03 NOTE — TELEPHONE ENCOUNTER
Called pharmacy and informed them to hold the hand written Prilosec script until Dr. Cervantes could address this.

## 2019-10-03 NOTE — TELEPHONE ENCOUNTER
Jemima from [facility] at [place] is calling because the pt is there and Dr. Cerv[facility] but it shows a severe interaction with her Plavix? Should they still fill it or does this need to be changed?    I was going to forward to him but she has traveled there to fill her Rx's today.    [facility] ([place]) [phone] or [phone]

## 2019-10-03 NOTE — TELEPHONE ENCOUNTER
It looks like she hadn't received Prilosec for some time (since 2017), recently renewed during her appointment on 9/17/19. There's no mention of worsening GERD or resuming treatment, just renewed. If she doesn't need the Prilosec, would advise to hold and let Dr. Cervantes adjust. The combination of Prilosec and Plavix can lower the efficacy of Plavix, so shouldn't be used together.

## 2019-10-04 NOTE — TELEPHONE ENCOUNTER
If she can stop the prilosec than that would be great.  Call back with any issues but she had been on both of these in the past.

## 2019-10-04 NOTE — TELEPHONE ENCOUNTER
Called informed pt she stated she would need to take something every now and then what should she take then.

## 2019-10-10 ENCOUNTER — OFFICE VISIT (OUTPATIENT)
Dept: FAMILY MEDICINE CLINIC | Facility: CLINIC | Age: 79
End: 2019-10-10

## 2019-10-10 VITALS
SYSTOLIC BLOOD PRESSURE: 130 MMHG | RESPIRATION RATE: 16 BRPM | TEMPERATURE: 97.3 F | DIASTOLIC BLOOD PRESSURE: 80 MMHG | HEIGHT: 62 IN | WEIGHT: 163.5 LBS | BODY MASS INDEX: 30.09 KG/M2 | HEART RATE: 84 BPM

## 2019-10-10 DIAGNOSIS — R21 RASH: Primary | ICD-10-CM

## 2019-10-10 PROCEDURE — 99213 OFFICE O/P EST LOW 20 MIN: CPT | Performed by: NURSE PRACTITIONER

## 2019-10-10 RX ORDER — TRIAMCINOLONE ACETONIDE 40 MG/ML
40 INJECTION, SUSPENSION INTRA-ARTICULAR; INTRAMUSCULAR ONCE
Status: DISCONTINUED | OUTPATIENT
Start: 2019-10-10 | End: 2019-12-20

## 2019-10-10 RX ORDER — PREDNISONE 20 MG/1
TABLET ORAL
Qty: 5 TABLET | Refills: 0 | Status: SHIPPED | OUTPATIENT
Start: 2019-10-10 | End: 2019-12-20

## 2019-10-10 NOTE — PROGRESS NOTES
Subjective   Nancy Goodman is a 79 y.o. female.     History of Present Illness   Rash under breast and on right side face noticed on Tuesday  Skin sensitivity in the past to poison ivy and milk weeds & mold and weeds but she is not sure what she was exposed to, seems to be getting worse, very itchy.  No new body wash, laundry detergent or soaps or lotions  Took some Benadryl and used some salt and vinegar for the itch and taking raw honey orally,   Type 2 DM stable for the most part but seems to make BS elevated when she takes steroids or if she is ill    The following portions of the patient's history were reviewed and updated as appropriate: allergies, current medications, past family history, past medical history, past social history, past surgical history and problem list.    Review of Systems   Constitutional: Negative for activity change, chills, fatigue and fever.   HENT: Positive for facial swelling (mild right side of face swelling and red). Negative for congestion, postnasal drip, rhinorrhea, sinus pressure, sore throat and trouble swallowing.    Eyes: Negative for pain, discharge, redness and itching.   Respiratory: Negative for cough, chest tightness and shortness of breath.    Skin: Positive for rash.   Allergic/Immunologic: Positive for environmental allergies.   Hematological: Negative for adenopathy. Bruises/bleeds easily.       Objective   Physical Exam   Constitutional: She is oriented to person, place, and time. She appears well-developed and well-nourished. No distress.   HENT:   Head: Normocephalic.   Right Ear: External ear normal.   Left Ear: External ear normal.   Nose: Nose normal.   Mouth/Throat: Uvula is midline, oropharynx is clear and moist and mucous membranes are normal. No oropharyngeal exudate.   Neck: Neck supple.   Cardiovascular: Normal rate, regular rhythm and normal heart sounds.   Pulmonary/Chest: Effort normal and breath sounds normal.   Musculoskeletal: She exhibits  edema (mild swelling of right side of face with redness).   Lymphadenopathy:     She has no cervical adenopathy.   Neurological: She is alert and oriented to person, place, and time.   Skin: Skin is warm and dry. Capillary refill takes less than 2 seconds. Rash (under breast and upper abdomen, face  ) noted. She is not diaphoretic. There is erythema.   Psychiatric: She has a normal mood and affect. Her behavior is normal. Judgment and thought content normal.   Nursing note and vitals reviewed.        Assessment/Plan   Nancy was seen today for rash.    Diagnoses and all orders for this visit:    Rash  -     predniSONE (DELTASONE) 20 MG tablet; 1 tablet daily  -     triamcinolone acetonide (KENALOG-40) injection 40 mg    will give pt Kenalog injection today and start oral Prednisone tomorrow, discussed that BS can go up with steroids. F/U if not improving. Pt agrees.

## 2019-10-15 ENCOUNTER — TELEPHONE (OUTPATIENT)
Dept: FAMILY MEDICINE CLINIC | Facility: CLINIC | Age: 79
End: 2019-10-15

## 2019-10-15 DIAGNOSIS — R21 RASH: Primary | ICD-10-CM

## 2019-10-15 RX ORDER — TRIAMCINOLONE ACETONIDE 0.25 MG/G
CREAM TOPICAL 2 TIMES DAILY
Qty: 80 G | Refills: 0 | Status: SHIPPED | OUTPATIENT
Start: 2019-10-15 | End: 2019-12-20

## 2019-10-15 NOTE — TELEPHONE ENCOUNTER
PATIENT STATES THE RASH HAS GOT A LOT BETTER BUT SHE STILL HAS SOME ITCHING AND REDNESS SHE WANTED TO SEE IF THEY WANTED TO GIVE HER A LITTLE MORE PREDNISONE?

## 2019-10-15 NOTE — TELEPHONE ENCOUNTER
Will send in some topical steroid cream and pt can take OTC Benadryl for itching and rash. I would prefer not to use oral steroids if we can avoid it now due to DM. ajc

## 2019-12-09 RX ORDER — POTASSIUM CHLORIDE 750 MG/1
TABLET, FILM COATED, EXTENDED RELEASE ORAL
Qty: 90 TABLET | Refills: 4 | OUTPATIENT
Start: 2019-12-09

## 2019-12-17 NOTE — PROGRESS NOTES
Subjective:     Encounter Date:12/18/2019    Primary Care Physician: Eyal Cervantes MD      Patient ID: Nancy Goodman is a 79 y.o. female.    Chief Complaint:Coronary Artery Disease and Mixed hyperlipidemia    PROBLEM LIST:  1. Coronary artery disease  a. 5/16/17 angina, cardiac catheter severe ostial LAD and LCx of these.  Ostial RPL and RCA.  b. 5 vessel CABG ([place]) LIMA to LAD, SVG to diagonal and LCx, and SVG to PDA and PL.  2. Aortic stenosis  a. Peak gradient of 27 by cath 5/17  b. 4/19 peak gradient by echo of 66  c. 5/13/19 TAVR #23 Bah Tobias tissue valve  d. 6/13/19 ECHO EF 60%. Mod MR. TAVR normal. Max pressure gradient 34.8  3. Hypertension  4. Dyslipidemia  5. Diabetes mellitus  6. GERD  7. Varicose veins  8. Arthritis  9. Surgeries:  a. Tubal ligation  b. Varicose vein surgery  c. CABG        Allergies   Allergen Reactions   • Amlodipine      sickness   • Benazepril Nausea Only   • Ciprofloxacin      Pt not sure of reaction (stomach problems)   • Statins Nausea Only     Stomach problem   • Pokeweed [Phytolacca] Swelling and Rash         Current Outpatient Medications:   •  aspirin 81 MG EC tablet, Take 81 mg by mouth Every Morning., Disp: , Rfl:   •  B-D UF III MINI PEN NEEDLES 31G X 5 MM misc, , Disp: , Rfl:   •  clopidogrel (PLAVIX) 75 MG tablet, Take 1 tablet by mouth Daily., Disp: 90 tablet, Rfl: 1  •  CRANBERRY PO, Take 450 mg by mouth Daily., Disp: , Rfl:   •  FREESTYLE LITE test strip, , Disp: , Rfl:   •  furosemide (LASIX) 20 MG tablet, Take 1 tablet by mouth Daily As Needed (swelling)., Disp: 90 tablet, Rfl: 0  •  Insulin Glargine (LANTUS SOLOSTAR) 100 UNIT/ML injection pen, Inject 45 Units under the skin into the appropriate area as directed Daily. (Patient taking differently: Inject 42 Units under the skin into the appropriate area as directed Daily.), Disp: 5 pen, Rfl: 5  •  irbesartan (AVAPRO) 75 MG tablet, Take 2 tablets by mouth Every Night., Disp: 90 tablet, Rfl: 1  •   metoprolol tartrate (LOPRESSOR) 25 MG tablet, Take 1 tablet by mouth 2 (Two) Times a Day., Disp: 180 tablet, Rfl: 1  •  Multiple Vitamins-Minerals (MULTIVITAMIN ADULT PO), Take 1 tablet by mouth Daily., Disp: , Rfl:   •  Omega-3 Fatty Acids (SALMON OIL PO), Take 1,200 mg by mouth 2 (Two) Times a Day., Disp: , Rfl:   •  omeprazole (priLOSEC) 40 MG capsule, Take 1 capsule by mouth Every Night. Taking it as needed, Disp: 90 capsule, Rfl: 3  •  potassium chloride (K-DUR,KLOR-CON) 10 MEQ CR tablet, Take 10 mEq by mouth Daily., Disp: , Rfl:   •  Probiotic Product (PROBIOTIC ADVANCED PO), Take 1 tablet by mouth Every Night., Disp: , Rfl:   •  colestipol (COLESTID) 1 g tablet, Take 2 tablets by mouth Daily., Disp: 180 tablet, Rfl: 1  •  loratadine (CLARITIN) 10 MG tablet, Take 1 tablet by mouth Daily., Disp: 30 tablet, Rfl: 2  •  NON FORMULARY, Daily. Super cayenne 100,ooo HU every day, Disp: , Rfl:   •  predniSONE (DELTASONE) 20 MG tablet, 1 tablet daily, Disp: 5 tablet, Rfl: 0  •  triamcinolone (KENALOG) 0.025 % cream, Apply  topically to the appropriate area as directed 2 (Two) Times a Day., Disp: 80 g, Rfl: 0    Current Facility-Administered Medications:   •  triamcinolone acetonide (KENALOG-40) injection 40 mg, 40 mg, Intramuscular, Once, Brittany Cox, APRN    Facility-Administered Medications Ordered in Other Visits:   •  Chlorhexidine Gluconate Cloth 2 % pads 1 application, 1 application, Topical, Q12H PRN, Eduardo Bain PA        History of Present Illness    Patient returns today for follow-up post TAVR in for coronary disease.  Since her last visit, she overall feels reasonably well.  She has no change in her dyspnea.  She notes that she does have occasional days of malaise fatigue but some days she feels fine.  She was going to the gym and doing well has stopped going recently, but notes she feels better on the day she goes to the gym.  No exertional chest pain presyncope syncope.    The following portions of the  "patient's history were reviewed and updated as appropriate: allergies, current medications, past family history, past medical history, past social history, past surgical history and problem list.      Social History     Tobacco Use   • Smoking status: Never Smoker   • Smokeless tobacco: Never Used   Substance Use Topics   • Alcohol use: No   • Drug use: No         Review of Systems   Constitution: Positive for malaise/fatigue and weight gain.   HENT: Positive for hearing loss and tinnitus.    Cardiovascular: Negative.    Respiratory: Positive for shortness of breath.    Hematologic/Lymphatic: Negative for bleeding problem. Does not bruise/bleed easily.   Skin: Negative for rash.   Musculoskeletal: Negative for muscle weakness and myalgias.   Gastrointestinal: Positive for flatus. Negative for heartburn, nausea and vomiting.   Neurological: Positive for excessive daytime sleepiness and loss of balance.   Allergic/Immunologic: Positive for environmental allergies.          Objective:    height is 158.8 cm (62.5\") and weight is 75.8 kg (167 lb). Her blood pressure is 144/78 and her pulse is 88.         Physical Exam   Constitutional: She is oriented to person, place, and time. She appears well-developed and well-nourished.   HENT:   Mouth/Throat: Oropharynx is clear and moist.   Neck: No JVD present. Carotid bruit is not present. No thyromegaly present.   Cardiovascular: Regular rhythm, S1 normal, S2 normal and intact distal pulses. Exam reveals no gallop, no S3 and no S4.   Murmur heard.   Systolic murmur is present with a grade of 1/6 at the upper right sternal border and upper left sternal border.  Pulses:       Carotid pulses are 2+ on the right side, and 2+ on the left side.       Radial pulses are 2+ on the right side, and 2+ on the left side.   Pulmonary/Chest: Breath sounds normal.   Abdominal: Soft. Bowel sounds are normal. She exhibits no mass. There is no tenderness.   Musculoskeletal: She exhibits no edema. "   Neurological: She is alert and oriented to person, place, and time.   Skin: Skin is warm and dry. No rash noted.       Procedures          Assessment:   Assessment/Plan      Nancy was seen today for coronary artery disease and mixed hyperlipidemia.    Diagnoses and all orders for this visit:    Coronary artery disease involving native coronary artery of native heart without angina pectoris    Aortic stenosis, severe    Essential hypertension    Mixed hyperlipidemia      1.  History of aortic stenosis status post TAVR 7 months ago.  Echocardiogram reviewed with patient today showed normal function.  And normal LVEF.  2.  2 years status post CABG.  Stable without angina.  3.  Hypertension, mildly elevated today, but first elevated reading in our office since quite some time.  4.  Dyslipidemia, intolerant of all statins.    Recommendations  1.  Echocardiac showed normal TAVR function.  Discussed need for continued Plavix with patient.  2.  Follow-up with primary care physician to recheck blood pressure after the holidays.  No change in medicines at this time.  3.  Patient intolerant to all statins.  Not really willing to take cholesterol medicine.     Fidel Valdez MD      Dictated utilizing Dragon dictation

## 2019-12-18 ENCOUNTER — OFFICE VISIT (OUTPATIENT)
Dept: CARDIOLOGY | Facility: CLINIC | Age: 79
End: 2019-12-18

## 2019-12-18 ENCOUNTER — HOSPITAL ENCOUNTER (OUTPATIENT)
Dept: CARDIOLOGY | Facility: HOSPITAL | Age: 79
Discharge: HOME | End: 2019-12-18
Admitting: INTERNAL MEDICINE

## 2019-12-18 VITALS
HEART RATE: 88 BPM | HEIGHT: 63 IN | DIASTOLIC BLOOD PRESSURE: 78 MMHG | BODY MASS INDEX: 29.59 KG/M2 | SYSTOLIC BLOOD PRESSURE: 144 MMHG | WEIGHT: 167 LBS

## 2019-12-18 VITALS — WEIGHT: 163 LBS | BODY MASS INDEX: 30 KG/M2 | HEIGHT: 62 IN

## 2019-12-18 DIAGNOSIS — I10 ESSENTIAL HYPERTENSION: ICD-10-CM

## 2019-12-18 DIAGNOSIS — I35.0 AORTIC STENOSIS, SEVERE: ICD-10-CM

## 2019-12-18 DIAGNOSIS — I25.10 CORONARY ARTERY DISEASE INVOLVING NATIVE CORONARY ARTERY OF NATIVE HEART WITHOUT ANGINA PECTORIS: Primary | ICD-10-CM

## 2019-12-18 DIAGNOSIS — I25.10 CORONARY ARTERY DISEASE INVOLVING NATIVE CORONARY ARTERY OF NATIVE HEART WITHOUT ANGINA PECTORIS: ICD-10-CM

## 2019-12-18 DIAGNOSIS — E78.2 MIXED HYPERLIPIDEMIA: ICD-10-CM

## 2019-12-18 LAB
AORTIC DIMENSIONLESS INDEX: 0.4 (DI)
BH CV ECHO MEAS - AO MAX PG (FULL): 21.2 MMHG
BH CV ECHO MEAS - AO MAX PG: 27 MMHG
BH CV ECHO MEAS - AO MEAN PG (FULL): 12.6 MMHG
BH CV ECHO MEAS - AO MEAN PG: 15 MMHG
BH CV ECHO MEAS - AO ROOT AREA (BSA CORRECTED): 1.7
BH CV ECHO MEAS - AO ROOT AREA: 7.2 CM^2
BH CV ECHO MEAS - AO ROOT DIAM: 3 CM
BH CV ECHO MEAS - AO V2 MAX: 259 CM/SEC
BH CV ECHO MEAS - AO V2 MEAN: 182 CM/SEC
BH CV ECHO MEAS - AO V2 VTI: 64.2 CM
BH CV ECHO MEAS - AVA(I,A): 1.1 CM^2
BH CV ECHO MEAS - AVA(I,D): 1 CM^2
BH CV ECHO MEAS - AVA(V,A): 1.2 CM^2
BH CV ECHO MEAS - AVA(V,D): 1.2 CM^2
BH CV ECHO MEAS - BSA(HAYCOCK): 1.8 M^2
BH CV ECHO MEAS - BSA: 1.8 M^2
BH CV ECHO MEAS - BZI_BMI: 29.8 KILOGRAMS/M^2
BH CV ECHO MEAS - BZI_METRIC_HEIGHT: 157.5 CM
BH CV ECHO MEAS - BZI_METRIC_WEIGHT: 73.9 KG
BH CV ECHO MEAS - EDV(CUBED): 50.4 ML
BH CV ECHO MEAS - EDV(MOD-SP2): 99 ML
BH CV ECHO MEAS - EDV(MOD-SP4): 81 ML
BH CV ECHO MEAS - EDV(TEICH): 57.9 ML
BH CV ECHO MEAS - EF(CUBED): 74.5 %
BH CV ECHO MEAS - EF(MOD-BP): 62 %
BH CV ECHO MEAS - EF(MOD-SP2): 63.6 %
BH CV ECHO MEAS - EF(MOD-SP4): 54.3 %
BH CV ECHO MEAS - EF(TEICH): 67.2 %
BH CV ECHO MEAS - ESV(CUBED): 12.9 ML
BH CV ECHO MEAS - ESV(MOD-SP2): 36 ML
BH CV ECHO MEAS - ESV(MOD-SP4): 37 ML
BH CV ECHO MEAS - ESV(TEICH): 19 ML
BH CV ECHO MEAS - FS: 36.6 %
BH CV ECHO MEAS - IVS/LVPW: 1.1
BH CV ECHO MEAS - IVSD: 1.5 CM
BH CV ECHO MEAS - LAD MAJOR: 5.2 CM
BH CV ECHO MEAS - LAT PEAK E' VEL: 8.1 CM/SEC
BH CV ECHO MEAS - LATERAL E/E' RATIO: 13.2
BH CV ECHO MEAS - LV DIASTOLIC VOL/BSA (35-75): 46.2 ML/M^2
BH CV ECHO MEAS - LV IVRT: 0.07 SEC
BH CV ECHO MEAS - LV MASS(C)D: 195.3 GRAMS
BH CV ECHO MEAS - LV MASS(C)DI: 111.4 GRAMS/M^2
BH CV ECHO MEAS - LV MAX PG: 5.4 MMHG
BH CV ECHO MEAS - LV MEAN PG: 2.5 MMHG
BH CV ECHO MEAS - LV SYSTOLIC VOL/BSA (12-30): 21.1 ML/M^2
BH CV ECHO MEAS - LV V1 MAX: 115.9 CM/SEC
BH CV ECHO MEAS - LV V1 MEAN: 72.7 CM/SEC
BH CV ECHO MEAS - LV V1 VTI: 26.1 CM
BH CV ECHO MEAS - LVIDD: 3.7 CM
BH CV ECHO MEAS - LVIDS: 2.3 CM
BH CV ECHO MEAS - LVLD AP2: 7.2 CM
BH CV ECHO MEAS - LVLD AP4: 6.9 CM
BH CV ECHO MEAS - LVLS AP2: 5.6 CM
BH CV ECHO MEAS - LVLS AP4: 5.5 CM
BH CV ECHO MEAS - LVOT AREA (M): 2.5 CM^2
BH CV ECHO MEAS - LVOT AREA: 2.6 CM^2
BH CV ECHO MEAS - LVOT DIAM: 1.8 CM
BH CV ECHO MEAS - LVPWD: 1.4 CM
BH CV ECHO MEAS - MED PEAK E' VEL: 4.4 CM/SEC
BH CV ECHO MEAS - MEDIAL E/E' RATIO: 24.2
BH CV ECHO MEAS - MPA AREA: 7.8 CM^2
BH CV ECHO MEAS - MPA DIAM: 3.1 CM
BH CV ECHO MEAS - MR ALIAS VEL: 32.1 CM/SEC
BH CV ECHO MEAS - MR ERO: 0.04 CM^2
BH CV ECHO MEAS - MR FLOW RATE: 25.6 CM^3/SEC
BH CV ECHO MEAS - MR MAX PG: 142 MMHG
BH CV ECHO MEAS - MR MAX VEL: 593.4 CM/SEC
BH CV ECHO MEAS - MR MEAN PG: 82.9 MMHG
BH CV ECHO MEAS - MR MEAN VEL: 424 CM/SEC
BH CV ECHO MEAS - MR PISA RADIUS: 0.36 CM
BH CV ECHO MEAS - MR PISA: 0.8 CM^2
BH CV ECHO MEAS - MR VOLUME: 8.5 ML
BH CV ECHO MEAS - MR VTI: 197.1 CM
BH CV ECHO MEAS - MV A MAX VEL: 134.4 CM/SEC
BH CV ECHO MEAS - MV AREA (1 DIAM): 4.6 CM^2
BH CV ECHO MEAS - MV DEC TIME: 0.28 SEC
BH CV ECHO MEAS - MV DIAM: 2.4 CM
BH CV ECHO MEAS - MV E MAX VEL: 109.7 CM/SEC
BH CV ECHO MEAS - MV E/A: 0.82
BH CV ECHO MEAS - MV FLOW AREA(1DIAM): 4.6 CM^2
BH CV ECHO MEAS - MV MAX PG: 8.2 MMHG
BH CV ECHO MEAS - MV MEAN PG: 4.3 MMHG
BH CV ECHO MEAS - MV V2 MAX: 143.5 CM/SEC
BH CV ECHO MEAS - MV V2 MEAN: 96.8 CM/SEC
BH CV ECHO MEAS - MV V2 VTI: 38.9 CM
BH CV ECHO MEAS - MVA(VTI): 1.7 CM^2
BH CV ECHO MEAS - PA ACC SLOPE: 1095 CM/SEC^2
BH CV ECHO MEAS - PA ACC TIME: 0.09 SEC
BH CV ECHO MEAS - PA PR(ACCEL): 40.2 MMHG
BH CV ECHO MEAS - PI END-D VEL: 88 CM/SEC
BH CV ECHO MEAS - PULM A REVS VEL: 26.2 CM/SEC
BH CV ECHO MEAS - PULM DIAS VEL: 28.6 CM/SEC
BH CV ECHO MEAS - PULM S/D: 1.9
BH CV ECHO MEAS - PULM SYS VEL: 54.8 CM/SEC
BH CV ECHO MEAS - RAP SYSTOLE: 3 MMHG
BH CV ECHO MEAS - RF(MV,AO)(1 DIAM): -1.6
BH CV ECHO MEAS - RF(MV,LVOT)(1DIAM): 0.62
BH CV ECHO MEAS - RVSP: 22 MMHG
BH CV ECHO MEAS - SI(AO): 264.6 ML/M^2
BH CV ECHO MEAS - SI(CUBED): 21.4 ML/M^2
BH CV ECHO MEAS - SI(LVOT): 38.8 ML/M^2
BH CV ECHO MEAS - SI(MOD-SP2): 36 ML/M^2
BH CV ECHO MEAS - SI(MOD-SP4): 25.1 ML/M^2
BH CV ECHO MEAS - SI(MV 1 DIAM): 101 ML/M^2
BH CV ECHO MEAS - SI(TEICH): 22.2 ML/M^2
BH CV ECHO MEAS - SV(AO): 463.7 ML
BH CV ECHO MEAS - SV(CUBED): 37.5 ML
BH CV ECHO MEAS - SV(LVOT): 68 ML
BH CV ECHO MEAS - SV(MOD-SP2): 63 ML
BH CV ECHO MEAS - SV(MOD-SP4): 44 ML
BH CV ECHO MEAS - SV(MV 1 DIAM): 176.9 ML
BH CV ECHO MEAS - SV(TEICH): 38.9 ML
BH CV ECHO MEAS - TAPSE (>1.6): 1.7 CM2
BH CV ECHO MEAS - TR MAX PG: 19 MMHG
BH CV ECHO MEAS - TR MAX VEL: 215 CM/SEC
BH CV ECHO MEASUREMENTS AVERAGE E/E' RATIO: 17.55
BH CV VAS BP LEFT ARM: NORMAL MMHG
BH CV XLRA - RV BASE: 3.5 CM
BH CV XLRA - RV LENGTH: 6.3 CM
BH CV XLRA - RV MID: 3.1 CM
BH CV XLRA - TDI S': 8.22 CM/SEC
LEFT ATRIUM VOLUME INDEX: 34.2 ML/M^2
LEFT ATRIUM VOLUME: 60 ML
LV EF 2D ECHO EST: 62 %

## 2019-12-18 PROCEDURE — 99214 OFFICE O/P EST MOD 30 MIN: CPT | Performed by: INTERNAL MEDICINE

## 2019-12-18 PROCEDURE — 93306 TTE W/DOPPLER COMPLETE: CPT

## 2019-12-18 PROCEDURE — 93306 TTE W/DOPPLER COMPLETE: CPT | Performed by: INTERNAL MEDICINE

## 2019-12-20 ENCOUNTER — OFFICE VISIT (OUTPATIENT)
Dept: FAMILY MEDICINE CLINIC | Facility: CLINIC | Age: 79
End: 2019-12-20

## 2019-12-20 VITALS
WEIGHT: 165 LBS | DIASTOLIC BLOOD PRESSURE: 92 MMHG | BODY MASS INDEX: 29.23 KG/M2 | RESPIRATION RATE: 18 BRPM | HEART RATE: 80 BPM | TEMPERATURE: 97.3 F | HEIGHT: 63 IN | SYSTOLIC BLOOD PRESSURE: 152 MMHG

## 2019-12-20 DIAGNOSIS — Z79.4 TYPE 2 DIABETES MELLITUS WITHOUT COMPLICATION, WITH LONG-TERM CURRENT USE OF INSULIN (HCC): Primary | ICD-10-CM

## 2019-12-20 DIAGNOSIS — E78.2 MIXED HYPERLIPIDEMIA: ICD-10-CM

## 2019-12-20 DIAGNOSIS — I10 ESSENTIAL HYPERTENSION: ICD-10-CM

## 2019-12-20 DIAGNOSIS — E11.9 TYPE 2 DIABETES MELLITUS WITHOUT COMPLICATION, WITH LONG-TERM CURRENT USE OF INSULIN (HCC): Primary | ICD-10-CM

## 2019-12-20 LAB
ALBUMIN SERPL-MCNC: 4.2 G/DL (ref 3.5–5.2)
ALBUMIN/GLOB SERPL: 1.6 G/DL
ALP SERPL-CCNC: 69 U/L (ref 39–117)
ALT SERPL-CCNC: 22 U/L (ref 1–33)
AST SERPL-CCNC: 15 U/L (ref 1–32)
BASOPHILS # BLD AUTO: 0.06 10*3/MM3 (ref 0–0.2)
BASOPHILS NFR BLD AUTO: 1 % (ref 0–1.5)
BILIRUB SERPL-MCNC: 0.5 MG/DL (ref 0.2–1.2)
BUN SERPL-MCNC: 16 MG/DL (ref 8–23)
BUN/CREAT SERPL: 14.4 (ref 7–25)
CALCIUM SERPL-MCNC: 10 MG/DL (ref 8.6–10.5)
CHLORIDE SERPL-SCNC: 101 MMOL/L (ref 98–107)
CHOLEST SERPL-MCNC: 266 MG/DL (ref 0–200)
CO2 SERPL-SCNC: 30 MMOL/L (ref 22–29)
CREAT SERPL-MCNC: 1.11 MG/DL (ref 0.57–1)
EOSINOPHIL # BLD AUTO: 0.16 10*3/MM3 (ref 0–0.4)
EOSINOPHIL NFR BLD AUTO: 2.6 % (ref 0.3–6.2)
ERYTHROCYTE [DISTWIDTH] IN BLOOD BY AUTOMATED COUNT: 13.1 % (ref 12.3–15.4)
GLOBULIN SER CALC-MCNC: 2.6 GM/DL
GLUCOSE SERPL-MCNC: 103 MG/DL (ref 65–99)
HBA1C MFR BLD: 8.6 % (ref 4.8–5.6)
HCT VFR BLD AUTO: 40.2 % (ref 34–46.6)
HDLC SERPL-MCNC: 37 MG/DL (ref 40–60)
HGB BLD-MCNC: 13.6 G/DL (ref 12–15.9)
IMM GRANULOCYTES # BLD AUTO: 0.02 10*3/MM3 (ref 0–0.05)
IMM GRANULOCYTES NFR BLD AUTO: 0.3 % (ref 0–0.5)
LDLC SERPL CALC-MCNC: 171 MG/DL (ref 0–100)
LYMPHOCYTES # BLD AUTO: 2.08 10*3/MM3 (ref 0.7–3.1)
LYMPHOCYTES NFR BLD AUTO: 34.2 % (ref 19.6–45.3)
MCH RBC QN AUTO: 31 PG (ref 26.6–33)
MCHC RBC AUTO-ENTMCNC: 33.8 G/DL (ref 31.5–35.7)
MCV RBC AUTO: 91.6 FL (ref 79–97)
MONOCYTES # BLD AUTO: 0.59 10*3/MM3 (ref 0.1–0.9)
MONOCYTES NFR BLD AUTO: 9.7 % (ref 5–12)
NEUTROPHILS # BLD AUTO: 3.18 10*3/MM3 (ref 1.7–7)
NEUTROPHILS NFR BLD AUTO: 52.2 % (ref 42.7–76)
NRBC BLD AUTO-RTO: 0 /100 WBC (ref 0–0.2)
PLATELET # BLD AUTO: 188 10*3/MM3 (ref 140–450)
POTASSIUM SERPL-SCNC: 5 MMOL/L (ref 3.5–5.2)
PROT SERPL-MCNC: 6.8 G/DL (ref 6–8.5)
RBC # BLD AUTO: 4.39 10*6/MM3 (ref 3.77–5.28)
SODIUM SERPL-SCNC: 141 MMOL/L (ref 136–145)
TRIGL SERPL-MCNC: 291 MG/DL (ref 0–150)
VLDLC SERPL CALC-MCNC: 58.2 MG/DL
WBC # BLD AUTO: 6.09 10*3/MM3 (ref 3.4–10.8)

## 2019-12-20 PROCEDURE — 99214 OFFICE O/P EST MOD 30 MIN: CPT | Performed by: FAMILY MEDICINE

## 2019-12-20 NOTE — PROGRESS NOTES
Subjective   Nancy Goodman is a 79 y.o. female.     History of Present Illness     Diabetes Mellitus Type II, Follow-up:   Nancy Goodman is a 79 y.o. female who is here for follow-up of Type 2 diabetes mellitus.  Current symptoms/problems include none and have been stable. Patient is adherent with medications.  Known diabetic complications: none  Cardiovascular risk factors: advanced age (older than 55 for men, 65 for women), diabetes mellitus, dyslipidemia and hypertension  Current diabetic medications include l;antus 42 units.   Current monitoring regimen: home blood tests - daily  Home blood sugar records: fasting range: doing higher  Any episodes of hypoglycemia? no  Eye exam current (within one year): yes  She is on ACE inhibitor or angiotensin II receptor blocker. Patient is on a statin.       Nancy Goodman  is here for follow-up of hypertension of several years duration. She is not exercising and is not adherent to a low-salt diet. Patient does not check her blood pressure  Patient denies chest pain and palpitations. She is compliant with meds.        Nancy Goodman returns today for follow up of Hyperlipidemia  Nancy indicates her exercise level as not at all.  Diet: has been eating worse  Patient is not compliant with medications  As she cannot tolerate statins  Any side effects to medications:   chest pain No myalgia No memory change No  Pt is due for labs        The following portions of the patient's history were reviewed and updated as appropriate: allergies, current medications, past family history, past medical history, past social history, past surgical history and problem list.    Review of Systems   Constitutional: Negative.    HENT: Negative.    Eyes: Negative.    Respiratory: Negative.  Negative for shortness of breath.    Cardiovascular: Negative.  Negative for chest pain.   Gastrointestinal: Negative.    Musculoskeletal: Negative.    Skin: Negative.     Neurological: Negative.    Psychiatric/Behavioral: Negative.  Negative for dysphoric mood. The patient is not nervous/anxious.    All other systems reviewed and are negative.      Objective   Physical Exam   Constitutional: She is oriented to person, place, and time. She appears well-developed and well-nourished. No distress.   Cardiovascular: Normal rate, regular rhythm and normal heart sounds.   Pulmonary/Chest: Effort normal and breath sounds normal.   Abdominal: Soft. Bowel sounds are normal. She exhibits no distension. There is no tenderness.   Neurological: She is alert and oriented to person, place, and time.   Psychiatric: She has a normal mood and affect. Her behavior is normal. Judgment and thought content normal.   Nursing note and vitals reviewed.      Assessment/Plan   Nancy was seen today for diabetes.    Diagnoses and all orders for this visit:    Type 2 diabetes mellitus without complication, with long-term current use of insulin (CMS/[facility])  -     Insulin Glargine (LANTUS SOLOSTAR) 100 UNIT/ML injection pen; Inject 45 Units under the skin into the appropriate area as directed Daily.  -     CBC & Differential  -     Comprehensive Metabolic Panel  -     Hemoglobin A1c    Essential hypertension  -     CBC & Differential  -     Comprehensive Metabolic Panel    Mixed hyperlipidemia  -     Comprehensive Metabolic Panel  -     Lipid Panel      We discussed her BP that is higher.  She feels that her diet has been higher in salt recently and that this is what caused her BP to be elevated.  Will hold off on med change until f/u in 3 months but may need to adjust beta blocker  She has not been taking her colestipol as it got stuck one time in her throat.  I asked her to resume this  Will check DM labs but last HgA1C was 8.1 and she has not made changes.  Will add janumet 50/500 1 PO BID today and see how this helps her glucose levels.

## 2020-04-15 DIAGNOSIS — I25.10 CORONARY ARTERY DISEASE INVOLVING NATIVE CORONARY ARTERY OF NATIVE HEART WITHOUT ANGINA PECTORIS: ICD-10-CM

## 2020-04-15 RX ORDER — CLOPIDOGREL BISULFATE 75 MG/1
75 TABLET ORAL DAILY
Qty: 30 TABLET | Refills: 6 | Status: SHIPPED | OUTPATIENT
Start: 2020-04-15 | End: 2020-05-15 | Stop reason: SDUPTHER

## 2020-05-15 ENCOUNTER — OFFICE VISIT (OUTPATIENT)
Dept: FAMILY MEDICINE CLINIC | Facility: CLINIC | Age: 80
End: 2020-05-15

## 2020-05-15 VITALS
WEIGHT: 162.2 LBS | DIASTOLIC BLOOD PRESSURE: 90 MMHG | BODY MASS INDEX: 28.74 KG/M2 | SYSTOLIC BLOOD PRESSURE: 150 MMHG | RESPIRATION RATE: 16 BRPM | TEMPERATURE: 97.1 F | HEART RATE: 84 BPM | HEIGHT: 63 IN

## 2020-05-15 DIAGNOSIS — I25.10 CORONARY ARTERY DISEASE INVOLVING NATIVE CORONARY ARTERY OF NATIVE HEART WITHOUT ANGINA PECTORIS: ICD-10-CM

## 2020-05-15 DIAGNOSIS — I10 ESSENTIAL HYPERTENSION: Primary | ICD-10-CM

## 2020-05-15 DIAGNOSIS — E78.2 MIXED HYPERLIPIDEMIA: ICD-10-CM

## 2020-05-15 DIAGNOSIS — E11.9 TYPE 2 DIABETES MELLITUS WITHOUT COMPLICATION, WITH LONG-TERM CURRENT USE OF INSULIN (HCC): ICD-10-CM

## 2020-05-15 DIAGNOSIS — Z79.4 TYPE 2 DIABETES MELLITUS WITHOUT COMPLICATION, WITH LONG-TERM CURRENT USE OF INSULIN (HCC): ICD-10-CM

## 2020-05-15 PROCEDURE — 99214 OFFICE O/P EST MOD 30 MIN: CPT | Performed by: FAMILY MEDICINE

## 2020-05-15 RX ORDER — CLOPIDOGREL BISULFATE 75 MG/1
75 TABLET ORAL DAILY
Qty: 30 TABLET | Refills: 6 | Status: SHIPPED | OUTPATIENT
Start: 2020-05-15 | End: 2021-02-23 | Stop reason: SDUPTHER

## 2020-05-15 RX ORDER — IRBESARTAN 75 MG/1
150 TABLET ORAL NIGHTLY
Qty: 90 TABLET | Refills: 1 | Status: SHIPPED | OUTPATIENT
Start: 2020-05-15 | End: 2020-12-08 | Stop reason: SDUPTHER

## 2020-05-15 NOTE — PATIENT INSTRUCTIONS
Advance Care Planning and Advance Directives     You make decisions on a daily basis - decisions about where you want to live, your career, your home, your life. Perhaps one of the most important decisions you face is your choice for future medical care. Take time to talk with your family and your healthcare team and start planning today.  Advance Care Planning is a process that can help you:  · Understand possible future healthcare decisions in light of your own experiences  · Reflect on those decision in light of your goals and values  · Discuss your decisions with those closest to you and the healthcare professionals that care for you  · Make a plan by creating a document that reflects your wishes    Surrogate Decision Maker  In the event of a medical emergency, which has left you unable to communicate or to make your own decisions, you would need someone to make decisions for you.  It is important to discuss your preferences for medical treatment with this person while you are in good health.     Qualities of a surrogate decision maker:  • Willing to take on this role and responsibility  • Knows what you want for future medical care  • Willing to follow your wishes even if they don't agree with them  • Able to make difficult medical decisions under stressful circumstances    Advance Directives  These are legal documents you can create that will guide your healthcare team and decision maker(s) when needed. These documents can be stored in the electronic medical record.    · Living Will - a legal document to guide your care if you have a terminal condition or a serious illness and are unable to communicate. States vary by statute in document names/types, but most forms may include one or more of the following:        -  Directions regarding life-prolonging treatments        -  Directions regarding artificially provided nutrition/hydration        -  Choosing a healthcare decision maker        -  Direction  regarding organ/tissue donation    · Durable Power of  for Healthcare - this document names an -in-fact to make medical decisions for you, but it may also allow this person to make personal and financial decisions for you. Please seek the advice of an  if you need this type of document.    **Advance Directives are not required and no one may discriminate against you if you do not sign one.    Medical Orders  Many states allow specific forms/orders signed by your physician to record your wishes for medical treatment in your current state of health. This form, signed in personal communication with your physician, addresses resuscitation and other medical interventions that you may or may not want.      For more information or to schedule a time with a [facility] Advance Care Planning Facilitator contact: [email or web address] or call [phone] and someone will contact you directly.

## 2020-05-15 NOTE — PROGRESS NOTES
Subjective   Nancy Goodman is a 79 y.o. female.     History of Present Illness     Diabetes Mellitus Type II, Follow-up:   Nancy Goodman is a 79 y.o. female who is here for follow-up of Type 2 diabetes mellitus.  Current symptoms/problems include none and have been stable. Patient is adherent with medications.  Known diabetic complications: cardiovascular disease  Cardiovascular risk factors: advanced age (older than 55 for men, 65 for women), diabetes mellitus, dyslipidemia and hypertension  Current diabetic medications include lantus 42 units. And we tried janumet but it was not covered  Current monitoring regimen: home blood tests - daily  Home blood sugar records: fasting range: doing ok  Any episodes of hypoglycemia? no  Eye exam current (within one year): yes  She is on ACE inhibitor or angiotensin II receptor blocker. Patient is not on a statin.       Nancy Goodman  is here for follow-up of hypertension of several years duration. She is not exercising and is not adherent to a low-salt diet. Patient does not check her blood pressure.  Patient denies chest pain and palpitations. She is compliant with meds.        Nancy Goodman returns today for follow up of Hyperlipidemia  Nancy indicates her exercise level as not at all.  Diet: tries to eat healthy  Patient is not compliant with medications   Any side effects to medications: she failed colestipol recently and has failed lipitor, crestor, zocor in the past  Pt is due for labs  She has been taking red yeast rice supplement        The following portions of the patient's history were reviewed and updated as appropriate: allergies, current medications, past family history, past medical history, past social history, past surgical history and problem list.    Review of Systems   Constitutional: Negative.    HENT: Negative.    Eyes: Negative.    Respiratory: Negative.  Negative for shortness of breath.    Cardiovascular:  Negative.  Negative for chest pain.   Gastrointestinal: Negative.  Negative for constipation and diarrhea.   Musculoskeletal: Negative.    Skin: Negative.    Neurological: Negative.    Psychiatric/Behavioral: Negative.  Negative for dysphoric mood. The patient is not nervous/anxious.    All other systems reviewed and are negative.      Objective   Physical Exam   Constitutional: She appears well-developed and well-nourished. No distress.   Cardiovascular: Normal rate and regular rhythm.   Murmur heard.  Pulmonary/Chest: Effort normal and breath sounds normal.   Psychiatric: She has a normal mood and affect. Her behavior is normal.   Nursing note and vitals reviewed.      Assessment/Plan   Nancy was seen today for diabetes, hypertension, hyperlipidemia, coronary artery disease and discuss possible allergy to colestid.    Diagnoses and all orders for this visit:    Essential hypertension  -     irbesartan (Avapro) 75 MG tablet; Take 2 tablets by mouth Every Night.  -     CBC & Differential  -     Comprehensive Metabolic Panel    Mixed hyperlipidemia  -     Comprehensive Metabolic Panel  -     Lipid Panel    Type 2 diabetes mellitus without complication, with long-term current use of insulin (CMS/[facility])  -     sitaGLIPtin-metFORMIN (Janumet)  MG per tablet; Take 1 tablet by mouth 2 (Two) Times a Day With Meals.  -     Discontinue: Insulin Glargine (LANTUS SOLOSTAR) 100 UNIT/ML injection pen; Inject 45 Units under the skin into the appropriate area as directed Daily.  -     Comprehensive Metabolic Panel  -     Hemoglobin A1c  -     Insulin Glargine (LANTUS SOLOSTAR) 100 UNIT/ML injection pen; Inject 45 Units under the skin into the appropriate area as directed Daily.    Coronary artery disease involving native coronary artery of native heart without angina pectoris  -     clopidogrel (PLAVIX) 75 MG tablet; Take 1 tablet by mouth Daily.  -     Discontinue: metoprolol tartrate (LOPRESSOR) 25 MG tablet; Take 1  tablet by mouth 2 (Two) Times a Day.  -     metoprolol tartrate (LOPRESSOR) 25 MG tablet; Take 1 tablet by mouth 2 (Two) Times a Day.      Was on lantus 42 units but then down to 38 units recently.  janumet was not covered so not taking any pills!  Discussed that she will need oral and injections.  Will f/u pending labs and will need to see what is covered, need to see her back in 3 months for close follow up    BP stable continue medicine  Will check lipids, she was simply not able to tolerate statins nor colestipol.  Will try red yeast rice instead     Advance Care Planning   ACP discussion was held with the patient during this visit. Patient does not have an advance directive, information provided.

## 2020-05-16 LAB
ALBUMIN SERPL-MCNC: 4.5 G/DL (ref 3.5–5.2)
ALBUMIN/GLOB SERPL: 1.8 G/DL
ALP SERPL-CCNC: 61 U/L (ref 39–117)
ALT SERPL-CCNC: 17 U/L (ref 1–33)
AST SERPL-CCNC: 15 U/L (ref 1–32)
BASOPHILS # BLD AUTO: 0.07 10*3/MM3 (ref 0–0.2)
BASOPHILS NFR BLD AUTO: 1 % (ref 0–1.5)
BILIRUB SERPL-MCNC: 0.7 MG/DL (ref 0.2–1.2)
BUN SERPL-MCNC: 19 MG/DL (ref 8–23)
BUN/CREAT SERPL: 17.1 (ref 7–25)
CALCIUM SERPL-MCNC: 10.4 MG/DL (ref 8.6–10.5)
CHLORIDE SERPL-SCNC: 99 MMOL/L (ref 98–107)
CHOLEST SERPL-MCNC: 247 MG/DL (ref 0–200)
CO2 SERPL-SCNC: 32 MMOL/L (ref 22–29)
CREAT SERPL-MCNC: 1.11 MG/DL (ref 0.57–1)
EOSINOPHIL # BLD AUTO: 0.24 10*3/MM3 (ref 0–0.4)
EOSINOPHIL NFR BLD AUTO: 3.5 % (ref 0.3–6.2)
ERYTHROCYTE [DISTWIDTH] IN BLOOD BY AUTOMATED COUNT: 13.2 % (ref 12.3–15.4)
GLOBULIN SER CALC-MCNC: 2.5 GM/DL
GLUCOSE SERPL-MCNC: 123 MG/DL (ref 65–99)
HBA1C MFR BLD: 8 % (ref 4.8–5.6)
HCT VFR BLD AUTO: 44 % (ref 34–46.6)
HDLC SERPL-MCNC: 36 MG/DL (ref 40–60)
HGB BLD-MCNC: 14.6 G/DL (ref 12–15.9)
IMM GRANULOCYTES # BLD AUTO: 0.01 10*3/MM3 (ref 0–0.05)
IMM GRANULOCYTES NFR BLD AUTO: 0.1 % (ref 0–0.5)
LDLC SERPL CALC-MCNC: 146 MG/DL (ref 0–100)
LYMPHOCYTES # BLD AUTO: 1.87 10*3/MM3 (ref 0.7–3.1)
LYMPHOCYTES NFR BLD AUTO: 27.3 % (ref 19.6–45.3)
MCH RBC QN AUTO: 31 PG (ref 26.6–33)
MCHC RBC AUTO-ENTMCNC: 33.2 G/DL (ref 31.5–35.7)
MCV RBC AUTO: 93.4 FL (ref 79–97)
MONOCYTES # BLD AUTO: 0.54 10*3/MM3 (ref 0.1–0.9)
MONOCYTES NFR BLD AUTO: 7.9 % (ref 5–12)
NEUTROPHILS # BLD AUTO: 4.11 10*3/MM3 (ref 1.7–7)
NEUTROPHILS NFR BLD AUTO: 60.2 % (ref 42.7–76)
NRBC BLD AUTO-RTO: 0 /100 WBC (ref 0–0.2)
PLATELET # BLD AUTO: 156 10*3/MM3 (ref 140–450)
POTASSIUM SERPL-SCNC: 4.9 MMOL/L (ref 3.5–5.2)
PROT SERPL-MCNC: 7 G/DL (ref 6–8.5)
RBC # BLD AUTO: 4.71 10*6/MM3 (ref 3.77–5.28)
SODIUM SERPL-SCNC: 142 MMOL/L (ref 136–145)
TRIGL SERPL-MCNC: 325 MG/DL (ref 0–150)
VLDLC SERPL CALC-MCNC: 65 MG/DL
WBC # BLD AUTO: 6.84 10*3/MM3 (ref 3.4–10.8)

## 2020-05-19 DIAGNOSIS — I50.32 CHRONIC DIASTOLIC CONGESTIVE HEART FAILURE (HCC): ICD-10-CM

## 2020-05-19 DIAGNOSIS — I25.10 CORONARY ARTERY DISEASE INVOLVING NATIVE CORONARY ARTERY OF NATIVE HEART WITHOUT ANGINA PECTORIS: ICD-10-CM

## 2020-05-19 DIAGNOSIS — I35.0 AORTIC STENOSIS, SEVERE: Primary | ICD-10-CM

## 2020-05-19 RX ORDER — PIOGLITAZONEHYDROCHLORIDE 15 MG/1
15 TABLET ORAL DAILY
Qty: 30 TABLET | Refills: 2 | Status: SHIPPED | OUTPATIENT
Start: 2020-05-19 | End: 2020-10-06

## 2020-06-03 ENCOUNTER — HOSPITAL ENCOUNTER (OUTPATIENT)
Dept: CARDIOLOGY | Facility: HOSPITAL | Age: 80
Discharge: HOME | End: 2020-06-03
Admitting: NURSE PRACTITIONER

## 2020-06-03 VITALS — BODY MASS INDEX: 29.81 KG/M2 | HEIGHT: 62 IN | WEIGHT: 162 LBS

## 2020-06-03 DIAGNOSIS — I50.32 CHRONIC DIASTOLIC CONGESTIVE HEART FAILURE (HCC): ICD-10-CM

## 2020-06-03 DIAGNOSIS — I25.10 CORONARY ARTERY DISEASE INVOLVING NATIVE CORONARY ARTERY OF NATIVE HEART WITHOUT ANGINA PECTORIS: ICD-10-CM

## 2020-06-03 DIAGNOSIS — I35.0 AORTIC STENOSIS, SEVERE: ICD-10-CM

## 2020-06-03 PROCEDURE — 93306 TTE W/DOPPLER COMPLETE: CPT

## 2020-06-03 PROCEDURE — 93306 TTE W/DOPPLER COMPLETE: CPT | Performed by: INTERNAL MEDICINE

## 2020-06-04 ENCOUNTER — OFFICE VISIT (OUTPATIENT)
Dept: CARDIOLOGY | Facility: HOSPITAL | Age: 80
End: 2020-06-04

## 2020-06-04 VITALS
TEMPERATURE: 98.5 F | BODY MASS INDEX: 29.63 KG/M2 | WEIGHT: 161 LBS | RESPIRATION RATE: 18 BRPM | HEIGHT: 62 IN | DIASTOLIC BLOOD PRESSURE: 72 MMHG | HEART RATE: 69 BPM | SYSTOLIC BLOOD PRESSURE: 142 MMHG | OXYGEN SATURATION: 96 %

## 2020-06-04 DIAGNOSIS — I35.0 AORTIC STENOSIS, SEVERE: Primary | ICD-10-CM

## 2020-06-04 DIAGNOSIS — I10 ESSENTIAL HYPERTENSION: ICD-10-CM

## 2020-06-04 DIAGNOSIS — I25.10 CORONARY ARTERY DISEASE INVOLVING NATIVE CORONARY ARTERY OF NATIVE HEART WITHOUT ANGINA PECTORIS: ICD-10-CM

## 2020-06-04 LAB
BH CV ECHO MEAS - AO MAX PG (FULL): 20.6 MMHG
BH CV ECHO MEAS - AO MAX PG: 24.5 MMHG
BH CV ECHO MEAS - AO MEAN PG (FULL): 12.8 MMHG
BH CV ECHO MEAS - AO MEAN PG: 15.3 MMHG
BH CV ECHO MEAS - AO ROOT AREA (BSA CORRECTED): 1.5
BH CV ECHO MEAS - AO ROOT AREA: 5.7 CM^2
BH CV ECHO MEAS - AO ROOT DIAM: 2.7 CM
BH CV ECHO MEAS - AO V2 MAX: 247.6 CM/SEC
BH CV ECHO MEAS - AO V2 MEAN: 181.1 CM/SEC
BH CV ECHO MEAS - AO V2 VTI: 56.1 CM
BH CV ECHO MEAS - AVA(I,A): 0.85 CM^2
BH CV ECHO MEAS - AVA(I,D): 0.85 CM^2
BH CV ECHO MEAS - AVA(V,A): 0.81 CM^2
BH CV ECHO MEAS - AVA(V,D): 0.81 CM^2
BH CV ECHO MEAS - BSA(HAYCOCK): 1.8 M^2
BH CV ECHO MEAS - BSA: 1.7 M^2
BH CV ECHO MEAS - BZI_BMI: 29.6 KILOGRAMS/M^2
BH CV ECHO MEAS - BZI_METRIC_HEIGHT: 157.5 CM
BH CV ECHO MEAS - BZI_METRIC_WEIGHT: 73.5 KG
BH CV ECHO MEAS - EDV(CUBED): 49.7 ML
BH CV ECHO MEAS - EDV(MOD-SP4): 63 ML
BH CV ECHO MEAS - EDV(TEICH): 57.3 ML
BH CV ECHO MEAS - EF(CUBED): 62.1 %
BH CV ECHO MEAS - EF(MOD-BP): 60 %
BH CV ECHO MEAS - EF(MOD-SP4): 60.3 %
BH CV ECHO MEAS - EF(TEICH): 54.5 %
BH CV ECHO MEAS - ESV(CUBED): 18.9 ML
BH CV ECHO MEAS - ESV(MOD-SP4): 25 ML
BH CV ECHO MEAS - ESV(TEICH): 26.1 ML
BH CV ECHO MEAS - FS: 27.6 %
BH CV ECHO MEAS - IVS/LVPW: 0.87
BH CV ECHO MEAS - IVSD: 1.1 CM
BH CV ECHO MEAS - LA DIMENSION: 4 CM
BH CV ECHO MEAS - LA/AO: 1.5
BH CV ECHO MEAS - LAD MAJOR: 4.9 CM
BH CV ECHO MEAS - LAT PEAK E' VEL: 6.4 CM/SEC
BH CV ECHO MEAS - LATERAL E/E' RATIO: 18.1
BH CV ECHO MEAS - LV DIASTOLIC VOL/BSA (35-75): 36 ML/M^2
BH CV ECHO MEAS - LV MASS(C)D: 140.6 GRAMS
BH CV ECHO MEAS - LV MASS(C)DI: 80.5 GRAMS/M^2
BH CV ECHO MEAS - LV MAX PG: 3.9 MMHG
BH CV ECHO MEAS - LV MEAN PG: 2.4 MMHG
BH CV ECHO MEAS - LV SYSTOLIC VOL/BSA (12-30): 14.3 ML/M^2
BH CV ECHO MEAS - LV V1 MAX: 98.7 CM/SEC
BH CV ECHO MEAS - LV V1 MEAN: 76 CM/SEC
BH CV ECHO MEAS - LV V1 VTI: 23.5 CM
BH CV ECHO MEAS - LVIDD: 3.7 CM
BH CV ECHO MEAS - LVIDS: 2.7 CM
BH CV ECHO MEAS - LVLD AP4: 6.9 CM
BH CV ECHO MEAS - LVLS AP4: 6 CM
BH CV ECHO MEAS - LVOT AREA (M): 2 CM^2
BH CV ECHO MEAS - LVOT AREA: 2 CM^2
BH CV ECHO MEAS - LVOT DIAM: 1.6 CM
BH CV ECHO MEAS - LVPWD: 1.3 CM
BH CV ECHO MEAS - MED PEAK E' VEL: 4.6 CM/SEC
BH CV ECHO MEAS - MEDIAL E/E' RATIO: 25.2
BH CV ECHO MEAS - MV A MAX VEL: 157 CM/SEC
BH CV ECHO MEAS - MV DEC TIME: 0.3 SEC
BH CV ECHO MEAS - MV E MAX VEL: 117.2 CM/SEC
BH CV ECHO MEAS - MV E/A: 0.75
BH CV ECHO MEAS - MV MAX PG: 11 MMHG
BH CV ECHO MEAS - MV MEAN PG: 4.8 MMHG
BH CV ECHO MEAS - MV V2 MAX: 165.6 CM/SEC
BH CV ECHO MEAS - MV V2 MEAN: 105.7 CM/SEC
BH CV ECHO MEAS - MV V2 VTI: 39.8 CM
BH CV ECHO MEAS - MVA(VTI): 1.2 CM^2
BH CV ECHO MEAS - PA ACC SLOPE: 806 CM/SEC^2
BH CV ECHO MEAS - PA ACC TIME: 0.12 SEC
BH CV ECHO MEAS - PA MAX PG: 5.3 MMHG
BH CV ECHO MEAS - PA PR(ACCEL): 23.5 MMHG
BH CV ECHO MEAS - PA V2 MAX: 115.4 CM/SEC
BH CV ECHO MEAS - SI(AO): 184.1 ML/M^2
BH CV ECHO MEAS - SI(CUBED): 17.7 ML/M^2
BH CV ECHO MEAS - SI(LVOT): 27.4 ML/M^2
BH CV ECHO MEAS - SI(MOD-SP4): 21.7 ML/M^2
BH CV ECHO MEAS - SI(TEICH): 17.9 ML/M^2
BH CV ECHO MEAS - SV(AO): 321.8 ML
BH CV ECHO MEAS - SV(CUBED): 30.9 ML
BH CV ECHO MEAS - SV(LVOT): 47.9 ML
BH CV ECHO MEAS - SV(MOD-SP4): 38 ML
BH CV ECHO MEAS - SV(TEICH): 31.2 ML
BH CV ECHO MEAS - TAPSE (>1.6): 1.7 CM2
BH CV ECHO MEASUREMENTS AVERAGE E/E' RATIO: 21.31
BH CV VAS BP RIGHT ARM: NORMAL MMHG
BH CV XLRA - RV BASE: 2.8 CM
BH CV XLRA - RV LENGTH: 5 CM
BH CV XLRA - RV MID: 2.9 CM
BH CV XLRA - TDI S': 8.96 CM/SEC
LEFT ATRIUM VOLUME INDEX: 26.9 ML/M^2
LEFT ATRIUM VOLUME: 47 ML
LV EF 2D ECHO EST: 70 %

## 2020-06-04 PROCEDURE — 99214 OFFICE O/P EST MOD 30 MIN: CPT | Performed by: NURSE PRACTITIONER

## 2020-06-04 NOTE — PROGRESS NOTES
[facility]  [facility]      Encounter Date:06/04/2020     Nancy Goodman  [address] [place] [address] SUSANNAH BROWNING [address]  [unfilled]    1940    Eyal Cervantes MD    Nancy Goodman is a 79 y.o. female.      Subjective:     Chief Complaint:  Follow-up (1 years post TAVR (AS, HF))       HPI     79-year-old female with a history of aortic stenosis status post TAVR 1 year ago (5/21/2019).  History of coronary artery disease status post CABG, hypertension, dyslipidemia. Echo 62%, mild to moderate LVH, mild-mod MR.    Denies CP, pressure, dizziness, syncope.  Mild intermittent edema.  Mild intermittent dyspnea with climbing stairs, but improves quickly. No dyspnea with normal walking.  Able to do normal activities with mild fatigue at times.  Pt will take Lasix as needed (take 4-5 times per week). Until COVID she has been going to the gym 3 times per week.  Has not been as active at home.     Patient Active Problem List    Diagnosis Date Noted   • BMI 29.0-29.9,adult 08/12/2019   • Pulmonary edema 04/09/2019   • Diastolic CHF (CMS/HCC) 04/09/2019     Note Last Updated: 4/10/2019     1. Echo 4-9-19  ·  Estimated EF = 65%     • S/P CABG (coronary artery bypass graft) 06/26/2017   • CAD (coronary artery disease) 05/24/2017     Note Last Updated: 4/24/2019     a. 5/16/17 angina, cardiac catheter severe ostial LAD and LCx of these.  Ostial RPL and RCA.  b. 5 vessel CABG (Rajinder) LIMA to LAD, SVG to diagonal and LCx, and SVG to PDA and PL.       c.   Cardiac catheterization 4/23/2019 showed an occluded sequential limb to the PLB of the SVG to the PDA and PLB. Now status post rotational atherectomy and stent placement to the posterolateral branch as well as balloon angioplasty to the             anastomotic site of the SVG to the PDA         • Aortic stenosis, severe 05/24/2017     Note Last Updated: 5/13/2019     1. Mild aoritc valve stenosis by Echo 5/16/17    2. Echo  4-9-19  · Aortic valve maximum pressure gradient is 66.4 mmHg.  · Aortic valve mean pressure gradient is 38.4 mmHg.    3.  s/p TAVR 5/13/19     • Type 2 diabetes mellitus without complication, with long-term current use of insulin (CMS/[facility]) 05/09/2016   • Mixed hyperlipidemia 05/09/2016   • Essential hypertension 05/09/2016   • Vitamin D deficiency 05/09/2016         Past Surgical History:   Procedure Laterality Date   • AORTIC VALVE REPAIR/REPLACEMENT N/A 5/13/2019    Procedure: TRANSCATHETER AORTIC VALVE REPLACEMENT, ELIZABETH;  Surgeon: Fidel Calvillo MD;  Location:  [facility] HYBRID OR 15;  Service: Cardiothoracic   • AORTIC VALVE REPAIR/REPLACEMENT N/A 5/13/2019    Procedure: Transapical Transcatheter Aortic Valve Replacement;  Surgeon: Anushka Hankins MD;  Location:  [facility] HYBRID OR 15;  Service: Cardiovascular   • CARDIAC CATHETERIZATION N/A 5/16/2017    Procedure: Left Heart Cath;  Surgeon: Fidel Valdez MD;  Location:  [facility] CATH INVASIVE LOCATION;  Service:    • CARDIAC CATHETERIZATION Left 4/23/2019    Procedure: Cardiac Catheterization/Vascular Study;  Surgeon: Anushka Hankins MD;  Location:  [facility] CATH INVASIVE LOCATION;  Service: Cardiovascular   • CORONARY ARTERY BYPASS GRAFT N/A 5/24/2017    Procedure: CORONARY ARTERY BYPASS x  5 WITH INTERNAL MAMMARY ARTERY GRAFT and EVH of the Right leg;  Surgeon: Fidel Calvillo MD;  Location:  [facility] OR;  Service:    • FINGER NAIL SURGERY     • TUBAL ABDOMINAL LIGATION     • VARICOSE VEIN SURGERY         Allergies   Allergen Reactions   • Amlodipine      sickness   • Benazepril Nausea Only   • Ciprofloxacin      Pt not sure of reaction (stomach problems)   • Statins Nausea Only     Stomach problem   • Pokeweed [Phytolacca] Swelling and Rash         Current Outpatient Medications:   •  aspirin 81 MG EC tablet, Take 81 mg by mouth Every Morning., Disp: , Rfl:   •  B-D UF III MINI PEN NEEDLES 31G X 5 MM misc, , Disp: , Rfl:   •  clopidogrel (PLAVIX) 75  MG tablet, Take 1 tablet by mouth Daily., Disp: 30 tablet, Rfl: 6  •  Colestipol HCl (COLESTID PO), Take  by mouth., Disp: , Rfl:   •  CRANBERRY PO, Take 450 mg by mouth Daily., Disp: , Rfl:   •  FREESTYLE LITE test strip, , Disp: , Rfl:   •  furosemide (LASIX) 20 MG tablet, Take 1 tablet by mouth Daily As Needed (swelling)., Disp: 90 tablet, Rfl: 0  •  Insulin Glargine (LANTUS SOLOSTAR) 100 UNIT/ML injection pen, Inject 45 Units under the skin into the appropriate area as directed Daily., Disp: 15 pen, Rfl: 1  •  irbesartan (Avapro) 75 MG tablet, Take 2 tablets by mouth Every Night., Disp: 90 tablet, Rfl: 1  •  loratadine (CLARITIN) 10 MG tablet, Take 1 tablet by mouth Daily., Disp: 30 tablet, Rfl: 2  •  metFORMIN (Glucophage) 500 MG tablet, Take 1 tablet by mouth 2 (Two) Times a Day With Meals., Disp: 60 tablet, Rfl: 2  •  metoprolol tartrate (LOPRESSOR) 25 MG tablet, Take 1 tablet by mouth 2 (Two) Times a Day., Disp: 180 tablet, Rfl: 1  •  Multiple Vitamins-Minerals (MULTIVITAMIN ADULT PO), Take 1 tablet by mouth Daily., Disp: , Rfl:   •  NON FORMULARY, Daily. Super cayenne 100,ooo HU every day, Disp: , Rfl:   •  Omega-3 Fatty Acids (SALMON OIL PO), Take 1,200 mg by mouth 2 (Two) Times a Day., Disp: , Rfl:   •  omeprazole (priLOSEC) 40 MG capsule, Take 1 capsule by mouth Every Night. Taking it as needed, Disp: 90 capsule, Rfl: 3  •  pioglitazone (Actos) 15 MG tablet, Take 1 tablet by mouth Daily., Disp: 30 tablet, Rfl: 2  •  potassium chloride (K-DUR,KLOR-CON) 10 MEQ CR tablet, Take 10 mEq by mouth Daily., Disp: , Rfl:   •  Probiotic Product (PROBIOTIC ADVANCED PO), Take 1 tablet by mouth Every Night., Disp: , Rfl:   •  sitaGLIPtin-metFORMIN (Janumet)  MG per tablet, Take 1 tablet by mouth 2 (Two) Times a Day With Meals., Disp: 60 tablet, Rfl: 2  No current facility-administered medications for this visit.     Facility-Administered Medications Ordered in Other Visits:   •  Chlorhexidine Gluconate Cloth 2 %  "pads 1 application, 1 application, Topical, Q12H PRN, Eduardo Bain PA    The following portions of the patient's history were reviewed and updated today during office visit as appropriate: allergies, current medications, past family history, past medical history, past social history, past surgical history and problem list.    Review of Systems   Cardiovascular: Positive for leg swelling.   Respiratory: Positive for shortness of breath (mild).    All other systems reviewed and are negative.      Objective:     Vitals:    06/04/20 1130 06/04/20 1149   BP: 172/98 142/72   BP Location: Left arm    Patient Position: Sitting    Cuff Size: Adult    Pulse: 69    Resp: 18    Temp: 98.5 °F (36.9 °C)    TempSrc: Temporal    SpO2: 96%    Weight: 73 kg (161 lb)    Height: 157.5 cm (62\")          Physical Exam   Constitutional: She is oriented to person, place, and time. She appears well-developed and well-nourished. No distress.   HENT:   Mouth/Throat: Oropharynx is clear and moist.   Eyes: Conjunctivae are normal. No scleral icterus.   Neck: No hepatojugular reflux and no JVD present. Carotid bruit is not present. No tracheal deviation present. No thyromegaly present.   Cardiovascular: Normal rate, regular rhythm and intact distal pulses. Exam reveals no friction rub.   Murmur (1/6  left sternal border) heard.  Pulmonary/Chest: Effort normal. She has rales (RLL ).   Abdominal: Soft. Bowel sounds are normal. She exhibits no distension. There is no tenderness.   Musculoskeletal: She exhibits edema (mild non-pitting ankle edema bilaterally).   Lymphadenopathy:     She has no cervical adenopathy.   Neurological: She is alert and oriented to person, place, and time.   Skin: Skin is warm, dry and intact. No rash noted. No cyanosis or erythema. No pallor.   Psychiatric: She has a normal mood and affect. Her behavior is normal. Thought content normal.   Vitals reviewed.      Lab and Diagnostic Review:  Lab Results   Component Value " Date    WBC 6.84 05/15/2020    HGB 14.6 05/15/2020    HCT 44.0 05/15/2020    MCV 93.4 05/15/2020     05/15/2020     Lab Results   Component Value Date    GLUCOSE 175 (H) 05/15/2019    CALCIUM 10.4 05/15/2020     05/15/2020    K 4.9 05/15/2020    CO2 32.0 (H) 05/15/2020    CL 99 05/15/2020    BUN 19 05/15/2020    CREATININE 1.11 (H) 05/15/2020    EGFRIFAFRI 57 (L) 05/15/2020    EGFRIFNONA 47 (L) 05/15/2020    BCR 17.1 05/15/2020    ANIONGAP 12.0 05/15/2019         Assessment and Plan:         1. Aortic stenosis, severe  S/p TAVR 05/13/19  NYHA II    Five meter walk test completed: 5 m walk average 4.95 seconds per 5 m.  (Normal average speed less than 6 seconds per 5 m)    [place] cardiomyopathy questionnaire score 47    Using Lasix 5 times per week and appears euvolemic    Echocardiogram completed yesterday, results pending  Hx of normal EF    2. Coronary artery disease involving native coronary artery of native heart without angina pectoris  Asa.  No statin due to myalgias    3. Essential hypertension  BP elevated, but improved with recheck but not at goal.  Continue irbesaran and metoprolol  Encouraged to keep BP and HR log and bring to f/u with PCP and cardiology.    F/u with cardiology and PCP as scheduled.  [facility] needed.         *Please note that portions of this note were completed with a voice recognition program. Efforts were made to edit the dictations, but occasionally words are mistranscribed.

## 2020-06-11 ENCOUNTER — TELEPHONE (OUTPATIENT)
Dept: CARDIOLOGY | Facility: HOSPITAL | Age: 80
End: 2020-06-11

## 2020-06-16 DIAGNOSIS — Z79.4 TYPE 2 DIABETES MELLITUS WITHOUT COMPLICATION, WITH LONG-TERM CURRENT USE OF INSULIN (HCC): ICD-10-CM

## 2020-06-16 DIAGNOSIS — E11.9 TYPE 2 DIABETES MELLITUS WITHOUT COMPLICATION, WITH LONG-TERM CURRENT USE OF INSULIN (HCC): ICD-10-CM

## 2020-06-16 NOTE — TELEPHONE ENCOUNTER
Patient requested refills of Insulin Glargine (LANTUS SOLOSTAR) 100 UNIT/ML injection pen and B-D UF III MINI PEN NEEDLES 31G X 5 MM misc. Patient would like a 3 month supply.    Please call and advise. Patient call back [phone] or [phone]    [facility] - [facility], [address] - [phone]  - [phone] FX

## 2020-06-17 RX ORDER — FLURBIPROFEN SODIUM 0.3 MG/ML
SOLUTION/ DROPS OPHTHALMIC
Qty: 100 EACH | Refills: 3 | Status: SHIPPED | OUTPATIENT
Start: 2020-06-17 | End: 2020-11-16 | Stop reason: SDUPTHER

## 2020-08-14 ENCOUNTER — OFFICE VISIT (OUTPATIENT)
Dept: FAMILY MEDICINE CLINIC | Facility: CLINIC | Age: 80
End: 2020-08-14

## 2020-08-14 VITALS
SYSTOLIC BLOOD PRESSURE: 118 MMHG | TEMPERATURE: 97.6 F | BODY MASS INDEX: 29.81 KG/M2 | WEIGHT: 162 LBS | DIASTOLIC BLOOD PRESSURE: 80 MMHG | HEIGHT: 62 IN | RESPIRATION RATE: 18 BRPM | HEART RATE: 80 BPM

## 2020-08-14 DIAGNOSIS — I10 ESSENTIAL HYPERTENSION: ICD-10-CM

## 2020-08-14 DIAGNOSIS — Z00.00 MEDICARE ANNUAL WELLNESS VISIT, SUBSEQUENT: Primary | ICD-10-CM

## 2020-08-14 DIAGNOSIS — Z79.4 TYPE 2 DIABETES MELLITUS WITHOUT COMPLICATION, WITH LONG-TERM CURRENT USE OF INSULIN (HCC): ICD-10-CM

## 2020-08-14 DIAGNOSIS — E11.9 TYPE 2 DIABETES MELLITUS WITHOUT COMPLICATION, WITH LONG-TERM CURRENT USE OF INSULIN (HCC): ICD-10-CM

## 2020-08-14 DIAGNOSIS — E78.2 MIXED HYPERLIPIDEMIA: ICD-10-CM

## 2020-08-14 PROCEDURE — G0439 PPPS, SUBSEQ VISIT: HCPCS | Performed by: FAMILY MEDICINE

## 2020-08-14 NOTE — PATIENT INSTRUCTIONS
Advance Care Planning and Advance Directives     You make decisions on a daily basis - decisions about where you want to live, your career, your home, your life. Perhaps one of the most important decisions you face is your choice for future medical care. Take time to talk with your family and your healthcare team and start planning today.  Advance Care Planning is a process that can help you:  · Understand possible future healthcare decisions in light of your own experiences  · Reflect on those decision in light of your goals and values  · Discuss your decisions with those closest to you and the healthcare professionals that care for you  · Make a plan by creating a document that reflects your wishes    Surrogate Decision Maker  In the event of a medical emergency, which has left you unable to communicate or to make your own decisions, you would need someone to make decisions for you.  It is important to discuss your preferences for medical treatment with this person while you are in good health.     Qualities of a surrogate decision maker:  • Willing to take on this role and responsibility  • Knows what you want for future medical care  • Willing to follow your wishes even if they don't agree with them  • Able to make difficult medical decisions under stressful circumstances    Advance Directives  These are legal documents you can create that will guide your healthcare team and decision maker(s) when needed. These documents can be stored in the electronic medical record.    · Living Will - a legal document to guide your care if you have a terminal condition or a serious illness and are unable to communicate. States vary by statute in document names/types, but most forms may include one or more of the following:        -  Directions regarding life-prolonging treatments        -  Directions regarding artificially provided nutrition/hydration        -  Choosing a healthcare decision maker        -  Direction  regarding organ/tissue donation    · Durable Power of  for Healthcare - this document names an -in-fact to make medical decisions for you, but it may also allow this person to make personal and financial decisions for you. Please seek the advice of an  if you need this type of document.    **Advance Directives are not required and no one may discriminate against you if you do not sign one.    Medical Orders  Many states allow specific forms/orders signed by your physician to record your wishes for medical treatment in your current state of health. This form, signed in personal communication with your physician, addresses resuscitation and other medical interventions that you may or may not want.      For more information or to schedule a time with a [facility] Advance Care Planning Facilitator contact: [email or web address] or call [phone] and someone will contact you directly.

## 2020-08-14 NOTE — PROGRESS NOTES
The ABCs of the Annual Wellness Visit  Subsequent Medicare Wellness Visit    Chief Complaint   Patient presents with   • Medicare Wellness-subsequent       Subjective   History of Present Illness:  Nancy Goodman is a 79 y.o. female who presents for a Subsequent Medicare Wellness Visit.    HEALTH RISK ASSESSMENT    Recent Hospitalizations:  No hospitalization(s) within the last year.    Her DM has been doing a little better, she is due for labs  Last HgA1C was 8  Checking a couple times a day  If her sugar is low, then she will not take any lantus    Current Medical Providers:  Patient Care Team:  Eyal Cervantes MD as PCP - General (Family Medicine)  Eyal Cervantes MD as PCP - Claims Attributed  Fidel Valdez MD as Consulting Physician (Cardiology)    Smoking Status:  Social History     Tobacco Use   Smoking Status Never Smoker   Smokeless Tobacco Never Used       Alcohol Consumption:  Social History     Substance and Sexual Activity   Alcohol Use No       Depression Screen:   PHQ-2/PHQ-9 Depression Screening 8/14/2020   Little interest or pleasure in doing things 0   Feeling down, depressed, or hopeless 0   Total Score 0       Fall Risk Screen:  ALEXANDER Fall Risk Assessment was completed, and patient is at LOW risk for falls.Assessment completed on:8/14/2020    Health Habits and Functional and Cognitive Screening:  Functional & Cognitive Status 8/14/2020   Do you have difficulty preparing food and eating? No   Do you have difficulty bathing yourself, getting dressed or grooming yourself? No   Do you have difficulty using the toilet? No   Do you have difficulty moving around from place to place? No   Do you have trouble with steps or getting out of a bed or a chair? No   Current Diet Well Balanced Diet   Dental Exam Up to date   Eye Exam Up to date   Exercise (times per week) 3 times per week   Current Exercise Activities Include Walking   Do you need help using the phone?  No   Are you deaf or do you  have serious difficulty hearing?  No   Do you need help with transportation? No   Do you need help shopping? No   Do you need help preparing meals?  No   Do you need help with housework?  No   Do you need help with laundry? No   Do you need help taking your medications? No   Do you need help managing money? No   Do you ever drive or ride in a car without wearing a seat belt? No   Have you felt unusual stress, anger or loneliness in the last month? Yes   Who do you live with? Spouse   If you need help, do you have trouble finding someone available to you? No   Have you been bothered in the last four weeks by sexual problems? No   Do you have difficulty concentrating, remembering or making decisions? No         Does the patient have evidence of cognitive impairment? No      Age-appropriate Screening Schedule:  Refer to the list below for future screening recommendations based on patient's age, sex and/or medical conditions. Orders for these recommended tests are listed in the plan section. The patient has been provided with a written plan.    Health Maintenance   Topic Date Due   • TDAP/TD VACCINES (1 - Tdap) 09/28/1951   • ZOSTER VACCINE (1 of 2) 09/28/1989   • DXA SCAN  05/09/2016   • URINE MICROALBUMIN  11/26/2019   • INFLUENZA VACCINE  08/01/2020   • DIABETIC EYE EXAM  09/18/2020   • HEMOGLOBIN A1C  11/15/2020   • LIPID PANEL  05/15/2021   • MAMMOGRAM  Discontinued          The following portions of the patient's history were reviewed and updated as appropriate: allergies, current medications, past family history, past medical history, past social history, past surgical history and problem list.    Outpatient Medications Prior to Visit   Medication Sig Dispense Refill   • aspirin 81 MG EC tablet Take 81 mg by mouth Every Morning.     • B-D UF III MINI PEN NEEDLES 31G X 5 MM misc 1 for daily injection 100 each 3   • clopidogrel (PLAVIX) 75 MG tablet Take 1 tablet by mouth Daily. 30 tablet 6   • Colestipol HCl  (COLESTID PO) Take  by mouth.     • CRANBERRY PO Take 450 mg by mouth Daily.     • FREESTYLE LITE test strip      • furosemide (LASIX) 20 MG tablet Take 1 tablet by mouth Daily As Needed (swelling). 90 tablet 0   • Insulin Glargine (LANTUS SOLOSTAR) 100 UNIT/ML injection pen Inject 45 Units under the skin into the appropriate area as directed Daily. 15 pen 1   • irbesartan (Avapro) 75 MG tablet Take 2 tablets by mouth Every Night. 90 tablet 1   • loratadine (CLARITIN) 10 MG tablet Take 1 tablet by mouth Daily. 30 tablet 2   • metFORMIN (Glucophage) 500 MG tablet Take 1 tablet by mouth 2 (Two) Times a Day With Meals. 60 tablet 2   • metoprolol tartrate (LOPRESSOR) 25 MG tablet Take 1 tablet by mouth 2 (Two) Times a Day. 180 tablet 1   • Multiple Vitamins-Minerals (MULTIVITAMIN ADULT PO) Take 1 tablet by mouth Daily.     • NON FORMULARY Daily. Super cayenne 100,ooo HU every day     • Omega-3 Fatty Acids (SALMON OIL PO) Take 1,200 mg by mouth 2 (Two) Times a Day.     • omeprazole (priLOSEC) 40 MG capsule Take 1 capsule by mouth Every Night. Taking it as needed 90 capsule 3   • pioglitazone (Actos) 15 MG tablet Take 1 tablet by mouth Daily. 30 tablet 2   • potassium chloride (K-DUR,KLOR-CON) 10 MEQ CR tablet Take 10 mEq by mouth Daily.     • Probiotic Product (PROBIOTIC ADVANCED PO) Take 1 tablet by mouth Every Night.     • sitaGLIPtin-metFORMIN (Janumet)  MG per tablet Take 1 tablet by mouth 2 (Two) Times a Day With Meals. 60 tablet 2     Facility-Administered Medications Prior to Visit   Medication Dose Route Frequency Provider Last Rate Last Dose   • Chlorhexidine Gluconate Cloth 2 % pads 1 application  1 application Topical Q12H PRN Eduardo Bain PA           Patient Active Problem List   Diagnosis   • Type 2 diabetes mellitus without complication, with long-term current use of insulin (CMS/[facility])   • Mixed hyperlipidemia   • Essential hypertension   • Vitamin D deficiency   • CAD (coronary artery disease)   •  "Aortic stenosis, severe   • S/P CABG (coronary artery bypass graft)   • Pulmonary edema   • Diastolic CHF (CMS/HCC)   • BMI 29.0-29.9,adult       Advanced Care Planning:  ACP discussion was held with the patient during this visit. Patient does not have an advance directive, information provided.    Review of Systems   Constitutional: Negative.    HENT: Negative.    Eyes: Negative.    Respiratory: Negative.  Negative for shortness of breath.    Cardiovascular: Negative.  Negative for chest pain.   Gastrointestinal: Negative.  Negative for constipation and diarrhea.   Musculoskeletal: Negative.    Skin: Negative.    Neurological: Negative.    Psychiatric/Behavioral: Negative.  Negative for dysphoric mood. The patient is not nervous/anxious.    All other systems reviewed and are negative.      Compared to one year ago, the patient feels her physical health is the same.  Compared to one year ago, the patient feels her mental health is the same.    Reviewed chart for potential of high risk medication in the elderly: yes  Reviewed chart for potential of harmful drug interactions in the elderly:yes    Objective         Vitals:    08/14/20 0932   BP: 118/80   Pulse: 80   Resp: 18   Temp: 97.6 °F (36.4 °C)   Weight: 73.5 kg (162 lb)   Height: 157.5 cm (62\")       Body mass index is 29.63 kg/m².  Discussed the patient's BMI with her. The BMI is above average; BMI management plan is completed.    Physical Exam   Constitutional: She is oriented to person, place, and time. She appears well-developed and well-nourished. No distress.   HENT:   Head: Normocephalic and atraumatic.   Eyes: Conjunctivae and EOM are normal.   Cardiovascular: Normal rate, regular rhythm and normal heart sounds.   Pulmonary/Chest: Effort normal and breath sounds normal.   Abdominal: Soft. Bowel sounds are normal. She exhibits no distension. There is no tenderness.   Neurological: She is alert and oriented to person, place, and time.   Psychiatric: She " has a normal mood and affect. Her behavior is normal. Judgment and thought content normal.   Nursing note and vitals reviewed.            Assessment/Plan   Medicare Risks and Personalized Health Plan  CMS Preventative Services Quick Reference  Advance Directive Discussion  Chronic Pain   Depression/Dysphoria  Fall Risk    The above risks/problems have been discussed with the patient.  Pertinent information has been shared with the patient in the After Visit Summary.  Follow up plans and orders are seen below in the Assessment/Plan Section.    Diagnoses and all orders for this visit:    1. Medicare annual wellness visit, subsequent (Primary)    2. Type 2 diabetes mellitus without complication, with long-term current use of insulin (CMS/[facility])  -     CBC & Differential  -     Comprehensive Metabolic Panel  -     Hemoglobin A1c    3. Essential hypertension  -     CBC & Differential  -     Comprehensive Metabolic Panel    4. Mixed hyperlipidemia  -     Comprehensive Metabolic Panel  -     Lipid Panel      Counseled about well adult care including diet and exercise, she is actually quite    Sugars good at home, inconcistent use of her insulin though.  We discussed this, will check labs and f/u in 3 months    She is taking red yeast rice for her cholesteorl    Follow Up:  Return in about 3 months (around 11/14/2020).     An After Visit Summary and PPPS were given to the patient.

## 2020-08-15 LAB
ALBUMIN SERPL-MCNC: 4.5 G/DL (ref 3.5–5.2)
ALBUMIN/GLOB SERPL: 2.5 G/DL
ALP SERPL-CCNC: 51 U/L (ref 39–117)
ALT SERPL-CCNC: 15 U/L (ref 1–33)
AST SERPL-CCNC: 19 U/L (ref 1–32)
BASOPHILS # BLD AUTO: 0.05 10*3/MM3 (ref 0–0.2)
BASOPHILS NFR BLD AUTO: 0.8 % (ref 0–1.5)
BILIRUB SERPL-MCNC: 0.5 MG/DL (ref 0–1.2)
BUN SERPL-MCNC: 16 MG/DL (ref 8–23)
BUN/CREAT SERPL: 16.8 (ref 7–25)
CALCIUM SERPL-MCNC: 9.7 MG/DL (ref 8.6–10.5)
CHLORIDE SERPL-SCNC: 100 MMOL/L (ref 98–107)
CHOLEST SERPL-MCNC: 222 MG/DL (ref 0–200)
CO2 SERPL-SCNC: 29.5 MMOL/L (ref 22–29)
CREAT SERPL-MCNC: 0.95 MG/DL (ref 0.57–1)
EOSINOPHIL # BLD AUTO: 0.21 10*3/MM3 (ref 0–0.4)
EOSINOPHIL NFR BLD AUTO: 3.5 % (ref 0.3–6.2)
ERYTHROCYTE [DISTWIDTH] IN BLOOD BY AUTOMATED COUNT: 13.1 % (ref 12.3–15.4)
GLOBULIN SER CALC-MCNC: 1.8 GM/DL
GLUCOSE SERPL-MCNC: 96 MG/DL (ref 65–99)
HBA1C MFR BLD: 7.71 % (ref 4.8–5.6)
HCT VFR BLD AUTO: 40.3 % (ref 34–46.6)
HDLC SERPL-MCNC: 37 MG/DL (ref 40–60)
HGB BLD-MCNC: 13.6 G/DL (ref 12–15.9)
IMM GRANULOCYTES # BLD AUTO: 0.02 10*3/MM3 (ref 0–0.05)
IMM GRANULOCYTES NFR BLD AUTO: 0.3 % (ref 0–0.5)
LDLC SERPL CALC-MCNC: 139 MG/DL (ref 0–100)
LYMPHOCYTES # BLD AUTO: 1.88 10*3/MM3 (ref 0.7–3.1)
LYMPHOCYTES NFR BLD AUTO: 31.2 % (ref 19.6–45.3)
MCH RBC QN AUTO: 31.8 PG (ref 26.6–33)
MCHC RBC AUTO-ENTMCNC: 33.7 G/DL (ref 31.5–35.7)
MCV RBC AUTO: 94.2 FL (ref 79–97)
MONOCYTES # BLD AUTO: 0.58 10*3/MM3 (ref 0.1–0.9)
MONOCYTES NFR BLD AUTO: 9.6 % (ref 5–12)
NEUTROPHILS # BLD AUTO: 3.29 10*3/MM3 (ref 1.7–7)
NEUTROPHILS NFR BLD AUTO: 54.6 % (ref 42.7–76)
NRBC BLD AUTO-RTO: 0 /100 WBC (ref 0–0.2)
PLATELET # BLD AUTO: 181 10*3/MM3 (ref 140–450)
POTASSIUM SERPL-SCNC: 4.6 MMOL/L (ref 3.5–5.2)
PROT SERPL-MCNC: 6.3 G/DL (ref 6–8.5)
RBC # BLD AUTO: 4.28 10*6/MM3 (ref 3.77–5.28)
SODIUM SERPL-SCNC: 138 MMOL/L (ref 136–145)
TRIGL SERPL-MCNC: 232 MG/DL (ref 0–150)
VLDLC SERPL CALC-MCNC: 46.4 MG/DL
WBC # BLD AUTO: 6.03 10*3/MM3 (ref 3.4–10.8)

## 2020-09-11 DIAGNOSIS — I25.10 CORONARY ARTERY DISEASE INVOLVING NATIVE CORONARY ARTERY OF NATIVE HEART WITHOUT ANGINA PECTORIS: ICD-10-CM

## 2020-09-11 DIAGNOSIS — Z79.4 TYPE 2 DIABETES MELLITUS WITHOUT COMPLICATION, WITH LONG-TERM CURRENT USE OF INSULIN (HCC): Primary | ICD-10-CM

## 2020-09-11 DIAGNOSIS — E11.9 TYPE 2 DIABETES MELLITUS WITHOUT COMPLICATION, WITH LONG-TERM CURRENT USE OF INSULIN (HCC): Primary | ICD-10-CM

## 2020-09-11 RX ORDER — LANCETS 30 GAUGE
EACH MISCELLANEOUS
Qty: 300 EACH | Refills: 3 | Status: SHIPPED | OUTPATIENT
Start: 2020-09-11

## 2020-10-01 ENCOUNTER — OFFICE VISIT (OUTPATIENT)
Dept: FAMILY MEDICINE CLINIC | Facility: CLINIC | Age: 80
End: 2020-10-01

## 2020-10-01 VITALS
TEMPERATURE: 97.6 F | SYSTOLIC BLOOD PRESSURE: 140 MMHG | HEIGHT: 62 IN | HEART RATE: 76 BPM | RESPIRATION RATE: 18 BRPM | WEIGHT: 162 LBS | BODY MASS INDEX: 29.81 KG/M2 | OXYGEN SATURATION: 100 % | DIASTOLIC BLOOD PRESSURE: 78 MMHG

## 2020-10-01 DIAGNOSIS — H25.9 AGE-RELATED CATARACT OF BOTH EYES, UNSPECIFIED AGE-RELATED CATARACT TYPE: Primary | ICD-10-CM

## 2020-10-01 DIAGNOSIS — Z01.818 PREOP EXAMINATION: ICD-10-CM

## 2020-10-01 LAB
ALBUMIN SERPL-MCNC: 4.5 G/DL (ref 3.5–5.2)
ALBUMIN/GLOB SERPL: 2.4 G/DL
ALP SERPL-CCNC: 64 U/L (ref 39–117)
ALT SERPL-CCNC: 18 U/L (ref 1–33)
AST SERPL-CCNC: 18 U/L (ref 1–32)
BASOPHILS # BLD AUTO: 0.06 10*3/MM3 (ref 0–0.2)
BASOPHILS NFR BLD AUTO: 1.2 % (ref 0–1.5)
BILIRUB SERPL-MCNC: 0.4 MG/DL (ref 0–1.2)
BUN SERPL-MCNC: 19 MG/DL (ref 8–23)
BUN/CREAT SERPL: 17 (ref 7–25)
CALCIUM SERPL-MCNC: 10.3 MG/DL (ref 8.6–10.5)
CHLORIDE SERPL-SCNC: 102 MMOL/L (ref 98–107)
CO2 SERPL-SCNC: 28.7 MMOL/L (ref 22–29)
CREAT SERPL-MCNC: 1.12 MG/DL (ref 0.57–1)
EOSINOPHIL # BLD AUTO: 0.19 10*3/MM3 (ref 0–0.4)
EOSINOPHIL NFR BLD AUTO: 3.7 % (ref 0.3–6.2)
ERYTHROCYTE [DISTWIDTH] IN BLOOD BY AUTOMATED COUNT: 13 % (ref 12.3–15.4)
GLOBULIN SER CALC-MCNC: 1.9 GM/DL
GLUCOSE SERPL-MCNC: 165 MG/DL (ref 65–99)
HCT VFR BLD AUTO: 43.4 % (ref 34–46.6)
HGB BLD-MCNC: 14 G/DL (ref 12–15.9)
IMM GRANULOCYTES # BLD AUTO: 0.01 10*3/MM3 (ref 0–0.05)
IMM GRANULOCYTES NFR BLD AUTO: 0.2 % (ref 0–0.5)
LYMPHOCYTES # BLD AUTO: 1.63 10*3/MM3 (ref 0.7–3.1)
LYMPHOCYTES NFR BLD AUTO: 32 % (ref 19.6–45.3)
MCH RBC QN AUTO: 31.2 PG (ref 26.6–33)
MCHC RBC AUTO-ENTMCNC: 32.3 G/DL (ref 31.5–35.7)
MCV RBC AUTO: 96.7 FL (ref 79–97)
MONOCYTES # BLD AUTO: 0.46 10*3/MM3 (ref 0.1–0.9)
MONOCYTES NFR BLD AUTO: 9 % (ref 5–12)
NEUTROPHILS # BLD AUTO: 2.75 10*3/MM3 (ref 1.7–7)
NEUTROPHILS NFR BLD AUTO: 53.9 % (ref 42.7–76)
NRBC BLD AUTO-RTO: 0 /100 WBC (ref 0–0.2)
PLATELET # BLD AUTO: 160 10*3/MM3 (ref 140–450)
POTASSIUM SERPL-SCNC: 5.3 MMOL/L (ref 3.5–5.2)
PROT SERPL-MCNC: 6.4 G/DL (ref 6–8.5)
RBC # BLD AUTO: 4.49 10*6/MM3 (ref 3.77–5.28)
SODIUM SERPL-SCNC: 140 MMOL/L (ref 136–145)
WBC # BLD AUTO: 5.1 10*3/MM3 (ref 3.4–10.8)

## 2020-10-01 PROCEDURE — 93005 ELECTROCARDIOGRAM TRACING: CPT | Performed by: FAMILY MEDICINE

## 2020-10-01 PROCEDURE — 99214 OFFICE O/P EST MOD 30 MIN: CPT | Performed by: FAMILY MEDICINE

## 2020-10-01 NOTE — PROGRESS NOTES
Subjective   Nancy Goodman is a 80 y.o. female.   Chief Complaint   Patient presents with   • Preop-cataract sx     History of Present Illness   Pre-Op Evaluation  Nancy Goodman is a 80 y.o. female who presents to the office today for a preoperative consultation at the request of surgeon Dr. Roa who plans on performing cataract removal on October 2020. This consultation is requested for the specific conditions prompting preoperative evaluation (i.e. because of potential affect on operative risk): HLD, HTN, CAD, CHF, DM type 2, Aortic stenosis, s/p CABG. Planned anesthesia is local and IV sedation. The patient has the following known anesthesia issues: no known complications. Patient has a bleeding risk of: no recent abnormal bleeding.     The following portions of the patient's history were reviewed and updated as appropriate: allergies, current medications, past family history, past medical history, past social history, past surgical history and problem list.    Review of Systems   Constitutional: Negative for fever.   Eyes: Positive for visual disturbance. Negative for double vision and pain.   Respiratory: Negative for cough, chest tightness and wheezing.    Cardiovascular: Negative for chest pain, palpitations and leg swelling.   Gastrointestinal: Negative.    Neurological: Negative for dizziness, light-headedness and headache.       Objective     Physical Exam  Vitals signs and nursing note reviewed.   Constitutional:       Appearance: She is well-developed.   HENT:      Head: Normocephalic and atraumatic.      Right Ear: External ear normal.      Left Ear: External ear normal.      Nose: Nose normal.   Eyes:      Conjunctiva/sclera: Conjunctivae normal.   Neck:      Musculoskeletal: Normal range of motion and neck supple.   Cardiovascular:      Rate and Rhythm: Normal rate and regular rhythm.      Heart sounds: Normal heart sounds.   Pulmonary:      Effort: Pulmonary effort is normal.       Breath sounds: Normal breath sounds. No wheezing.   Musculoskeletal:         General: No swelling.   Skin:     General: Skin is warm and dry.   Neurological:      General: No focal deficit present.      Mental Status: She is alert and oriented to person, place, and time.   Psychiatric:         Mood and Affect: Mood normal.         Behavior: Behavior normal.         ECG 12 Lead    Date/Time: 10/1/2020 10:13 AM  Performed by: Vero Monge DO  Authorized by: Vero Monge DO   Rhythm: sinus rhythm  Rate: normal  BPM: 73  Conduction: conduction normal  ST Segments: ST segments normal  T Waves: T waves normal  QRS axis: normal  Other: no other findings    Clinical impression: normal ECG            Assessment/Plan   Nancy was seen today for preop-cataract sx.    Diagnoses and all orders for this visit:    Age-related cataract of both eyes, unspecified age-related cataract type  -     Comprehensive Metabolic Panel  -     CBC & Differential  -     Ambulatory Referral to Cardiology  -     ECG 12 Lead    Preop examination  -     Comprehensive Metabolic Panel  -     CBC & Differential  -     Ambulatory Referral to Cardiology  -     ECG 12 Lead    Other orders  -     SCANNED EKG      Preop completed today. Before clearing for surgery, will need to see her cardiologist for clearance.   Geriatric sensitive perioperative cardiac risk index :Probability of perioperative myocardial infarction or cardiac arrest: 2.5%

## 2020-10-01 NOTE — PATIENT INSTRUCTIONS
Go to the nearest ER or return to clinic if symptoms worsen, fever/chill develop    Cataract    A cataract is a buildup of protein that causes the lens of your eye to become cloudy. The lens is normally clear. It is the part of the eye that is behind your iris and pupil. The lens focuses light on the retina, which lets you see clearly. When a lens becomes cloudy, your vision may become blurry. The clouding can range from a tiny dot to complete cloudiness.  As some cataracts develop, they can make it harder for you to see things that are far away. (You become more nearsighted.) Other cataracts increase glare. Cataracts can worsen over time, and sometimes the pupil can look white. As cataracts get worse, they cloud more of the lens, making it difficult to see. Cataracts can affect one eye or both eyes.  What are the causes?  This condition may be caused by age-related eye changes. The lens of the eye is mostly made up of water and protein. Normally, this protein is arranged in a way that keeps the lens clear. Cataracts develop when protein begins to clump together over time. This buildup of protein clouds the lens and lets less light pass through to the retina, which causes blurry vision.  What increases the risk?  You are more likely to develop this condition if you:  · Are 60 years of age or older.  · Have diabetes.  · Have high blood pressure.  · Take certain medicines, such as steroids or hormone replacement therapy.  · Have had an eye injury.  · Have or have had eye inflammation.  · Have a family history of cataracts.  · Smoke.  · Drink alcohol heavily.  · Are frequently exposed to sun or very strong light without eye protection.  · Are obese.  · Have been exposed to large amounts of radiation, lead, or other toxic substances.  · Have had eye surgery.  What are the signs or symptoms?  The main symptom of a cataract is blurry vision. Your vision may change or get worse over time. Other symptoms  "include:  · Increased glare.  · Seeing a bright ring or halo around light.  · Poor night vision.  · Double or \"shadow\" vision in one eye or both eyes.  · Having trouble seeing, even while wearing contact lenses or glasses.  · Seeing colors that appear faded.  · Having trouble telling the difference between blue and purple.  · Needing frequent changes to your prescription glasses or contacts.  How is this diagnosed?  This condition is diagnosed with a medical history and eye exam.  · You should see an eye specialist (optometrist or ophthalmologist).  · Your health care provider may enlarge (dilate) your pupils with eye drops to see the back of your eye more clearly and look for signs of cataracts or other eye damage.  You may also have tests, including:  · A visual acuity test. This uses a chart to determine the smallest letters that you can see from a specific distance.  · A slit-lamp exam. This uses a microscope to examine small sections of your eye for abnormalities.  · Tonometry. This test measures the pressure of the fluid inside your eye.  · Glare testing. This test shines a light in your eye while you view letters to see whether the bright light affects your vision.  How is this treated?  Treatment depends on the stage of your cataract. You may:  · Wear eyeglasses or use stronger light. This is for an early-stage cataract.  · Have surgery if the condition is severely affecting your vision. This is needed for late-stage cataract.  · Stop or change certain medicines. This is recommended if your health care provider thinks your cataract may be linked to your medicines.  Follow these instructions at home:  Lifestyle  · Use stronger or brighter lighting.  · Consider using a magnifying glass for reading or other activities.  · Become familiar with your surroundings. Having poor vision can put you at greater risk for tripping, falling, or bumping into things.  · Wear sunglasses and a hat if you are sensitive to " bright light or are having problems with glare.  · Do not use any products that contain nicotine or tobacco, such as cigarettes, e-cigarettes, and chewing tobacco. If you need help quitting, ask your health care provider.  General instructions  · If you are prescribed new eyeglasses, wear them as told by your health care provider.  · Take over-the-counter and prescription medicines only as told by your health care provider. Do not change your medicines unless told by your health care provider.  · Do not drive or use heavy machinery if your vision is blurry, particularly at night.  · Keep your blood sugar under control if you have diabetes.  · Keep all follow-up visits as told by your health care provider. This is important.  Contact a health care provider if:  · Your symptoms get worse.  · Your vision affects your ability to perform daily activities.  · You have new symptoms.  · You have a fever.  Get help right away if:  · You have sudden vision loss.  · You have redness, swelling, or increasing pain in your eye.  · You develop a headache and sensitivity to light.  Summary  · A cataract is a buildup of protein that causes the lens of your eye to become cloudy. Cataracts are very common, especially as people age.  · Mild cataracts cause mild visual symptoms, while more severe cataracts can cause a significant decrease in quality of life.  · Mild cataracts can often be treated with a prescription for new glasses or contact lenses, while surgery is often recommended for more severe cataracts.  · Contact a health care provider if your symptoms get worse, your vision affects your ability to do daily activities, or you have a fever.  · Get help right away if you have sudden vision loss, redness, swelling, or increasing pain in the eye, or you develop a headache or sensitivity to light.  This information is not intended to replace advice given to you by your health care provider. Make sure you discuss any questions you  have with your health care provider.  Document Released: 12/18/2006 Document Revised: 06/17/2019 Document Reviewed: 06/17/2019  Elsevier Patient Education © 2020 [employer]

## 2020-10-06 ENCOUNTER — OFFICE VISIT (OUTPATIENT)
Dept: CARDIOLOGY | Facility: CLINIC | Age: 80
End: 2020-10-06

## 2020-10-06 VITALS
DIASTOLIC BLOOD PRESSURE: 90 MMHG | WEIGHT: 163 LBS | HEIGHT: 62 IN | HEART RATE: 68 BPM | SYSTOLIC BLOOD PRESSURE: 202 MMHG | OXYGEN SATURATION: 96 % | BODY MASS INDEX: 30 KG/M2

## 2020-10-06 DIAGNOSIS — I25.10 CORONARY ARTERY DISEASE INVOLVING NATIVE CORONARY ARTERY OF NATIVE HEART WITHOUT ANGINA PECTORIS: Primary | ICD-10-CM

## 2020-10-06 DIAGNOSIS — I35.0 AORTIC STENOSIS, SEVERE: ICD-10-CM

## 2020-10-06 DIAGNOSIS — E78.2 MIXED HYPERLIPIDEMIA: ICD-10-CM

## 2020-10-06 DIAGNOSIS — I35.0 AORTIC STENOSIS, SEVERE: Primary | ICD-10-CM

## 2020-10-06 DIAGNOSIS — I10 ESSENTIAL HYPERTENSION: ICD-10-CM

## 2020-10-06 DIAGNOSIS — Z01.810 PREOP CARDIOVASCULAR EXAM: ICD-10-CM

## 2020-10-06 PROCEDURE — 99214 OFFICE O/P EST MOD 30 MIN: CPT | Performed by: INTERNAL MEDICINE

## 2020-10-06 RX ORDER — HYDROCHLOROTHIAZIDE 25 MG/1
25 TABLET ORAL DAILY
Qty: 90 TABLET | Refills: 3 | Status: ON HOLD | OUTPATIENT
Start: 2020-10-06 | End: 2021-07-22

## 2020-10-06 NOTE — PROGRESS NOTES
Subjective:     Encounter Date:10/06/2020    Primary Care Physician: Eyal Cervantes MD      Patient ID: Nancy Goodman is a 80 y.o. female.    Chief Complaint:Pre-op Exam    PROBLEM LIST:  1. Coronary artery disease  a. 5/16/17 angina, cardiac catheter severe ostial LAD and LCx of these.  Ostial RPL and RCA.  b. 5 vessel CABG ([place]) LIMA to LAD, SVG to diagonal and LCx, and SVG to PDA and PL.  2. Aortic stenosis  a. Peak gradient of 27 by cath 5/17  b. 4/19 peak gradient by echo of 66  c. 5/13/19 TAVR #23 Bah Tobias tissue valve  d. 6/13/19 ECHO EF 60%. Mod MR. TAVR normal. Max pressure gradient 34.8  3. Hypertension  4. Dyslipidemia  5. Diabetes mellitus  6. GERD  7. Varicose veins  8. Arthritis  9. Surgeries:  a. Tubal ligation  b. Varicose vein surgery  c. CABG      Allergies   Allergen Reactions   • Amlodipine GI Intolerance     sickness   • Benazepril Nausea Only   • Ciprofloxacin GI Intolerance     Pt not sure of reaction (stomach problems)   • Pokeweed [Phytolacca] Swelling and Rash   • Statins Nausea Only     Stomach problem         Current Outpatient Medications:   •  aspirin 81 MG EC tablet, Take 81 mg by mouth Every Morning., Disp: , Rfl:   •  B-D UF III MINI PEN NEEDLES 31G X 5 MM misc, 1 for daily injection, Disp: 100 each, Rfl: 3  •  clopidogrel (PLAVIX) 75 MG tablet, Take 1 tablet by mouth Daily., Disp: 30 tablet, Rfl: 6  •  CRANBERRY PO, Take 450 mg by mouth Daily., Disp: , Rfl:   •  FREESTYLE LITE test strip, , Disp: , Rfl:   •  furosemide (LASIX) 20 MG tablet, Take 1 tablet by mouth Daily As Needed (swelling)., Disp: 90 tablet, Rfl: 0  •  Insulin Glargine (LANTUS SOLOSTAR) 100 UNIT/ML injection pen, Inject 45 Units under the skin into the appropriate area as directed Daily., Disp: 15 pen, Rfl: 1  •  irbesartan (Avapro) 75 MG tablet, Take 2 tablets by mouth Every Night., Disp: 90 tablet, Rfl: 1  •  Lancets misc, Lancet of choice, check QD and PRN, Disp: 300 each, Rfl: 3  •  metFORMIN  "(Glucophage) 500 MG tablet, Take 1 tablet by mouth 2 (Two) Times a Day With Meals., Disp: 180 tablet, Rfl: 1  •  metoprolol tartrate (LOPRESSOR) 25 MG tablet, Take 1 tablet by mouth 2 (Two) Times a Day., Disp: 180 tablet, Rfl: 1  •  NON FORMULARY, Daily. Super cayenne 100,ooo HU every day, Disp: , Rfl:   •  omeprazole (priLOSEC) 40 MG capsule, Take 1 capsule by mouth Every Night. Taking it as needed, Disp: 90 capsule, Rfl: 3  •  potassium chloride (K-DUR,KLOR-CON) 10 MEQ CR tablet, Take 10 mEq by mouth Daily., Disp: , Rfl:   •  Probiotic Product (PROBIOTIC ADVANCED PO), Take 1 tablet by mouth Every Night., Disp: , Rfl:   No current facility-administered medications for this visit.     Facility-Administered Medications Ordered in Other Visits:   •  Chlorhexidine Gluconate Cloth 2 % pads 1 application, 1 application, Topical, Q12H PRN, Eduardo Bain, PA        History of Present Illness    Patient returns today for follow-up of coronary disease TAVR, and preop cataract surgery.  Since her last visit overall she feels that she is doing well.  She has persistent lower extremity edema which she says is unchanged from previous.  Denies any chest pain dyspnea on exertion orthopnea PND presyncope or syncope.  She is walking 35 minutes on a treadmill several days weekly at a slow pace, but without symptoms.  Patient describes some significant increase in sodium intake recently using a \"natural sea salt\".  And now she returns for preop cataract surgery.    The following portions of the patient's history were reviewed and updated as appropriate: allergies, current medications, past family history, past medical history, past social history, past surgical history and problem list.      Social History     Tobacco Use   • Smoking status: Never Smoker   • Smokeless tobacco: Never Used   Substance Use Topics   • Alcohol use: No   • Drug use: No         Review of Systems   Constitution: Negative.   Cardiovascular: Negative for chest " "pain, dyspnea on exertion, leg swelling, palpitations and syncope.   Respiratory: Negative.  Negative for shortness of breath.    Hematologic/Lymphatic: Negative for bleeding problem. Does not bruise/bleed easily.   Skin: Negative for rash.   Musculoskeletal: Negative for muscle weakness and myalgias.   Gastrointestinal: Negative for heartburn, nausea and vomiting.   Neurological: Negative for dizziness, light-headedness, loss of balance and numbness.          Objective:   BP (!) 202/90 (BP Location: Right arm)   Pulse 68   Ht 157.5 cm (62\")   Wt 73.9 kg (163 lb)   SpO2 96%   BMI 29.81 kg/m²         Vitals signs reviewed.   Constitutional:       Appearance: Well-developed and not in distress.   Neck:      Thyroid: No thyromegaly.      Vascular: No carotid bruit or JVD.   Pulmonary:      Breath sounds: Normal breath sounds.   Cardiovascular:      Regular rhythm.      No gallop. No S3 and S4 gallop.   Edema:     Pretibial: bilateral 2+ edema of the pretibial area.     Ankle: bilateral 2+ edema of the ankle.  Abdominal:      General: Bowel sounds are normal.      Palpations: Abdomen is soft. There is no abdominal mass.      Tenderness: There is no abdominal tenderness.   Musculoskeletal:         General: No deformity.      Extremities: No clubbing present.  Skin:     General: Skin is warm and dry.      Findings: No rash.   Neurological:      Mental Status: Alert and oriented to person, place, and time.         Procedures          Assessment:   Assessment/Plan      Nancy was seen today for pre-op exam.    Diagnoses and all orders for this visit:    Coronary artery disease involving native coronary artery of native heart without angina pectoris    Essential hypertension    Mixed hyperlipidemia    Aortic stenosis, severe    Preop cardiovascular exam      1.  Coronary artery disease, stable without angina status post remote CABG 3 years ago.  2.  Aortic stenosis status post TAVR.  Normal functioning by last echo.  " Asymptomatic  3.  Hypertension, severe likely due to recent dietary indiscretions with sodium.  4.  Preop cataract surgery  5.  Dyslipidemia on statin    Recommendations  1.  Patient is significant hypertensive today.  Discussed the need to decrease her sodium intake significantly  2.  Add HCTZ 25 mg daily.  3.  Assuming her blood pressure is reasonably well-controlled at the time, she is at low cardiovascular risk otherwise for upcoming cataract surgery.  No further testing required prior to the procedure  4.  Revisit annually, with an echocardiogram.         Fidel Valdez MD  Dictated utilizing Dragon dictation

## 2020-10-14 ENCOUNTER — LAB (OUTPATIENT)
Dept: FAMILY MEDICINE CLINIC | Facility: CLINIC | Age: 80
End: 2020-10-14

## 2020-10-14 DIAGNOSIS — E87.5 HYPERKALEMIA: Primary | ICD-10-CM

## 2020-10-14 LAB
ALBUMIN SERPL-MCNC: 4.4 G/DL (ref 3.5–5.2)
ALBUMIN/GLOB SERPL: 2 G/DL
ALP SERPL-CCNC: 67 U/L (ref 39–117)
ALT SERPL-CCNC: 15 U/L (ref 1–33)
AST SERPL-CCNC: 16 U/L (ref 1–32)
BILIRUB SERPL-MCNC: 0.5 MG/DL (ref 0–1.2)
BUN SERPL-MCNC: 23 MG/DL (ref 8–23)
BUN/CREAT SERPL: 18 (ref 7–25)
CALCIUM SERPL-MCNC: 9.9 MG/DL (ref 8.6–10.5)
CHLORIDE SERPL-SCNC: 99 MMOL/L (ref 98–107)
CO2 SERPL-SCNC: 29.1 MMOL/L (ref 22–29)
CREAT SERPL-MCNC: 1.28 MG/DL (ref 0.57–1)
GLOBULIN SER CALC-MCNC: 2.2 GM/DL
GLUCOSE SERPL-MCNC: 212 MG/DL (ref 65–99)
POTASSIUM SERPL-SCNC: 4.6 MMOL/L (ref 3.5–5.2)
PROT SERPL-MCNC: 6.6 G/DL (ref 6–8.5)
SODIUM SERPL-SCNC: 137 MMOL/L (ref 136–145)

## 2020-11-16 ENCOUNTER — OFFICE VISIT (OUTPATIENT)
Dept: FAMILY MEDICINE CLINIC | Facility: CLINIC | Age: 80
End: 2020-11-16

## 2020-11-16 VITALS
RESPIRATION RATE: 18 BRPM | SYSTOLIC BLOOD PRESSURE: 142 MMHG | HEART RATE: 78 BPM | BODY MASS INDEX: 29.08 KG/M2 | TEMPERATURE: 97.5 F | DIASTOLIC BLOOD PRESSURE: 84 MMHG | HEIGHT: 62 IN | WEIGHT: 158 LBS

## 2020-11-16 DIAGNOSIS — E11.9 TYPE 2 DIABETES MELLITUS WITHOUT COMPLICATION, WITH LONG-TERM CURRENT USE OF INSULIN (HCC): Primary | ICD-10-CM

## 2020-11-16 DIAGNOSIS — Z79.4 TYPE 2 DIABETES MELLITUS WITHOUT COMPLICATION, WITH LONG-TERM CURRENT USE OF INSULIN (HCC): Primary | ICD-10-CM

## 2020-11-16 DIAGNOSIS — E78.2 MIXED HYPERLIPIDEMIA: ICD-10-CM

## 2020-11-16 DIAGNOSIS — I25.10 CORONARY ARTERY DISEASE INVOLVING NATIVE CORONARY ARTERY OF NATIVE HEART WITHOUT ANGINA PECTORIS: ICD-10-CM

## 2020-11-16 DIAGNOSIS — I10 ESSENTIAL HYPERTENSION: ICD-10-CM

## 2020-11-16 LAB
POC CREATININE URINE: 100
POC MICROALBUMIN URINE: 10

## 2020-11-16 PROCEDURE — 82044 UR ALBUMIN SEMIQUANTITATIVE: CPT | Performed by: FAMILY MEDICINE

## 2020-11-16 PROCEDURE — 99214 OFFICE O/P EST MOD 30 MIN: CPT | Performed by: FAMILY MEDICINE

## 2020-11-16 RX ORDER — BLOOD-GLUCOSE METER
KIT MISCELLANEOUS
Qty: 300 EACH | Refills: 3 | Status: SHIPPED | OUTPATIENT
Start: 2020-11-16

## 2020-11-16 RX ORDER — FLURBIPROFEN SODIUM 0.3 MG/ML
SOLUTION/ DROPS OPHTHALMIC
Qty: 100 EACH | Refills: 3 | Status: SHIPPED | OUTPATIENT
Start: 2020-11-16 | End: 2022-12-05 | Stop reason: SDUPTHER

## 2020-11-16 NOTE — PROGRESS NOTES
Subjective   Nancy Goodman is a 80 y.o. female.     History of Present Illness     Diabetes Mellitus Type II, Follow-up:   Nancy Goodman is a 80 y.o. female who is here for follow-up of Type 2 diabetes mellitus.  Current symptoms/problems include none and have been stable. Patient is adherent with medications.  Known diabetic complications: none  Cardiovascular risk factors: advanced age (older than 55 for men, 65 for women), diabetes mellitus, dyslipidemia and hypertension  Current diabetic medications include lantus 42 units.   Current monitoring regimen: home blood tests - daily  Home blood sugar records: fasting range:   Any episodes of hypoglycemia? no  Eye exam current (within one year): yes  She is on ACE inhibitor or angiotensin II receptor blocker. Patient is on a statin.       Nancy Goodman  is here for follow-up of hypertension of several years duration. She is not exercising and is not adherent to a low-salt diet. Patient does not check her blood pressure.  She is compliant with meds.        Nancy Goodman returns today for follow up of Hyperlipidemia  Nancy indicates her exercise level as not at all.  Diet: not watching what she eats  Patient is compliant with medications   Any side effects to medications:   chest pain No myalgia No memory change No  Pt is not due for labs      The following portions of the patient's history were reviewed and updated as appropriate: allergies, current medications, past family history, past medical history, past social history, past surgical history and problem list.    Review of Systems   Constitutional: Negative.    HENT: Negative.    Eyes: Negative.    Respiratory: Negative.  Negative for shortness of breath.    Cardiovascular: Negative.  Negative for chest pain.   Gastrointestinal: Negative.    Musculoskeletal: Negative.    Skin: Negative.    Neurological: Negative.    Psychiatric/Behavioral: Negative.  Negative for  dysphoric mood. The patient is not nervous/anxious.    All other systems reviewed and are negative.      Objective   Physical Exam  Vitals signs and nursing note reviewed.   Constitutional:       General: She is not in acute distress.     Appearance: Normal appearance. She is well-developed.   Eyes:      Extraocular Movements: Extraocular movements intact.      Conjunctiva/sclera: Conjunctivae normal.   Cardiovascular:      Rate and Rhythm: Normal rate and regular rhythm.      Heart sounds: Normal heart sounds.   Pulmonary:      Effort: Pulmonary effort is normal.      Breath sounds: Normal breath sounds.   Abdominal:      General: Abdomen is flat. Bowel sounds are normal. There is no distension.      Palpations: Abdomen is soft.      Tenderness: There is no abdominal tenderness.   Neurological:      Mental Status: She is alert and oriented to person, place, and time.   Psychiatric:         Mood and Affect: Mood normal.         Behavior: Behavior normal.         Thought Content: Thought content normal.         Judgment: Judgment normal.         Assessment/Plan   Diagnoses and all orders for this visit:    1. Type 2 diabetes mellitus without complication, with long-term current use of insulin (CMS/[facility]) (Primary)  -     CBC & Differential  -     Comprehensive Metabolic Panel  -     Hemoglobin A1c  -     FREESTYLE LITE test strip; Check daily and PRN  Dispense: 300 each; Refill: 3  -     B-D UF III MINI PEN NEEDLES 31G X 5 MM misc; 1 for daily injection  Dispense: 100 each; Refill: 3  -     Insulin Glargine (LANTUS SOLOSTAR) 100 UNIT/ML injection pen; Inject 45 Units under the skin into the appropriate area as directed Daily.  Dispense: 15 pen; Refill: 1  -     POCT microalbumin    2. Essential hypertension  -     CBC & Differential  -     Comprehensive Metabolic Panel    3. Coronary artery disease involving native coronary artery of native heart without angina pectoris  -     Comprehensive Metabolic Panel    4. Mixed  hyperlipidemia    she is concerned about her DM control as sugars have been higher and her last HgA1C was 7.7.  We discussed specific use of insulin (she had been skipping if her glucose was good!) and we will f/u pending labs  BP stable, no change in rrgimen, she will let me know if she needs refills of any medicine  CAD stable, no recent chest pain noted.  She has failed multiple statins in the past, not able to tolerate

## 2020-11-17 LAB
ALBUMIN SERPL-MCNC: 4.4 G/DL (ref 3.5–5.2)
ALBUMIN/GLOB SERPL: 2 G/DL
ALP SERPL-CCNC: 58 U/L (ref 39–117)
ALT SERPL-CCNC: 17 U/L (ref 1–33)
AST SERPL-CCNC: 18 U/L (ref 1–32)
BASOPHILS # BLD AUTO: 0.06 10*3/MM3 (ref 0–0.2)
BASOPHILS NFR BLD AUTO: 1 % (ref 0–1.5)
BILIRUB SERPL-MCNC: 0.3 MG/DL (ref 0–1.2)
BUN SERPL-MCNC: 20 MG/DL (ref 8–23)
BUN/CREAT SERPL: 19.6 (ref 7–25)
CALCIUM SERPL-MCNC: 10.1 MG/DL (ref 8.6–10.5)
CHLORIDE SERPL-SCNC: 100 MMOL/L (ref 98–107)
CO2 SERPL-SCNC: 29.8 MMOL/L (ref 22–29)
CREAT SERPL-MCNC: 1.02 MG/DL (ref 0.57–1)
EOSINOPHIL # BLD AUTO: 0.34 10*3/MM3 (ref 0–0.4)
EOSINOPHIL NFR BLD AUTO: 5.7 % (ref 0.3–6.2)
ERYTHROCYTE [DISTWIDTH] IN BLOOD BY AUTOMATED COUNT: 12.9 % (ref 12.3–15.4)
GLOBULIN SER CALC-MCNC: 2.2 GM/DL
GLUCOSE SERPL-MCNC: 118 MG/DL (ref 65–99)
HBA1C MFR BLD: 7.5 % (ref 4.8–5.6)
HCT VFR BLD AUTO: 42.7 % (ref 34–46.6)
HGB BLD-MCNC: 14.1 G/DL (ref 12–15.9)
IMM GRANULOCYTES # BLD AUTO: 0.02 10*3/MM3 (ref 0–0.05)
IMM GRANULOCYTES NFR BLD AUTO: 0.3 % (ref 0–0.5)
LYMPHOCYTES # BLD AUTO: 2.1 10*3/MM3 (ref 0.7–3.1)
LYMPHOCYTES NFR BLD AUTO: 35.5 % (ref 19.6–45.3)
MCH RBC QN AUTO: 30.7 PG (ref 26.6–33)
MCHC RBC AUTO-ENTMCNC: 33 G/DL (ref 31.5–35.7)
MCV RBC AUTO: 93 FL (ref 79–97)
MONOCYTES # BLD AUTO: 0.5 10*3/MM3 (ref 0.1–0.9)
MONOCYTES NFR BLD AUTO: 8.4 % (ref 5–12)
NEUTROPHILS # BLD AUTO: 2.9 10*3/MM3 (ref 1.7–7)
NEUTROPHILS NFR BLD AUTO: 49.1 % (ref 42.7–76)
NRBC BLD AUTO-RTO: 0 /100 WBC (ref 0–0.2)
PLATELET # BLD AUTO: 194 10*3/MM3 (ref 140–450)
POTASSIUM SERPL-SCNC: 4.7 MMOL/L (ref 3.5–5.2)
PROT SERPL-MCNC: 6.6 G/DL (ref 6–8.5)
RBC # BLD AUTO: 4.59 10*6/MM3 (ref 3.77–5.28)
SODIUM SERPL-SCNC: 138 MMOL/L (ref 136–145)
WBC # BLD AUTO: 5.92 10*3/MM3 (ref 3.4–10.8)

## 2020-12-08 DIAGNOSIS — I10 ESSENTIAL HYPERTENSION: ICD-10-CM

## 2020-12-08 RX ORDER — IRBESARTAN 75 MG/1
150 TABLET ORAL NIGHTLY
Qty: 90 TABLET | Refills: 1 | Status: SHIPPED | OUTPATIENT
Start: 2020-12-08 | End: 2021-02-23 | Stop reason: SDUPTHER

## 2020-12-08 NOTE — TELEPHONE ENCOUNTER
Caller: Nancy Goodman    Relationship: Self    Best call back number: [phone]    Medication needed:   Requested Prescriptions     Pending Prescriptions Disp Refills   • irbesartan (Avapro) 75 MG tablet 90 tablet 1     Sig: Take 2 tablets by mouth Every Night.       When do you need the refill by: 12/09/2020    What details did the patient provide when requesting the medication: Patient states she needs prescription called into pharmacy.     Does the patient have less than a 3 day supply:  [x] Yes  [] No    What is the patient's preferred pharmacy: [facility] [place] [facility] [place], [place] - [address] - [phone] [facility] [phone]

## 2021-01-06 ENCOUNTER — APPOINTMENT (OUTPATIENT)
Dept: CARDIOLOGY | Facility: HOSPITAL | Age: 81
End: 2021-01-06

## 2021-02-23 ENCOUNTER — OFFICE VISIT (OUTPATIENT)
Dept: FAMILY MEDICINE CLINIC | Facility: CLINIC | Age: 81
End: 2021-02-23

## 2021-02-23 VITALS
RESPIRATION RATE: 18 BRPM | DIASTOLIC BLOOD PRESSURE: 82 MMHG | HEART RATE: 66 BPM | HEIGHT: 62 IN | TEMPERATURE: 98.2 F | BODY MASS INDEX: 28.71 KG/M2 | SYSTOLIC BLOOD PRESSURE: 122 MMHG | WEIGHT: 156 LBS

## 2021-02-23 DIAGNOSIS — R42 DIZZINESS: ICD-10-CM

## 2021-02-23 DIAGNOSIS — E78.2 MIXED HYPERLIPIDEMIA: ICD-10-CM

## 2021-02-23 DIAGNOSIS — Z79.4 TYPE 2 DIABETES MELLITUS WITHOUT COMPLICATION, WITH LONG-TERM CURRENT USE OF INSULIN (HCC): Primary | ICD-10-CM

## 2021-02-23 DIAGNOSIS — I25.10 CORONARY ARTERY DISEASE INVOLVING NATIVE CORONARY ARTERY OF NATIVE HEART WITHOUT ANGINA PECTORIS: ICD-10-CM

## 2021-02-23 DIAGNOSIS — E11.9 TYPE 2 DIABETES MELLITUS WITHOUT COMPLICATION, WITH LONG-TERM CURRENT USE OF INSULIN (HCC): Primary | ICD-10-CM

## 2021-02-23 DIAGNOSIS — I10 ESSENTIAL HYPERTENSION: ICD-10-CM

## 2021-02-23 PROCEDURE — 99214 OFFICE O/P EST MOD 30 MIN: CPT | Performed by: FAMILY MEDICINE

## 2021-02-23 RX ORDER — IRBESARTAN 75 MG/1
75 TABLET ORAL 2 TIMES DAILY
Qty: 180 TABLET | Refills: 1 | Status: SHIPPED | OUTPATIENT
Start: 2021-02-23 | End: 2021-08-04 | Stop reason: SDUPTHER

## 2021-02-23 RX ORDER — CLOPIDOGREL BISULFATE 75 MG/1
75 TABLET ORAL DAILY
Qty: 30 TABLET | Refills: 6 | Status: SHIPPED | OUTPATIENT
Start: 2021-02-23 | End: 2021-09-14 | Stop reason: SDUPTHER

## 2021-02-23 RX ORDER — POTASSIUM CHLORIDE 750 MG/1
10 TABLET, EXTENDED RELEASE ORAL DAILY
Qty: 90 TABLET | Refills: 1 | Status: SHIPPED | OUTPATIENT
Start: 2021-02-23 | End: 2021-12-14 | Stop reason: SDUPTHER

## 2021-02-23 NOTE — PROGRESS NOTES
Subjective   Nancy Goodman is a 80 y.o. female.     History of Present Illness     Diabetes Mellitus Type II, Follow-up:   Nancy Goodman is a 80 y.o. female who is here for follow-up of Type 2 diabetes mellitus.  Current symptoms/problems include none and have been stable. Patient is adherent with medications.  Known diabetic complications: none  Cardiovascular risk factors: diabetes mellitus, dyslipidemia and hypertension  Current diabetic medications include metformin and 42 lantus  Current monitoring regimen: home blood tests - daily  Home blood sugar records: fasting range: doing ok, 103 this AM  Any episodes of hypoglycemia? no  Eye exam current (within one year): yes  She is on ACE inhibitor or angiotensin II receptor blocker. Patient is on a statin.       Nancy Goodman  is here for follow-up of hypertension of several years duration. She is not exercising and is not adherent to a low-salt diet. Patient does not check her blood pressure.  She is compliant with meds.  She notes that she is dizzy at night when she wakes up in there middle of the night.  It does not happen in the day time.  She is on metoprolol 25 BID and avapro 150 QHs        Nancy Goodman returns today for follow up of Hyperlipidemia  Nancy indicates her exercise level as not at all.  Diet: tries to eat healthy  She cannot tolerate statins  Pt is not due for labs      The following portions of the patient's history were reviewed and updated as appropriate: allergies, current medications, past family history, past medical history, past social history, past surgical history and problem list.    Review of Systems   Constitutional: Negative.    Cardiovascular: Negative for chest pain.   Neurological: Positive for dizziness.       Objective   Physical Exam  Vitals signs and nursing note reviewed.   Constitutional:       General: She is not in acute distress.     Appearance: Normal appearance. She is  well-developed.   Cardiovascular:      Rate and Rhythm: Normal rate and regular rhythm.      Heart sounds: Murmur present. Systolic murmur present with a grade of 2/6.   Pulmonary:      Effort: Pulmonary effort is normal.      Breath sounds: Normal breath sounds.   Neurological:      Mental Status: She is alert and oriented to person, place, and time.   Psychiatric:         Mood and Affect: Mood normal.         Behavior: Behavior normal.         Thought Content: Thought content normal.         Judgment: Judgment normal.         Assessment/Plan   Diagnoses and all orders for this visit:    1. Type 2 diabetes mellitus without complication, with long-term current use of insulin (CMS/[facility]) (Primary)  -     Comprehensive Metabolic Panel  -     Hemoglobin A1c  -     metFORMIN (Glucophage) 500 MG tablet; Take 1 tablet by mouth 2 (Two) Times a Day With Meals.  Dispense: 180 tablet; Refill: 1  -     Insulin Glargine (LANTUS SOLOSTAR) 100 UNIT/ML injection pen; Inject 45 Units under the skin into the appropriate area as directed Daily.  Dispense: 15 pen; Refill: 1    2. Essential hypertension  -     CBC & Differential  -     Comprehensive Metabolic Panel  -     irbesartan (Avapro) 75 MG tablet; Take 1 tablet by mouth 2 (two) times a day.  Dispense: 180 tablet; Refill: 1    3. Mixed hyperlipidemia  -     Comprehensive Metabolic Panel  -     Lipid Panel    4. Dizziness    5. Coronary artery disease involving native coronary artery of native heart without angina pectoris  -     Discontinue: metoprolol tartrate (LOPRESSOR) 25 MG tablet; Take 1 tablet by mouth 2 (Two) Times a Day.  Dispense: 180 tablet; Refill: 1  -     clopidogrel (PLAVIX) 75 MG tablet; Take 1 tablet by mouth Daily.  Dispense: 30 tablet; Refill: 6  -     metoprolol tartrate (LOPRESSOR) 25 MG tablet; Take 1 tablet by mouth 2 (Two) Times a Day.  Dispense: 180 tablet; Refill: 1    Other orders  -     potassium chloride (K-DUR,KLOR-CON) 10 MEQ CR tablet; Take 1 tablet  by mouth Daily.  Dispense: 90 tablet; Refill: 1    DM control is not to goal but her fasting AM glucose levels are good.  Will continue lantus 42 and metformin and rehcek labs today  BP is doing well, will change avapro to 75 BID to se if night time dizziness can be imrpoves.  Will try epley maneuvers for dizziness and pt to f/u INB

## 2021-02-24 DIAGNOSIS — E78.2 MIXED HYPERLIPIDEMIA: Primary | ICD-10-CM

## 2021-02-24 LAB
ALBUMIN SERPL-MCNC: 4.3 G/DL (ref 3.5–5.2)
ALBUMIN/GLOB SERPL: 1.7 G/DL
ALP SERPL-CCNC: 53 U/L (ref 39–117)
ALT SERPL-CCNC: 16 U/L (ref 1–33)
AST SERPL-CCNC: 18 U/L (ref 1–32)
BASOPHILS # BLD AUTO: 0.07 10*3/MM3 (ref 0–0.2)
BASOPHILS NFR BLD AUTO: 1.1 % (ref 0–1.5)
BILIRUB SERPL-MCNC: 0.3 MG/DL (ref 0–1.2)
BUN SERPL-MCNC: 21 MG/DL (ref 8–23)
BUN/CREAT SERPL: 20 (ref 7–25)
CALCIUM SERPL-MCNC: 10.2 MG/DL (ref 8.6–10.5)
CHLORIDE SERPL-SCNC: 102 MMOL/L (ref 98–107)
CHOLEST SERPL-MCNC: 247 MG/DL (ref 0–200)
CO2 SERPL-SCNC: 30.8 MMOL/L (ref 22–29)
CREAT SERPL-MCNC: 1.05 MG/DL (ref 0.57–1)
EOSINOPHIL # BLD AUTO: 0.21 10*3/MM3 (ref 0–0.4)
EOSINOPHIL NFR BLD AUTO: 3.4 % (ref 0.3–6.2)
ERYTHROCYTE [DISTWIDTH] IN BLOOD BY AUTOMATED COUNT: 13.6 % (ref 12.3–15.4)
GLOBULIN SER CALC-MCNC: 2.6 GM/DL
GLUCOSE SERPL-MCNC: 110 MG/DL (ref 65–99)
HBA1C MFR BLD: 7.3 % (ref 4.8–5.6)
HCT VFR BLD AUTO: 44.2 % (ref 34–46.6)
HDLC SERPL-MCNC: 36 MG/DL (ref 40–60)
HGB BLD-MCNC: 14.5 G/DL (ref 12–15.9)
IMM GRANULOCYTES # BLD AUTO: 0.01 10*3/MM3 (ref 0–0.05)
IMM GRANULOCYTES NFR BLD AUTO: 0.2 % (ref 0–0.5)
LDLC SERPL CALC-MCNC: 153 MG/DL (ref 0–100)
LYMPHOCYTES # BLD AUTO: 1.91 10*3/MM3 (ref 0.7–3.1)
LYMPHOCYTES NFR BLD AUTO: 31.4 % (ref 19.6–45.3)
MCH RBC QN AUTO: 31.7 PG (ref 26.6–33)
MCHC RBC AUTO-ENTMCNC: 32.8 G/DL (ref 31.5–35.7)
MCV RBC AUTO: 96.5 FL (ref 79–97)
MONOCYTES # BLD AUTO: 0.46 10*3/MM3 (ref 0.1–0.9)
MONOCYTES NFR BLD AUTO: 7.6 % (ref 5–12)
NEUTROPHILS # BLD AUTO: 3.43 10*3/MM3 (ref 1.7–7)
NEUTROPHILS NFR BLD AUTO: 56.3 % (ref 42.7–76)
NRBC BLD AUTO-RTO: 0 /100 WBC (ref 0–0.2)
PLATELET # BLD AUTO: 160 10*3/MM3 (ref 140–450)
POTASSIUM SERPL-SCNC: 4.9 MMOL/L (ref 3.5–5.2)
PROT SERPL-MCNC: 6.9 G/DL (ref 6–8.5)
RBC # BLD AUTO: 4.58 10*6/MM3 (ref 3.77–5.28)
SODIUM SERPL-SCNC: 141 MMOL/L (ref 136–145)
TRIGL SERPL-MCNC: 314 MG/DL (ref 0–150)
VLDLC SERPL CALC-MCNC: 58 MG/DL (ref 5–40)
WBC # BLD AUTO: 6.09 10*3/MM3 (ref 3.4–10.8)

## 2021-02-24 RX ORDER — ROSUVASTATIN CALCIUM 5 MG/1
5 TABLET, COATED ORAL DAILY
Qty: 30 TABLET | Refills: 5 | Status: SHIPPED | OUTPATIENT
Start: 2021-02-24 | End: 2021-09-14 | Stop reason: SDUPTHER

## 2021-03-24 RX ORDER — FUROSEMIDE 20 MG/1
20 TABLET ORAL DAILY PRN
Qty: 90 TABLET | Refills: 0 | Status: SHIPPED | OUTPATIENT
Start: 2021-03-24 | End: 2021-06-30 | Stop reason: SDUPTHER

## 2021-03-24 NOTE — TELEPHONE ENCOUNTER
Caller: Nancy Goodman    Relationship: Self    Best call back number: [phone]   Medication needed:   Requested Prescriptions     Pending Prescriptions Disp Refills   • furosemide (LASIX) 20 MG tablet 90 tablet 0     Sig: Take 1 tablet by mouth Daily As Needed (swelling).       When do you need the refill by: ASAP    What additional details did the patient provide when requesting the medication: 2 DAYS LEFT    Does the patient have less than a 3 day supply:  [x] Yes  [] No    What is the patient's preferred pharmacy: [facility] -  [place], [place]  [address] [facility] [phone] [facility] [phone]

## 2021-05-24 ENCOUNTER — OFFICE VISIT (OUTPATIENT)
Dept: FAMILY MEDICINE CLINIC | Facility: CLINIC | Age: 81
End: 2021-05-24

## 2021-05-24 VITALS
HEART RATE: 76 BPM | TEMPERATURE: 97.6 F | BODY MASS INDEX: 28.89 KG/M2 | SYSTOLIC BLOOD PRESSURE: 132 MMHG | RESPIRATION RATE: 18 BRPM | HEIGHT: 62 IN | WEIGHT: 157 LBS | DIASTOLIC BLOOD PRESSURE: 80 MMHG

## 2021-05-24 DIAGNOSIS — E78.2 MIXED HYPERLIPIDEMIA: ICD-10-CM

## 2021-05-24 DIAGNOSIS — I10 ESSENTIAL HYPERTENSION: ICD-10-CM

## 2021-05-24 DIAGNOSIS — Z79.4 TYPE 2 DIABETES MELLITUS WITHOUT COMPLICATION, WITH LONG-TERM CURRENT USE OF INSULIN (HCC): Primary | ICD-10-CM

## 2021-05-24 DIAGNOSIS — E11.9 TYPE 2 DIABETES MELLITUS WITHOUT COMPLICATION, WITH LONG-TERM CURRENT USE OF INSULIN (HCC): Primary | ICD-10-CM

## 2021-05-24 LAB
ALBUMIN SERPL-MCNC: 4.4 G/DL (ref 3.5–5.2)
ALBUMIN/GLOB SERPL: 2.1 G/DL
ALP SERPL-CCNC: 57 U/L (ref 39–117)
ALT SERPL-CCNC: 14 U/L (ref 1–33)
AST SERPL-CCNC: 15 U/L (ref 1–32)
BASOPHILS # BLD AUTO: 0.06 10*3/MM3 (ref 0–0.2)
BASOPHILS NFR BLD AUTO: 1.1 % (ref 0–1.5)
BILIRUB SERPL-MCNC: 0.3 MG/DL (ref 0–1.2)
BUN SERPL-MCNC: 14 MG/DL (ref 8–23)
BUN/CREAT SERPL: 14.4 (ref 7–25)
CALCIUM SERPL-MCNC: 10 MG/DL (ref 8.6–10.5)
CHLORIDE SERPL-SCNC: 106 MMOL/L (ref 98–107)
CO2 SERPL-SCNC: 28 MMOL/L (ref 22–29)
CREAT SERPL-MCNC: 0.97 MG/DL (ref 0.57–1)
EOSINOPHIL # BLD AUTO: 0.33 10*3/MM3 (ref 0–0.4)
EOSINOPHIL NFR BLD AUTO: 6.1 % (ref 0.3–6.2)
ERYTHROCYTE [DISTWIDTH] IN BLOOD BY AUTOMATED COUNT: 13 % (ref 12.3–15.4)
GLOBULIN SER CALC-MCNC: 2.1 GM/DL
GLUCOSE SERPL-MCNC: 115 MG/DL (ref 65–99)
HBA1C MFR BLD: 7.3 % (ref 4.8–5.6)
HCT VFR BLD AUTO: 41.3 % (ref 34–46.6)
HGB BLD-MCNC: 13.8 G/DL (ref 12–15.9)
IMM GRANULOCYTES # BLD AUTO: 0.01 10*3/MM3 (ref 0–0.05)
IMM GRANULOCYTES NFR BLD AUTO: 0.2 % (ref 0–0.5)
LYMPHOCYTES # BLD AUTO: 1.86 10*3/MM3 (ref 0.7–3.1)
LYMPHOCYTES NFR BLD AUTO: 34.5 % (ref 19.6–45.3)
MCH RBC QN AUTO: 30.9 PG (ref 26.6–33)
MCHC RBC AUTO-ENTMCNC: 33.4 G/DL (ref 31.5–35.7)
MCV RBC AUTO: 92.6 FL (ref 79–97)
MONOCYTES # BLD AUTO: 0.53 10*3/MM3 (ref 0.1–0.9)
MONOCYTES NFR BLD AUTO: 9.8 % (ref 5–12)
NEUTROPHILS # BLD AUTO: 2.6 10*3/MM3 (ref 1.7–7)
NEUTROPHILS NFR BLD AUTO: 48.3 % (ref 42.7–76)
NRBC BLD AUTO-RTO: 0 /100 WBC (ref 0–0.2)
PLATELET # BLD AUTO: 166 10*3/MM3 (ref 140–450)
POTASSIUM SERPL-SCNC: 4.5 MMOL/L (ref 3.5–5.2)
PROT SERPL-MCNC: 6.5 G/DL (ref 6–8.5)
RBC # BLD AUTO: 4.46 10*6/MM3 (ref 3.77–5.28)
SODIUM SERPL-SCNC: 143 MMOL/L (ref 136–145)
WBC # BLD AUTO: 5.39 10*3/MM3 (ref 3.4–10.8)

## 2021-05-24 PROCEDURE — 99214 OFFICE O/P EST MOD 30 MIN: CPT | Performed by: FAMILY MEDICINE

## 2021-05-24 NOTE — PROGRESS NOTES
Subjective   Nancy Goodman is a 80 y.o. female.     History of Present Illness     Diabetes Mellitus Type II, Follow-up:   Nancy Goodman is a 80 y.o. female who is here for follow-up of Type 2 diabetes mellitus.  Current symptoms/problems include none and have been stable. Patient is adherent with medications.  Known diabetic complications: none  Cardiovascular risk factors: advanced age (older than 55 for men, 65 for women), diabetes mellitus, dyslipidemia and hypertension  Current diabetic medications include lantus 42 units and metformin.  taking some cinnamon.   Current monitoring regimen: home blood tests - daily  Home blood sugar records: fasting range: has not been checking very often at all  Any episodes of hypoglycemia? no  Eye exam current (within one year): yes  She is on ACE inhibitor or angiotensin II receptor blocker. Patient is on a statin.       Nancy Goodman  is here for follow-up of hypertension of several years duration. She is not exercising and is not adherent to a low-salt diet. Patient does not check her blood pressure.  She is compliant with meds.        Nancy Goodman returns today for follow up of Hyperlipidemia  Nancy indicates her exercise level as 3 times a week.  Diet: not eating as well with recent travel  Patient is not compliant with medications, she simply did not try the crestor again due to issues with SE in the past  Pt is not due for labs        Review of Systems   Constitutional: Negative.    Psychiatric/Behavioral: Negative.        Objective   Physical Exam  Vitals and nursing note reviewed.   Constitutional:       General: She is not in acute distress.     Appearance: Normal appearance. She is well-developed.   Cardiovascular:      Rate and Rhythm: Normal rate and regular rhythm.      Heart sounds: Normal heart sounds.   Pulmonary:      Effort: Pulmonary effort is normal.      Breath sounds: Normal breath sounds.   Neurological:       Mental Status: She is alert and oriented to person, place, and time.   Psychiatric:         Mood and Affect: Mood normal.         Behavior: Behavior normal.         Thought Content: Thought content normal.         Judgment: Judgment normal.         Assessment/Plan   Diagnoses and all orders for this visit:    1. Type 2 diabetes mellitus without complication, with long-term current use of insulin (CMS/[facility]) (Primary)  -     CBC & Differential  -     Comprehensive Metabolic Panel  -     Hemoglobin A1c    2. Essential hypertension  -     CBC & Differential  -     Comprehensive Metabolic Panel    3. Mixed hyperlipidemia  -     Comprehensive Metabolic Panel    she is going to try to exercise more regularly in the future.  Will continue lantus and metformin but she has not increased the dose as recommended in the past,  BP doing well on avapro and lopressor  She did not start the crestor 5 mg as she was worried about SE to this medicine at 10 mg does in the past

## 2021-06-30 DIAGNOSIS — E11.9 TYPE 2 DIABETES MELLITUS WITHOUT COMPLICATION, WITH LONG-TERM CURRENT USE OF INSULIN (HCC): ICD-10-CM

## 2021-06-30 DIAGNOSIS — Z79.4 TYPE 2 DIABETES MELLITUS WITHOUT COMPLICATION, WITH LONG-TERM CURRENT USE OF INSULIN (HCC): ICD-10-CM

## 2021-06-30 RX ORDER — FUROSEMIDE 20 MG/1
20 TABLET ORAL DAILY PRN
Qty: 90 TABLET | Refills: 0 | Status: SHIPPED | OUTPATIENT
Start: 2021-06-30 | End: 2021-08-04 | Stop reason: SDUPTHER

## 2021-06-30 NOTE — TELEPHONE ENCOUNTER
Caller: Nancy Goodman    Relationship: Self    Best call back number: [phone]    Medication needed:   Requested Prescriptions     Pending Prescriptions Disp Refills   • furosemide (LASIX) 20 MG tablet 90 tablet 0     Sig: Take 1 tablet by mouth Daily As Needed (swelling).   • metFORMIN (Glucophage) 500 MG tablet 180 tablet 1     Sig: Take 1 tablet by mouth 2 (Two) Times a Day With Meals.       When do you need the refill by: SOON    What additional details did the patient provide when requesting the medication: PATIENT WILL  ON Tuesday, July 6.    Does the patient have less than a 3 day supply:  [] Yes  [x] No    What is the patient's preferred pharmacy: [facility] -  [place], [place] - [address] [facility] [phone] [facility] [phone]

## 2021-07-22 ENCOUNTER — HOSPITAL ENCOUNTER (INPATIENT)
Facility: HOSPITAL | Age: 81
LOS: 4 days | Discharge: HOME | End: 2021-07-26
Attending: INTERNAL MEDICINE | Admitting: INTERNAL MEDICINE

## 2021-07-22 DIAGNOSIS — K80.50 COLIC, BILIARY: Primary | ICD-10-CM

## 2021-07-22 DIAGNOSIS — K81.9 CHOLECYSTITIS: ICD-10-CM

## 2021-07-22 DIAGNOSIS — K80.50 CHOLEDOCHOLITHIASIS: ICD-10-CM

## 2021-07-22 PROBLEM — J81.1 PULMONARY EDEMA: Status: RESOLVED | Noted: 2019-04-09 | Resolved: 2021-07-22

## 2021-07-22 LAB
ALBUMIN SERPL-MCNC: 3 G/DL (ref 3.5–5.2)
ALBUMIN/GLOB SERPL: 1.7 G/DL
ALP SERPL-CCNC: 77 U/L (ref 39–117)
ALT SERPL W P-5'-P-CCNC: 249 U/L (ref 1–33)
ANION GAP SERPL CALCULATED.3IONS-SCNC: 13 MMOL/L (ref 5–15)
AST SERPL-CCNC: 173 U/L (ref 1–32)
BILIRUB SERPL-MCNC: 3.1 MG/DL (ref 0–1.2)
BUN SERPL-MCNC: 13 MG/DL (ref 8–23)
BUN/CREAT SERPL: 12.9 (ref 7–25)
CALCIUM SPEC-SCNC: 8.3 MG/DL (ref 8.6–10.5)
CHLORIDE SERPL-SCNC: 105 MMOL/L (ref 98–107)
CO2 SERPL-SCNC: 20 MMOL/L (ref 22–29)
CREAT SERPL-MCNC: 1.01 MG/DL (ref 0.57–1)
GFR SERPL CREATININE-BSD FRML MDRD: 53 ML/MIN/1.73
GLOBULIN UR ELPH-MCNC: 1.8 GM/DL
GLUCOSE BLDC GLUCOMTR-MCNC: 139 MG/DL (ref 70–130)
GLUCOSE BLDC GLUCOMTR-MCNC: 172 MG/DL (ref 70–130)
GLUCOSE SERPL-MCNC: 96 MG/DL (ref 65–99)
LIPASE SERPL-CCNC: 38 U/L (ref 13–60)
POTASSIUM SERPL-SCNC: 3.7 MMOL/L (ref 3.5–5.2)
PROT SERPL-MCNC: 4.8 G/DL (ref 6–8.5)
SARS-COV-2 RNA RESP QL NAA+PROBE: NOT DETECTED
SODIUM SERPL-SCNC: 138 MMOL/L (ref 136–145)

## 2021-07-22 PROCEDURE — C1760 CLOSURE DEV, VASC: HCPCS | Performed by: INTERNAL MEDICINE

## 2021-07-22 PROCEDURE — S0260 H&P FOR SURGERY: HCPCS | Performed by: PHYSICIAN ASSISTANT

## 2021-07-22 PROCEDURE — C1769 GUIDE WIRE: HCPCS | Performed by: INTERNAL MEDICINE

## 2021-07-22 PROCEDURE — 4A023N7 MEASUREMENT OF CARDIAC SAMPLING AND PRESSURE, LEFT HEART, PERCUTANEOUS APPROACH: ICD-10-PCS | Performed by: INTERNAL MEDICINE

## 2021-07-22 PROCEDURE — 25010000002 FENTANYL CITRATE (PF) 50 MCG/ML SOLUTION: Performed by: INTERNAL MEDICINE

## 2021-07-22 PROCEDURE — 93455 CORONARY ART/GRFT ANGIO S&I: CPT | Performed by: INTERNAL MEDICINE

## 2021-07-22 PROCEDURE — B2181ZZ FLUOROSCOPY OF LEFT INTERNAL MAMMARY BYPASS GRAFT USING LOW OSMOLAR CONTRAST: ICD-10-PCS | Performed by: INTERNAL MEDICINE

## 2021-07-22 PROCEDURE — B2131ZZ FLUOROSCOPY OF MULTIPLE CORONARY ARTERY BYPASS GRAFTS USING LOW OSMOLAR CONTRAST: ICD-10-PCS | Performed by: INTERNAL MEDICINE

## 2021-07-22 PROCEDURE — B2111ZZ FLUOROSCOPY OF MULTIPLE CORONARY ARTERIES USING LOW OSMOLAR CONTRAST: ICD-10-PCS | Performed by: INTERNAL MEDICINE

## 2021-07-22 PROCEDURE — U0005 INFEC AGEN DETEC AMPLI PROBE: HCPCS | Performed by: NURSE PRACTITIONER

## 2021-07-22 PROCEDURE — 82962 GLUCOSE BLOOD TEST: CPT

## 2021-07-22 PROCEDURE — 99222 1ST HOSP IP/OBS MODERATE 55: CPT | Performed by: FAMILY MEDICINE

## 2021-07-22 PROCEDURE — C1894 INTRO/SHEATH, NON-LASER: HCPCS | Performed by: INTERNAL MEDICINE

## 2021-07-22 PROCEDURE — 99223 1ST HOSP IP/OBS HIGH 75: CPT | Performed by: NURSE PRACTITIONER

## 2021-07-22 PROCEDURE — 80053 COMPREHEN METABOLIC PANEL: CPT | Performed by: INTERNAL MEDICINE

## 2021-07-22 PROCEDURE — 83690 ASSAY OF LIPASE: CPT | Performed by: INTERNAL MEDICINE

## 2021-07-22 PROCEDURE — U0003 INFECTIOUS AGENT DETECTION BY NUCLEIC ACID (DNA OR RNA); SEVERE ACUTE RESPIRATORY SYNDROME CORONAVIRUS 2 (SARS-COV-2) (CORONAVIRUS DISEASE [COVID-19]), AMPLIFIED PROBE TECHNIQUE, MAKING USE OF HIGH THROUGHPUT TECHNOLOGIES AS DESCRIBED BY CMS-2020-01-R: HCPCS | Performed by: NURSE PRACTITIONER

## 2021-07-22 PROCEDURE — 0 IOPAMIDOL PER 1 ML: Performed by: INTERNAL MEDICINE

## 2021-07-22 PROCEDURE — 99222 1ST HOSP IP/OBS MODERATE 55: CPT | Performed by: INTERNAL MEDICINE

## 2021-07-22 PROCEDURE — 25010000002 PIPERACILLIN SOD-TAZOBACTAM PER 1 G: Performed by: FAMILY MEDICINE

## 2021-07-22 PROCEDURE — 25010000002 MIDAZOLAM PER 1 MG: Performed by: INTERNAL MEDICINE

## 2021-07-22 RX ORDER — LIDOCAINE HYDROCHLORIDE 10 MG/ML
INJECTION, SOLUTION EPIDURAL; INFILTRATION; INTRACAUDAL; PERINEURAL AS NEEDED
Status: DISCONTINUED | OUTPATIENT
Start: 2021-07-22 | End: 2021-07-22 | Stop reason: HOSPADM

## 2021-07-22 RX ORDER — DIPHENHYDRAMINE HYDROCHLORIDE 50 MG/ML
25 INJECTION INTRAMUSCULAR; INTRAVENOUS EVERY 6 HOURS PRN
Status: DISCONTINUED | OUTPATIENT
Start: 2021-07-22 | End: 2021-07-26 | Stop reason: HOSPADM

## 2021-07-22 RX ORDER — DEXTROSE MONOHYDRATE 25 G/50ML
25 INJECTION, SOLUTION INTRAVENOUS
Status: DISCONTINUED | OUTPATIENT
Start: 2021-07-22 | End: 2021-07-26 | Stop reason: HOSPADM

## 2021-07-22 RX ORDER — ROSUVASTATIN CALCIUM 10 MG/1
5 TABLET, COATED ORAL NIGHTLY
Status: DISCONTINUED | OUTPATIENT
Start: 2021-07-22 | End: 2021-07-26 | Stop reason: HOSPADM

## 2021-07-22 RX ORDER — NICOTINE POLACRILEX 4 MG
15 LOZENGE BUCCAL
Status: DISCONTINUED | OUTPATIENT
Start: 2021-07-22 | End: 2021-07-26 | Stop reason: HOSPADM

## 2021-07-22 RX ORDER — FENTANYL CITRATE 50 UG/ML
INJECTION, SOLUTION INTRAMUSCULAR; INTRAVENOUS AS NEEDED
Status: DISCONTINUED | OUTPATIENT
Start: 2021-07-22 | End: 2021-07-22 | Stop reason: HOSPADM

## 2021-07-22 RX ORDER — ACETAMINOPHEN 325 MG/1
650 TABLET ORAL EVERY 4 HOURS PRN
Status: DISCONTINUED | OUTPATIENT
Start: 2021-07-22 | End: 2021-07-26 | Stop reason: HOSPADM

## 2021-07-22 RX ORDER — SODIUM CHLORIDE 9 MG/ML
3 INJECTION, SOLUTION INTRAVENOUS CONTINUOUS
Status: ACTIVE | OUTPATIENT
Start: 2021-07-22 | End: 2021-07-22

## 2021-07-22 RX ORDER — ASPIRIN 81 MG/1
81 TABLET ORAL EVERY MORNING
Status: DISCONTINUED | OUTPATIENT
Start: 2021-07-23 | End: 2021-07-26 | Stop reason: HOSPADM

## 2021-07-22 RX ORDER — SODIUM CHLORIDE, SODIUM LACTATE, POTASSIUM CHLORIDE, CALCIUM CHLORIDE 600; 310; 30; 20 MG/100ML; MG/100ML; MG/100ML; MG/100ML
30 INJECTION, SOLUTION INTRAVENOUS CONTINUOUS PRN
Status: CANCELLED | OUTPATIENT
Start: 2021-07-22

## 2021-07-22 RX ORDER — ALPRAZOLAM 0.25 MG/1
0.25 TABLET ORAL 3 TIMES DAILY PRN
Status: DISCONTINUED | OUTPATIENT
Start: 2021-07-22 | End: 2021-07-26 | Stop reason: HOSPADM

## 2021-07-22 RX ORDER — PANTOPRAZOLE SODIUM 40 MG/1
40 TABLET, DELAYED RELEASE ORAL EVERY MORNING
Status: DISCONTINUED | OUTPATIENT
Start: 2021-07-23 | End: 2021-07-26 | Stop reason: HOSPADM

## 2021-07-22 RX ORDER — MIDAZOLAM HYDROCHLORIDE 1 MG/ML
INJECTION INTRAMUSCULAR; INTRAVENOUS AS NEEDED
Status: DISCONTINUED | OUTPATIENT
Start: 2021-07-22 | End: 2021-07-22 | Stop reason: HOSPADM

## 2021-07-22 RX ORDER — FUROSEMIDE 20 MG/1
20 TABLET ORAL DAILY PRN
Status: DISCONTINUED | OUTPATIENT
Start: 2021-07-22 | End: 2021-07-26 | Stop reason: HOSPADM

## 2021-07-22 RX ADMIN — SODIUM CHLORIDE 3 ML/KG/HR: 9 INJECTION, SOLUTION INTRAVENOUS at 13:52

## 2021-07-22 RX ADMIN — ROSUVASTATIN CALCIUM 5 MG: 10 TABLET, COATED ORAL at 20:49

## 2021-07-22 RX ADMIN — FUROSEMIDE 20 MG: 20 TABLET ORAL at 21:01

## 2021-07-22 RX ADMIN — TAZOBACTAM SODIUM AND PIPERACILLIN SODIUM 3.38 G: 375; 3 INJECTION, SOLUTION INTRAVENOUS at 15:05

## 2021-07-22 RX ADMIN — METOPROLOL TARTRATE 25 MG: 25 TABLET, FILM COATED ORAL at 20:49

## 2021-07-22 RX ADMIN — TAZOBACTAM SODIUM AND PIPERACILLIN SODIUM 3.38 G: 375; 3 INJECTION, SOLUTION INTRAVENOUS at 20:50

## 2021-07-22 NOTE — CONSULTS
[facility] Medicine Services  CONSULT NOTE      Patient Name: Nancy Goodman  : 1940  MRN: [id number]    Primary Care Physician: Eyal Cervantes MD  Provider requesting consultation: Fidel Valdez MD    Subjective   Subjective     Reason for Consultation:  Medical Management     HPI:  Nancy Goodman is a 80 y.o. female with PMHx of MVCAD s/p CABG, DM, arthritis, HTN, HLD, diastolic CHF, aortic stenosis s/p TAVR who was transferred from [facility] for NSTEMI. She initially presented to OSH with abdominal pain, RUQ, described as pressure that started after eating. Pt reports the pain radiates substernal into chest and R flank. Admits to nausea, no vomiting. Also some diarrhea. Pt denies light colored stool or dark urine. She had elevated LFTs and lipase at OSH.  CT abdomen and pelvis outside hospital showed biliary ductal dilatation secondary to choledocholithiasis.  Mild gallbladder wall thickening of uncertain etiology or significance.  2 stones noted in the distal common bile duct.  In regards to NSTEMI, patient was taken to the Cath Lab by Dr. Valdez.  Patient noted to have occluded LAD with patent LIMA graft.  Widely patent SVG to diagonal and left circumflex. Occluded right PDA and occluded vein graft to the right PDA. 95% proliferative in-stent/segment restenosis in the right posterior lateral branch.  Recommends to optimize medical management.  Hospital medicine consulted for elevated LFTs and choledocholithiasis.      Review of Systems   Constitutional: Negative for activity change, chills, fatigue and fever.   HENT: Negative for congestion, rhinorrhea and sore throat.    Eyes: Negative for visual disturbance.   Respiratory: Negative for cough and wheezing.    Cardiovascular: Positive for chest pain. Negative for palpitations and leg swelling.   Gastrointestinal: Positive for abdominal pain, diarrhea and nausea. Negative for constipation and  vomiting.   Genitourinary: Negative for dysuria and urgency.   Musculoskeletal: Negative for back pain, gait problem and myalgias.   Skin: Negative for rash.   Neurological: Negative for dizziness, weakness, light-headedness and headaches.   Psychiatric/Behavioral: Negative for confusion.       Personal History     Past Medical History:   Diagnosis Date   • Allergic rhinitis    • Arthritis    • Cellulitis of finger    • GERD (gastroesophageal reflux disease)    • PAT (paroxysmal atrial tachycardia) (CMS/[facility])    • Pulmonary edema 4/9/2019   • Rosacea    • Vitamin D deficiency    • Wears dentures    • Wears eyeglasses        Past Surgical History:   Procedure Laterality Date   • AORTIC VALVE REPAIR/REPLACEMENT N/A 5/13/2019    Procedure: TRANSCATHETER AORTIC VALVE REPLACEMENT, ELIZABETH;  Surgeon: Fidel Calvillo MD;  Location:  [facility] HYBRID OR 15;  Service: Cardiothoracic   • AORTIC VALVE REPAIR/REPLACEMENT N/A 5/13/2019    Procedure: Transapical Transcatheter Aortic Valve Replacement;  Surgeon: Anushka Hankins MD;  Location: [facility] HYBRID OR 15;  Service: Cardiovascular   • CARDIAC CATHETERIZATION N/A 5/16/2017    Procedure: Left Heart Cath;  Surgeon: Fidel Valdez MD;  Location: [facility] CATH INVASIVE LOCATION;  Service:    • CARDIAC CATHETERIZATION Left 4/23/2019    Procedure: Cardiac Catheterization/Vascular Study;  Surgeon: Anushka Hankins MD;  Location: [facility] CATH INVASIVE LOCATION;  Service: Cardiovascular   • CORONARY ARTERY BYPASS GRAFT N/A 5/24/2017    Procedure: CORONARY ARTERY BYPASS x  5 WITH INTERNAL MAMMARY ARTERY GRAFT and EVH of the Right leg;  Surgeon: Fidel Calvillo MD;  Location:  [facility] OR;  Service:    • FINGER NAIL SURGERY     • TUBAL ABDOMINAL LIGATION     • VARICOSE VEIN SURGERY         Family History:  family history includes Cancer in her mother; Heart attack in her father; No Known Problems in her maternal grandfather and maternal grandmother. Otherwise pertinent FHx was  reviewed and unremarkable.     Social History:  reports that she has never smoked. She has never used smokeless tobacco. She reports that she does not drink alcohol and does not use drugs.    Medications:  Cranberry, Insulin Glargine, Insulin Pen Needle, Lancets, NON FORMULARY, Probiotic Product, aspirin, clopidogrel, furosemide, glucose blood, irbesartan, metFORMIN, metoprolol tartrate, omeprazole, potassium chloride, and rosuvastatin    Scheduled Meds:[START ON 7/23/2021] aspirin, 81 mg, Oral, QAM  metFORMIN, 500 mg, Oral, BID With Meals  metoprolol tartrate, 25 mg, Oral, BID  [START ON 7/23/2021] pantoprazole, 40 mg, Oral, QAM  rosuvastatin, 5 mg, Oral, Daily      Continuous Infusions:sodium chloride, 3 mL/kg/hr, Last Rate: 3 mL/kg/hr (07/22/21 1352)      PRN Meds:.•  acetaminophen  •  ALPRAZolam  •  diphenhydrAMINE  •  furosemide    Allergies   Allergen Reactions   • Amlodipine GI Intolerance     sickness   • Benazepril Nausea Only   • Ciprofloxacin GI Intolerance     Pt not sure of reaction (stomach problems)   • Pokeweed [Phytolacca] Swelling and Rash   • Statins Nausea Only     Stomach problem       Objective   Objective     Vital Signs:   Temp:  [97.9 °F (36.6 °C)] 97.9 °F (36.6 °C)  Heart Rate:  [73-80] 73  Resp:  [18-19] 18  BP: (114-160)/(62-84) 137/63    Physical Exam  Constitutional: Awake, alert, NAD, non-toxic   Eyes: PERRLA, sclerae anicteric, no conjunctival injection  HENT: NCAT, mucous membranes moist  Neck: Supple, no thyromegaly, no lymphadenopathy, trachea midline  Respiratory: Clear to auscultation bilaterally, nonlabored respirations   Cardiovascular: RRR, +murmur, No rubs or gallops, palpable pedal pulses bilaterally  Gastrointestinal: Positive bowel sounds, soft, non-distended, +mild TTP epigastric region  Musculoskeletal: No bilateral ankle edema, no clubbing or cyanosis to extremities  Psychiatric: Appropriate affect, cooperative  Neurologic: Oriented x 3, strength symmetric in all  extremities, Cranial Nerves grossly intact to confrontation, speech clear  Skin: No rashes       Result Review:  I have personally reviewed the results from the time of admission and agree with these findings:  [x]  Laboratory  [x]  Radiology  []  EKG/Telemetry   []  Pathology  [x]  Old records  []  Other:    LAB RESULTS:                              Brief Urine Lab Results  (Last result in the past 365 days)      Color   Clarity   Blood   Leuk Est   Nitrite   Protein   CREAT   Urine HCG        11/16/20 0939             100           Microbiology Results (last 10 days)     ** No results found for the last 240 hours. **          Cardiac Catheterization/Vascular Study    Result Date: 7/22/2021  · Occluded LAD with patent LIMA graft · Widely patent SVG to diagonal, and left circumflex · Occluded right PDA and occluded vein graft to the right PDA · 95% proliferative in-stent/segment restenosis in the right posterior lateral branch.        Results for orders placed during the hospital encounter of 06/03/20    Adult Transthoracic Echo Complete W/ Cont if Necessary Per Protocol    Interpretation Summary  · Estimated EF = 70%.  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       NSTEMI  MVCAD s/p CABG   History of Aortic valve Stenosis s/p TAVR   Chronic Diastolic HF  -S/P C today with Dr Valdez  -Patient noted to have occluded LAD with patent LIMA graft.  Widely patent SVG to diagonal and left circumflex. Occluded right PDA  and occluded vein graft to the right PDA. 95% proliferative in-stent/segment restenosis in the right posterior lateral branch.  Recommends to optimize medical management.   -Continue ASA/Statin/BB    Choledocholithiasis   -CT at OSH with 2 stones noted in distal CBD, mild GB wall thickening  -Consult to GI for ERCP  -CMP at OSH with ALT 53, ALT 68, ALP 67, Lipase 296  -Repeat CMP and lipase pending   -NPO at midnight   -Zosyn for now   -May need CCY after ERCP, will defer gen surg consult for now     HLD  -Continue statin  -Lipid panel in AM    HTN  -Continue home medications     DM2  -Hold Metformin since she received contrast for C  -LDSSI  -Obtain Hga1c     Thank you for allowing [facility] Medicine Service to provide consultative care for your patient, we will continue to follow while clinically appropriate.    Althea Rae,   07/22/21

## 2021-07-22 NOTE — CASE MANAGEMENT/SOCIAL WORK
Discharge Planning Assessment  [facility]     Patient Name: Nancy Goodman  MRN: [id number]  Today's Date: 7/22/2021    Admit Date: 7/22/2021    Discharge Needs Assessment     Row Name 07/22/21 1544       Living Environment    Lives With  spouse    Current Living Arrangements  home/apartment/condo    Primary Care Provided by  self    Provides Primary Care For  no one    Family Caregiver if Needed  spouse;child(juana), adult    Able to Return to Prior Arrangements  yes       Resource/Environmental Concerns    Resource/Environmental Concerns  none       Transition Planning    Patient/Family Anticipates Transition to  home with family    Patient/Family Anticipated Services at Transition      Transportation Anticipated  family or friend will provide       Discharge Needs Assessment    Readmission Within the Last 30 Days  no previous admission in last 30 days    Equipment Currently Used at Home  none    Concerns to be Addressed  discharge planning    Equipment Needed After Discharge  none        Discharge Plan     Row Name 07/22/21 1544       Plan    Plan  Home    Patient/Family in Agreement with Plan  yes    Plan Comments  Met & spoke with Mrs Goodman, her spouse, and her daughter Venita, at the bedside. She lives with her spouse in [place]. At baseline, is ind with ADL's/mobility. Is physically active and goes to [employer] three times weekly. Has been Covid vaccinated. Plan is possible lap pa[place] on Saturday then DC afterward or possibly Sunday. No immediate DC needs voiced. Plan is home and spouse will transport.    Final Discharge Disposition Code  01 - home or self-care        Continued Care and Services - Admitted Since 7/22/2021    Coordination has not been started for this encounter.         Demographic Summary     Row Name 07/22/21 1540       General Information    General Information Comments  Met & spoke with Mrs Goodman at the bedside. Verified PCP is Dr Cervantes. Primary insurance is  Medicare. Secondary is  for Life. Has drug coverage.        Functional Status     Row Name 07/22/21 1544       Functional Status    Usual Activity Tolerance  good    Current Activity Tolerance  good       Functional Status, IADL    Medications  independent    Meal Preparation  independent    Housekeeping  independent    Laundry  independent    Shopping  independent        Psychosocial    No documentation.       Abuse/Neglect    No documentation.       Legal    No documentation.       Substance Abuse    No documentation.       Patient Forms    No documentation.           Tatiana Castro RN

## 2021-07-22 NOTE — H&P
[facility] Cardiology         Date of Hospital Visit: 21      Place of Service: [facility]    Patient Name: Nancy Goodman  :1940      Primary Care Provider: Eyal Cervantes MD      Chief complaint/Reason for Consultation:  Non-STEMI         Problem List:  Active Hospital Problems    Diagnosis    • Diastolic CHF (CMS/[facility])      1. Echo 19  ·  Estimated EF = 65%     • Aortic stenosis, severe      1. Mild aoritc valve stenosis by Echo 17  2. Echo 19  · Aortic valve maximum pressure gradient is 66.4 mmHg.  · Aortic valve mean pressure gradient is 38.4 mmHg.  3.  s/p TAVR 19     • CAD (coronary artery disease)      a. 17 angina, cardiac catheter severe ostial LAD and LCx of these.  Ostial RPL and RCA.  b. 5 vessel CABG (Rajinder) LIMA to LAD, SVG to diagonal and LCx, and SVG to PDA and PL.       c.   Cardiac catheterization 2019 showed an occluded sequential limb to the PLB of the SVG to the PDA and PLB. Now status post rotational atherectomy and stent placement to the posterolateral branch as well as balloon angioplasty to the anastomotic site of the SVG to the PDA     • Essential hypertension    • Mixed hyperlipidemia    • Type 2 diabetes mellitus without complication, with long-term current use of insulin (CMS/[facility])          History of Present Illness:    This is an 80-year-old hypertensive dyslipidemic diabetic female with known coronary artery disease and valvular heart disease who is status post CABG with subsequent TAVR.  She presented to [facility] with a complaint of abdominal and chest pain.  Her abdominal pain was attributed to biliary stone which has passed with resolution of abdominal pain and jaundice.  She had accelerated hypertension and elevated troponins and has been transferred to [facility] for higher level of care.  She has no current complaint of orthopnea, PND, claudication, lower extremity  edema.  She denies dizziness or syncope.      Past Surgical History:   Procedure Laterality Date   • AORTIC VALVE REPAIR/REPLACEMENT N/A 5/13/2019    Procedure: TRANSCATHETER AORTIC VALVE REPLACEMENT, ELIZABETH;  Surgeon: Fidel Calvillo MD;  Location:  [facility];  Service: Cardiothoracic   • AORTIC VALVE REPAIR/REPLACEMENT N/A 5/13/2019    Procedure: Transapical Transcatheter Aortic Valve Replacement;  Surgeon: Anushka Hankins MD;  Location:  [facility];  Service: Cardiovascular   • CARDIAC CATHETERIZATION N/A 5/16/2017    Procedure: Left Heart Cath;  Surgeon: Fidel Valdez MD;  Location:  [email or web address] CATH INVASIVE LOCATION;  Service:    • CARDIAC CATHETERIZATION Left 4/23/2019    Procedure: Cardiac Catheterization/Vascular Study;  Surgeon: Anushka Hankins MD;  Location:  [email or web address] CATH INVASIVE LOCATION;  Service: Cardiovascular   • CORONARY ARTERY BYPASS GRAFT N/A 5/24/2017    Procedure: CORONARY ARTERY BYPASS x  5 WITH INTERNAL MAMMARY ARTERY GRAFT and EVH of the Right leg;  Surgeon: Fidel Calvillo MD;  Location:  [facility] OR;  Service:    • FINGER NAIL SURGERY     • TUBAL ABDOMINAL LIGATION     • VARICOSE VEIN SURGERY         Allergies   Allergen Reactions   • Amlodipine GI Intolerance     sickness   • Benazepril Nausea Only   • Ciprofloxacin GI Intolerance     Pt not sure of reaction (stomach problems)   • Pokeweed [Phytolacca] Swelling and Rash   • Statins Nausea Only     Stomach problem       Current Outpatient Medications   Medication Instructions   • aspirin 81 mg, Oral, Every Morning   • B-D UF III MINI PEN NEEDLES 31G X 5 MM misc 1 for daily injection   • clopidogrel (PLAVIX) 75 mg, Oral, Daily   • CRANBERRY  mg, Oral, Daily   • FREESTYLE LITE test strip Check daily and PRN   • furosemide (LASIX) 20 mg, Oral, Daily PRN   • Insulin Glargine (LANTUS SOLOSTAR) 45 Units, Subcutaneous, Daily   • irbesartan (AVAPRO) 75 mg, Oral, 2 times daily   • Lancets misc Lancet of choice, check  QD and PRN   • metFORMIN (GLUCOPHAGE) 500 mg, Oral, 2 Times Daily With Meals   • metoprolol tartrate (LOPRESSOR) 25 mg, Oral, 2 Times Daily   • NON FORMULARY Daily, Super cayenne 100,ooo HU every day    • omeprazole (PRILOSEC) 40 mg, Oral, Nightly, Taking it as needed   • potassium chloride (K-DUR,KLOR-CON) 10 MEQ CR tablet 10 mEq, Oral, Daily   • Probiotic Product (PROBIOTIC ADVANCED PO) 1 tablet, Oral, Nightly   • rosuvastatin (CRESTOR) 5 mg, Oral, Daily          Scheduled Meds:  Continuous Infusions:No current facility-administered medications for this encounter.          Social History     Socioeconomic History   • Marital status:      Spouse name: Not on file   • Number of children: 6   • Years of education: Not on file   • Highest education level: Not on file   Tobacco Use   • Smoking status: Never Smoker   • Smokeless tobacco: Never Used   Substance and Sexual Activity   • Alcohol use: No   • Drug use: No   • Sexual activity: Defer     Comment:        Family History   Problem Relation Age of Onset   • Cancer Mother         Lung cancer   • Heart attack Father    • No Known Problems Maternal Grandmother    • No Known Problems Maternal Grandfather        REVIEW OF SYSTEMS:   Review of Systems   Constitutional: Negative.   HENT: Negative.    Eyes: Negative.    Cardiovascular: Positive for chest pain.   Respiratory: Negative.    Endocrine: Negative.    Hematologic/Lymphatic: Negative.    Skin: Negative.    Musculoskeletal: Negative.    Gastrointestinal: Positive for abdominal pain.   Genitourinary: Negative.    Neurological: Negative.    Psychiatric/Behavioral: Negative.    Allergic/Immunologic: Negative.    All other systems reviewed and are negative.           Objective:  Vitals:    07/22/21 0934 07/22/21 0935 07/22/21 0936   BP: 123/62  132/65   BP Location: Right arm  Left arm   Pulse: 77 80 80   Temp: 97.9 °F (36.6 °C)     TempSrc: Tympanic     SpO2:  97%    Weight: 74.5 kg (164 lb 3.2 oz)    "  Height: 157.5 cm (62\")       Body mass index is 30.03 kg/m².  Flowsheet Rows      First Filed Value   Admission Height  157.5 cm (62\") Documented at 07/22/2021 0934   Admission Weight  74.5 kg (164 lb 3.2 oz) Documented at 07/22/2021 0934        No intake or output data in the 24 hours ending 07/22/21 1023    Vitals reviewed.   Constitutional:       Appearance: Healthy appearance. Well-developed and not in distress.   Eyes:      Pupils: Pupils are equal, round, and reactive to light.   HENT:    Mouth/Throat:      Pharynx: Oropharynx is clear.   Neck:      Lymphadenopathy: No cervical adenopathy.   Pulmonary:      Effort: Pulmonary effort is normal. No respiratory distress.      Breath sounds: Normal breath sounds. No wheezing.      Comments: Bases clear  Chest:      Chest wall: Not tender to palpatation.   Cardiovascular:      Normal rate. Regular rhythm.      Murmurs: There is no murmur.      No gallop. No click. No rub.   Pulses:     Intact distal pulses.   Edema:     Peripheral edema absent.   Abdominal:      General: Bowel sounds are normal.      Palpations: Abdomen is soft.   Musculoskeletal: Normal range of motion.      Cervical back: Normal range of motion and neck supple. Skin:     General: Skin is warm and dry.   Neurological:      General: No focal deficit present.      Mental Status: Alert.     I have examined the patient and agree with the above    Barbaeu Test:  Left: Normal  (oxymetric Allens) Right: Not Assessed     Lab Review:                CBC:      CMP:          Lab Results   Component Value Date    HGBA1C 7.30 (H) 05/24/2021     CrCl cannot be calculated (Patient's most recent lab result is older than the maximum 30 days allowed.).  No results found for: DDIMER        CARDIAC LABS:        Lab Results   Component Value Date    CHOL 206 (H) 04/23/2019    CHLPL 247 (H) 02/23/2021    TRIG 314 (H) 02/23/2021    HDL 36 (L) 02/23/2021     (H) 02/23/2021         Assessment and Plan:       1.  " Non-ST elevation MI, unclear if type II, or true ACS.  2.  History of CABG, 2 years status post PTCA RA and stenting of right posterior lateral and PTCA of SVG to right PDA  3.  Accelerated hypertension, now well managed  4.  Chronic hypertension  5.  Dyslipidemia LDL not at goal  6.  Recent common bile duct stone, with symptoms and elevated LFTs/bilirubin consistent with acute colitis.  Symptoms have improved and lab abnormalities have resolved.      Recommendations:  Left heart catheterization possible cath-based intervention pending results  Discussed with hospitalist, who will evaluate the patient for her common bile duct stone to ascertain the timing of upcoming cholecystectomy  If no intervention needed, will hold Plavix for possible upcoming surgery  Will need maximize statin dose, will do so after LFTs have normalized.       Electronically signed by DANIEL Leyva, 07/22/21, 10:41 AM EDT.    I have seen and examined the patient, I have reviewed the note, discussed the case with the advance practice clinician, made necessary changes and I agree with the final note.    Fidel Valdez MD  07/22/21  13:06 EDT

## 2021-07-22 NOTE — NURSING NOTE
ACC REVIEW REPORT: [facility]        PATIENT NAME: Nancy Goodman    PATIENT ID: [id number]        COVID-19 ACC SCREENING       DOES THE PATIENT HAVE A FEVER GREATER THAN OR EQUAL .4: No    IS THE PATIENT EXPERIENCING SHORTNESS OF BREATH: No    DOES THE PATIENT HAVE A COUGH: No  DOES THE PATIENT HAVE ANY OF THE FOLLOWING RISK FACTORS:    EXPOSURE TO SUSPECTED OR KNOWN COVID-19: No    RECENT TRAVEL HISTORY TO ENDEMIC AREA (DOMESTIC/LOCAL): No    IS THE PATIENT A HEALTHCARE WORKER: No    HAS THE PATIENT EXPERIENCED A LOSS OF SENSE OF TASTE OR SMELL: No    HAS THE PATIENT BEEN TESTED FOR COVID-19: No    DATE TESTED:     LAB TESTING SENT TO:            BED: 9801    BED TYPE: [facility]    BED GIVEN TO: Abundio WHITE BED GIVEN: 0801    TODAY'S DATE: 7/22/2021    TRANSFER DATE: 7/22/2021    ETA:     TRANSFERRING FACILITY: [facility]     TRANSFERRING FACILITY PHONE # : [phone]    TRANSFERRING MD: Dr. Bustillo    DATE/TIME REQUEST RECEIVED: 7/22/21 at 0739    [facility] RN: Jeanette Sheppard RN     REPORT FROM: Abundio Groves RN    TIME REPORT TAKEN: 0801    DIAGNOSIS: NSTEMI    REASON FOR TRANSFER TO [facility]: Higher Level of Care     TRANSPORTATION: Ambulance    CLINICAL REASON FOR TRANSFER TO [facility]: Patient presented to local ED with complaints of RUQ abdominal pain 1 hour after eating. BP on arrival was 265/125. Troponin elevated.       CLINICAL INFORMATION    HEIGHT: 5 ft 2 in     WEIGHT: 71.3 kg    ALLERGIES: Amlodipine, Benazepril     INFECTIOUS DISEASE: None     ISOLATION:     VITAL SIGNS:   TIME:   TEMP: 98.4  PULSE: 82  B/P: 124/56  RESP: 18        LAB INFORMATION: Unremarkable     CULTURE INFORMATION:     MEDS/IV FLUIDS: See Mar sent with patient. Has a #20 Rt hand and LAC. LR running at 125 ml/hr.       CARDIAC SYSTEM:    CHEST PAIN: None at time of report     RATE:     SCALE:     RHYTHM: NSR    Is patient taking or has patient been given any drugs that could increase bleeding? Yes    DRUG:  Heparin     DOSE/FREQUENCY: 5000 units SQ  Asa and Plavix home meds    TROPONIN:    DATE:   TIME:   TROP: 1.615    DATE:   TIME:   TROP: 1.204            :     CARDIAC NOTES:       RESPIRATORY SYSTEM:    LUNG SOUNDS: Clear       OXYGEN: 2 liters per NC    O2 SAT: 98%            RESPIRATORY STATUS: No acute distress       CNS/MUSCULOSKELETAL    ALERT AND ORIENTED:    PERSON: Yes  PLACE: Yes  TIME: Yes      CNS/MUSCULOSKELETAL NOTES:       GI//GY      ABDOMINAL PAIN: Yes    VOMITING: No    DIARRHEA: No    NAUSEA: No    BOWEL SOUNDS: Positive    OCCULT STOOL:     HEMATURIA: No      ULTRASOUND RESULTS:     ACUTE ABDOMEN RESULTS:     CT SCAN RESULTS: Choledocholithiasis. Biliary stones.        GI//GY NOTES: Patient needs ERCP. Has been NPO after MN.    [place]: None    PAST MEDICAL HISTORY: HTN, AVR, cardiac stents, CABG 5 years ago    OTHER SYMPTOM NOTES:     ADDITIONAL NOTES: Skin intact           Jeanette Sheppard RN  7/22/2021  08:21 EDT

## 2021-07-22 NOTE — CONSULTS
[facility] [place] [facility] Consult    Referring Provider: Fidel Valdez MD    PCP: Eyal Cervantes MD    Reason for Consultation: Choledocholithiasis, needs ERCP    Chief complaint: Abdominal pain, chest pain    History of present illness:    Nancy Goodman is a 80 y.o. female who is admitted with a 2-day onset of right upper quadrant and epigastric abdominal pain who presented to outside hospital with NSTEMI.  Upon admission here, patient underwent successful heart cath with no acute interventions required.  While at outside hospital, patient had CT imaging of abdomen pelvis which was indicative of choledocholithiasis but subsequent ultrasound did not show any evidence of biliary obstruction or stones.  Patient reports that for the past few months she has had worsening indigestion and postprandial pain; does not endorse any nausea, vomiting, or change in bowel habits.  Outside hospital patient was found to have a fever of 100.4 but does not endorse any chills or night sweats.  Does not endorse any history of GERD or hepatobiliary disorders.  Denies use of NSAIDs and is not anticoagulated.  Reports she is fairly healthy and works out numerous times per week.  Past medical, surgical, social, and family histories are reviewed for accuracy.  No documented alleviating or exacerbating factors.  Does not endorse pain at time of exam.    Allergies:  Amlodipine, Benazepril, Ciprofloxacin, Pokeweed [phytolacca], and Statins    Scheduled Meds:  [START ON 7/23/2021] aspirin, 81 mg, Oral, QAM  insulin lispro, 0-7 Units, Subcutaneous, TID AC  metoprolol tartrate, 25 mg, Oral, BID  [START ON 7/23/2021] pantoprazole, 40 mg, Oral, QAM  pharmacy consult - [facility], , Does not apply, Daily  piperacillin-tazobactam, 3.375 g, Intravenous, Q8H  rosuvastatin, 5 mg, Oral, Nightly         Infusions:  sodium chloride, 3 mL/kg/hr, Last Rate: 3 mL/kg/hr (07/22/21 1352)        PRN Meds:  •  acetaminophen  •  ALPRAZolam  •  dextrose  •   dextrose  •  diphenhydrAMINE  •  furosemide  •  glucagon (human recombinant)    Home Meds:  Medications Prior to Admission   Medication Sig Dispense Refill Last Dose   • aspirin 81 MG EC tablet Take 81 mg by mouth Every Morning.   7/22/2021 at Unknown time   • clopidogrel (PLAVIX) 75 MG tablet Take 1 tablet by mouth Daily. 30 tablet 6 7/22/2021 at Unknown time   • CRANBERRY PO Take 450 mg by mouth Daily.   Past Week at Unknown time   • furosemide (LASIX) 20 MG tablet Take 1 tablet by mouth Daily As Needed (swelling). 90 tablet 0 Past Week at Unknown time   • Insulin Glargine (LANTUS SOLOSTAR) 100 UNIT/ML injection pen Inject 45 Units under the skin into the appropriate area as directed Daily. 15 pen 1 7/21/2021 at Unknown time   • irbesartan (Avapro) 75 MG tablet Take 1 tablet by mouth 2 (two) times a day. 180 tablet 1 7/22/2021 at Unknown time   • metFORMIN (Glucophage) 500 MG tablet Take 1 tablet by mouth 2 (Two) Times a Day With Meals. 180 tablet 1 Past Week at Unknown time   • metoprolol tartrate (LOPRESSOR) 25 MG tablet Take 1 tablet by mouth 2 (Two) Times a Day. 180 tablet 1 7/21/2021 at Unknown time   • potassium chloride (K-DUR,KLOR-CON) 10 MEQ CR tablet Take 1 tablet by mouth Daily. 90 tablet 1 7/21/2021 at Unknown time   • Probiotic Product (PROBIOTIC ADVANCED PO) Take 1 tablet by mouth Every Night.   Past Week at Unknown time   • B-D UF III MINI PEN NEEDLES 31G X 5 MM misc 1 for daily injection 100 each 3 Unknown at Unknown time   • FREESTYLE LITE test strip Check daily and  each 3 Unknown at Unknown time   • Lancets misc Lancet of choice, check QD and  each 3 Unknown at Unknown time   • NON FORMULARY Daily. Super cayenne 100,ooo HU every day      • omeprazole (priLOSEC) 40 MG capsule Take 1 capsule by mouth Every Night. Taking it as needed 90 capsule 3 Unknown at Unknown time   • rosuvastatin (Crestor) 5 MG tablet Take 1 tablet by mouth Daily. 30 tablet 5 Unknown at Unknown time       ROS:  Review of Systems   Constitutional: Positive for activity change, appetite change, fatigue and fever. Negative for chills, diaphoresis and unexpected weight change.   HENT: Negative for sore throat, trouble swallowing and voice change.    Eyes: Negative.    Respiratory: Negative for apnea, cough, choking, chest tightness, shortness of breath, wheezing and stridor.    Cardiovascular: Positive for chest pain. Negative for palpitations and leg swelling.   Gastrointestinal: Positive for abdominal pain and nausea. Negative for abdominal distention, anal bleeding, blood in stool, constipation, diarrhea, rectal pain and vomiting.        Positive for indigestion   Endocrine: Negative.    Genitourinary: Negative.    Musculoskeletal: Negative.    Skin: Positive for color change. Negative for pallor, rash and wound.   Allergic/Immunologic: Negative.    Neurological: Negative.    Hematological: Negative for adenopathy. Does not bruise/bleed easily.   Psychiatric/Behavioral: Negative.    All other systems reviewed and are negative.      PAST MED HX:  Past Medical History:   Diagnosis Date   • Allergic rhinitis    • Arthritis    • Cellulitis of finger    • GERD (gastroesophageal reflux disease)    • PAT (paroxysmal atrial tachycardia) (CMS/HCC)    • Pulmonary edema 4/9/2019   • Rosacea    • Vitamin D deficiency    • Wears dentures    • Wears eyeglasses        PAST SURG HX:  Past Surgical History:   Procedure Laterality Date   • AORTIC VALVE REPAIR/REPLACEMENT N/A 5/13/2019    Procedure: TRANSCATHETER AORTIC VALVE REPLACEMENT, ELIZABETH;  Surgeon: Fidel Calvillo MD;  Location: [facility] OR ;  Service: Cardiothoracic   • AORTIC VALVE REPAIR/REPLACEMENT N/A 5/13/2019    Procedure: Transapical Transcatheter Aortic Valve Replacement;  Surgeon: Anushka Hankins MD;  Location: [facility] OR ;  Service: Cardiovascular   • CARDIAC CATHETERIZATION N/A 5/16/2017    Procedure: Left Heart Cath;  Surgeon: Fidel Valdez MD;   "Location:  [facility] CATH INVASIVE LOCATION;  Service:    • CARDIAC CATHETERIZATION Left 4/23/2019    Procedure: Cardiac Catheterization/Vascular Study;  Surgeon: Anushka Hankins MD;  Location:  [facility] CATH INVASIVE LOCATION;  Service: Cardiovascular   • CORONARY ARTERY BYPASS GRAFT N/A 5/24/2017    Procedure: CORONARY ARTERY BYPASS x  5 WITH INTERNAL MAMMARY ARTERY GRAFT and EVH of the Right leg;  Surgeon: Fidel Calvillo MD;  Location:  [facility] OR;  Service:    • FINGER NAIL SURGERY     • TUBAL ABDOMINAL LIGATION     • VARICOSE VEIN SURGERY         FAM HX:  Family History   Problem Relation Age of Onset   • Cancer Mother         Lung cancer   • Heart attack Father    • No Known Problems Maternal Grandmother    • No Known Problems Maternal Grandfather        SOC HX:  Social History     Socioeconomic History   • Marital status:      Spouse name: Not on file   • Number of children: 6   • Years of education: Not on file   • Highest education level: Not on file   Tobacco Use   • Smoking status: Never Smoker   • Smokeless tobacco: Never Used   Substance and Sexual Activity   • Alcohol use: No   • Drug use: No   • Sexual activity: Defer     Comment:        PHYSICAL EXAM  /67 (BP Location: Left arm, Patient Position: Lying)   Pulse 73   Temp 98.3 °F (36.8 °C) (Oral)   Resp 16   Ht 157.5 cm (62\")   Wt 74.5 kg (164 lb 3.2 oz)   SpO2 94%   BMI 30.03 kg/m²   Wt Readings from Last 3 Encounters:   07/22/21 74.5 kg (164 lb 3.2 oz)   05/24/21 71.2 kg (157 lb)   02/23/21 70.8 kg (156 lb)   ,body mass index is 30.03 kg/m².  Physical Exam  Vitals and nursing note reviewed.   Constitutional:       General: She is not in acute distress.     Appearance: Normal appearance. She is normal weight. She is not ill-appearing or toxic-appearing.      Comments: Pleasantly conversant, no acute distress, appears younger than stated age   HENT:      Head: Normocephalic and atraumatic.      Mouth/Throat:      Mouth: " Mucous membranes are moist.      Pharynx: Oropharynx is clear. No oropharyngeal exudate.   Eyes:      General: No scleral icterus.     Extraocular Movements: Extraocular movements intact.      Conjunctiva/sclera: Conjunctivae normal.      Pupils: Pupils are equal, round, and reactive to light.   Cardiovascular:      Rate and Rhythm: Normal rate and regular rhythm.      Pulses: Normal pulses.      Heart sounds: Normal heart sounds.   Pulmonary:      Effort: Pulmonary effort is normal. No respiratory distress.      Breath sounds: Normal breath sounds.   Abdominal:      General: Abdomen is flat. Bowel sounds are normal. There is no distension.      Palpations: Abdomen is soft. There is no mass.      Tenderness: There is abdominal tenderness. There is no guarding or rebound.      Hernia: No hernia is present.   Genitourinary:     Comments: defer  Musculoskeletal:         General: Normal range of motion.      Cervical back: Normal range of motion and neck supple. No rigidity or tenderness.      Right lower leg: No edema.      Left lower leg: No edema.   Lymphadenopathy:      Cervical: No cervical adenopathy.   Skin:     General: Skin is warm and dry.      Capillary Refill: Capillary refill takes less than 2 seconds.      Coloration: Skin is not jaundiced or pale.   Neurological:      General: No focal deficit present.      Mental Status: She is alert and oriented to person, place, and time.   Psychiatric:         Mood and Affect: Mood normal.         Behavior: Behavior normal.         Thought Content: Thought content normal.         Judgment: Judgment normal.     Results Review:   I reviewed the patient's new clinical results.  I reviewed the patient's new imaging results and agree with the interpretation.  I personally viewed and interpreted the patient's EKG/Telemetry data    Lab Results   Component Value Date    WBC 5.39 05/24/2021    HGB 13.8 05/24/2021    HGB 14.5 02/23/2021    HGB 14.1 11/16/2020    HCT 41.3  05/24/2021    MCV 92.6 05/24/2021     05/24/2021       Lab Results   Component Value Date    INR 1.10 05/14/2019    INR 1.17 (H) 05/13/2019    INR 0.96 05/12/2019       Lab Results   Component Value Date    GLUCOSE 175 (H) 05/15/2019    BUN 14 05/24/2021    CREATININE 0.97 05/24/2021    EGFRIFNONA 55 (L) 05/24/2021    EGFRIFAFRI 67 05/24/2021    BCR 14.4 05/24/2021     05/24/2021    K 4.5 05/24/2021    CO2 28.0 05/24/2021    CALCIUM 10.0 05/24/2021    PROTENTOTREF 6.5 05/24/2021    ALBUMIN 4.40 05/24/2021    ALKPHOS 57 05/24/2021    BILITOT 0.3 05/24/2021    ALT 14 05/24/2021    AST 15 05/24/2021   Results for NANCY GOODMAN (MRN [id number]) as of 7/22/2021 15:52   Ref. Range 7/22/2021 13:52   Alkaline Phosphatase Latest Ref Range: 39 - 117 U/L 77   Total Protein Latest Ref Range: 6.0 - 8.5 g/dL 4.8 (L)   ALT (SGPT) Latest Ref Range: 1 - 33 U/L 249 (H)   AST (SGOT) Latest Ref Range: 1 - 32 U/L 173 (H)   Total Bilirubin Latest Ref Range: 0.0 - 1.2 mg/dL 3.1 (H)     Cardiac Catheterization/Vascular Study  Result Date: 7/22/2021  · Occluded LAD with patent LIMA graft · Widely patent SVG to diagonal, and left circumflex · Occluded right PDA and occluded vein graft to the right PDA · 95% proliferative in-stent/segment restenosis in the right posterior lateral branch.      No results found for: COVID19    ASSESSMENTS/PLANS  1.  Choledocholithiasis, noted at outside hospital  2.  Transaminitis and hyperbilirubinemia  3.  NSTEMI, status post heart cath with no intervention  4.  History of valvular heart disease, status post TAVR    Nancy Goodman is a 80 y.o. female admitted to hospital as transfer from [facility] with NSTEMI.  Patient underwent successful heart cath with no interventions required.  While at outside hospital, patient had CT scan which was indicative of choledocholithiasis but subsequent right upper quadrant ultrasound showed no evidence of biliary obstruction  or stones.  LFTs were also noted to be elevated while at outside hospital.  Recommend MRCP today to evaluate for presence of stones given inconclusive nature of outside imaging; will make patient n.p.o. at midnight for potential ERCP tomorrow.    >>> MRCP today.  >>> N.p.o. at midnight  >>> Obtain informed consent for ERCP  >>> Risk and benefits explained including incomplete cannulation, 10%; Perforation, 1/ 500; Bleeding, 1/500:  Infection; Pancreatitis, 5 to 10% mild to moderate; and 5% severe with 1/1000 risk of death.  >>> Intraprocedural fluoroscopy ordered  >>> Patient is currently on Zosyn which provides adequate antimicrobial coverage.  >>> CBC, CMP, lipase-pending  >>> Will require preprocedural COVID-19 screening per protocol.    I discussed the patient's findings and my recommendations with patient, family and nursing staff    Scooby Mendoza, APRN  07/22/21  15:52 EDT

## 2021-07-23 ENCOUNTER — ANESTHESIA EVENT (OUTPATIENT)
Dept: GASTROENTEROLOGY | Facility: HOSPITAL | Age: 81
End: 2021-07-23

## 2021-07-23 ENCOUNTER — ANESTHESIA (OUTPATIENT)
Dept: PERIOP | Facility: HOSPITAL | Age: 81
End: 2021-07-23

## 2021-07-23 ENCOUNTER — APPOINTMENT (OUTPATIENT)
Dept: MRI IMAGING | Facility: HOSPITAL | Age: 81
End: 2021-07-23

## 2021-07-23 ENCOUNTER — ANESTHESIA EVENT (OUTPATIENT)
Dept: PERIOP | Facility: HOSPITAL | Age: 81
End: 2021-07-23

## 2021-07-23 LAB
ALBUMIN SERPL-MCNC: 3.1 G/DL (ref 3.5–5.2)
ALBUMIN/GLOB SERPL: 1.1 G/DL
ALP SERPL-CCNC: 101 U/L (ref 39–117)
ALT SERPL W P-5'-P-CCNC: 206 U/L (ref 1–33)
ANION GAP SERPL CALCULATED.3IONS-SCNC: 8 MMOL/L (ref 5–15)
AST SERPL-CCNC: 116 U/L (ref 1–32)
BILIRUB SERPL-MCNC: 2.1 MG/DL (ref 0–1.2)
BUN SERPL-MCNC: 11 MG/DL (ref 8–23)
BUN/CREAT SERPL: 10.8 (ref 7–25)
CALCIUM SPEC-SCNC: 8.9 MG/DL (ref 8.6–10.5)
CHLORIDE SERPL-SCNC: 104 MMOL/L (ref 98–107)
CHOLEST SERPL-MCNC: 190 MG/DL (ref 0–200)
CO2 SERPL-SCNC: 23 MMOL/L (ref 22–29)
CREAT SERPL-MCNC: 1.02 MG/DL (ref 0.57–1)
DEPRECATED RDW RBC AUTO: 51.1 FL (ref 37–54)
ERYTHROCYTE [DISTWIDTH] IN BLOOD BY AUTOMATED COUNT: 14.6 % (ref 12.3–15.4)
GFR SERPL CREATININE-BSD FRML MDRD: 52 ML/MIN/1.73
GLOBULIN UR ELPH-MCNC: 2.7 GM/DL
GLUCOSE BLDC GLUCOMTR-MCNC: 116 MG/DL (ref 70–130)
GLUCOSE BLDC GLUCOMTR-MCNC: 139 MG/DL (ref 70–130)
GLUCOSE BLDC GLUCOMTR-MCNC: 141 MG/DL (ref 70–130)
GLUCOSE BLDC GLUCOMTR-MCNC: 161 MG/DL (ref 70–130)
GLUCOSE SERPL-MCNC: 171 MG/DL (ref 65–99)
HBA1C MFR BLD: 7.1 % (ref 4.8–5.6)
HCT VFR BLD AUTO: 35.7 % (ref 34–46.6)
HDLC SERPL-MCNC: 17 MG/DL (ref 40–60)
HGB BLD-MCNC: 11.5 G/DL (ref 12–15.9)
INR PPP: 1.04 (ref 0.85–1.16)
LDLC SERPL CALC-MCNC: 96 MG/DL (ref 0–100)
LDLC/HDLC SERPL: 4.75 {RATIO}
MCH RBC QN AUTO: 30.5 PG (ref 26.6–33)
MCHC RBC AUTO-ENTMCNC: 32.2 G/DL (ref 31.5–35.7)
MCV RBC AUTO: 94.7 FL (ref 79–97)
PLATELET # BLD AUTO: 88 10*3/MM3 (ref 140–450)
PMV BLD AUTO: 11.5 FL (ref 6–12)
POTASSIUM SERPL-SCNC: 3.6 MMOL/L (ref 3.5–5.2)
PROT SERPL-MCNC: 5.8 G/DL (ref 6–8.5)
PROTHROMBIN TIME: 13.3 SECONDS (ref 11.4–14.4)
RBC # BLD AUTO: 3.77 10*6/MM3 (ref 3.77–5.28)
SODIUM SERPL-SCNC: 135 MMOL/L (ref 136–145)
TRIGL SERPL-MCNC: 461 MG/DL (ref 0–150)
VLDLC SERPL-MCNC: 77 MG/DL (ref 5–40)
WBC # BLD AUTO: 4.85 10*3/MM3 (ref 3.4–10.8)

## 2021-07-23 PROCEDURE — 83036 HEMOGLOBIN GLYCOSYLATED A1C: CPT | Performed by: INTERNAL MEDICINE

## 2021-07-23 PROCEDURE — 85610 PROTHROMBIN TIME: CPT | Performed by: FAMILY MEDICINE

## 2021-07-23 PROCEDURE — 82962 GLUCOSE BLOOD TEST: CPT

## 2021-07-23 PROCEDURE — 99232 SBSQ HOSP IP/OBS MODERATE 35: CPT | Performed by: PHYSICIAN ASSISTANT

## 2021-07-23 PROCEDURE — 93005 ELECTROCARDIOGRAM TRACING: CPT | Performed by: INTERNAL MEDICINE

## 2021-07-23 PROCEDURE — 80053 COMPREHEN METABOLIC PANEL: CPT | Performed by: FAMILY MEDICINE

## 2021-07-23 PROCEDURE — 25010000002 FUROSEMIDE PER 20 MG: Performed by: PHYSICIAN ASSISTANT

## 2021-07-23 PROCEDURE — 25010000002 PIPERACILLIN SOD-TAZOBACTAM PER 1 G: Performed by: FAMILY MEDICINE

## 2021-07-23 PROCEDURE — 25010000002 HYDRALAZINE PER 20 MG: Performed by: PHYSICIAN ASSISTANT

## 2021-07-23 PROCEDURE — 80061 LIPID PANEL: CPT | Performed by: INTERNAL MEDICINE

## 2021-07-23 PROCEDURE — 99232 SBSQ HOSP IP/OBS MODERATE 35: CPT | Performed by: INTERNAL MEDICINE

## 2021-07-23 PROCEDURE — 74181 MRI ABDOMEN W/O CONTRAST: CPT

## 2021-07-23 PROCEDURE — 85027 COMPLETE CBC AUTOMATED: CPT | Performed by: INTERNAL MEDICINE

## 2021-07-23 PROCEDURE — 93010 ELECTROCARDIOGRAM REPORT: CPT | Performed by: INTERNAL MEDICINE

## 2021-07-23 RX ORDER — HYDRALAZINE HYDROCHLORIDE 20 MG/ML
10 INJECTION INTRAMUSCULAR; INTRAVENOUS EVERY 6 HOURS PRN
Status: DISCONTINUED | OUTPATIENT
Start: 2021-07-23 | End: 2021-07-26 | Stop reason: HOSPADM

## 2021-07-23 RX ORDER — SODIUM CHLORIDE 0.9 % (FLUSH) 0.9 %
10 SYRINGE (ML) INJECTION EVERY 12 HOURS SCHEDULED
Status: CANCELLED | OUTPATIENT
Start: 2021-07-23

## 2021-07-23 RX ORDER — LIDOCAINE HYDROCHLORIDE 10 MG/ML
0.5 INJECTION, SOLUTION EPIDURAL; INFILTRATION; INTRACAUDAL; PERINEURAL ONCE AS NEEDED
Status: CANCELLED | OUTPATIENT
Start: 2021-07-23

## 2021-07-23 RX ORDER — SODIUM CHLORIDE 0.9 % (FLUSH) 0.9 %
10 SYRINGE (ML) INJECTION AS NEEDED
Status: CANCELLED | OUTPATIENT
Start: 2021-07-23

## 2021-07-23 RX ORDER — LOSARTAN POTASSIUM 50 MG/1
50 TABLET ORAL
Status: DISCONTINUED | OUTPATIENT
Start: 2021-07-24 | End: 2021-07-23

## 2021-07-23 RX ORDER — MIDAZOLAM HYDROCHLORIDE 1 MG/ML
0.5 INJECTION INTRAMUSCULAR; INTRAVENOUS
Status: CANCELLED | OUTPATIENT
Start: 2021-07-23

## 2021-07-23 RX ORDER — CHOLECALCIFEROL (VITAMIN D3) 125 MCG
5 CAPSULE ORAL NIGHTLY
Status: DISCONTINUED | OUTPATIENT
Start: 2021-07-23 | End: 2021-07-26 | Stop reason: HOSPADM

## 2021-07-23 RX ORDER — LOSARTAN POTASSIUM 50 MG/1
50 TABLET ORAL
Status: DISCONTINUED | OUTPATIENT
Start: 2021-07-23 | End: 2021-07-25

## 2021-07-23 RX ORDER — SODIUM CHLORIDE, SODIUM LACTATE, POTASSIUM CHLORIDE, CALCIUM CHLORIDE 600; 310; 30; 20 MG/100ML; MG/100ML; MG/100ML; MG/100ML
9 INJECTION, SOLUTION INTRAVENOUS CONTINUOUS
Status: CANCELLED | OUTPATIENT
Start: 2021-07-23

## 2021-07-23 RX ORDER — FAMOTIDINE 20 MG/1
20 TABLET, FILM COATED ORAL ONCE
Status: CANCELLED | OUTPATIENT
Start: 2021-07-23 | End: 2021-07-23

## 2021-07-23 RX ORDER — FUROSEMIDE 10 MG/ML
20 INJECTION INTRAMUSCULAR; INTRAVENOUS ONCE
Status: COMPLETED | OUTPATIENT
Start: 2021-07-23 | End: 2021-07-23

## 2021-07-23 RX ADMIN — ASPIRIN 81 MG: 81 TABLET, COATED ORAL at 09:29

## 2021-07-23 RX ADMIN — TAZOBACTAM SODIUM AND PIPERACILLIN SODIUM 3.38 G: 375; 3 INJECTION, SOLUTION INTRAVENOUS at 21:18

## 2021-07-23 RX ADMIN — ROSUVASTATIN CALCIUM 5 MG: 10 TABLET, COATED ORAL at 21:18

## 2021-07-23 RX ADMIN — METOPROLOL TARTRATE 25 MG: 25 TABLET, FILM COATED ORAL at 09:29

## 2021-07-23 RX ADMIN — LOSARTAN POTASSIUM 50 MG: 50 TABLET, FILM COATED ORAL at 18:22

## 2021-07-23 RX ADMIN — FUROSEMIDE 20 MG: 10 INJECTION, SOLUTION INTRAMUSCULAR; INTRAVENOUS at 16:53

## 2021-07-23 RX ADMIN — METOPROLOL TARTRATE 25 MG: 25 TABLET, FILM COATED ORAL at 18:22

## 2021-07-23 RX ADMIN — TAZOBACTAM SODIUM AND PIPERACILLIN SODIUM 3.38 G: 375; 3 INJECTION, SOLUTION INTRAVENOUS at 06:05

## 2021-07-23 RX ADMIN — Medication 5 MG: at 21:18

## 2021-07-23 RX ADMIN — PANTOPRAZOLE SODIUM 40 MG: 40 TABLET, DELAYED RELEASE ORAL at 09:29

## 2021-07-23 RX ADMIN — TAZOBACTAM SODIUM AND PIPERACILLIN SODIUM 3.38 G: 375; 3 INJECTION, SOLUTION INTRAVENOUS at 15:15

## 2021-07-23 RX ADMIN — HYDRALAZINE HYDROCHLORIDE 10 MG: 20 INJECTION INTRAMUSCULAR; INTRAVENOUS at 15:13

## 2021-07-23 NOTE — PROGRESS NOTES
"  [facility]  PROGRESS NOTE    Date of Admission: 7/22/2021  Date of Service: 07/23/21    Primary Care Physician: Eyal Cervantes MD    Chief Complaint: f/u NSTEMI, Choledocholithiasis   Problem List:     Type 2 diabetes mellitus without complication, with long-term current use of insulin (CMS/HCC)    Mixed hyperlipidemia    Essential hypertension    CAD (coronary artery disease)    Aortic stenosis, severe    Diastolic CHF (CMS/HCC)    Colic, biliary    Subjective      No recurrent chest or abdominal pain. Awaiting ERCP, in good spirits.  No further chest pain.      Objective   Vitals: /77   Pulse 76   Temp 98.2 °F (36.8 °C) (Oral)   Resp 18   Ht 157.5 cm (62\")   Wt 74.5 kg (164 lb 3.2 oz)   SpO2 96%   BMI 30.03 kg/m²     Physical Exam:  GENERAL: Alert, cooperative, in no acute distress.   HEENT: Normocephalic, no jugular venous distention  HEART: Regular rhythm, normal rate, and no murmurs, gallops, or rubs.   LUNGS: Clear to auscultation bilaterally. No wheezing, rales or rhonchi.  ABDOMEN: Soft, bowel sounds present, nontender   NEUROLOGIC: No focal abnormalities involving strength or sensation are noted.   EXTREMITIES: No clubbing, cyanosis, or edema noted.     I have examined the patient and agree with the above  RFA site benign  Results:  Results from last 7 days   Lab Units 07/23/21  0500   WBC 10*3/mm3 4.85   HEMOGLOBIN g/dL 11.5*   HEMATOCRIT % 35.7   PLATELETS 10*3/mm3 88*     Results from last 7 days   Lab Units 07/23/21  0500 07/22/21  1352 07/22/21  1352   SODIUM mmol/L 135*  --  138   POTASSIUM mmol/L 3.6  --  3.7   CHLORIDE mmol/L 104   < > 105   CO2 mmol/L 23.0  --  20.0*   BUN mg/dL 11  --  13   CREATININE mg/dL 1.02*  --  1.01*   GLUCOSE mg/dL 171*  --  96    < > = values in this interval not displayed.      Lab Results   Component Value Date    CHOL 190 07/23/2021    TRIG 461 (H) 07/23/2021    HDL 17 (L) 07/23/2021    LDL 96 07/23/2021     " (H) 07/23/2021     (H) 07/23/2021     Results from last 7 days   Lab Units 07/23/21  0500   HEMOGLOBIN A1C % 7.10*     Results from last 7 days   Lab Units 07/23/21  0500   CHOLESTEROL mg/dL 190   TRIGLYCERIDES mg/dL 461*   HDL CHOL mg/dL 17*   LDL CHOL mg/dL 96       Results from last 7 days   Lab Units 07/23/21  0500   PROTIME Seconds 13.3   INR  1.04       Intake/Output Summary (Last 24 hours) at 7/23/2021 1002  Last data filed at 7/23/2021 0605  Gross per 24 hour   Intake 195 ml   Output --   Net 195 ml     I personally reviewed the patient's EKG/Telemetry data    Radiology Data:   [facility] 7/22/21:  Conclusion    · Occluded LAD with patent LIMA graft  · Widely patent SVG to diagonal, and left circumflex  · Occluded right PDA and occluded vein graft to the right PDA  · 95% proliferative in-stent/segment restenosis in the right posterior lateral branch.      Recommendations    •     Optimize medical therapy.       Current Medications:  aspirin, 81 mg, Oral, QAM  insulin lispro, 0-7 Units, Subcutaneous, TID AC  metoprolol tartrate, 25 mg, Oral, BID  pantoprazole, 40 mg, Oral, QAM  pharmacy consult - [place], , Does not apply, Daily  piperacillin-tazobactam, 3.375 g, Intravenous, Q8H  rosuvastatin, 5 mg, Oral, Nightly      Assessment and Plan:   1.  Non-ST elevation MI   - [facility] yesterday with patent LIMA to LAD, and SVG to diagonal and LCx;    - occluded right PDA with occluded vein graft; 95% in-stent/segement restenosis in right CLAY   - medical management recommended   2. Choledocholithiasis with transaminitis    - LFTs trending down   3.  Accelerated hypertension, now well managed  4.  Chronic hypertension  5.  Dyslipidemia LDL not at goal        Recommendations:  - Medical management of distal RCA disease, appears chronic and likely had type II MI due to recent choledocholithiasis and transaminitis.  - Plan for ERCP/MRCP per GI today  - continue ASA, statin and BB  -Hold Plavix pending possible upcoming  cholecystectomy.  If this is going to be deferred to an outpatient, will need to be resumed  -Discussed case with hospitalist, who will take over his primary attending, we will continue to follow, please call for any further questions.        Electronically signed by Kathy Gamino PA-C, 07/23/21, 10:59 AM EDT.    I have seen and examined the patient, I have reviewed the note, discussed the case with the advance practice clinician, made necessary changes and I agree with the final note.    Fidel Valdez MD  07/23/21  14:32 EDT

## 2021-07-23 NOTE — CASE MANAGEMENT/SOCIAL WORK
Continued Stay Note  [facility]     Patient Name: Nancy Goodman  MRN: [id number]  Today's Date: 7/23/2021    Admit Date: 7/22/2021    Discharge Plan     Row Name 07/23/21 1537       Plan    Plan  Home    Patient/Family in Agreement with Plan  yes    Plan Comments  Awaiting possible ERCP today.  Patient denied dc needs at this time.  Plan is home, family to transport.    Final Discharge Disposition Code  01 - home or self-care        Discharge Codes    No documentation.             Niki Vega RN

## 2021-07-23 NOTE — ANESTHESIA PREPROCEDURE EVALUATION
Anesthesia Evaluation     Patient summary reviewed and Nursing notes reviewed                Airway   Mallampati: II  TM distance: >3 FB  Neck ROM: full  No difficulty expected  Dental - normal exam     Pulmonary - negative pulmonary ROS and normal exam   Cardiovascular - normal exam    (+) hypertension, CAD, CABG, cardiac stents hyperlipidemia,     ROS comment:   S/p TAVR 2019  S/p CABG 2017    Echo 6/20: normal EF, normal function of prosthetic AV, mild MR    LHC yesterday: occluded PDA graft, 95% in-stent restenosis right post lateral branch --> medical management    Neuro/Psych- negative ROS  GI/Hepatic/Renal/Endo    (+)  GERD,  liver disease history of elevated LFT, diabetes mellitus,     Musculoskeletal     Abdominal  - normal exam    Bowel sounds: normal.   Substance History - negative use     OB/GYN negative ob/gyn ROS         Other   arthritis,                    Anesthesia Plan    ASA 4     general     intravenous induction     Anesthetic plan, all risks, benefits, and alternatives have been provided, discussed and informed consent has been obtained with: patient.    Plan discussed with CRNA.

## 2021-07-23 NOTE — PROGRESS NOTES
[facility] Medicine Services  PROGRESS NOTE    Patient Name: Nancy Goodman  : 1940  MRN: [id number]    Date of Admission: 2021  Primary Care Physician: Eyal Cervantes MD    Subjective     CC: f/u NSTEMI, choledocholithiasis    HPI:  In bed after MRCP. We discussed results - 13mm stone in CBD. She is hopeful that ERCP can be completed today. No chest pain. Feels a little dyspneic if she lays flat. No nausea or vomiting. Daughter concerned that BP has been elevated.     ROS:  Gen- No fevers, chills  CV- No chest pain, palpitations  Resp- No cough, dyspnea  GI- No N/V/D, abd pain    Objective     Vital Signs:   Temp:  [98.2 °F (36.8 °C)-98.6 °F (37 °C)] 98.2 °F (36.8 °C)  Heart Rate:  [71-92] 71  Resp:  [18] 18  BP: (141-165)/(64-78) 165/77     Physical Exam:  Constitutional: No acute distress, awake, alert  HENT: NCAT, mucous membranes moist  Respiratory: Clear to auscultation bilaterally, respiratory effort normal on room air   Cardiovascular: RRR, no murmurs, rubs, or gallops  Gastrointestinal: Positive bowel sounds, soft, nontender, nondistended  Musculoskeletal: Trace pitting bilateral ankle and hand edema  Psychiatric: Appropriate affect, cooperative  Neurologic: Oriented x 3, moves all extremities spontaneously,, speech clear    Results Reviewed:  LAB RESULTS:      Lab 21  0500   WBC 4.85   HEMOGLOBIN 11.5*   HEMATOCRIT 35.7   PLATELETS 88*   MCV 94.7   PROTIME 13.3         Lab 21  0500 21  1352   SODIUM 135* 138   POTASSIUM 3.6 3.7   CHLORIDE 104 105   CO2 23.0 20.0*   ANION GAP 8.0 13.0   BUN 11 13   CREATININE 1.02* 1.01*   GLUCOSE 171* 96   CALCIUM 8.9 8.3*   HEMOGLOBIN A1C 7.10*  --          Lab 21  0500 21  1352   TOTAL PROTEIN 5.8* 4.8*   ALBUMIN 3.10* 3.00*   GLOBULIN 2.7 1.8   ALT (SGPT) 206* 249*   AST (SGOT) 116* 173*   BILIRUBIN 2.1* 3.1*   ALK PHOS 101 77   LIPASE  --  38         Lab 21  0500   PROTIME 13.3   INR  1.04         Lab 07/23/21  0500   CHOLESTEROL 190   LDL CHOL 96   HDL CHOL 17*   TRIGLYCERIDES 461*     Brief Urine Lab Results  (Last result in the past 365 days)      Color   Clarity   Blood   Leuk Est   Nitrite   Protein   CREAT   Urine HCG        11/16/20 0939             100           Microbiology Results Abnormal     Procedure Component Value - Date/Time    COVID PRE-OP / PRE-PROCEDURE SCREENING ORDER (NO ISOLATION) - Swab, Nasopharynx [[id number]]  (Normal) Collected: 07/22/21 1645    Lab Status: Final result Specimen: Swab from Nasopharynx Updated: 07/22/21 1813    Narrative:      The following orders were created for panel order COVID PRE-OP / PRE-PROCEDURE SCREENING ORDER (NO ISOLATION) - Swab, Nasopharynx.  Procedure                               Abnormality         Status                     ---------                               -----------         ------                     COVID-19,CEPHEID,[facility] IN-...[[id number]]  Normal              Final result                 Please view results for these tests on the individual orders.    COVID-19,CEPHEID,[facility] IN-HOUSE(OR EMERGENT/ADD-ON),NP SWAB IN TRANSPORT MEDIA 3-4 HR TAT - Swab, Nasopharynx [[id number]]  (Normal) Collected: 07/22/21 1645    Lab Status: Final result Specimen: Swab from Nasopharynx Updated: 07/22/21 1813     COVID19 Not Detected    Narrative:      Fact sheet for providers: https://www.fda.gov/media/841246/download     Fact sheet for patients: https://www.fda.gov/media/891064/download  Fact sheet for providers: https://www.fda.gov/media/410812/download     Fact sheet for patients: https://www.fda.gov/media/107854/download        Cardiac Catheterization/Vascular Study    Result Date: 7/22/2021  · Occluded LAD with patent LIMA graft · Widely patent SVG to diagonal, and left circumflex · Occluded right PDA and occluded vein graft to the right PDA · 95% proliferative in-stent/segment restenosis in the right posterior lateral branch.      MRI abdomen wo  contrast mrcp    Result Date: 7/23/2021  EXAMINATION: MRI ABDOMEN WO CONTRAST MRCP-  INDICATION: Cholelithiasis; K80.50-Calculus of bile duct without cholangitis or cholecystitis without obstruction; K80.50-Calculus of bile duct without cholangitis or cholecystitis without obstruction  COMPARISON: NONE  TECHNIQUE:Multiplanar multisequence MRI of the abdomen performed without IV contrast, including dedicated heavily T2-weighted thin cut MRCP sequences.  FINDINGS: The lung bases demonstrate trace effusions. The body wall soft tissues are grossly unremarkable. The spleen, pancreas and bilateral adrenal glands demonstrate homogeneous attenuation. The kidneys are unremarkable. In and out of phase imaging demonstrates no significant steatosis component. The liver parenchyma is homogeneous without suspicious focal T2 hyperintense lesion. Dedicated MRCP sequences demonstrate filling defects as proximal as the left intrahepatic ducts, with prominent filling defects noted in the dilated extrahepatic common duct. The common duct is dilated measuring up to 15 mm. Bile duct stones measure up to 13 mm. No definite cholelithiasis or acute findings of cholecystitis.      Impression: Choledocholithiasis with prominent obstructing common duct stones measuring up to 13 mm.  This report was finalized on 7/23/2021 2:39 PM by Kyree Bateman.      Results for orders placed during the hospital encounter of 06/03/20    Adult Transthoracic Echo Complete W/ Cont if Necessary Per Protocol    Interpretation Summary  · Estimated EF = 70%.  · Normal function of TAVR    I have reviewed the medications:  Scheduled Meds:aspirin, 81 mg, Oral, QAM  insulin lispro, 0-7 Units, Subcutaneous, TID AC  [START ON 7/24/2021] losartan, 50 mg, Oral, Q24H  metoprolol tartrate, 25 mg, Oral, BID  pantoprazole, 40 mg, Oral, QAM  piperacillin-tazobactam, 3.375 g, Intravenous, Q8H  rosuvastatin, 5 mg, Oral, Nightly      Continuous Infusions:   PRN Meds:.•   acetaminophen  •  ALPRAZolam  •  dextrose  •  dextrose  •  diphenhydrAMINE  •  furosemide  •  glucagon (human recombinant)  •  hydrALAZINE    Assessment & Plan     Active Hospital Problems    Diagnosis  POA   • Colic, biliary [K80.50]  Yes   • Diastolic CHF (CMS/HCC) [I50.30]  Yes   • Aortic stenosis, severe [I35.0]  Yes   • CAD (coronary artery disease) [I25.10]  Yes   • Essential hypertension [I10]  Yes   • Mixed hyperlipidemia [E78.2]  Yes   • Type 2 diabetes mellitus without complication, with long-term current use of insulin (CMS/HCC) [E11.9, Z79.4]  Not Applicable      Resolved Hospital Problems   No resolved problems to display.     Brief Hospital Course to date:  Nancy Goodman is a 80 y.o. female with PMH significant for HTN, HLD, CAD, diastolic CHF, severe aortic stenosis and insulin-dependent DMII. She was transferred to [facility] from [facility] for evaluation of choledocholithiasis and NSTEMI. She underwent LHC by Dr. Valdez - she was found to have occluded LAD with patent LIMA graft, widely patent SVG to diagonal and L circumflex, occluded R PDA and occluded vein graft to the R PDA. 95% proliferative in-stent segment re-stenosis in the R posterolateral branch. Recommended optimize medical management. Hospital medicine consulted due to choledocholithiasis. GI following. MRCP revealed 13mm CBD stone. ERCP planned.     NSTEMI  MVCAD s/p CABG   History of Aortic valve Stenosis s/p TAVR   Chronic Diastolic HF  - s/p LHC 7/22 by Dr. Valdez. Results/recommendations as above  - Continue ASA / statin / BB  - Plavix on hold pending plan for operative intervention this admission    Choledocholithiasis   - CT at OSH with 2 stones noted in distal CBD, mild GB wall thickening  - Appreciate GI assistance  - MRCP revealed 13mm CBD stone   - ERCP tomorrow - NPO at midnight  - Consult general surgery for consideration of CCY  - Repeat CMP in AM  - Continue IV Zosyn      HLD  - Continue  high-intensity statin  - Total cholesterol 190, triglycerides 461 and HDL 17  - Needs further optimization. On Crestor 5mg daily, per records review she previously had side effects at higher dose     HTN  - Continue Metoprolol 25mg BID  - Irbesartan 75mg BID on hold - restart Losartan 50mg daily in AM  - Takes Lasix 20mg PO daily PRN for edema  - Will give Lasix 20mg IV x 1      DM2  - Holding Metformin due to contrast with LHC on 7/22  - Hgb A1c 7.1%  - Continue SSI only     DVT prophylaxis:No DVT prophylaxis order currently exists.   Disposition: I expect the patient to be discharged TBD - awaiting ERCP and general surgery michelle Mitchell PA-C  07/23/21

## 2021-07-23 NOTE — PLAN OF CARE
Goal Outcome Evaluation:  Plan of Care Reviewed With: patient        Progress: no change  Outcome Summary: Post heart cath. RAJEEV HSU SR. Patient NPO for MRCP. Patient had no complaints of any kind. No acute distress, will continue to monitor patient at this time.

## 2021-07-23 NOTE — PROGRESS NOTES
Pt. Referred for Phase II Cardiac Rehab. Staff discussed benefits of exercise, program protocol, and educational material provided. Teach back verified.  Permission granted from patient for staff to fax referral information to outlying program at this time.  Staff faxed referral info to [facility].   Tonight, we were called about Mr. Reyes who we have known from March for diffuse intracranial atherosclerosis.  He had dialysis catheter placed in the right ICA and was removed yesterday.  Prior to dialysis catheter placement, he is at his baseline, talking and moving both sides.  When he came back from surgery, he was not really respond much. No talking, noticed twitching of his mouth and lip but his family thinks that it is from anesthesia from surgery.  Around shift changed, his day nurse noticed that he does not look right.   Note from anesthesiologist stated that he followed command around 2 pm.  Stat team was called to evaluate him.  CT head, CTA head and CT perfusion were done: show no acute infarction, severe R M1 stenosis, moderate basilar artery stenosis and mild to moderate L MCA stenosis. CT perfusion showed prolonged TTP and no CBV. Discuss with Dr. Fischer ( suspect for large penumbra)  His exam by our stroke navigator reported globally aphasia, left arm weakness and generalized weakness.  My exam around midnight showed alert, awake, global aphasia, left facial weakness, motor exam is unreliable and I can't localize weakness on one side. I can't trust the motor exam. I don't think he is faking it either.   If all findings are real, we localize it in the posterior circulation not the right MCA    Will get MRI brain stat.

## 2021-07-23 NOTE — CONSULTS
General Surgery Consultation Note    Date of Service: 7/23/2021  Nancy Goodman  [id number]  1940      Referring Provider: Mireya Arias DO    Location of Consult: Inpatient     Reason for Consultation: Abdominal pain, choledocholithiasis       History of Present Illness:  I am seeing, Nancy Goodman, in consultation at request of Mireya Arias DO regarding abdominal pain and concern for choledocholithiasis.  She is a very pleasant 80-year-old lady with history of GERD, atrial tachycardia, pulmonary edema, and coronary artery disease status post CABG and aortic valve replacement who was admitted to [facility] on July 22 with complaints of chest and epigastric pain.  She was admitted by the cardiology service and underwent left heart cath without intervention.  She had previously undergone CT scan at the outside hospital which reportedly noted 2 stones in the distal common bile duct and mild gallbladder wall thickening.  On evaluation here she was noted to have an elevated total bilirubin of 2.1 and mild transaminase elevation.  She was seen and evaluated by gastroenterology who performed MRCP that demonstrated choledocholithiasis.  She is tentatively planned for ERCP tomorrow.  She has been followed by cardiology and her home Plavix has been held.  She reports that her epigastric pain was much worse on Tuesday night when she had nausea or vomiting.  Pain has been quite a bit improved while she has been in the hospital.  She has not had nausea or vomiting here.  She has had some subjective chills.  Abdominal surgical history is notable only for tubal ligation many years ago    Problems Addressed this Visit        Gastrointestinal Abdominal     Colic, biliary - Primary    Relevant Orders    Case Request (Completed)      Other Visit Diagnoses     Choledocholithiasis        Relevant Orders    Case Request (Completed)      Diagnoses       Codes Comments    Colic, biliary    -   Primary ICD-10-CM: K80.50  ICD-9-CM: 574.20     Choledocholithiasis     ICD-10-CM: K80.50  ICD-9-CM: 574.50           PMHx:  Past Medical History:   Diagnosis Date   • Allergic rhinitis    • Arthritis    • Cellulitis of finger    • GERD (gastroesophageal reflux disease)    • PAT (paroxysmal atrial tachycardia) (CMS/[facility])    • Pulmonary edema 4/9/2019   • Rosacea    • Vitamin D deficiency    • Wears dentures    • Wears eyeglasses        Past Surgical History:  Past Surgical History:   • AORTIC VALVE REPAIR/REPLACEMENT    Procedure: TRANSCATHETER AORTIC VALVE REPLACEMENT, ELIZABETH;  Surgeon: Fidel Calvillo MD;  Location:  [facility];  Service: Cardiothoracic   • AORTIC VALVE REPAIR/REPLACEMENT    Procedure: Transapical Transcatheter Aortic Valve Replacement;  Surgeon: Anushka Hankins MD;  Location:  [facility];  Service: Cardiovascular   • CARDIAC CATHETERIZATION    Procedure: Left Heart Cath;  Surgeon: Fidel Valdez MD;  Location:  [facility] CATH INVASIVE [facility]ATION;  Service:    • CARDIAC CATHETERIZATION    Procedure: Cardiac Catheterization/Vascular Study;  Surgeon: Anushka Hankins MD;  Location:  [facility]ATION;  Service: Cardiovascular   • CARDIAC CATHETERIZATION    Procedure: LEFT HEART CATH;  Surgeon: Fidel Valdez MD;  Location:  [facility];  Service: Cardiovascular;  Laterality: N/A;   • CORONARY ARTERY BYPASS GRAFT    Procedure: CORONARY ARTERY BYPASS x  5 WITH INTERNAL MAMMARY ARTERY GRAFT and EVH of the Right leg;  Surgeon: Fidel Calvillo MD;  Location:  [facility] OR;  Service:    • FINGER NAIL SURGERY   • TUBAL ABDOMINAL LIGATION   • VARICOSE VEIN SURGERY       Allergies:  Allergies   Allergen Reactions   • Amlodipine GI Intolerance     sickness   • Benazepril Nausea Only   • Ciprofloxacin GI Intolerance     Pt not sure of reaction (stomach problems)   • Pokeweed [Phytolacca] Swelling and Rash   • Statins Nausea Only     Stomach problem        Medications:  No current facility-administered medications on file prior to encounter.     Current Outpatient Medications on File Prior to Encounter   Medication Sig Dispense Refill   • aspirin 81 MG EC tablet Take 81 mg by mouth Every Morning.     • clopidogrel (PLAVIX) 75 MG tablet Take 1 tablet by mouth Daily. 30 tablet 6   • CRANBERRY PO Take 450 mg by mouth Daily.     • furosemide (LASIX) 20 MG tablet Take 1 tablet by mouth Daily As Needed (swelling). 90 tablet 0   • Insulin Glargine (LANTUS SOLOSTAR) 100 UNIT/ML injection pen Inject 45 Units under the skin into the appropriate area as directed Daily. 15 pen 1   • irbesartan (Avapro) 75 MG tablet Take 1 tablet by mouth 2 (two) times a day. 180 tablet 1   • metFORMIN (Glucophage) 500 MG tablet Take 1 tablet by mouth 2 (Two) Times a Day With Meals. 180 tablet 1   • metoprolol tartrate (LOPRESSOR) 25 MG tablet Take 1 tablet by mouth 2 (Two) Times a Day. 180 tablet 1   • potassium chloride (K-DUR,KLOR-CON) 10 MEQ CR tablet Take 1 tablet by mouth Daily. 90 tablet 1   • Probiotic Product (PROBIOTIC ADVANCED PO) Take 1 tablet by mouth Every Night.     • B-D UF III MINI PEN NEEDLES 31G X 5 MM misc 1 for daily injection 100 each 3   • FREESTYLE LITE test strip Check daily and  each 3   • Lancets misc Lancet of choice, check QD and  each 3   • NON FORMULARY Daily. Super cayenne 100,ooo HU every day     • omeprazole (priLOSEC) 40 MG capsule Take 1 capsule by mouth Every Night. Taking it as needed 90 capsule 3   • rosuvastatin (Crestor) 5 MG tablet Take 1 tablet by mouth Daily. 30 tablet 5         Current Facility-Administered Medications:   •  acetaminophen (TYLENOL) tablet 650 mg, 650 mg, Oral, Q4H PRN, Fidel Valdez MD  •  ALPRAZolam (XANAX) tablet 0.25 mg, 0.25 mg, Oral, TID PRN, Fidel Valdez MD  •  aspirin EC tablet 81 mg, 81 mg, Oral, QAM, Fidel Valdez MD, 81 mg at 07/23/21 0929  •  dextrose (D50W) 25 g/ 50mL Intravenous Solution 25 g,  25 g, Intravenous, Q15 Min PRN, Althea Rae DO  •  dextrose (GLUTOSE) oral gel 15 g, 15 g, Oral, Q15 Min PRN, Althea Rae DO  •  diphenhydrAMINE (BENADRYL) injection 25 mg, 25 mg, Intravenous, Q6H PRN, Fidel Valdez MD  •  furosemide (LASIX) tablet 20 mg, 20 mg, Oral, Daily PRN, Fidel Valdez MD, 20 mg at 07/22/21 2101  •  glucagon (human recombinant) (GLUCAGEN DIAGNOSTIC) injection 1 mg, 1 mg, Subcutaneous, Q15 Min PRN, Althea Rae DO  •  hydrALAZINE (APRESOLINE) injection 10 mg, 10 mg, Intravenous, Q6H PRN, Maria L Mitchell PA-C, 10 mg at 07/23/21 1513  •  insulin lispro (humaLOG) injection 0-7 Units, 0-7 Units, Subcutaneous, TID AC, Althea Rae DO  •  losartan (COZAAR) tablet 50 mg, 50 mg, Oral, Q24H, Mireya Arias DO, 50 mg at 07/23/21 1822  •  melatonin tablet 5 mg, 5 mg, Oral, Nightly, Maria L Mitchell PA-C  •  metoprolol tartrate (LOPRESSOR) tablet 25 mg, 25 mg, Oral, BID, Mireya Arias DO, 25 mg at 07/23/21 1822  •  pantoprazole (PROTONIX) EC tablet 40 mg, 40 mg, Oral, QAM, Fidel Valdez MD, 40 mg at 07/23/21 0929  •  piperacillin-tazobactam (ZOSYN) 3.375 g in iso-osmotic dextrose 50 ml (premix), 3.375 g, Intravenous, Q8H, Althea Rae DO, 3.375 g at 07/23/21 1515  •  rosuvastatin (CRESTOR) tablet 5 mg, 5 mg, Oral, Nightly, Fidel Valdez MD, 5 mg at 07/22/21 2049      Family History:  Family History   Problem Relation Age of Onset   • Cancer Mother         Lung cancer   • Heart attack Father    • No Known Problems Maternal Grandmother    • No Known Problems Maternal Grandfather        Social History: Pt lives in [place].    Tobacco use: Denies     EtOH use : Denies    Illicit drug use: Denies       Review of Systems:   Constitutional: No fevers, chills or malaise   Eyes: Denies visual changes    Cardiovascular: Denies chest pain, palpitations   Pulmonary: Denies cough or shortness of breath   Abdominal/ GI: See HPI    Genitourinary:  "Denies dysuria or hematuria   Musculoskeletal: Denies any but chronic joint aches, pains or deformities   Psychiatric: No recent mood changes   Neurologic: No paresthesias or loss of function    /77   Pulse 83   Temp 98.2 °F (36.8 °C) (Oral)   Resp 18   Ht 157.5 cm (62\")   Wt 74.5 kg (164 lb 3.2 oz)   SpO2 97%   BMI 30.03 kg/m²   Body mass index is 30.03 kg/m².    Gen: Awake, alert, no acute distress, resting in bed  Head: Normocephalic, atraumatic.   Eyes: Pupils equal, round, react to light and accommodation.   Mouth: Oral mucosa without lesions,   Neck: No masses, lymphadenopathy or carotid bruits bilaterally   CV: Rhythm and rate regular, no murmurs, rubs or gallops  Lungs: Clear to auscultation bilaterally, not labored on room air   Abdomen: Soft, not tender, not distended, no scars  Groin : No obvious hernias bilaterally   Extremities:  No cyanosis, clubbing or edema bilaterally  Lymphatics: No abnormal lymphadenopathy appreciated   Neurologic: No gross deficits         CBC  Results from last 7 days   Lab Units 07/23/21  0500   WBC 10*3/mm3 4.85   HEMOGLOBIN g/dL 11.5*   HEMATOCRIT % 35.7   PLATELETS 10*3/mm3 88*       CMP  Results from last 7 days   Lab Units 07/23/21  0500   SODIUM mmol/L 135*   POTASSIUM mmol/L 3.6   CHLORIDE mmol/L 104   CO2 mmol/L 23.0   BUN mg/dL 11   CREATININE mg/dL 1.02*   CALCIUM mg/dL 8.9   BILIRUBIN mg/dL 2.1*   ALK PHOS U/L 101   ALT (SGPT) U/L 206*   AST (SGOT) U/L 116*   GLUCOSE mg/dL 171*       Radiology  Imaging Results (Last 72 Hours)     Procedure Component Value Units Date/Time    MRI abdomen wo contrast mrcp [[id number]] Collected: 07/23/21 1431     Updated: 07/23/21 1442    Narrative:      EXAMINATION: MRI ABDOMEN WO CONTRAST MRCP-      INDICATION: Cholelithiasis; K80.50-Calculus of bile duct without  cholangitis or cholecystitis without obstruction; K80.50-Calculus of  bile duct without cholangitis or cholecystitis without obstruction      COMPARISON: NONE   "   TECHNIQUE:Multiplanar multisequence MRI of the abdomen performed without  IV contrast, including dedicated heavily T2-weighted thin cut MRCP  sequences.     FINDINGS: The lung bases demonstrate trace effusions. The body wall soft  tissues are grossly unremarkable. The spleen, pancreas and bilateral  adrenal glands demonstrate homogeneous attenuation. The kidneys are  unremarkable. In and out of phase imaging demonstrates no significant  steatosis component. The liver parenchyma is homogeneous without  suspicious focal T2 hyperintense lesion. Dedicated MRCP sequences  demonstrate filling defects as proximal as the left intrahepatic ducts,  with prominent filling defects noted in the dilated extrahepatic common  duct. The common duct is dilated measuring up to 15 mm. Bile duct stones  measure up to 13 mm. No definite cholelithiasis or acute findings of  cholecystitis.       Impression:      Choledocholithiasis with prominent obstructing common duct  stones measuring up to 13 mm.     This report was finalized on 7/23/2021 2:39 PM by Kyree Bateman.                 Results Review: I have personally reviewed all of the recent lab and imaging results available at this time.     Assessment:  Mrs. Goodman is murray pleasant 80-year-old lady with history of GERD, atrial tachycardia, pulmonary edema, and coronary artery disease status post CABG and aortic valve replacement with choledocholithiasis.  Vital signs are stable.  Labs without leukocytosis with a white blood cell count of 4.  She has elevated total bilirubin of 2.1 and mild transaminase elevation.  I have personally reviewed her MRCP which demonstrates choledocholithiasis.  She has tentatively plan for ERCP tomorrow.  I have discussed with her that I would recommend cholecystectomy prior to discharge so that she does not experience recurrence of symptoms.  We will follow up the results of her ERCP tomorrow and tentatively plan for cholecystectomy on  Sunday    Plan:  -Agree with GI consult and plans for ERCP tomorrow  -Continue to hold Plavix  -We will follow up results of ERCP. tentative plan for cholecystectomy prior to discharge, hopefully on Sunday      I discussed the patient's findings and my recommendations with the patient and/or family, as well as the primary team     Mendoza Tobar MD  07/23/21  19:01 EDT

## 2021-07-23 NOTE — PROGRESS NOTES
MRCP shows choledocholithiasis.  Plan ERCP in a.m.  We will give low-fat diet this evening and keep n.p.o. after midnight.

## 2021-07-24 ENCOUNTER — ANESTHESIA EVENT (OUTPATIENT)
Dept: PERIOP | Facility: HOSPITAL | Age: 81
End: 2021-07-24

## 2021-07-24 ENCOUNTER — APPOINTMENT (OUTPATIENT)
Dept: GENERAL RADIOLOGY | Facility: HOSPITAL | Age: 81
End: 2021-07-24

## 2021-07-24 ENCOUNTER — ANESTHESIA (OUTPATIENT)
Dept: GASTROENTEROLOGY | Facility: HOSPITAL | Age: 81
End: 2021-07-24

## 2021-07-24 LAB
ALBUMIN SERPL-MCNC: 3.3 G/DL (ref 3.5–5.2)
ALBUMIN/GLOB SERPL: 1.1 G/DL
ALP SERPL-CCNC: 113 U/L (ref 39–117)
ALT SERPL W P-5'-P-CCNC: 142 U/L (ref 1–33)
ANION GAP SERPL CALCULATED.3IONS-SCNC: 10 MMOL/L (ref 5–15)
AST SERPL-CCNC: 54 U/L (ref 1–32)
BILIRUB SERPL-MCNC: 1.3 MG/DL (ref 0–1.2)
BUN SERPL-MCNC: 13 MG/DL (ref 8–23)
BUN/CREAT SERPL: 13 (ref 7–25)
CALCIUM SPEC-SCNC: 9.1 MG/DL (ref 8.6–10.5)
CHLORIDE SERPL-SCNC: 103 MMOL/L (ref 98–107)
CO2 SERPL-SCNC: 23 MMOL/L (ref 22–29)
CREAT SERPL-MCNC: 1 MG/DL (ref 0.57–1)
DEPRECATED RDW RBC AUTO: 48.4 FL (ref 37–54)
ERYTHROCYTE [DISTWIDTH] IN BLOOD BY AUTOMATED COUNT: 14.2 % (ref 12.3–15.4)
GFR SERPL CREATININE-BSD FRML MDRD: 53 ML/MIN/1.73
GLOBULIN UR ELPH-MCNC: 3.1 GM/DL
GLUCOSE BLDC GLUCOMTR-MCNC: 147 MG/DL (ref 70–130)
GLUCOSE BLDC GLUCOMTR-MCNC: 158 MG/DL (ref 70–130)
GLUCOSE BLDC GLUCOMTR-MCNC: 258 MG/DL (ref 70–130)
GLUCOSE BLDC GLUCOMTR-MCNC: 305 MG/DL (ref 70–130)
GLUCOSE SERPL-MCNC: 198 MG/DL (ref 65–99)
HCT VFR BLD AUTO: 38.5 % (ref 34–46.6)
HGB BLD-MCNC: 12.8 G/DL (ref 12–15.9)
MCH RBC QN AUTO: 30.5 PG (ref 26.6–33)
MCHC RBC AUTO-ENTMCNC: 33.2 G/DL (ref 31.5–35.7)
MCV RBC AUTO: 91.9 FL (ref 79–97)
PLATELET # BLD AUTO: 142 10*3/MM3 (ref 140–450)
PMV BLD AUTO: 10.9 FL (ref 6–12)
POTASSIUM SERPL-SCNC: 4 MMOL/L (ref 3.5–5.2)
PROT SERPL-MCNC: 6.4 G/DL (ref 6–8.5)
RBC # BLD AUTO: 4.19 10*6/MM3 (ref 3.77–5.28)
SODIUM SERPL-SCNC: 136 MMOL/L (ref 136–145)
WBC # BLD AUTO: 5.93 10*3/MM3 (ref 3.4–10.8)

## 2021-07-24 PROCEDURE — 0FC98ZZ EXTIRPATION OF MATTER FROM COMMON BILE DUCT, VIA NATURAL OR ARTIFICIAL OPENING ENDOSCOPIC: ICD-10-PCS | Performed by: INTERNAL MEDICINE

## 2021-07-24 PROCEDURE — 80053 COMPREHEN METABOLIC PANEL: CPT | Performed by: INTERNAL MEDICINE

## 2021-07-24 PROCEDURE — 63710000001 INSULIN LISPRO (HUMAN) PER 5 UNITS: Performed by: INTERNAL MEDICINE

## 2021-07-24 PROCEDURE — 25010000002 ONDANSETRON PER 1 MG: Performed by: NURSE ANESTHETIST, CERTIFIED REGISTERED

## 2021-07-24 PROCEDURE — 85027 COMPLETE CBC AUTOMATED: CPT | Performed by: PHYSICIAN ASSISTANT

## 2021-07-24 PROCEDURE — 25010000002 PIPERACILLIN SOD-TAZOBACTAM PER 1 G: Performed by: INTERNAL MEDICINE

## 2021-07-24 PROCEDURE — 25010000002 HYDRALAZINE PER 20 MG: Performed by: INTERNAL MEDICINE

## 2021-07-24 PROCEDURE — 25010000002 NEOSTIGMINE 10 MG/10ML SOLUTION: Performed by: NURSE ANESTHETIST, CERTIFIED REGISTERED

## 2021-07-24 PROCEDURE — 43264 ERCP REMOVE DUCT CALCULI: CPT | Performed by: INTERNAL MEDICINE

## 2021-07-24 PROCEDURE — C1769 GUIDE WIRE: HCPCS | Performed by: INTERNAL MEDICINE

## 2021-07-24 PROCEDURE — 43274 ERCP DUCT STENT PLACEMENT: CPT | Performed by: INTERNAL MEDICINE

## 2021-07-24 PROCEDURE — 99232 SBSQ HOSP IP/OBS MODERATE 35: CPT | Performed by: INTERNAL MEDICINE

## 2021-07-24 PROCEDURE — 0F798DZ DILATION OF COMMON BILE DUCT WITH INTRALUMINAL DEVICE, VIA NATURAL OR ARTIFICIAL OPENING ENDOSCOPIC: ICD-10-PCS | Performed by: INTERNAL MEDICINE

## 2021-07-24 PROCEDURE — 25010000002 PIPERACILLIN SOD-TAZOBACTAM PER 1 G: Performed by: FAMILY MEDICINE

## 2021-07-24 PROCEDURE — 25010000002 DEXAMETHASONE PER 1 MG: Performed by: NURSE ANESTHETIST, CERTIFIED REGISTERED

## 2021-07-24 PROCEDURE — 82962 GLUCOSE BLOOD TEST: CPT

## 2021-07-24 PROCEDURE — 74330 X-RAY BILE/PANC ENDOSCOPY: CPT

## 2021-07-24 PROCEDURE — C2617 STENT, NON-COR, TEM W/O DEL: HCPCS | Performed by: INTERNAL MEDICINE

## 2021-07-24 PROCEDURE — 25010000002 PROPOFOL 10 MG/ML EMULSION: Performed by: NURSE ANESTHETIST, CERTIFIED REGISTERED

## 2021-07-24 DEVICE — BILIARY STENT
Type: IMPLANTABLE DEVICE | Site: BILE DUCT | Status: FUNCTIONAL
Brand: ADVANIX™ BILIARY

## 2021-07-24 RX ORDER — ONDANSETRON 2 MG/ML
4 INJECTION INTRAMUSCULAR; INTRAVENOUS ONCE AS NEEDED
Status: DISCONTINUED | OUTPATIENT
Start: 2021-07-24 | End: 2021-07-24 | Stop reason: HOSPADM

## 2021-07-24 RX ORDER — LIDOCAINE HYDROCHLORIDE 10 MG/ML
INJECTION, SOLUTION EPIDURAL; INFILTRATION; INTRACAUDAL; PERINEURAL AS NEEDED
Status: DISCONTINUED | OUTPATIENT
Start: 2021-07-24 | End: 2021-07-24 | Stop reason: SURG

## 2021-07-24 RX ORDER — GLYCOPYRROLATE 0.2 MG/ML
INJECTION INTRAMUSCULAR; INTRAVENOUS AS NEEDED
Status: DISCONTINUED | OUTPATIENT
Start: 2021-07-24 | End: 2021-07-24 | Stop reason: SURG

## 2021-07-24 RX ORDER — FENTANYL CITRATE 50 UG/ML
50 INJECTION, SOLUTION INTRAMUSCULAR; INTRAVENOUS
Status: DISCONTINUED | OUTPATIENT
Start: 2021-07-24 | End: 2021-07-24 | Stop reason: HOSPADM

## 2021-07-24 RX ORDER — ROCURONIUM BROMIDE 10 MG/ML
INJECTION, SOLUTION INTRAVENOUS AS NEEDED
Status: DISCONTINUED | OUTPATIENT
Start: 2021-07-24 | End: 2021-07-24 | Stop reason: SURG

## 2021-07-24 RX ORDER — ONDANSETRON 2 MG/ML
INJECTION INTRAMUSCULAR; INTRAVENOUS AS NEEDED
Status: DISCONTINUED | OUTPATIENT
Start: 2021-07-24 | End: 2021-07-24 | Stop reason: SURG

## 2021-07-24 RX ORDER — SODIUM CHLORIDE 9 MG/ML
INJECTION, SOLUTION INTRAVENOUS CONTINUOUS PRN
Status: DISCONTINUED | OUTPATIENT
Start: 2021-07-24 | End: 2021-07-24 | Stop reason: SURG

## 2021-07-24 RX ORDER — BUPIVACAINE HCL/0.9 % NACL/PF 0.125 %
PLASTIC BAG, INJECTION (ML) EPIDURAL AS NEEDED
Status: DISCONTINUED | OUTPATIENT
Start: 2021-07-24 | End: 2021-07-24 | Stop reason: SURG

## 2021-07-24 RX ORDER — PROPOFOL 10 MG/ML
VIAL (ML) INTRAVENOUS AS NEEDED
Status: DISCONTINUED | OUTPATIENT
Start: 2021-07-24 | End: 2021-07-24 | Stop reason: SURG

## 2021-07-24 RX ORDER — DEXAMETHASONE SODIUM PHOSPHATE 4 MG/ML
INJECTION, SOLUTION INTRA-ARTICULAR; INTRALESIONAL; INTRAMUSCULAR; INTRAVENOUS; SOFT TISSUE AS NEEDED
Status: DISCONTINUED | OUTPATIENT
Start: 2021-07-24 | End: 2021-07-24 | Stop reason: SURG

## 2021-07-24 RX ORDER — IPRATROPIUM BROMIDE AND ALBUTEROL SULFATE 2.5; .5 MG/3ML; MG/3ML
3 SOLUTION RESPIRATORY (INHALATION) ONCE AS NEEDED
Status: DISCONTINUED | OUTPATIENT
Start: 2021-07-24 | End: 2021-07-24 | Stop reason: HOSPADM

## 2021-07-24 RX ORDER — NEOSTIGMINE METHYLSULFATE 1 MG/ML
INJECTION, SOLUTION INTRAVENOUS AS NEEDED
Status: DISCONTINUED | OUTPATIENT
Start: 2021-07-24 | End: 2021-07-24 | Stop reason: SURG

## 2021-07-24 RX ADMIN — SODIUM CHLORIDE: 9 INJECTION, SOLUTION INTRAVENOUS at 09:41

## 2021-07-24 RX ADMIN — METOPROLOL TARTRATE 25 MG: 25 TABLET, FILM COATED ORAL at 23:31

## 2021-07-24 RX ADMIN — DEXAMETHASONE SODIUM PHOSPHATE 4 MG: 4 INJECTION, SOLUTION INTRA-ARTICULAR; INTRALESIONAL; INTRAMUSCULAR; INTRAVENOUS; SOFT TISSUE at 09:43

## 2021-07-24 RX ADMIN — ROCURONIUM BROMIDE 30 MG: 10 INJECTION INTRAVENOUS at 09:43

## 2021-07-24 RX ADMIN — TAZOBACTAM SODIUM AND PIPERACILLIN SODIUM 3.38 G: 375; 3 INJECTION, SOLUTION INTRAVENOUS at 13:05

## 2021-07-24 RX ADMIN — ROSUVASTATIN CALCIUM 5 MG: 10 TABLET, COATED ORAL at 23:31

## 2021-07-24 RX ADMIN — HYDRALAZINE HYDROCHLORIDE 10 MG: 20 INJECTION INTRAMUSCULAR; INTRAVENOUS at 18:13

## 2021-07-24 RX ADMIN — LIDOCAINE HYDROCHLORIDE 50 MG: 10 INJECTION, SOLUTION EPIDURAL; INFILTRATION; INTRACAUDAL; PERINEURAL at 09:43

## 2021-07-24 RX ADMIN — PROPOFOL 100 MG: 10 INJECTION, EMULSION INTRAVENOUS at 09:43

## 2021-07-24 RX ADMIN — Medication 80 MCG: at 09:52

## 2021-07-24 RX ADMIN — NEOSTIGMINE 2.5 MG: 1 INJECTION INTRAVENOUS at 10:40

## 2021-07-24 RX ADMIN — TAZOBACTAM SODIUM AND PIPERACILLIN SODIUM 3.38 G: 375; 3 INJECTION, SOLUTION INTRAVENOUS at 23:32

## 2021-07-24 RX ADMIN — GLYCOPYRROLATE 0.4 MG: 0.4 INJECTION INTRAMUSCULAR; INTRAVENOUS at 10:40

## 2021-07-24 RX ADMIN — PANTOPRAZOLE SODIUM 40 MG: 40 TABLET, DELAYED RELEASE ORAL at 07:02

## 2021-07-24 RX ADMIN — LOSARTAN POTASSIUM 50 MG: 50 TABLET, FILM COATED ORAL at 08:20

## 2021-07-24 RX ADMIN — METOPROLOL TARTRATE 25 MG: 25 TABLET, FILM COATED ORAL at 08:20

## 2021-07-24 RX ADMIN — INSULIN LISPRO 2 UNITS: 100 INJECTION, SOLUTION INTRAVENOUS; SUBCUTANEOUS at 13:05

## 2021-07-24 RX ADMIN — TAZOBACTAM SODIUM AND PIPERACILLIN SODIUM 3.38 G: 375; 3 INJECTION, SOLUTION INTRAVENOUS at 07:02

## 2021-07-24 RX ADMIN — Medication 5 MG: at 23:31

## 2021-07-24 RX ADMIN — INSULIN LISPRO 5 UNITS: 100 INJECTION, SOLUTION INTRAVENOUS; SUBCUTANEOUS at 16:56

## 2021-07-24 RX ADMIN — Medication 80 MCG: at 10:10

## 2021-07-24 RX ADMIN — Medication 80 MCG: at 10:00

## 2021-07-24 RX ADMIN — ONDANSETRON 4 MG: 2 INJECTION INTRAMUSCULAR; INTRAVENOUS at 10:39

## 2021-07-24 NOTE — ANESTHESIA PROCEDURE NOTES
Arterial Line      Patient reassessed immediately prior to procedure    Patient location during procedure: pre-op   Line placed for hemodynamic monitoring.  Performed By   Anesthesiologist: Loren Cary MD  Preanesthetic Checklist  Completed: patient identified, IV checked, site marked, risks and benefits discussed, surgical consent, monitors and equipment checked, pre-op evaluation and timeout performed  Arterial Line Prep   Sterile Tech: cap, gloves and sterile barriers  Prep: ChloraPrep  Patient monitoring: blood pressure monitoring, continuous pulse oximetry and EKG  Arterial Line Procedure   Laterality:left  Location:  radial artery  Catheter size: 20 G   Guidance: palpation technique  Number of attempts: 1  Successful placement: yes  Post Assessment   Dressing Type: line sutured, occlusive dressing applied, secured with tape and wrist guard applied.   Complications no  Circ/Move/Sens Assessment: normal and unchanged.   Patient Tolerance: patient tolerated the procedure well with no apparent complications

## 2021-07-24 NOTE — PLAN OF CARE
Goal Outcome Evaluation:  Plan of Care Reviewed With: patient        Progress: no change  Outcome Summary: VSS. RA. SR/SB. NPO for ERCP. Patient rested well overnight with no complaints of any kind. No acute distress noted, will continue to monitor patient at this time.

## 2021-07-24 NOTE — ANESTHESIA PREPROCEDURE EVALUATION
Anesthesia Evaluation     Patient summary reviewed and Nursing notes reviewed   NPO Solid Status: > 8 hours  NPO Liquid Status: > 8 hours           Airway   Mallampati: II  TM distance: >3 FB  Neck ROM: full  No difficulty expected  Dental    (+) lower dentures and upper dentures    Pulmonary - negative pulmonary ROS and normal exam   Cardiovascular - normal exam    ECG reviewed    (+) hypertension, valvular problems/murmurs (s/p TAVR), CAD, CABG, cardiac stents CHF , hyperlipidemia,     ROS comment:   S/p TAVR 2019  S/p CABG 2017    Echo 6/20: normal EF, normal function of prosthetic AV, mild MR    LHC yesterday: occluded PDA graft, 95% in-stent restenosis right post lateral branch --> medical management    Neuro/Psych- negative ROS  GI/Hepatic/Renal/Endo    (+)  GERD,  liver disease history of elevated LFT, diabetes mellitus,     Musculoskeletal     Abdominal  - normal exam    Bowel sounds: normal.   Substance History - negative use     OB/GYN negative ob/gyn ROS         Other   arthritis,                      Anesthesia Plan    ASA 4     general   (Janet)  intravenous induction     Anesthetic plan, all risks, benefits, and alternatives have been provided, discussed and informed consent has been obtained with: patient.    Plan discussed with CRNA.

## 2021-07-24 NOTE — PROGRESS NOTES
[facility] Medicine Services  PROGRESS NOTE    Patient Name: Nancy Goodman  : 1940  MRN: [id number]    Date of Admission: 2021  Primary Care Physician: Eyal Cervantes MD    Subjective     CC: f/u NSTEMI, choledocholithiasis    HPI:  Anticipating ERCP today. No issues overnight. No pain. No nausea.    ROS:  Gen- No fevers, chills  CV- No chest pain, palpitations  Resp- No cough, dyspnea  GI- No N/V/D, abd pain    Objective     Vital Signs:   Temp:  [98.6 °F (37 °C)-98.7 °F (37.1 °C)] 98.6 °F (37 °C)  Heart Rate:  [55-84] 55  Resp:  [18] 18  BP: (120-180)/() 170/120     Physical Exam:  Constitutional: No acute distress, awake, alert  HENT: NCAT, mucous membranes moist  Respiratory: Clear to auscultation bilaterally, respiratory effort normal   Cardiovascular: RRR, no murmurs, rubs, or gallops  Gastrointestinal: Positive bowel sounds, soft, nontender, nondistended  Musculoskeletal: No bilateral ankle edema  Psychiatric: Appropriate affect, cooperative  Neurologic: Oriented x 3, strength symmetric in all extremities, Cranial Nerves grossly intact to confrontation, speech clear  Skin: No rashes      Results Reviewed:  LAB RESULTS:      Lab 21  0821 21  0500   WBC 5.93 4.85   HEMOGLOBIN 12.8 11.5*   HEMATOCRIT 38.5 35.7   PLATELETS 142 88*   MCV 91.9 94.7   PROTIME  --  13.3         Lab 21  0500 21  1352   SODIUM 135* 138   POTASSIUM 3.6 3.7   CHLORIDE 104 105   CO2 23.0 20.0*   ANION GAP 8.0 13.0   BUN 11 13   CREATININE 1.02* 1.01*   GLUCOSE 171* 96   CALCIUM 8.9 8.3*   HEMOGLOBIN A1C 7.10*  --          Lab 21  0500 21  1352   TOTAL PROTEIN 5.8* 4.8*   ALBUMIN 3.10* 3.00*   GLOBULIN 2.7 1.8   ALT (SGPT) 206* 249*   AST (SGOT) 116* 173*   BILIRUBIN 2.1* 3.1*   ALK PHOS 101 77   LIPASE  --  38         Lab 21  0500   PROTIME 13.3   INR 1.04         Lab 21  0500   CHOLESTEROL 190   LDL CHOL 96   HDL CHOL 17*    TRIGLYCERIDES 461*     Brief Urine Lab Results  (Last result in the past 365 days)      Color   Clarity   Blood   Leuk Est   Nitrite   Protein   CREAT   Urine HCG        11/16/20 0939             100           Microbiology Results Abnormal     Procedure Component Value - Date/Time    COVID PRE-OP / PRE-PROCEDURE SCREENING ORDER (NO ISOLATION) - Swab, Nasopharynx [[id number]]  (Normal) Collected: 07/22/21 1645    Lab Status: Final result Specimen: Swab from Nasopharynx Updated: 07/22/21 1813    Narrative:      The following orders were created for panel order COVID PRE-OP / PRE-PROCEDURE SCREENING ORDER (NO ISOLATION) - Swab, Nasopharynx.  Procedure                               Abnormality         Status                     ---------                               -----------         ------                     COVID-19,CEPHEID,SEAN IN-...[[id number]]  Normal              Final result                 Please view results for these tests on the individual orders.    COVID-19,CEPHEID,SEAN IN-HOUSE(OR EMERGENT/ADD-ON),NP SWAB IN TRANSPORT MEDIA 3-4 HR TAT - Swab, Nasopharynx [[id number]]  (Normal) Collected: 07/22/21 1645    Lab Status: Final result Specimen: Swab from Nasopharynx Updated: 07/22/21 1813     COVID19 Not Detected    Narrative:      Fact sheet for providers: https://www.fda.gov/media/565515/download     Fact sheet for patients: https://www.fda.gov/media/073132/download  Fact sheet for providers: https://www.fda.gov/media/335573/download     Fact sheet for patients: https://www.fda.gov/media/154878/download        Cardiac Catheterization/Vascular Study    Result Date: 7/22/2021  · Occluded LAD with patent LIMA graft · Widely patent SVG to diagonal, and left circumflex · Occluded right PDA and occluded vein graft to the right PDA · 95% proliferative in-stent/segment restenosis in the right posterior lateral branch.      MRI abdomen wo contrast mrcp    Result Date: 7/23/2021  EXAMINATION: MRI ABDOMEN WO CONTRAST  MRCP-  INDICATION: Cholelithiasis; K80.50-Calculus of bile duct without cholangitis or cholecystitis without obstruction; K80.50-Calculus of bile duct without cholangitis or cholecystitis without obstruction  COMPARISON: NONE  TECHNIQUE:Multiplanar multisequence MRI of the abdomen performed without IV contrast, including dedicated heavily T2-weighted thin cut MRCP sequences.  FINDINGS: The lung bases demonstrate trace effusions. The body wall soft tissues are grossly unremarkable. The spleen, pancreas and bilateral adrenal glands demonstrate homogeneous attenuation. The kidneys are unremarkable. In and out of phase imaging demonstrates no significant steatosis component. The liver parenchyma is homogeneous without suspicious focal T2 hyperintense lesion. Dedicated MRCP sequences demonstrate filling defects as proximal as the left intrahepatic ducts, with prominent filling defects noted in the dilated extrahepatic common duct. The common duct is dilated measuring up to 15 mm. Bile duct stones measure up to 13 mm. No definite cholelithiasis or acute findings of cholecystitis.      Impression: Choledocholithiasis with prominent obstructing common duct stones measuring up to 13 mm.  This report was finalized on 7/23/2021 2:39 PM by Kyree Bateman.      Results for orders placed during the hospital encounter of 06/03/20    Adult Transthoracic Echo Complete W/ Cont if Necessary Per Protocol    Interpretation Summary  · Estimated EF = 70%.  · Normal function of TAVR    I have reviewed the medications:  Scheduled Meds:aspirin, 81 mg, Oral, QAM  insulin lispro, 0-7 Units, Subcutaneous, TID AC  insulin lispro, 0-7 Units, Subcutaneous, TID AC  losartan, 50 mg, Oral, Q24H  melatonin, 5 mg, Oral, Nightly  metoprolol tartrate, 25 mg, Oral, BID  pantoprazole, 40 mg, Oral, QAM  piperacillin-tazobactam, 3.375 g, Intravenous, Q8H  rosuvastatin, 5 mg, Oral, Nightly      Continuous Infusions:   PRN Meds:.•  acetaminophen  •   ALPRAZolam  •  dextrose  •  dextrose  •  diphenhydrAMINE  •  fentanyl  •  furosemide  •  glucagon (human recombinant)  •  hydrALAZINE  •  indomethacin  •  ipratropium-albuterol  •  lactated ringers  •  ondansetron    Assessment & Plan     Active Hospital Problems    Diagnosis  POA   • Colic, biliary [K80.50]  Yes   • Diastolic CHF (CMS/HCC) [I50.30]  Yes   • Aortic stenosis, severe [I35.0]  Yes   • CAD (coronary artery disease) [I25.10]  Yes   • Essential hypertension [I10]  Yes   • Mixed hyperlipidemia [E78.2]  Yes   • Type 2 diabetes mellitus without complication, with long-term current use of insulin (CMS/[facility]) [E11.9, Z79.4]  Not Applicable      Resolved Hospital Problems   No resolved problems to display.     Brief Hospital Course to date:  Nancy Goodman is a 80 y.o. female with PMH significant for HTN, HLD, CAD, diastolic CHF, severe aortic stenosis and insulin-dependent DMII. She was transferred to [facility] from [facility] for evaluation of choledocholithiasis and NSTEMI. She underwent LHC by Dr. Valdez - she was found to have occluded LAD with patent LIMA graft, widely patent SVG to diagonal and L circumflex, occluded R PDA and occluded vein graft to the R PDA. 95% proliferative in-stent segment re-stenosis in the R posterolateral branch. Recommended optimize medical management. Hospital medicine consulted due to choledocholithiasis. GI following. MRCP revealed 13mm CBD stone. ERCP planned.     NSTEMI  MVCAD s/p CABG   History of Aortic valve Stenosis s/p TAVR   Chronic Diastolic HF  - s/p LHC 7/22 by Dr. Valdez. Results/recommendations as above  - Continue ASA / statin / BB  - Plavix on hold pending plan for operative intervention this admission    Choledocholithiasis   - CT at OSH with 2 stones noted in distal CBD, mild GB wall thickening  - MRCP revealed 13mm CBD stone   - ERCP planned for today  - gen Surg following, plan for probably lap paulo tomorrow  - Continue IV Zosyn       HLD  - Continue high-intensity statin  - Total cholesterol 190, triglycerides 461 and HDL 17  - Needs further optimization. On Crestor 5mg daily, per records review she previously had side effects at higher dose     HTN  - Continue Metoprolol 25mg BID  -  Losartan 50mg daily   - Takes Lasix 20mg PO daily PRN for edema     DM2  - Holding Metformin due to contrast with LHC on 7/22  - Hgb A1c 7.1%  - Continue SSI only     DVT prophylaxis:No DVT prophylaxis order currently exists.     Disposition: I expect the patient to be discharged 2-3 days, julius bates prior to discharge    Mireya Arias,   07/24/21

## 2021-07-24 NOTE — BRIEF OP NOTE
ENDOSCOPIC RETROGRADE CHOLANGIOPANCREATOGRAPHY  Progress Note    Nancy Goodman  7/24/2021    Ampulla is located within a small diverticulum.  Single-pass wire cannulation of the common bile duct achieved.  Cholangiogram revealed numerous filling defects in the lower one half of common bile duct.  Sphincterotomy performed.  Balloon sweeps yielded a large amount of sludge and numerous stones.  At least 1 larger stone remains, which could not be balloon extracted despite multiple attempts.  A temporary 10 Croatian by 7 cm biliary stent was placed with excellent flow of bile. Nothing seen to suggest cholangitis.    >> Repeat ERCP in 2 to 4 weeks for removal of stent and extraction of remaining choledocholithiasis.  >> May proceed with interval cholecystectomy.    Mark I. Brunner, MD     Date: 7/24/2021  Time: 10:42 EDT

## 2021-07-24 NOTE — ANESTHESIA PROCEDURE NOTES
Airway  Urgency: elective    Date/Time: 7/24/2021 9:45 AM  Airway not difficult    General Information and Staff    Patient location during procedure: OR  CRNA: Jackson Rodriguez CRNA    Indications and Patient Condition  Indications for airway management: airway protection    Preoxygenated: yes  MILS not maintained throughout  Mask difficulty assessment: 1 - vent by mask    Final Airway Details  Final airway type: endotracheal airway      Successful airway: ETT  Cuffed: yes   Successful intubation technique: direct laryngoscopy  Endotracheal tube insertion site: oral  Blade: Wassreman  Blade size: 2  ETT size (mm): 7.5  Cormack-Lehane Classification: grade I - full view of glottis  Placement verified by: chest auscultation and capnometry   Cuff volume (mL): 8  Measured from: lips  ETT/EBT  to lips (cm): 21  Number of attempts at approach: 1  Assessment: lips, teeth, and gum same as pre-op and atraumatic intubation    Additional Comments  Negative epigastric sounds, Breath sound equal bilaterally with symmetric chest rise and fall

## 2021-07-24 NOTE — PROGRESS NOTES
"Patient Name:  Nancy Goodman  YOB: 1940  [id number]    Surgery Progress Note    Date of visit: 7/24/2021      Subjective: No acute events overnight.  Denies nausea, vomiting, fevers or chills.  Denies abdominal pain this morning.          Objective:     BP (!) 170/120 (BP Location: Left arm, Patient Position: Lying)   Pulse 55   Temp 98.6 °F (37 °C) (Oral)   Resp 18   Ht 157.5 cm (62\")   Wt 74.5 kg (164 lb 3.2 oz)   SpO2 95%   BMI 30.03 kg/m²     Intake/Output Summary (Last 24 hours) at 7/24/2021 0937  Last data filed at 7/24/2021 0702  Gross per 24 hour   Intake 591.6 ml   Output --   Net 591.6 ml       GEN:   Awake, alert, in no acute distress, resting comfortably in bed, elderly  CV:   Regular rate and rhythm  L:  Clear to auscultation bilaterally, not labored on room air   Abd:  Soft, not tender, not distended  Ext:  No cyanosis, clubbing, or edema    Recent labs that are back at this time have been reviewed.           Assessment/ Plan:    Mrs. Goodman is murray pleasant 80-year-old lady with history of GERD, atrial tachycardia, pulmonary edema, and coronary artery disease status post CABG and aortic valve replacement with choledocholithiasis.    #Choledocholithiasis  -Clinically stable.  Denies abdominal pain.  Labs from today are still pending  -Tentatively plan for ERCP with GI today  -We will follow up results of ERCP.  Possible cholecystectomy tomorrow  -N.p.o. at midnight      Mendoza Tobar MD  7/24/2021  09:37 EDT      "

## 2021-07-24 NOTE — ANESTHESIA POSTPROCEDURE EVALUATION
Patient: Nancy Goodman    Procedure Summary     Date: 07/24/21 Room / Location: [facility] ENDOSCOPY 3 /  [facility]    Anesthesia Start: 0929 Anesthesia Stop:     Procedure: ENDOSCOPIC RETROGRADE CHOLANGIOPANCREATOGRAPHY (N/A ) Diagnosis:     Surgeons: Brunner, Mark I, MD Provider: Loren Cary MD    Anesthesia Type: general ASA Status: 4          Anesthesia Type: general    Vitals  Vitals Value Taken Time   /81 07/24/21 1100   Temp 97.6 °F (36.4 °C) 07/24/21 1100   Pulse 67 07/24/21 1100   Resp 18 07/24/21 1100   SpO2 96 % 07/24/21 1100           Post Anesthesia Care and Evaluation    Patient location during evaluation: PACU  Patient participation: complete - patient participated  Level of consciousness: awake  Pain score: 0  Pain management: adequate  Airway patency: patent  Anesthetic complications: No anesthetic complications  PONV Status: none  Cardiovascular status: acceptable and stable  Respiratory status: nasal cannula, unassisted, acceptable and spontaneous ventilation  Hydration status: acceptable

## 2021-07-25 ENCOUNTER — ANESTHESIA (OUTPATIENT)
Dept: PERIOP | Facility: HOSPITAL | Age: 81
End: 2021-07-25

## 2021-07-25 LAB
ALBUMIN SERPL-MCNC: 3.6 G/DL (ref 3.5–5.2)
ALBUMIN/GLOB SERPL: 1.2 G/DL
ALP SERPL-CCNC: 108 U/L (ref 39–117)
ALT SERPL W P-5'-P-CCNC: 112 U/L (ref 1–33)
ANION GAP SERPL CALCULATED.3IONS-SCNC: 13 MMOL/L (ref 5–15)
AST SERPL-CCNC: 32 U/L (ref 1–32)
BILIRUB SERPL-MCNC: 0.9 MG/DL (ref 0–1.2)
BUN SERPL-MCNC: 16 MG/DL (ref 8–23)
BUN/CREAT SERPL: 15.1 (ref 7–25)
CALCIUM SPEC-SCNC: 9.4 MG/DL (ref 8.6–10.5)
CHLORIDE SERPL-SCNC: 105 MMOL/L (ref 98–107)
CO2 SERPL-SCNC: 21 MMOL/L (ref 22–29)
CREAT SERPL-MCNC: 1.06 MG/DL (ref 0.57–1)
DEPRECATED RDW RBC AUTO: 48.6 FL (ref 37–54)
ERYTHROCYTE [DISTWIDTH] IN BLOOD BY AUTOMATED COUNT: 14.5 % (ref 12.3–15.4)
GFR SERPL CREATININE-BSD FRML MDRD: 50 ML/MIN/1.73
GLOBULIN UR ELPH-MCNC: 3 GM/DL
GLUCOSE BLDC GLUCOMTR-MCNC: 170 MG/DL (ref 70–130)
GLUCOSE BLDC GLUCOMTR-MCNC: 202 MG/DL (ref 70–130)
GLUCOSE BLDC GLUCOMTR-MCNC: 315 MG/DL (ref 70–130)
GLUCOSE BLDC GLUCOMTR-MCNC: 372 MG/DL (ref 70–130)
GLUCOSE SERPL-MCNC: 206 MG/DL (ref 65–99)
HCT VFR BLD AUTO: 40.7 % (ref 34–46.6)
HGB BLD-MCNC: 13.5 G/DL (ref 12–15.9)
INR PPP: 0.94 (ref 0.85–1.16)
MCH RBC QN AUTO: 30.1 PG (ref 26.6–33)
MCHC RBC AUTO-ENTMCNC: 33.2 G/DL (ref 31.5–35.7)
MCV RBC AUTO: 90.8 FL (ref 79–97)
PLATELET # BLD AUTO: 190 10*3/MM3 (ref 140–450)
PMV BLD AUTO: 10.9 FL (ref 6–12)
POTASSIUM SERPL-SCNC: 3.7 MMOL/L (ref 3.5–5.2)
PROT SERPL-MCNC: 6.6 G/DL (ref 6–8.5)
PROTHROMBIN TIME: 12.3 SECONDS (ref 11.4–14.4)
RBC # BLD AUTO: 4.48 10*6/MM3 (ref 3.77–5.28)
SODIUM SERPL-SCNC: 139 MMOL/L (ref 136–145)
WBC # BLD AUTO: 8.01 10*3/MM3 (ref 3.4–10.8)

## 2021-07-25 PROCEDURE — 82962 GLUCOSE BLOOD TEST: CPT

## 2021-07-25 PROCEDURE — 25010000002 FENTANYL CITRATE (PF) 50 MCG/ML SOLUTION: Performed by: NURSE ANESTHETIST, CERTIFIED REGISTERED

## 2021-07-25 PROCEDURE — 80053 COMPREHEN METABOLIC PANEL: CPT | Performed by: SURGERY

## 2021-07-25 PROCEDURE — 85027 COMPLETE CBC AUTOMATED: CPT | Performed by: SURGERY

## 2021-07-25 PROCEDURE — 25010000002 ONDANSETRON PER 1 MG: Performed by: NURSE ANESTHETIST, CERTIFIED REGISTERED

## 2021-07-25 PROCEDURE — 25010000002 PIPERACILLIN SOD-TAZOBACTAM PER 1 G: Performed by: INTERNAL MEDICINE

## 2021-07-25 PROCEDURE — 25010000002 HYDRALAZINE PER 20 MG: Performed by: INTERNAL MEDICINE

## 2021-07-25 PROCEDURE — 63710000001 INSULIN LISPRO (HUMAN) PER 5 UNITS: Performed by: SURGERY

## 2021-07-25 PROCEDURE — 99232 SBSQ HOSP IP/OBS MODERATE 35: CPT | Performed by: INTERNAL MEDICINE

## 2021-07-25 PROCEDURE — 63710000001 INSULIN LISPRO (HUMAN) PER 5 UNITS: Performed by: INTERNAL MEDICINE

## 2021-07-25 PROCEDURE — 88304 TISSUE EXAM BY PATHOLOGIST: CPT | Performed by: SURGERY

## 2021-07-25 PROCEDURE — C1889 IMPLANT/INSERT DEVICE, NOC: HCPCS | Performed by: SURGERY

## 2021-07-25 PROCEDURE — 25010000002 HYDRALAZINE PER 20 MG: Performed by: NURSE ANESTHETIST, CERTIFIED REGISTERED

## 2021-07-25 PROCEDURE — 85610 PROTHROMBIN TIME: CPT | Performed by: SURGERY

## 2021-07-25 PROCEDURE — 25010000002 DEXAMETHASONE PER 1 MG: Performed by: NURSE ANESTHETIST, CERTIFIED REGISTERED

## 2021-07-25 PROCEDURE — 25010000002 PROPOFOL 10 MG/ML EMULSION: Performed by: NURSE ANESTHETIST, CERTIFIED REGISTERED

## 2021-07-25 PROCEDURE — 25010000002 NEOSTIGMINE 10 MG/10ML SOLUTION: Performed by: NURSE ANESTHETIST, CERTIFIED REGISTERED

## 2021-07-25 PROCEDURE — 25010000002 PIPERACILLIN SOD-TAZOBACTAM PER 1 G: Performed by: SURGERY

## 2021-07-25 PROCEDURE — 0FT44ZZ RESECTION OF GALLBLADDER, PERCUTANEOUS ENDOSCOPIC APPROACH: ICD-10-PCS | Performed by: SURGERY

## 2021-07-25 DEVICE — LIGAMAX 5 MM ENDOSCOPIC MULTIPLE CLIP APPLIER
Type: IMPLANTABLE DEVICE | Site: ABDOMEN | Status: FUNCTIONAL
Brand: LIGAMAX

## 2021-07-25 RX ORDER — SODIUM CHLORIDE 0.9 % (FLUSH) 0.9 %
10 SYRINGE (ML) INJECTION AS NEEDED
Status: DISCONTINUED | OUTPATIENT
Start: 2021-07-25 | End: 2021-07-25

## 2021-07-25 RX ORDER — BUPIVACAINE HYDROCHLORIDE AND EPINEPHRINE 2.5; 5 MG/ML; UG/ML
INJECTION, SOLUTION EPIDURAL; INFILTRATION; INTRACAUDAL; PERINEURAL AS NEEDED
Status: DISCONTINUED | OUTPATIENT
Start: 2021-07-25 | End: 2021-07-25 | Stop reason: HOSPADM

## 2021-07-25 RX ORDER — FENTANYL CITRATE 50 UG/ML
INJECTION, SOLUTION INTRAMUSCULAR; INTRAVENOUS AS NEEDED
Status: DISCONTINUED | OUTPATIENT
Start: 2021-07-25 | End: 2021-07-25 | Stop reason: SURG

## 2021-07-25 RX ORDER — FAMOTIDINE 10 MG/ML
20 INJECTION, SOLUTION INTRAVENOUS ONCE
Status: DISCONTINUED | OUTPATIENT
Start: 2021-07-25 | End: 2021-07-25

## 2021-07-25 RX ORDER — SODIUM CHLORIDE 0.9 % (FLUSH) 0.9 %
10 SYRINGE (ML) INJECTION EVERY 12 HOURS SCHEDULED
Status: DISCONTINUED | OUTPATIENT
Start: 2021-07-25 | End: 2021-07-25

## 2021-07-25 RX ORDER — ROCURONIUM BROMIDE 10 MG/ML
INJECTION, SOLUTION INTRAVENOUS AS NEEDED
Status: DISCONTINUED | OUTPATIENT
Start: 2021-07-25 | End: 2021-07-25 | Stop reason: SURG

## 2021-07-25 RX ORDER — HYDROMORPHONE HYDROCHLORIDE 1 MG/ML
0.5 INJECTION, SOLUTION INTRAMUSCULAR; INTRAVENOUS; SUBCUTANEOUS
Status: DISCONTINUED | OUTPATIENT
Start: 2021-07-25 | End: 2021-07-25 | Stop reason: HOSPADM

## 2021-07-25 RX ORDER — ONDANSETRON 2 MG/ML
INJECTION INTRAMUSCULAR; INTRAVENOUS AS NEEDED
Status: DISCONTINUED | OUTPATIENT
Start: 2021-07-25 | End: 2021-07-25 | Stop reason: SURG

## 2021-07-25 RX ORDER — BUPIVACAINE HCL/0.9 % NACL/PF 0.125 %
PLASTIC BAG, INJECTION (ML) EPIDURAL AS NEEDED
Status: DISCONTINUED | OUTPATIENT
Start: 2021-07-25 | End: 2021-07-25 | Stop reason: SURG

## 2021-07-25 RX ORDER — LIDOCAINE HYDROCHLORIDE 10 MG/ML
0.5 INJECTION, SOLUTION EPIDURAL; INFILTRATION; INTRACAUDAL; PERINEURAL ONCE AS NEEDED
Status: DISCONTINUED | OUTPATIENT
Start: 2021-07-25 | End: 2021-07-25

## 2021-07-25 RX ORDER — METOPROLOL TARTRATE 50 MG/1
50 TABLET, FILM COATED ORAL 2 TIMES DAILY
Status: DISCONTINUED | OUTPATIENT
Start: 2021-07-25 | End: 2021-07-26 | Stop reason: HOSPADM

## 2021-07-25 RX ORDER — NEOSTIGMINE METHYLSULFATE 1 MG/ML
INJECTION, SOLUTION INTRAVENOUS AS NEEDED
Status: DISCONTINUED | OUTPATIENT
Start: 2021-07-25 | End: 2021-07-25 | Stop reason: SURG

## 2021-07-25 RX ORDER — LIDOCAINE HYDROCHLORIDE 10 MG/ML
INJECTION, SOLUTION EPIDURAL; INFILTRATION; INTRACAUDAL; PERINEURAL AS NEEDED
Status: DISCONTINUED | OUTPATIENT
Start: 2021-07-25 | End: 2021-07-25 | Stop reason: SURG

## 2021-07-25 RX ORDER — ONDANSETRON 2 MG/ML
4 INJECTION INTRAMUSCULAR; INTRAVENOUS ONCE AS NEEDED
Status: DISCONTINUED | OUTPATIENT
Start: 2021-07-25 | End: 2021-07-25 | Stop reason: HOSPADM

## 2021-07-25 RX ORDER — GLYCOPYRROLATE 0.2 MG/ML
INJECTION INTRAMUSCULAR; INTRAVENOUS AS NEEDED
Status: DISCONTINUED | OUTPATIENT
Start: 2021-07-25 | End: 2021-07-25 | Stop reason: SURG

## 2021-07-25 RX ORDER — LOSARTAN POTASSIUM 50 MG/1
50 TABLET ORAL 2 TIMES DAILY
Status: DISCONTINUED | OUTPATIENT
Start: 2021-07-25 | End: 2021-07-26 | Stop reason: HOSPADM

## 2021-07-25 RX ORDER — LOSARTAN POTASSIUM 25 MG/1
25 TABLET ORAL 2 TIMES DAILY
Status: DISCONTINUED | OUTPATIENT
Start: 2021-07-25 | End: 2021-07-25

## 2021-07-25 RX ORDER — FAMOTIDINE 20 MG/1
20 TABLET, FILM COATED ORAL ONCE
Status: DISCONTINUED | OUTPATIENT
Start: 2021-07-25 | End: 2021-07-25

## 2021-07-25 RX ORDER — PROPOFOL 10 MG/ML
VIAL (ML) INTRAVENOUS AS NEEDED
Status: DISCONTINUED | OUTPATIENT
Start: 2021-07-25 | End: 2021-07-25 | Stop reason: SURG

## 2021-07-25 RX ORDER — HYDRALAZINE HYDROCHLORIDE 20 MG/ML
INJECTION INTRAMUSCULAR; INTRAVENOUS AS NEEDED
Status: DISCONTINUED | OUTPATIENT
Start: 2021-07-25 | End: 2021-07-25 | Stop reason: SURG

## 2021-07-25 RX ORDER — SODIUM CHLORIDE, SODIUM LACTATE, POTASSIUM CHLORIDE, CALCIUM CHLORIDE 600; 310; 30; 20 MG/100ML; MG/100ML; MG/100ML; MG/100ML
9 INJECTION, SOLUTION INTRAVENOUS CONTINUOUS
Status: DISCONTINUED | OUTPATIENT
Start: 2021-07-25 | End: 2021-07-26 | Stop reason: HOSPADM

## 2021-07-25 RX ORDER — DEXAMETHASONE SODIUM PHOSPHATE 4 MG/ML
INJECTION, SOLUTION INTRA-ARTICULAR; INTRALESIONAL; INTRAMUSCULAR; INTRAVENOUS; SOFT TISSUE AS NEEDED
Status: DISCONTINUED | OUTPATIENT
Start: 2021-07-25 | End: 2021-07-25 | Stop reason: SURG

## 2021-07-25 RX ORDER — TRAMADOL HYDROCHLORIDE 50 MG/1
50 TABLET ORAL EVERY 6 HOURS PRN
Status: DISCONTINUED | OUTPATIENT
Start: 2021-07-25 | End: 2021-07-26 | Stop reason: HOSPADM

## 2021-07-25 RX ORDER — MIDAZOLAM HYDROCHLORIDE 1 MG/ML
0.5 INJECTION INTRAMUSCULAR; INTRAVENOUS
Status: DISCONTINUED | OUTPATIENT
Start: 2021-07-25 | End: 2021-07-25

## 2021-07-25 RX ORDER — FENTANYL CITRATE 50 UG/ML
50 INJECTION, SOLUTION INTRAMUSCULAR; INTRAVENOUS
Status: DISCONTINUED | OUTPATIENT
Start: 2021-07-25 | End: 2021-07-25 | Stop reason: HOSPADM

## 2021-07-25 RX ADMIN — TAZOBACTAM SODIUM AND PIPERACILLIN SODIUM 3.38 G: 375; 3 INJECTION, SOLUTION INTRAVENOUS at 22:52

## 2021-07-25 RX ADMIN — LOSARTAN POTASSIUM 50 MG: 50 TABLET, FILM COATED ORAL at 07:54

## 2021-07-25 RX ADMIN — Medication 80 MCG: at 09:52

## 2021-07-25 RX ADMIN — Medication 80 MCG: at 09:40

## 2021-07-25 RX ADMIN — METOPROLOL TARTRATE 50 MG: 50 TABLET, FILM COATED ORAL at 07:55

## 2021-07-25 RX ADMIN — TAZOBACTAM SODIUM AND PIPERACILLIN SODIUM 3.38 G: 375; 3 INJECTION, SOLUTION INTRAVENOUS at 06:43

## 2021-07-25 RX ADMIN — HYDRALAZINE HYDROCHLORIDE 10 MG: 20 INJECTION INTRAMUSCULAR; INTRAVENOUS at 10:46

## 2021-07-25 RX ADMIN — ONDANSETRON 4 MG: 2 INJECTION INTRAMUSCULAR; INTRAVENOUS at 10:31

## 2021-07-25 RX ADMIN — DEXAMETHASONE SODIUM PHOSPHATE 8 MG: 4 INJECTION, SOLUTION INTRA-ARTICULAR; INTRALESIONAL; INTRAMUSCULAR; INTRAVENOUS; SOFT TISSUE at 09:48

## 2021-07-25 RX ADMIN — INSULIN LISPRO 5 UNITS: 100 INJECTION, SOLUTION INTRAVENOUS; SUBCUTANEOUS at 17:57

## 2021-07-25 RX ADMIN — HYDRALAZINE HYDROCHLORIDE 10 MG: 20 INJECTION INTRAMUSCULAR; INTRAVENOUS at 04:36

## 2021-07-25 RX ADMIN — LIDOCAINE HYDROCHLORIDE 50 MG: 10 INJECTION, SOLUTION EPIDURAL; INFILTRATION; INTRACAUDAL; PERINEURAL at 09:40

## 2021-07-25 RX ADMIN — PANTOPRAZOLE SODIUM 40 MG: 40 TABLET, DELAYED RELEASE ORAL at 06:44

## 2021-07-25 RX ADMIN — ROSUVASTATIN CALCIUM 5 MG: 10 TABLET, COATED ORAL at 22:53

## 2021-07-25 RX ADMIN — TRAMADOL HYDROCHLORIDE 50 MG: 50 TABLET, FILM COATED ORAL at 13:59

## 2021-07-25 RX ADMIN — SODIUM CHLORIDE, POTASSIUM CHLORIDE, SODIUM LACTATE AND CALCIUM CHLORIDE: 600; 310; 30; 20 INJECTION, SOLUTION INTRAVENOUS at 09:30

## 2021-07-25 RX ADMIN — Medication 5 MG: at 22:53

## 2021-07-25 RX ADMIN — INSULIN LISPRO 2 UNITS: 100 INJECTION, SOLUTION INTRAVENOUS; SUBCUTANEOUS at 11:51

## 2021-07-25 RX ADMIN — TAZOBACTAM SODIUM AND PIPERACILLIN SODIUM 3.38 G: 375; 3 INJECTION, SOLUTION INTRAVENOUS at 13:57

## 2021-07-25 RX ADMIN — INSULIN LISPRO 3 UNITS: 100 INJECTION, SOLUTION INTRAVENOUS; SUBCUTANEOUS at 07:55

## 2021-07-25 RX ADMIN — ROCURONIUM BROMIDE 35 MG: 10 INJECTION INTRAVENOUS at 09:40

## 2021-07-25 RX ADMIN — METOPROLOL TARTRATE 50 MG: 50 TABLET, FILM COATED ORAL at 22:52

## 2021-07-25 RX ADMIN — FENTANYL CITRATE 100 MCG: 50 INJECTION, SOLUTION INTRAMUSCULAR; INTRAVENOUS at 09:40

## 2021-07-25 RX ADMIN — PROPOFOL 100 MG: 10 INJECTION, EMULSION INTRAVENOUS at 09:40

## 2021-07-25 RX ADMIN — NEOSTIGMINE 3 MG: 1 INJECTION INTRAVENOUS at 10:33

## 2021-07-25 RX ADMIN — LOSARTAN POTASSIUM 50 MG: 50 TABLET, FILM COATED ORAL at 22:53

## 2021-07-25 RX ADMIN — GLYCOPYRROLATE 0.4 MG: 0.4 INJECTION INTRAMUSCULAR; INTRAVENOUS at 10:33

## 2021-07-25 NOTE — ANESTHESIA PREPROCEDURE EVALUATION
Anesthesia Evaluation     Patient summary reviewed and Nursing notes reviewed                Airway   Mallampati: II  TM distance: >3 FB  Neck ROM: full  No difficulty expected  Dental - normal exam   (+) lower dentures and upper dentures    Pulmonary - negative pulmonary ROS and normal exam   Cardiovascular - normal exam    (+) hypertension, valvular problems/murmurs (s/p TAVR), CAD, CABG, cardiac stents (asa/plavix tx; held 3 days) hyperlipidemia,     ROS comment:   S/p TAVR 2019  S/p CABG 2017    Echo 6/20: normal EF, normal function of prosthetic AV, mild MR    LHC 2 days ago: occluded PDA graft, 95% in-stent restenosis right post lateral branch --> medical management    Neuro/Psych- negative ROS  GI/Hepatic/Renal/Endo    (+)  GERD,  liver disease history of elevated LFT, diabetes mellitus,     Musculoskeletal     Abdominal  - normal exam    Bowel sounds: normal.   Substance History - negative use     OB/GYN negative ob/gyn ROS         Other   arthritis,                      Anesthesia Plan    ASA 4     general   (A line vs clear site)  intravenous induction     Anesthetic plan, all risks, benefits, and alternatives have been provided, discussed and informed consent has been obtained with: patient.    Plan discussed with CRNA.

## 2021-07-25 NOTE — PROGRESS NOTES
"Patient Name:  Nancy Goodman  YOB: 1940  [id number]    Surgery Post - Operative Note    Date of visit: 7/25/2021      Subjective: No acute events.  Resting in bed.  Denies pain.  Denies nausea vomiting        Objective:    /70 (BP Location: Left arm, Patient Position: Lying)   Pulse 70   Temp 98.1 °F (36.7 °C) (Temporal)   Resp 18   Ht 157.5 cm (62\")   Wt 74.5 kg (164 lb 3.2 oz)   SpO2 94%   BMI 30.03 kg/m²     CV:  Regular rate and rhythm   L:  Clear to auscultation bilaterally. Not labored on O2 by NC   ABD:  Soft, appropriately tender. Dressings  clean, dry and intact   EXT:  No cyanosis, clubbing or edema        Assessment/ Plan:     Recovering well after laparoscopic cholecystectomy. Continue Pulmonary toilet        Mendoza Tobar MD  7/25/2021  16:57 EDT      "

## 2021-07-25 NOTE — OP NOTE
Operative Report    Patient Name:  Nancy Goodman  YOB: 1940  [id number]  7/25/2021      PREOPERATIVE DIAGNOSIS: Choledocholithiasis.  Coronary artery disease      POSTOPERATIVE DIAGNOSIS: Same        PROCEDURE PERFORMED:     Laparoscopic cholecystectomy        SURGEON: Mendoza Tobar MD      ASSISTANT: None        SPECIMENS: Gallbladder and contents        ANESTHESIA: General.        FINDINGS:     1. Critical view of safety established prior to ligation of cystic structures.  The cystic duct was somewhat enlarged however we had an excellent critical view and divided just at the infundibulum at its junction with the gallbladder       INDICATIONS:      The patient is a 80 y.o. female with a history of abdominal pain and a clinical diagnosis consistent with choledocholithiasis. Pre-operative imaging including MRCP scan confirmed the diagnosis. The risks, benefits and alternatives of laparoscopic cholecystectomy were discussed with the patient and their family preoperatively and they agreed to proceed.        DESCRIPTION OF PROCEDURE:     The patient was taken to the operating room and positioned supine on the operating room table. General anesthesia was initiated. The patient was prepped and draped in the usual sterile fashion. Antibiotics were given preoperatively. SCDs were properly placed on the patient and turned on. An orogastric tube was placed by the anesthesiologist with return of gastric content prior to start of the case.     Local anesthetic was injected prior to all skin incisions. A periumbilical skin incision was then created inferior to the umbilicus utilizing an 11 blade scalpel. Blunt dissection was carried down to the level of the anterior abdominal wall fascia and the base of the umbilicus. The base of the umbilicus was then grasped and elevated with a Kocher clamp. A vertical incision was then made in the anterior abdominal wall fascia under direct visualization using  cautery. Following this, the peritoneal cavity was then entered under direct visualization. Stay sutures of 0 Vicryl were placed around the defect, and a 12 mm Kalin trocar was then advanced without difficulty into the abdominal cavity. Pneumoperitoneum was established at 15 mmHg. The abdomen was then surveyed with a 10mm 30 degree laparoscope, with special attention to the viscera underlying the insertion site which was found to be free of injury.  Next, under direct laparoscopic visualization three additional 5 mm trocars were placed in the right upper quadrant. The patient was then positioned in the head-up position with the table tilted to the left.    The fundus of the gallbladder was retracted cephalad while the infundibulum of the gallbladder was retracted laterally. Using a combination of hook cautery as well as a [place] dissector, the peritoneum surrounding the cystic pedicle was stripped. Cystic structures were dissected. A critical view of safety was established, meanin and only 2 structures were visualized entering the gallbladder, all fibrous and fatty tissue between the cystic artery and cystic duct were cleared, and the posterior one-third of the gallbladder was removed from the cystic plate.  The cystic duct was noted to be somewhat dilated, however we had an excellent critical view and chose to divide the cystic duct just at its junction with the infundibulum of the gallbladder.  Next 2 clips were placed on the distal cystic duct, 1 clip was placed on the proximal cystic duct, and the duct was transected with laparoscopic scissors.  A PDS Endoloop was applied below the clips on the cystic duct stump.  Next 2 clips were placed on the proximal cystic artery, 1 clip was placed on the distal cystic artery and this was transected with laparoscopic scissors. There was a small posterior branch of the artery that was clipped and divided separately. Next the gallbladder was removed from the cystic  plate using hook electrocautery.      A 5 mm 30 degree laparoscope was then inserted through the subxiphoid trocar site to visualize the placement of the gallbladder within an Endo Catch bag and then extraction of the gallbladder through the periumbilical trocar site utilizing the Endo Catch bag. Instruments were returned to their native positions.  The specimen was inspected on the back table and we confirmed that the anatomic findings were concordant. hemostasis of the cystic plate was confirmed using electrocautery. The clipped cystic structures were carefully examined and there was no sign of bilious or bloody drainage. The table was then leveled and limited irrigation of the right upper quadrant was performed with normal saline and hemostasis again confirmed. Next, the 5 mm trocars were removed under direct laparoscopic visualization. The 12 mm trocar was then withdrawn and pneumoperitoneum was released.     The fascia of the periumbilical trocar site was then closed in a figure-of-eight fashion with an 0 Vicryl suture. All wound sites were irrigated and hemostasis confirmed. The skin incisions were then closed using a 4-0 Monocryl suture in the subcuticular layer. Mastisol and Steri-Strips were then applied to each incision site with a clean and dry dressing over this.    After the procedure, the patient was awakened, extubated, and taken to the postoperative anesthesia care unit for recovery. All needle, instrument, and lap counts were correct. I was personally present and performed all portions of the procedure. There were no immediate complications         Mendoza Tobar MD  7/25/2021  10:39 EDT

## 2021-07-25 NOTE — PROGRESS NOTES
"Patient Name:  Nancy Goodman  YOB: 1940  [id number]    Surgery Progress Note    Date of visit: 7/25/2021      Subjective: No acute events overnight.  Denies abdominal pain, nausea, vomiting, or chest pain.  Overall feels fairly well.          Objective:     BP (!) 187/92 (BP Location: Left arm, Patient Position: Lying)   Pulse 76   Temp 98.5 °F (36.9 °C) (Oral)   Resp 18   Ht 157.5 cm (62\")   Wt 74.5 kg (164 lb 3.2 oz)   SpO2 95%   BMI 30.03 kg/m²     Intake/Output Summary (Last 24 hours) at 7/25/2021 0766  Last data filed at 7/25/2021 0643  Gross per 24 hour   Intake 790.8 ml   Output 900 ml   Net -109.2 ml       GEN:   Awake, alert, in no acute distress, resting comfortably in bed, elderly  CV:   Regular rate and rhythm  L:  Clear to auscultation bilaterally, not labored on room air   Abd:  Soft, not tender, not distended  Ext:  No cyanosis, clubbing, or edema    Recent labs that are back at this time have been reviewed.           Assessment/ Plan:    Mrs. Goodman is murray pleasant 80-year-old lady with history of GERD, atrial tachycardia, pulmonary edema, and coronary artery disease status post CABG and aortic valve replacement with choledocholithiasis.     #Choledocholithiasis  -Underwent ERCP yesterday.  Choledocholithiasis was encountered.  Sphincterotomy performed and duct swept, however at least 1 stone remains in place.  Stent was left in place  -Clinically stable.  Denies abdominal pain  -Hemoglobin down slightly at 1.3 this morning  -Plan to proceed to the OR for lap paulo today as schedule permits    I had a long discussion with the patient and their family regarding the risks, benefits, and alternatives to the procedure including specifically discussing the risks of bleeding, infection, damage to adjacent structures, possible need for further operations, possible need for conversion to an open operation, risks of common bile duct injury and risk of bile leak after the " procedure. They wish to proceed.     Mendoza Tobar MD  7/25/2021  07:35 EDT

## 2021-07-25 NOTE — PROGRESS NOTES
Patient is in operating room for laparoscopic cholecystectomy.  LFTs are improved today.    >> Will arrange repeat ERCP in approximately 1 month for removal of remaining common duct stone(s).  >> Discussed with  and daughter to watch for symptoms of fever, chills, or jaundice.  Advised proceed to ER if she develops such symptoms.    Will sign off.  Please call with questions or concerns.

## 2021-07-25 NOTE — PLAN OF CARE
Goal Outcome Evaluation:  Plan of Care Reviewed With: patient        Progress: improving  Outcome Summary: VSS. RA. SR/SB. NPO for julius bates later today. Patient verbalized that she is feeling significantly better already. Patient has rested well, with no complaints of any kind. HTN noted. No acute distress noted, will continue to monitor patient at this time.

## 2021-07-25 NOTE — ANESTHESIA POSTPROCEDURE EVALUATION
Patient: Nancy Goodman    Procedure Summary     Date: 07/25/21 Room / Location:  [facility] [address] [place] [address] [facility]    Anesthesia Start: 0930 Anesthesia Stop:     Procedure: CHOLECYSTECTOMY LAPAROSCOPIC (N/A Abdomen) Diagnosis:     Surgeons: Mendoza Tobar MD Provider: Kevin Sevilla MD    Anesthesia Type: general ASA Status: 4          Anesthesia Type: general    Vitals  Vitals Value Taken Time   /80 07/25/21 1049   Temp 98.3 °F (36.8 °C) 07/25/21 1049   Pulse 70 07/25/21 1049   Resp 18 07/25/21 1049   SpO2 92 % 07/25/21 1049           Post Anesthesia Care and Evaluation    Patient location during evaluation: PACU  Patient participation: complete - patient participated  Level of consciousness: awake  Pain score: 0  Pain management: adequate  Airway patency: patent  Anesthetic complications: No anesthetic complications  PONV Status: none  Cardiovascular status: acceptable and stable  Respiratory status: nasal cannula, unassisted, acceptable and spontaneous ventilation  Hydration status: acceptable

## 2021-07-25 NOTE — ANESTHESIA PROCEDURE NOTES
Airway  Urgency: elective    Date/Time: 7/25/2021 9:43 AM  Airway not difficult    General Information and Staff    Patient location during procedure: OR  CRNA: Jackson Rodriguez CRNA    Indications and Patient Condition  Indications for airway management: airway protection    Preoxygenated: yes  MILS not maintained throughout  Mask difficulty assessment: 2 - vent by mask + OA or adjuvant +/- NMBA    Final Airway Details  Final airway type: endotracheal airway      Successful airway: ETT  Cuffed: yes   Successful intubation technique: direct laryngoscopy  Endotracheal tube insertion site: oral  Blade: Wasserman  Blade size: 2  ETT size (mm): 7.0  Cormack-Lehane Classification: grade I - full view of glottis  Placement verified by: chest auscultation and capnometry   Cuff volume (mL): 5  Measured from: lips  ETT/EBT  to lips (cm): 21  Number of attempts at approach: 1  Assessment: lips, teeth, and gum same as pre-op and atraumatic intubation    Additional Comments  Negative epigastric sounds, Breath sound equal bilaterally with symmetric chest rise and fall

## 2021-07-26 ENCOUNTER — PREP FOR SURGERY (OUTPATIENT)
Dept: OTHER | Facility: HOSPITAL | Age: 81
End: 2021-07-26

## 2021-07-26 ENCOUNTER — READMISSION MANAGEMENT (OUTPATIENT)
Dept: CALL CENTER | Facility: HOSPITAL | Age: 81
End: 2021-07-26

## 2021-07-26 VITALS
TEMPERATURE: 98.2 F | BODY MASS INDEX: 30.22 KG/M2 | WEIGHT: 164.2 LBS | HEART RATE: 60 BPM | OXYGEN SATURATION: 96 % | HEIGHT: 62 IN | RESPIRATION RATE: 18 BRPM | DIASTOLIC BLOOD PRESSURE: 71 MMHG | SYSTOLIC BLOOD PRESSURE: 146 MMHG

## 2021-07-26 DIAGNOSIS — K80.50 CHOLEDOCHOLITHIASIS: Primary | ICD-10-CM

## 2021-07-26 LAB
ALBUMIN SERPL-MCNC: 3.4 G/DL (ref 3.5–5.2)
ALBUMIN/GLOB SERPL: 1.1 G/DL
ALP SERPL-CCNC: 93 U/L (ref 39–117)
ALT SERPL W P-5'-P-CCNC: 97 U/L (ref 1–33)
ANION GAP SERPL CALCULATED.3IONS-SCNC: 10 MMOL/L (ref 5–15)
AST SERPL-CCNC: 32 U/L (ref 1–32)
BILIRUB SERPL-MCNC: 0.8 MG/DL (ref 0–1.2)
BUN SERPL-MCNC: 18 MG/DL (ref 8–23)
BUN/CREAT SERPL: 15.7 (ref 7–25)
CALCIUM SPEC-SCNC: 9.4 MG/DL (ref 8.6–10.5)
CHLORIDE SERPL-SCNC: 102 MMOL/L (ref 98–107)
CO2 SERPL-SCNC: 24 MMOL/L (ref 22–29)
CREAT SERPL-MCNC: 1.15 MG/DL (ref 0.57–1)
DEPRECATED RDW RBC AUTO: 51.1 FL (ref 37–54)
ERYTHROCYTE [DISTWIDTH] IN BLOOD BY AUTOMATED COUNT: 14.9 % (ref 12.3–15.4)
GFR SERPL CREATININE-BSD FRML MDRD: 45 ML/MIN/1.73
GLOBULIN UR ELPH-MCNC: 3 GM/DL
GLUCOSE BLDC GLUCOMTR-MCNC: 169 MG/DL (ref 70–130)
GLUCOSE BLDC GLUCOMTR-MCNC: 194 MG/DL (ref 70–130)
GLUCOSE BLDC GLUCOMTR-MCNC: 256 MG/DL (ref 70–130)
GLUCOSE SERPL-MCNC: 206 MG/DL (ref 65–99)
HCT VFR BLD AUTO: 38 % (ref 34–46.6)
HGB BLD-MCNC: 12.4 G/DL (ref 12–15.9)
MCH RBC QN AUTO: 30.3 PG (ref 26.6–33)
MCHC RBC AUTO-ENTMCNC: 32.6 G/DL (ref 31.5–35.7)
MCV RBC AUTO: 92.9 FL (ref 79–97)
PLATELET # BLD AUTO: 178 10*3/MM3 (ref 140–450)
PMV BLD AUTO: 11.3 FL (ref 6–12)
POTASSIUM SERPL-SCNC: 4 MMOL/L (ref 3.5–5.2)
PROT SERPL-MCNC: 6.4 G/DL (ref 6–8.5)
RBC # BLD AUTO: 4.09 10*6/MM3 (ref 3.77–5.28)
SODIUM SERPL-SCNC: 136 MMOL/L (ref 136–145)
WBC # BLD AUTO: 10.09 10*3/MM3 (ref 3.4–10.8)

## 2021-07-26 PROCEDURE — 85027 COMPLETE CBC AUTOMATED: CPT | Performed by: SURGERY

## 2021-07-26 PROCEDURE — 63710000001 INSULIN LISPRO (HUMAN) PER 5 UNITS: Performed by: SURGERY

## 2021-07-26 PROCEDURE — 82962 GLUCOSE BLOOD TEST: CPT

## 2021-07-26 PROCEDURE — 25010000002 HYDRALAZINE PER 20 MG: Performed by: SURGERY

## 2021-07-26 PROCEDURE — 25010000002 PIPERACILLIN SOD-TAZOBACTAM PER 1 G: Performed by: SURGERY

## 2021-07-26 PROCEDURE — 80053 COMPREHEN METABOLIC PANEL: CPT | Performed by: SURGERY

## 2021-07-26 PROCEDURE — 99239 HOSP IP/OBS DSCHRG MGMT >30: CPT | Performed by: HOSPITALIST

## 2021-07-26 RX ORDER — TRAMADOL HYDROCHLORIDE 50 MG/1
25 TABLET ORAL EVERY 8 HOURS PRN
Qty: 3 TABLET | Refills: 0 | Status: SHIPPED | OUTPATIENT
Start: 2021-07-26 | End: 2021-07-28

## 2021-07-26 RX ORDER — CLOPIDOGREL BISULFATE 75 MG/1
75 TABLET ORAL DAILY
Status: DISCONTINUED | OUTPATIENT
Start: 2021-07-26 | End: 2021-07-26 | Stop reason: HOSPADM

## 2021-07-26 RX ORDER — TERAZOSIN 2 MG/1
2 CAPSULE ORAL NIGHTLY
Qty: 30 CAPSULE | Refills: 0 | Status: SHIPPED | OUTPATIENT
Start: 2021-07-26 | End: 2021-11-03

## 2021-07-26 RX ADMIN — METOPROLOL TARTRATE 50 MG: 50 TABLET, FILM COATED ORAL at 08:43

## 2021-07-26 RX ADMIN — INSULIN LISPRO 4 UNITS: 100 INJECTION, SOLUTION INTRAVENOUS; SUBCUTANEOUS at 11:40

## 2021-07-26 RX ADMIN — CLOPIDOGREL BISULFATE 75 MG: 75 TABLET ORAL at 11:40

## 2021-07-26 RX ADMIN — TAZOBACTAM SODIUM AND PIPERACILLIN SODIUM 3.38 G: 375; 3 INJECTION, SOLUTION INTRAVENOUS at 08:51

## 2021-07-26 RX ADMIN — ASPIRIN 81 MG: 81 TABLET, COATED ORAL at 05:51

## 2021-07-26 RX ADMIN — TRAMADOL HYDROCHLORIDE 50 MG: 50 TABLET, FILM COATED ORAL at 05:52

## 2021-07-26 RX ADMIN — ACETAMINOPHEN 650 MG: 325 TABLET ORAL at 08:44

## 2021-07-26 RX ADMIN — HYDRALAZINE HYDROCHLORIDE 10 MG: 20 INJECTION INTRAMUSCULAR; INTRAVENOUS at 05:52

## 2021-07-26 RX ADMIN — LOSARTAN POTASSIUM 50 MG: 50 TABLET, FILM COATED ORAL at 08:44

## 2021-07-26 RX ADMIN — PANTOPRAZOLE SODIUM 40 MG: 40 TABLET, DELAYED RELEASE ORAL at 05:51

## 2021-07-26 RX ADMIN — INSULIN LISPRO 2 UNITS: 100 INJECTION, SOLUTION INTRAVENOUS; SUBCUTANEOUS at 08:42

## 2021-07-26 NOTE — DISCHARGE SUMMARY
[facility] Medicine Services  DISCHARGE SUMMARY    Patient Name: Nancy Goodman  : 1940  MRN: [id number]    Date of Admission: 2021  9:27 AM  Date of Discharge:  2021 (Monday)  Primary Care Physician: Eyal Cervantes MD    Consults     Date and Time Order Name Status Description    2021  3:07 PM Inpatient General Surgery Consult Completed     2021  2:42 PM Inpatient Gastroenterology Consult Completed     2021  1:36 PM Inpatient Hospitalist Consult Completed         Mendoza Tobar MD - General Surgery  Mark I. Brunner, MD - Gastroenterology   Althea Rae DO - Hospital Medicine - consulted by Dr. Valdez    Hospital Course     Presenting Problem:   Colic, biliary [K80.50]    Active Hospital Problems    Diagnosis  POA   • Choledocholithiasis [K80.50]  Yes   • Colic, biliary [K80.50]  Yes   • Diastolic CHF (CMS/HCC) [I50.30]  Yes   • Aortic stenosis, severe [I35.0]  Yes   • CAD (coronary artery disease) [I25.10]  Yes   • Essential hypertension [I10]  Yes   • Mixed hyperlipidemia [E78.2]  Yes   • Type 2 diabetes mellitus without complication, with long-term current use of insulin (CMS/HCC) [E11.9, Z79.4]  Not Applicable      Resolved Hospital Problems   No resolved problems to display.      Biliary Sludge    Hospital Course:  Nancy Goodman is a 80 y.o. female with known history of Multivessel CAD with history of 5 vessel CABG and coronary stenting who presented in transfer to the Cardiology Services for NSTEMI.  She was admitted by Dr. Valdez and taken to the Cath Lab for Non-STEMI.    Left Heart Cath on the day of admission was positive for Occluded LAD and patent LIMA graft.  Recommendations were to optimize medical mgmt.  Hospital medicine was consulted for medical mgmt and primary service transferred to the Hospital Medicine Service.    She had an MRCP on  (Friday) which was consistent with Choledocholithiasis with prominent  obstructing CBD stones measuring up to 13 mm and was planned for ERCP the next day.      On Saturday, 7/24 she went for ERCP with Dr. Brunner.  Sludge was swept from the CBD and multiple stones but there was still a stone retained and a temporary stent was placed for now.  She was prepared for general surgery by being off Plavix.    On Sunday, 7/25 she underwent Laparoscopic Cholecystectomy with Dr. Mendoza Tobar off Plavix.  She had un-eventful post op course and was cleared to restart Plavix.    She will be discharged home and     Discharge Follow Up Recommendations for outpatient labs/diagnostics:   follow up with GI in two weeks for repeat ERCP with stent removal and possible retained stone removal    Day of Discharge     HPI:     Doing well.  Would like to go home.   in room today.    Review of Systems    Gen- No fevers, chills  CV- No chest pain, palpitations  Resp- No cough, dyspnea  GI- No N/V/D, abd pain        Vital Signs:   Temp:  [98.1 °F (36.7 °C)-98.2 °F (36.8 °C)] 98.2 °F (36.8 °C)  Heart Rate:  [50-78] 60  Resp:  [18-20] 18  BP: (128-201)/(62-98) 146/71     Physical Exam:    Constitutional: No acute distress, awake, alert  HENT: NCAT, mucous membranes moist  Respiratory: Clear to auscultation bilaterally, respiratory effort normal   Cardiovascular: RRR, no murmurs, rubs, or gallops  Gastrointestinal: Positive bowel sounds, soft, nontender, nondistended  Musculoskeletal: No bilateral ankle edema  Psychiatric: Appropriate affect, cooperative  Skin: No rashes    Pertinent  and/or Most Recent Results     LAB RESULTS:      Lab 07/26/21  0506 07/25/21  0833 07/24/21  0821 07/23/21  0500   WBC 10.09 8.01 5.93 4.85   HEMOGLOBIN 12.4 13.5 12.8 11.5*   HEMATOCRIT 38.0 40.7 38.5 35.7   PLATELETS 178 190 142 88*   MCV 92.9 90.8 91.9 94.7   PROTIME  --  12.3  --  13.3         Lab 07/26/21  0506 07/25/21  0833 07/24/21  1339 07/23/21  0500 07/22/21  1352   SODIUM 136 139 136 135* 138   POTASSIUM 4.0 3.7  4.0 3.6 3.7   CHLORIDE 102 105 103 104 105   CO2 24.0 21.0* 23.0 23.0 20.0*   ANION GAP 10.0 13.0 10.0 8.0 13.0   BUN 18 16 13 11 13   CREATININE 1.15* 1.06* 1.00 1.02* 1.01*   GLUCOSE 206* 206* 198* 171* 96   CALCIUM 9.4 9.4 9.1 8.9 8.3*   HEMOGLOBIN A1C  --   --   --  7.10*  --          Lab 07/26/21  0506 07/25/21  0833 07/24/21  1339 07/23/21  0500 07/22/21  1352   TOTAL PROTEIN 6.4 6.6 6.4 5.8* 4.8*   ALBUMIN 3.40* 3.60 3.30* 3.10* 3.00*   GLOBULIN 3.0 3.0 3.1 2.7 1.8   ALT (SGPT) 97* 112* 142* 206* 249*   AST (SGOT) 32 32 54* 116* 173*   BILIRUBIN 0.8 0.9 1.3* 2.1* 3.1*   ALK PHOS 93 108 113 101 77   LIPASE  --   --   --   --  38         Lab 07/25/21  0833 07/23/21  0500   PROTIME 12.3 13.3   INR 0.94 1.04         Lab 07/23/21  0500   CHOLESTEROL 190   LDL CHOL 96   HDL CHOL 17*   TRIGLYCERIDES 461*             Brief Urine Lab Results  (Last result in the past 365 days)      Color   Clarity   Blood   Leuk Est   Nitrite   Protein   CREAT   Urine HCG        11/16/20 0939             100           Microbiology Results (last 10 days)     Procedure Component Value - Date/Time    COVID PRE-OP / PRE-PROCEDURE SCREENING ORDER (NO ISOLATION) - Swab, Nasopharynx [[id number]]  (Normal) Collected: 07/22/21 1645    Lab Status: Final result Specimen: Swab from Nasopharynx Updated: 07/22/21 1813    Narrative:      The following orders were created for panel order COVID PRE-OP / PRE-PROCEDURE SCREENING ORDER (NO ISOLATION) - Swab, Nasopharynx.  Procedure                               Abnormality         Status                     ---------                               -----------         ------                     COVID-19,CEPHEID,[facility] IN-...[[id number]]  Normal              Final result                 Please view results for these tests on the individual orders.    COVID-19,CEPHEID,[facility] IN-HOUSE(OR EMERGENT/ADD-ON),NP SWAB IN TRANSPORT MEDIA 3-4 HR TAT - Swab, Nasopharynx [[id number]]  (Normal) Collected: 07/22/21 6375    Lab  Status: Final result Specimen: Swab from Nasopharynx Updated: 07/22/21 1813     COVID19 Not Detected    Narrative:      Fact sheet for providers: https://www.fda.gov/media/868201/download     Fact sheet for patients: https://www.fda.gov/media/371360/download  Fact sheet for providers: https://www.fda.gov/media/750964/download     Fact sheet for patients: https://www.fda.gov/media/213582/download          FL ERCP pancreatic and biliary ducts    Result Date: 7/24/2021  EXAMINATION: FL ERCP PANCREATIC AND BILIARY DUCTS - 07/24/2021  INDICATION: K80.50-Calculus of bile duct without cholangitis or cholecystitis without obstruction; K80.50-Calculus of bile duct without cholangitis or cholecystitis without obstruction. ERCP.  COMPARISON: None.  FINDINGS: Intraoperative fluoroscopy for improved localization and treatment planning with total fluoroscopic time usage 7.2 minutes and 6 images saved during ERCP.      Intraoperative fluoroscopy was during ERCP. Please see procedure report for full details.  DICTATED:   07/24/2021 EDITED/ls :   07/24/2021   This report was finalized on 7/24/2021 4:36 PM by Dr. Christopher Landis.      Cardiac Catheterization/Vascular Study    Result Date: 7/22/2021  · Occluded LAD with patent LIMA graft · Widely patent SVG to diagonal, and left circumflex · Occluded right PDA and occluded vein graft to the right PDA · 95% proliferative in-stent/segment restenosis in the right posterior lateral branch.      MRI abdomen wo contrast mrcp    Result Date: 7/23/2021  EXAMINATION: MRI ABDOMEN WO CONTRAST MRCP-  INDICATION: Cholelithiasis; K80.50-Calculus of bile duct without cholangitis or cholecystitis without obstruction; K80.50-Calculus of bile duct without cholangitis or cholecystitis without obstruction  COMPARISON: NONE  TECHNIQUE:Multiplanar multisequence MRI of the abdomen performed without IV contrast, including dedicated heavily T2-weighted thin cut MRCP sequences.  FINDINGS: The lung bases  demonstrate trace effusions. The body wall soft tissues are grossly unremarkable. The spleen, pancreas and bilateral adrenal glands demonstrate homogeneous attenuation. The kidneys are unremarkable. In and out of phase imaging demonstrates no significant steatosis component. The liver parenchyma is homogeneous without suspicious focal T2 hyperintense lesion. Dedicated MRCP sequences demonstrate filling defects as proximal as the left intrahepatic ducts, with prominent filling defects noted in the dilated extrahepatic common duct. The common duct is dilated measuring up to 15 mm. Bile duct stones measure up to 13 mm. No definite cholelithiasis or acute findings of cholecystitis.      Choledocholithiasis with prominent obstructing common duct stones measuring up to 13 mm.  This report was finalized on 7/23/2021 2:39 PM by Kyree Bateman.        Results for orders placed during the hospital encounter of 04/09/19    Duplex Carotid Ultrasound CAR    Interpretation Summary  · Left internal carotid artery stenosis of 0-49%.  · Right internal carotid artery stenosis of 50-69%.      Results for orders placed during the hospital encounter of 04/09/19    Duplex Carotid Ultrasound CAR    Interpretation Summary  · Left internal carotid artery stenosis of 0-49%.  · Right internal carotid artery stenosis of 50-69%.      Results for orders placed during the hospital encounter of 06/03/20    Adult Transthoracic Echo Complete W/ Cont if Necessary Per Protocol    Interpretation Summary  · Estimated EF = 70%.  · Normal function of TAVR    Pending Labs     Order Current Status    Tissue Pathology Exam In process        Discharge Details        Discharge Medications      New Medications      Instructions Start Date   terazosin 2 MG capsule  Commonly known as: HYTRIN   2 mg, Oral, Nightly      traMADol 50 MG tablet  Commonly known as: ULTRAM   25 mg, Oral, Every 8 Hours PRN         Continue These Medications      Instructions Start Date    aspirin 81 MG EC tablet   81 mg, Oral, Every Morning      B-D UF III MINI PEN NEEDLES 31G X 5 MM misc  Generic drug: Insulin Pen Needle   1 for daily injection      clopidogrel 75 MG tablet  Commonly known as: PLAVIX   75 mg, Oral, Daily      CRANBERRY PO   450 mg, Oral, Daily      FREESTYLE LITE test strip  Generic drug: glucose blood   Check daily and PRN      furosemide 20 MG tablet  Commonly known as: LASIX   20 mg, Oral, Daily PRN      Insulin Glargine 100 UNIT/ML injection pen  Commonly known as: LANTUS SOLOSTAR   45 Units, Subcutaneous, Daily      irbesartan 75 MG tablet  Commonly known as: Avapro   75 mg, Oral, 2 times daily      Lancets misc   Lancet of choice, check QD and PRN      metFORMIN 500 MG tablet  Commonly known as: Glucophage   500 mg, Oral, 2 Times Daily With Meals      metoprolol tartrate 25 MG tablet  Commonly known as: LOPRESSOR   25 mg, Oral, 2 Times Daily      NON FORMULARY   Daily, Super cayenne 100,ooo HU every day       omeprazole 40 MG capsule  Commonly known as: priLOSEC   40 mg, Oral, Nightly, Taking it as needed      potassium chloride 10 MEQ CR tablet  Commonly known as: K-DUR,KLOR-CON   10 mEq, Oral, Daily      PROBIOTIC ADVANCED PO   1 tablet, Oral, Nightly      rosuvastatin 5 MG tablet  Commonly known as: Crestor   5 mg, Oral, Daily             Allergies   Allergen Reactions   • Amlodipine GI Intolerance     sickness   • Benazepril Nausea Only   • Ciprofloxacin GI Intolerance     Pt not sure of reaction (stomach problems)   • Pokeweed [Phytolacca] Swelling and Rash   • Statins Nausea Only     Stomach problem     Discharge Disposition:  Home or Self Care    Diet:  Hospital:  Diet Order   Procedures   • Diet Regular; Low Fat     Activity:  As tolerated    Restrictions or Other Recommendations:   Temporary CBD Stent.  Will need to come back for removal of retained stone(s) and remove temporary CBD stent with GI in 2 weeks.  May need to be off Plavix a few days prior       CODE  STATUS:    Code Status and Medical Interventions:   Ordered at: 07/23/21 1542     Level Of Support Discussed With:    Patient     Code Status:    CPR     Medical Interventions (Level of Support Prior to Arrest):    Full       Future Appointments   Date Time Provider Department Center   8/24/2021  9:15 AM Eyal Cervantes MD [facility]   10/6/2021 11:00 AM Fidel Valdez MD [facility]       Additional Instructions for the Follow-ups that You Need to Schedule     Discharge Follow-up with PCP   As directed       Currently Documented PCP:    Eyal Cervantes MD    PCP Phone Number:    [phone]     Follow Up Details: Primary Care Doctor - 1 week - re-assess hypertension         [place] [facility] [place]. [place]ar; 2 Weeks   As directed      [place] [facility] [place]. [place]    Follow Up: 2 Weeks    Follow Up Details: follow up with Mendoza Tobar MD 2 weeks after Cholecystectomy         Discharge Follow-up with Specialty[place] [facility] [place] Dr. Brunner [facility] - retained CBD stone / temporary stent   As directed      [facility] [facility] [place] Dr. Brunner [facility] - retained CBD stone / temporary stent    Follow Up Details: may need to hold Plavix 4 days before repeat ERCP - temporary CBD stent / retained stone             Will arrange repeat ERCP in approximately 1 month for removal of remaining common duct stone(s).  May need to be off Plavix a few days (? 4 days ) prior to repeat ERCP.    GI Discussed with  and daughter to watch for symptoms of fever, chills, or jaundice.  Advised proceed to ER if she develops such symptoms.    Discussed case with General Surgery and GI on the day of discharge.    Abundio Lobato MD  07/26/21    Time Spent on Discharge:  I spent  51  minutes on this discharge activity which included: face-to-face encounter with the patient, reviewing the data in the system, coordination of the care with the  nursing staff as well as consultants, documentation, and entering orders.

## 2021-07-26 NOTE — OUTREACH NOTE
Prep Survey      Responses   [facility] patient discharged from?  [place]   Is LACE score < 7 ?  No   Emergency Room discharge w/ pulse ox?  No   Eligibility  [facility]   Date of Admission  07/22/21   Date of Discharge  07/26/21   Discharge Disposition  Home or Self Care   Discharge diagnosis  Colic, biliary    Does the patient have one of the following disease processes/diagnoses(primary or secondary)?  Other   Does the patient have Home health ordered?  No   Is there a DME ordered?  No   Prep survey completed?  Yes          Leticia Evans RN

## 2021-07-26 NOTE — CASE MANAGEMENT/SOCIAL WORK
Continued Stay Note  [facility]     Patient Name: Nancy Goodman  MRN: [id number]  Today's Date: 7/26/2021    Admit Date: 7/22/2021    Discharge Plan     Row Name 07/26/21 1331       Plan    Plan  Home    Patient/Family in Agreement with Plan  yes    Plan Comments  Met & spoke with Mrs Goodman and her spouse at the bedside. DC order in place for today. No DC needs voiced. Plan is home and spouse will transport.    Final Discharge Disposition Code  01 - home or self-care        Discharge Codes    No documentation.       Expected Discharge Date and Time     Expected Discharge Date Expected Discharge Time    Jul 26, 2021             Tatiana Castro RN

## 2021-07-26 NOTE — PROGRESS NOTES
"Patient Name:  Nancy Goodman  YOB: 1940  [id number]    Surgery Progress Note    Date of visit: 7/26/2021      Subjective: No acute events overnight.  Denies nausea, vomiting, fevers or chills.  Has passed flatus and surgery.  No bowel movement.  Has tolerated diet          Objective:     /69 (BP Location: Left arm, Patient Position: Lying)   Pulse 68   Temp 98.2 °F (36.8 °C) (Oral)   Resp 18   Ht 157.5 cm (62\")   Wt 74.5 kg (164 lb 3.2 oz)   SpO2 95%   BMI 30.03 kg/m²     Intake/Output Summary (Last 24 hours) at 7/26/2021 0820  Last data filed at 7/26/2021 0553  Gross per 24 hour   Intake 729.2 ml   Output 815 ml   Net -85.8 ml       GEN:   Awake, alert, in no acute distress, resting comfortably in bed   CV:   Regular rate and rhythm  L:  Clear to auscultation bilaterally, not labored on room air   Abd:  Soft, appropriately tender palpation along incisions, incisions are clean dry intact, nondistended  Ext:  No cyanosis, clubbing, or edema    Recent labs that are back at this time have been reviewed.           Assessment/ Plan:    Mrs. Goodman is murray pleasant 80-year-old lady with history of GERD, atrial tachycardia, pulmonary edema, and coronary artery disease status post CABG and aortic valve replacement with choledocholithiasis.     #Choledocholithiasis  -Postoperative day 1 after laparoscopic cholecystectomy  -Clinically stable.  Denies abdominal pain.  Tolerating diet  -LFTs and postoperative labs appropriate  -Recovering well.  Okay to resume Plavix from my standpoint.  Okay for discharge from my standpoint when deemed appropriate.  Should follow-up with me in 2 weeks      Mendoza Tobar MD  7/26/2021  08:20 EDT      "

## 2021-07-26 NOTE — PLAN OF CARE
Goal Outcome Evaluation:  Plan of Care Reviewed With: patient        Progress: improving  Outcome Summary: RAJEEV CUEVA. SR. Hypertension still an ongoing issue despite increases in anti-hypertensives. Patient overall did verbalize that she is feeling better. Patient rested well overnight with no complaints of any kind. Woke up this morning and complained of pain that was treated with PRN Tramadol. No acute distress noted, will continue to monitor patient at this time.

## 2021-07-27 ENCOUNTER — TRANSITIONAL CARE MANAGEMENT TELEPHONE ENCOUNTER (OUTPATIENT)
Dept: CALL CENTER | Facility: HOSPITAL | Age: 81
End: 2021-07-27

## 2021-07-27 LAB
CYTO UR: NORMAL
LAB AP CASE REPORT: NORMAL
LAB AP CLINICAL INFORMATION: NORMAL
PATH REPORT.FINAL DX SPEC: NORMAL
PATH REPORT.GROSS SPEC: NORMAL
QT INTERVAL: 372 MS
QTC INTERVAL: 432 MS

## 2021-07-27 NOTE — OUTREACH NOTE
Call Center TCM Note      Responses   [facility], [place], operated by [facility] patient discharged from?  [place]   Does the patient have one of the following disease processes/diagnoses(primary or secondary)?  Other   TCM attempt successful?  Yes [Bobby, spouse]   Call start time  0826   Call end time  0828   Discharge diagnosis  Colic, biliary    Person spoke with today (if not patient) and relationship     Meds reviewed with patient/caregiver?  Yes   Is the patient having any side effects they believe may be caused by any medication additions or changes?  No   Does the patient have all medications ordered at discharge?  Yes   Is the patient taking all medications as directed (includes completed medication regime)?  Yes   Does the patient have a primary care provider?   Yes   Does the patient have an appointment with their PCP within 7 days of discharge?  Greater than 7 days   Comments regarding PCP  [facility] [place] appointment on 8/4/21 at 2:15 PM   What is preventing the patient from scheduling follow up appointments within 7 days of discharge?  Earlier appointment not available   Nursing Interventions  Verified appointment date/time/provider   Has the patient kept scheduled appointments due by today?  N/A   What is the Home health agency?   No HH   Psychosocial issues?  No   Did the patient receive a copy of their discharge instructions?  Yes   Nursing interventions  Reviewed instructions with patient   What is the patient's perception of their health status since discharge?  Improving   Is the patient/caregiver able to teach back signs and symptoms related to disease process for when to call PCP?  Yes   Is the patient/caregiver able to teach back signs and symptoms related to disease process for when to call 911?  Yes   Is the patient/caregiver able to teach back the hierarchy of who to call/visit for symptoms/problems? PCP, Specialist, Home health nurse, Urgent Care, ED, 911  Yes   If the patient is a current smoker, are they  able to teach back resources for cessation?  Not a smoker   TCM call completed?  Yes   Wrap up additional comments  Spouse states pt is doing better. Pt still having some pain,  ultram used for pain. No questions/concerns.          Kimi Salomon RN    7/27/2021, 08:36 EDT

## 2021-08-04 ENCOUNTER — OFFICE VISIT (OUTPATIENT)
Dept: FAMILY MEDICINE CLINIC | Facility: CLINIC | Age: 81
End: 2021-08-04

## 2021-08-04 VITALS
SYSTOLIC BLOOD PRESSURE: 138 MMHG | WEIGHT: 153 LBS | HEIGHT: 62 IN | TEMPERATURE: 99.1 F | HEART RATE: 76 BPM | RESPIRATION RATE: 18 BRPM | DIASTOLIC BLOOD PRESSURE: 68 MMHG | BODY MASS INDEX: 28.16 KG/M2

## 2021-08-04 DIAGNOSIS — I25.10 CORONARY ARTERY DISEASE INVOLVING NATIVE CORONARY ARTERY OF NATIVE HEART WITHOUT ANGINA PECTORIS: ICD-10-CM

## 2021-08-04 DIAGNOSIS — I10 ESSENTIAL HYPERTENSION: ICD-10-CM

## 2021-08-04 DIAGNOSIS — K80.50 COLIC, BILIARY: ICD-10-CM

## 2021-08-04 DIAGNOSIS — R10.11 RIGHT UPPER QUADRANT ABDOMINAL PAIN: ICD-10-CM

## 2021-08-04 DIAGNOSIS — K80.50 CHOLEDOCHOLITHIASIS: ICD-10-CM

## 2021-08-04 DIAGNOSIS — Z09 HOSPITAL DISCHARGE FOLLOW-UP: Primary | ICD-10-CM

## 2021-08-04 PROCEDURE — 99496 TRANSJ CARE MGMT HIGH F2F 7D: CPT | Performed by: FAMILY MEDICINE

## 2021-08-04 PROCEDURE — 1111F DSCHRG MED/CURRENT MED MERGE: CPT | Performed by: FAMILY MEDICINE

## 2021-08-04 RX ORDER — HYDROCODONE BITARTRATE AND ACETAMINOPHEN 5; 325 MG/1; MG/1
TABLET ORAL
Qty: 12 TABLET | Refills: 0 | Status: SHIPPED | OUTPATIENT
Start: 2021-08-04 | End: 2021-09-14

## 2021-08-04 RX ORDER — FUROSEMIDE 20 MG/1
20 TABLET ORAL DAILY PRN
Qty: 90 TABLET | Refills: 1 | Status: SHIPPED | OUTPATIENT
Start: 2021-08-04 | End: 2022-03-01 | Stop reason: SDUPTHER

## 2021-08-04 RX ORDER — IRBESARTAN 75 MG/1
75 TABLET ORAL 2 TIMES DAILY
Qty: 180 TABLET | Refills: 1 | Status: SHIPPED | OUTPATIENT
Start: 2021-08-04 | End: 2021-09-14 | Stop reason: SDUPTHER

## 2021-08-04 RX ORDER — IRBESARTAN 75 MG/1
75 TABLET ORAL 2 TIMES DAILY
Qty: 180 TABLET | Refills: 1 | Status: SHIPPED | OUTPATIENT
Start: 2021-08-04 | End: 2021-08-04 | Stop reason: SDUPTHER

## 2021-08-04 NOTE — PROGRESS NOTES
Transitional Care Follow Up Visit  Subjective     Nancy Cece Goodman is a 80 y.o. female who presents for a transitional care management visit.    Within 48 business hours after discharge our office contacted her via telephone to coordinate her care and needs.      I reviewed and discussed the details of that call along with the discharge summary, hospital problems, inpatient lab results, inpatient diagnostic studies, and consultation reports with Nancy.     Current outpatient and discharge medications have been reconciled for the patient.  Reviewed by: Eyal Cervantes MD      Date of TCM Phone Call 7/26/2021   [facility]   Date of Admission 7/22/2021   Date of Discharge 7/26/2021   Risk for Readmission (LACE Score) -   Discharge Disposition Home or Self Care     Risk for Readmission (LACE) Score: 10 (7/26/2021  6:01 AM)      History of Present Illness   Course During Hospital Stay:      She was having discomfort in her lower chest and upper abdomen on 7/21  She had cath with occluded stents and medical management as the plan  Then she had her GB out on 7/25 and then discharged on the 26th  They are planning on doing another ERCP to possible remove a stone on 8/17    She has been tired since being at home and has rested quite a bit  Not doing a lot of activity  No fever, no N/V, mild RUQ pain and some mild right flank pain  Good appetite and normal PO intake     The following portions of the patient's history were reviewed and updated as appropriate: allergies, current medications, past family history, past medical history, past social history, past surgical history and problem list.    Review of Systems   Constitutional: Negative.  Negative for fever.   Respiratory: Negative for shortness of breath.    Cardiovascular: Negative for chest pain.   Gastrointestinal: Positive for abdominal pain.   Psychiatric/Behavioral: Negative.        Objective   Physical Exam  Vitals and nursing note  reviewed.   Constitutional:       General: She is not in acute distress.     Appearance: Normal appearance. She is well-developed.   Cardiovascular:      Rate and Rhythm: Normal rate and regular rhythm.      Heart sounds: Normal heart sounds.   Pulmonary:      Effort: Pulmonary effort is normal.      Breath sounds: Normal breath sounds.   Abdominal:      General: Abdomen is flat.      Palpations: Abdomen is soft.   Neurological:      Mental Status: She is alert and oriented to person, place, and time.   Psychiatric:         Mood and Affect: Mood normal.         Behavior: Behavior normal.         Thought Content: Thought content normal.         Judgment: Judgment normal.         Assessment/Plan   Diagnoses and all orders for this visit:    1. Hospital discharge follow-up (Primary)    2. Coronary artery disease involving native coronary artery of native heart without angina pectoris    3. Choledocholithiasis    4. Colic, biliary    5. Right upper quadrant abdominal pain  -     HYDROcodone-acetaminophen (NORCO) 5-325 MG per tablet; 1/2-1 PO Q 6 hours PRN pain  Dispense: 12 tablet; Refill: 0    6. Essential hypertension  -     Discontinue: irbesartan (Avapro) 75 MG tablet; Take 1 tablet by mouth 2 (two) times a day.  Dispense: 180 tablet; Refill: 1  -     irbesartan (Avapro) 75 MG tablet; Take 1 tablet by mouth 2 (two) times a day.  Dispense: 180 tablet; Refill: 1    Other orders  -     furosemide (LASIX) 20 MG tablet; Take 1 tablet by mouth Daily As Needed (swelling).  Dispense: 90 tablet; Refill: 1    she is still dealing with some pain and is scheduled to have an Ercp to get stent removed and possible stone retrieval in future.  Ryne moon and keep GI follow up    Will resume meds slowly and she will call with any issues.  Plan f/u with me in one month

## 2021-08-05 ENCOUNTER — READMISSION MANAGEMENT (OUTPATIENT)
Dept: CALL CENTER | Facility: HOSPITAL | Age: 81
End: 2021-08-05

## 2021-08-05 ENCOUNTER — TELEPHONE (OUTPATIENT)
Dept: GASTROENTEROLOGY | Facility: CLINIC | Age: 81
End: 2021-08-05

## 2021-08-05 NOTE — OUTREACH NOTE
Medical Week 2 Survey      Responses   [facility] patient discharged from?  [place]   Does the patient have one of the following disease processes/diagnoses(primary or secondary)?  Other   Week 2 attempt successful?  No   Unsuccessful attempts  Attempt 1          Brittany Fraser RN

## 2021-08-12 ENCOUNTER — PRE-ADMISSION TESTING (OUTPATIENT)
Dept: PREADMISSION TESTING | Facility: HOSPITAL | Age: 81
End: 2021-08-12

## 2021-08-12 VITALS — HEIGHT: 63 IN | BODY MASS INDEX: 27.34 KG/M2 | WEIGHT: 154.32 LBS

## 2021-08-12 LAB
DEPRECATED RDW RBC AUTO: 47.9 FL (ref 37–54)
ERYTHROCYTE [DISTWIDTH] IN BLOOD BY AUTOMATED COUNT: 13.7 % (ref 12.3–15.4)
HCT VFR BLD AUTO: 37.4 % (ref 34–46.6)
HGB BLD-MCNC: 12.2 G/DL (ref 12–15.9)
MCH RBC QN AUTO: 30.8 PG (ref 26.6–33)
MCHC RBC AUTO-ENTMCNC: 32.6 G/DL (ref 31.5–35.7)
MCV RBC AUTO: 94.4 FL (ref 79–97)
PLATELET # BLD AUTO: 222 10*3/MM3 (ref 140–450)
PMV BLD AUTO: 10.2 FL (ref 6–12)
POTASSIUM SERPL-SCNC: 4.7 MMOL/L (ref 3.5–5.2)
RBC # BLD AUTO: 3.96 10*6/MM3 (ref 3.77–5.28)
WBC # BLD AUTO: 6.53 10*3/MM3 (ref 3.4–10.8)

## 2021-08-12 PROCEDURE — 84132 ASSAY OF SERUM POTASSIUM: CPT

## 2021-08-12 PROCEDURE — 36415 COLL VENOUS BLD VENIPUNCTURE: CPT

## 2021-08-12 PROCEDURE — 85027 COMPLETE CBC AUTOMATED: CPT

## 2021-08-12 RX ORDER — UBIDECARENONE 100 MG
100 CAPSULE ORAL DAILY
COMMUNITY

## 2021-08-12 NOTE — PAT
covid test scheduled 8/15    EKG and cardiac clearance in chart.     Patient did not review general PAT education video as instructed in their preoperative information received from their surgeon.  One-on-one Pre Admission Testing general education provided during PAT visit.  Copies of PAT general education handouts (Incentive Spirometry, Meds to Beds Program, Patient Belongings, Pre-op skin preparation instructions, Blood Glucose testing, Visitor policy, Surgery FAQ, Code H) distributed to patient. Encouraged patient/family to read PAT general education handouts thoroughly and notify PAT staff with any questions or concerns. Patient instructed to bring PAT pass and completed skin prep sheet (if applicable) on the day of procedure. Patient verbalized understanding of all information and priority content.

## 2021-08-15 ENCOUNTER — APPOINTMENT (OUTPATIENT)
Dept: PREADMISSION TESTING | Facility: HOSPITAL | Age: 81
End: 2021-08-15

## 2021-08-15 LAB — SARS-COV-2 RNA PNL SPEC NAA+PROBE: NOT DETECTED

## 2021-08-15 PROCEDURE — C9803 HOPD COVID-19 SPEC COLLECT: HCPCS

## 2021-08-15 PROCEDURE — U0005 INFEC AGEN DETEC AMPLI PROBE: HCPCS

## 2021-08-15 PROCEDURE — U0004 COV-19 TEST NON-CDC HGH THRU: HCPCS

## 2021-08-17 ENCOUNTER — ANESTHESIA EVENT (OUTPATIENT)
Dept: GASTROENTEROLOGY | Facility: HOSPITAL | Age: 81
End: 2021-08-17

## 2021-08-17 ENCOUNTER — HOSPITAL ENCOUNTER (OUTPATIENT)
Facility: HOSPITAL | Age: 81
Setting detail: HOSPITAL OUTPATIENT SURGERY
Discharge: HOME | End: 2021-08-17
Attending: INTERNAL MEDICINE | Admitting: INTERNAL MEDICINE

## 2021-08-17 ENCOUNTER — ANESTHESIA (OUTPATIENT)
Dept: GASTROENTEROLOGY | Facility: HOSPITAL | Age: 81
End: 2021-08-17

## 2021-08-17 ENCOUNTER — APPOINTMENT (OUTPATIENT)
Dept: GENERAL RADIOLOGY | Facility: HOSPITAL | Age: 81
End: 2021-08-17

## 2021-08-17 VITALS
RESPIRATION RATE: 18 BRPM | OXYGEN SATURATION: 93 % | TEMPERATURE: 97.1 F | SYSTOLIC BLOOD PRESSURE: 161 MMHG | HEART RATE: 67 BPM | DIASTOLIC BLOOD PRESSURE: 74 MMHG

## 2021-08-17 DIAGNOSIS — K80.50 CHOLEDOCHOLITHIASIS: ICD-10-CM

## 2021-08-17 LAB
GLUCOSE BLDC GLUCOMTR-MCNC: 124 MG/DL (ref 70–130)
GLUCOSE BLDC GLUCOMTR-MCNC: 99 MG/DL (ref 70–130)

## 2021-08-17 PROCEDURE — 43275 ERCP REMOVE FORGN BODY DUCT: CPT | Performed by: INTERNAL MEDICINE

## 2021-08-17 PROCEDURE — 74330 X-RAY BILE/PANC ENDOSCOPY: CPT

## 2021-08-17 PROCEDURE — 25010000002 ONDANSETRON PER 1 MG: Performed by: NURSE ANESTHETIST, CERTIFIED REGISTERED

## 2021-08-17 PROCEDURE — 25010000002 PROPOFOL 10 MG/ML EMULSION: Performed by: NURSE ANESTHETIST, CERTIFIED REGISTERED

## 2021-08-17 PROCEDURE — 82962 GLUCOSE BLOOD TEST: CPT

## 2021-08-17 PROCEDURE — 25010000002 SUCCINYLCHOLINE PER 20 MG: Performed by: NURSE ANESTHETIST, CERTIFIED REGISTERED

## 2021-08-17 PROCEDURE — 25010000002 HYDROMORPHONE PER 4 MG: Performed by: ANESTHESIOLOGY

## 2021-08-17 PROCEDURE — C1769 GUIDE WIRE: HCPCS | Performed by: INTERNAL MEDICINE

## 2021-08-17 PROCEDURE — 25010000002 DEXAMETHASONE PER 1 MG: Performed by: NURSE ANESTHETIST, CERTIFIED REGISTERED

## 2021-08-17 PROCEDURE — 25010000002 ONDANSETRON PER 1 MG: Performed by: ANESTHESIOLOGY

## 2021-08-17 RX ORDER — FAMOTIDINE 10 MG/ML
20 INJECTION, SOLUTION INTRAVENOUS ONCE
Status: COMPLETED | OUTPATIENT
Start: 2021-08-17 | End: 2021-08-17

## 2021-08-17 RX ORDER — DEXAMETHASONE SODIUM PHOSPHATE 4 MG/ML
INJECTION, SOLUTION INTRA-ARTICULAR; INTRALESIONAL; INTRAMUSCULAR; INTRAVENOUS; SOFT TISSUE AS NEEDED
Status: DISCONTINUED | OUTPATIENT
Start: 2021-08-17 | End: 2021-08-17 | Stop reason: SURG

## 2021-08-17 RX ORDER — ONDANSETRON 2 MG/ML
INJECTION INTRAMUSCULAR; INTRAVENOUS AS NEEDED
Status: DISCONTINUED | OUTPATIENT
Start: 2021-08-17 | End: 2021-08-17 | Stop reason: SURG

## 2021-08-17 RX ORDER — PROPOFOL 10 MG/ML
VIAL (ML) INTRAVENOUS AS NEEDED
Status: DISCONTINUED | OUTPATIENT
Start: 2021-08-17 | End: 2021-08-17 | Stop reason: SURG

## 2021-08-17 RX ORDER — LIDOCAINE HYDROCHLORIDE 10 MG/ML
0.5 INJECTION, SOLUTION EPIDURAL; INFILTRATION; INTRACAUDAL; PERINEURAL ONCE AS NEEDED
Status: DISCONTINUED | OUTPATIENT
Start: 2021-08-17 | End: 2021-08-17 | Stop reason: HOSPADM

## 2021-08-17 RX ORDER — FENTANYL CITRATE 50 UG/ML
50 INJECTION, SOLUTION INTRAMUSCULAR; INTRAVENOUS
Status: DISCONTINUED | OUTPATIENT
Start: 2021-08-17 | End: 2021-08-17 | Stop reason: HOSPADM

## 2021-08-17 RX ORDER — HYDROMORPHONE HYDROCHLORIDE 1 MG/ML
0.5 INJECTION, SOLUTION INTRAMUSCULAR; INTRAVENOUS; SUBCUTANEOUS
Status: DISCONTINUED | OUTPATIENT
Start: 2021-08-17 | End: 2021-08-17 | Stop reason: HOSPADM

## 2021-08-17 RX ORDER — LIDOCAINE HYDROCHLORIDE 10 MG/ML
INJECTION, SOLUTION EPIDURAL; INFILTRATION; INTRACAUDAL; PERINEURAL AS NEEDED
Status: DISCONTINUED | OUTPATIENT
Start: 2021-08-17 | End: 2021-08-17 | Stop reason: SURG

## 2021-08-17 RX ORDER — SODIUM CHLORIDE, SODIUM LACTATE, POTASSIUM CHLORIDE, CALCIUM CHLORIDE 600; 310; 30; 20 MG/100ML; MG/100ML; MG/100ML; MG/100ML
9 INJECTION, SOLUTION INTRAVENOUS CONTINUOUS
Status: DISCONTINUED | OUTPATIENT
Start: 2021-08-17 | End: 2021-08-17 | Stop reason: HOSPADM

## 2021-08-17 RX ORDER — FAMOTIDINE 20 MG/1
20 TABLET, FILM COATED ORAL ONCE
Status: DISCONTINUED | OUTPATIENT
Start: 2021-08-17 | End: 2021-08-17

## 2021-08-17 RX ORDER — ESMOLOL HYDROCHLORIDE 10 MG/ML
INJECTION INTRAVENOUS AS NEEDED
Status: DISCONTINUED | OUTPATIENT
Start: 2021-08-17 | End: 2021-08-17 | Stop reason: SURG

## 2021-08-17 RX ORDER — ONDANSETRON 2 MG/ML
4 INJECTION INTRAMUSCULAR; INTRAVENOUS EVERY 6 HOURS PRN
Status: DISCONTINUED | OUTPATIENT
Start: 2021-08-17 | End: 2021-08-17 | Stop reason: HOSPADM

## 2021-08-17 RX ORDER — SUCCINYLCHOLINE CHLORIDE 20 MG/ML
INJECTION INTRAMUSCULAR; INTRAVENOUS AS NEEDED
Status: DISCONTINUED | OUTPATIENT
Start: 2021-08-17 | End: 2021-08-17 | Stop reason: SURG

## 2021-08-17 RX ORDER — MIDAZOLAM HYDROCHLORIDE 1 MG/ML
0.5 INJECTION INTRAMUSCULAR; INTRAVENOUS
Status: DISCONTINUED | OUTPATIENT
Start: 2021-08-17 | End: 2021-08-17 | Stop reason: HOSPADM

## 2021-08-17 RX ORDER — SODIUM CHLORIDE 0.9 % (FLUSH) 0.9 %
10 SYRINGE (ML) INJECTION EVERY 12 HOURS SCHEDULED
Status: DISCONTINUED | OUTPATIENT
Start: 2021-08-17 | End: 2021-08-17 | Stop reason: HOSPADM

## 2021-08-17 RX ORDER — SODIUM CHLORIDE 0.9 % (FLUSH) 0.9 %
10 SYRINGE (ML) INJECTION AS NEEDED
Status: DISCONTINUED | OUTPATIENT
Start: 2021-08-17 | End: 2021-08-17 | Stop reason: HOSPADM

## 2021-08-17 RX ORDER — ROCURONIUM BROMIDE 10 MG/ML
INJECTION, SOLUTION INTRAVENOUS AS NEEDED
Status: DISCONTINUED | OUTPATIENT
Start: 2021-08-17 | End: 2021-08-17 | Stop reason: SURG

## 2021-08-17 RX ADMIN — PROPOFOL 50 MG: 10 INJECTION, EMULSION INTRAVENOUS at 11:42

## 2021-08-17 RX ADMIN — DEXAMETHASONE SODIUM PHOSPHATE 4 MG: 4 INJECTION, SOLUTION INTRA-ARTICULAR; INTRALESIONAL; INTRAMUSCULAR; INTRAVENOUS; SOFT TISSUE at 11:25

## 2021-08-17 RX ADMIN — HYDROMORPHONE HYDROCHLORIDE 0.5 MG: 1 INJECTION, SOLUTION INTRAMUSCULAR; INTRAVENOUS; SUBCUTANEOUS at 12:18

## 2021-08-17 RX ADMIN — PROPOFOL 150 MG: 10 INJECTION, EMULSION INTRAVENOUS at 11:25

## 2021-08-17 RX ADMIN — ONDANSETRON 4 MG: 2 INJECTION INTRAMUSCULAR; INTRAVENOUS at 12:20

## 2021-08-17 RX ADMIN — ROCURONIUM BROMIDE 5 MG: 10 INJECTION, SOLUTION INTRAVENOUS at 11:25

## 2021-08-17 RX ADMIN — LIDOCAINE HYDROCHLORIDE 50 MG: 10 INJECTION, SOLUTION EPIDURAL; INFILTRATION; INTRACAUDAL; PERINEURAL at 11:25

## 2021-08-17 RX ADMIN — FAMOTIDINE 20 MG: 10 INJECTION INTRAVENOUS at 09:06

## 2021-08-17 RX ADMIN — SODIUM CHLORIDE, POTASSIUM CHLORIDE, SODIUM LACTATE AND CALCIUM CHLORIDE 9 ML/HR: 600; 310; 30; 20 INJECTION, SOLUTION INTRAVENOUS at 09:05

## 2021-08-17 RX ADMIN — SUCCINYLCHOLINE CHLORIDE 100 MG: 20 INJECTION, SOLUTION INTRAMUSCULAR; INTRAVENOUS at 11:25

## 2021-08-17 RX ADMIN — ESMOLOL HYDROCHLORIDE 20 MG: 10 INJECTION, SOLUTION INTRAVENOUS at 11:25

## 2021-08-17 RX ADMIN — ONDANSETRON 4 MG: 2 INJECTION INTRAMUSCULAR; INTRAVENOUS at 11:25

## 2021-08-17 NOTE — ANESTHESIA PROCEDURE NOTES
Airway  Urgency: elective    Date/Time: 8/17/2021 11:26 AM  Airway not difficult    General Information and Staff    Patient location during procedure: OR  CRNA: Elda Andres CRNA    Indications and Patient Condition  Indications for airway management: airway protection    Preoxygenated: yes  MILS not maintained throughout  Mask difficulty assessment: 1 - vent by mask    Final Airway Details  Final airway type: endotracheal airway      Successful airway: ETT  Cuffed: yes   Successful intubation technique: direct laryngoscopy  Facilitating devices/methods: intubating stylet  Endotracheal tube insertion site: oral  Blade: Nitin  Blade size: 3  ETT size (mm): 7.0  Cormack-Lehane Classification: grade I - full view of glottis  Placement verified by: chest auscultation and capnometry   Measured from: lips  ETT/EBT  to lips (cm): 22  Number of attempts at approach: 1  Assessment: lips, teeth, and gum same as pre-op and atraumatic intubation    Additional Comments  Negative epigastric sounds, Breath sound equal bilaterally with symmetric chest rise and fall

## 2021-08-17 NOTE — ANESTHESIA POSTPROCEDURE EVALUATION
Patient: Nancy Goodman    Procedure Summary     Date: 08/17/21 Room / Location: [facility] ENDOSCOPY 3 /  [facility]    Anesthesia Start: 1120 Anesthesia Stop: 1156    Procedure: ENDOSCOPIC RETROGRADE CHOLANGIOPANCREATOGRAPHY (N/A ) Diagnosis:       Choledocholithiasis      (Choledocholithiasis [K80.50])    Surgeons: Jonathan Hill MD Provider: Pérez Arrieta MD    Anesthesia Type: general ASA Status: 4          Anesthesia Type: general    Vitals  Vitals Value Taken Time   /54 08/17/21 1200   Temp 97.1 °F (36.2 °C) 08/17/21 1154   Pulse 76 08/17/21 1203   Resp 18 08/17/21 1154   SpO2 91 % 08/17/21 1203   Vitals shown include unvalidated device data.        Post Anesthesia Care and Evaluation    Patient location during evaluation: PACU  Patient participation: complete - patient participated  Level of consciousness: sleepy but conscious  Pain management: adequate  Airway patency: patent  Anesthetic complications: No anesthetic complications  PONV Status: none  Cardiovascular status: hemodynamically stable and acceptable  Respiratory status: nonlabored ventilation, acceptable, oral airway and room air  Hydration status: acceptable

## 2021-08-17 NOTE — ANESTHESIA PREPROCEDURE EVALUATION
Anesthesia Evaluation     Patient summary reviewed and Nursing notes reviewed                Airway   Mallampati: II  TM distance: >3 FB  Neck ROM: full  No difficulty expected  Dental - normal exam   (+) upper dentures    Comment: LOWER PARTIAL    Pulmonary - negative pulmonary ROS and normal exam   Cardiovascular - normal exam    (+) hypertension, valvular problems/murmurs (S/P TAVR) AS, CAD, CABG, dysrhythmias (PAT), CHF , hyperlipidemia,     ROS comment: MEDICALLY MANAGED CORONARY ARTERY DZ  NORMAL LV FUNCTION  NORMAL FUNCTION OF TAVR VALVE    Neuro/Psych- negative ROS  GI/Hepatic/Renal/Endo    (+)  GERD,  diabetes mellitus,     ROS Comment: BILIARY COLIC  GALLSTONES    Musculoskeletal     Abdominal  - normal exam    Bowel sounds: normal.   Substance History - negative use     OB/GYN negative ob/gyn ROS         Other   arthritis,                      Anesthesia Plan    ASA 4     general     intravenous induction     Anesthetic plan, all risks, benefits, and alternatives have been provided, discussed and informed consent has been obtained with: patient.    Plan discussed with CRNA.

## 2021-08-17 NOTE — INTERVAL H&P NOTE
H&P reviewed. The patient was examined and there are no changes to the H&P.    \  No changes to h/p  Here for repeat ercp with ductal clearance.    We discussed the risk of the procedure including the risk of pancreatitis (roughly 4/100 with 100 mg pr indocin for obstructive etiology), bowel perforation (~1/1000), hemorrhage (~1/200), infection (1~1-300) and death (severe complication of any of the above).  After discussing the benefit and risk, the patient and I determined to proceed with the procedure.

## 2021-09-14 ENCOUNTER — OFFICE VISIT (OUTPATIENT)
Dept: FAMILY MEDICINE CLINIC | Facility: CLINIC | Age: 81
End: 2021-09-14

## 2021-09-14 VITALS
WEIGHT: 153 LBS | HEART RATE: 78 BPM | DIASTOLIC BLOOD PRESSURE: 60 MMHG | BODY MASS INDEX: 27.11 KG/M2 | SYSTOLIC BLOOD PRESSURE: 124 MMHG | HEIGHT: 63 IN | TEMPERATURE: 98 F | RESPIRATION RATE: 18 BRPM

## 2021-09-14 DIAGNOSIS — I25.10 CORONARY ARTERY DISEASE INVOLVING NATIVE CORONARY ARTERY OF NATIVE HEART WITHOUT ANGINA PECTORIS: ICD-10-CM

## 2021-09-14 DIAGNOSIS — Z79.4 TYPE 2 DIABETES MELLITUS WITHOUT COMPLICATION, WITH LONG-TERM CURRENT USE OF INSULIN (HCC): ICD-10-CM

## 2021-09-14 DIAGNOSIS — I10 ESSENTIAL HYPERTENSION: ICD-10-CM

## 2021-09-14 DIAGNOSIS — E11.9 TYPE 2 DIABETES MELLITUS WITHOUT COMPLICATION, WITH LONG-TERM CURRENT USE OF INSULIN (HCC): ICD-10-CM

## 2021-09-14 DIAGNOSIS — E78.2 MIXED HYPERLIPIDEMIA: ICD-10-CM

## 2021-09-14 DIAGNOSIS — Z00.00 MEDICARE ANNUAL WELLNESS VISIT, SUBSEQUENT: Primary | ICD-10-CM

## 2021-09-14 PROBLEM — I21.4 ACUTE NON-ST SEGMENT ELEVATION MYOCARDIAL INFARCTION (HCC): Status: ACTIVE | Noted: 2021-09-14

## 2021-09-14 PROCEDURE — G0439 PPPS, SUBSEQ VISIT: HCPCS | Performed by: FAMILY MEDICINE

## 2021-09-14 RX ORDER — IRBESARTAN 75 MG/1
75 TABLET ORAL 2 TIMES DAILY
Qty: 180 TABLET | Refills: 1 | Status: SHIPPED | OUTPATIENT
Start: 2021-09-14 | End: 2022-02-18 | Stop reason: SDUPTHER

## 2021-09-14 RX ORDER — ROSUVASTATIN CALCIUM 5 MG/1
5 TABLET, COATED ORAL DAILY
Qty: 90 TABLET | Refills: 1 | Status: SHIPPED | OUTPATIENT
Start: 2021-09-14 | End: 2022-02-18 | Stop reason: SDUPTHER

## 2021-09-14 RX ORDER — CLOPIDOGREL BISULFATE 75 MG/1
75 TABLET ORAL DAILY
Qty: 90 TABLET | Refills: 1 | Status: SHIPPED | OUTPATIENT
Start: 2021-09-14 | End: 2022-03-01 | Stop reason: SDUPTHER

## 2021-09-14 NOTE — PATIENT INSTRUCTIONS
Advance Care Planning and Advance Directives     You make decisions on a daily basis - decisions about where you want to live, your career, your home, your life. Perhaps one of the most important decisions you face is your choice for future medical care. Take time to talk with your family and your healthcare team and start planning today.  Advance Care Planning is a process that can help you:  · Understand possible future healthcare decisions in light of your own experiences  · Reflect on those decision in light of your goals and values  · Discuss your decisions with those closest to you and the healthcare professionals that care for you  · Make a plan by creating a document that reflects your wishes    Surrogate Decision Maker  In the event of a medical emergency, which has left you unable to communicate or to make your own decisions, you would need someone to make decisions for you.  It is important to discuss your preferences for medical treatment with this person while you are in good health.     Qualities of a surrogate decision maker:  • Willing to take on this role and responsibility  • Knows what you want for future medical care  • Willing to follow your wishes even if they don't agree with them  • Able to make difficult medical decisions under stressful circumstances    Advance Directives  These are legal documents you can create that will guide your healthcare team and decision maker(s) when needed. These documents can be stored in the electronic medical record.    · Living Will - a legal document to guide your care if you have a terminal condition or a serious illness and are unable to communicate. States vary by statute in document names/types, but most forms may include one or more of the following:        -  Directions regarding life-prolonging treatments        -  Directions regarding artificially provided nutrition/hydration        -  Choosing a healthcare decision maker        -  Direction  regarding organ/tissue donation    · Durable Power of  for Healthcare - this document names an -in-fact to make medical decisions for you, but it may also allow this person to make personal and financial decisions for you. Please seek the advice of an  if you need this type of document.    **Advance Directives are not required and no one may discriminate against you if you do not sign one.    Medical Orders  Many states allow specific forms/orders signed by your physician to record your wishes for medical treatment in your current state of health. This form, signed in personal communication with your physician, addresses resuscitation and other medical interventions that you may or may not want.      For more information or to schedule a time with a [facility] Advance Care Planning Facilitator contact: [email or web address] or call [phone] and someone will contact you directly.

## 2021-09-14 NOTE — PROGRESS NOTES
The ABCs of the Annual Wellness Visit  Subsequent Medicare Wellness Visit    Chief Complaint   Patient presents with   • Medicare Wellness-subsequent      Subjective    History of Present Illness:  Nancy Goodman is a 80 y.o. female who presents for a Subsequent Medicare Wellness Visit.    The following portions of the patient's history were reviewed and   updated as appropriate: allergies, current medications, past family history, past medical history, past social history, past surgical history and problem list.    Compared to one year ago, the patient feels her physical   health is the same.    Compared to one year ago, the patient feels her mental   health is the same.    Recent Hospitalizations:  This patient has had a [facility] operated by [facility] admission record on file within the last 365 days.    Current Medical Providers:  Patient Care Team:  Eyal Cervantes MD as PCP - General (Family Medicine)  Fidel Valdez MD as Consulting Physician (Cardiology)    Outpatient Medications Prior to Visit   Medication Sig Dispense Refill   • aspirin 81 MG EC tablet Take 81 mg by mouth Every Morning.     • B-D UF III MINI PEN NEEDLES 31G X 5 MM misc 1 for daily injection 100 each 3   • Black Elderberry (SAMBUCUS ELDERBERRY PO) Take 500 mg by mouth Daily.     • coenzyme Q10 100 MG capsule Take 100 mg by mouth Daily.     • CRANBERRY PO Take 40 mg by mouth Daily.     • FREESTYLE LITE test strip Check daily and  each 3   • furosemide (LASIX) 20 MG tablet Take 1 tablet by mouth Daily As Needed (swelling). 90 tablet 1   • Lancets misc Lancet of choice, check QD and  each 3   • Milk Thistle 1000 MG capsule Take 1 capsule by mouth Daily.     • NON FORMULARY Daily. Super cayenne 100,ooo HU every day     • omeprazole (priLOSEC) 40 MG capsule Take 1 capsule by mouth Every Night. Taking it as needed 90 capsule 3   • potassium chloride (K-DUR,KLOR-CON) 10 MEQ CR tablet Take 1 tablet by mouth Daily. 90 tablet 1   • Probiotic  Product (PROBIOTIC ADVANCED PO) Take 1 tablet by mouth Every Night.     • terazosin (HYTRIN) 2 MG capsule Take 1 capsule by mouth Every Night for 30 days. 30 capsule 0   • clopidogrel (PLAVIX) 75 MG tablet Take 1 tablet by mouth Daily. (Patient taking differently: Take 75 mg by mouth Daily. Instructed to stop. Last dose 8/12/2021) 30 tablet 6   • HYDROcodone-acetaminophen (NORCO) 5-325 MG per tablet 1/2-1 PO Q 6 hours PRN pain 12 tablet 0   • Insulin Glargine (LANTUS SOLOSTAR) 100 UNIT/ML injection pen Inject 45 Units under the skin into the appropriate area as directed Daily. 15 pen 1   • irbesartan (Avapro) 75 MG tablet Take 1 tablet by mouth 2 (two) times a day. 180 tablet 1   • metFORMIN (Glucophage) 500 MG tablet Take 1 tablet by mouth 2 (Two) Times a Day With Meals. 180 tablet 1   • metoprolol tartrate (LOPRESSOR) 25 MG tablet Take 1 tablet by mouth 2 (Two) Times a Day. 180 tablet 1   • rosuvastatin (Crestor) 5 MG tablet Take 1 tablet by mouth Daily. 30 tablet 5     No facility-administered medications prior to visit.       No opioid medication identified on active medication list. I have reviewed chart for other potential  high risk medication/s and harmful drug interactions in the elderly.                Patient Active Problem List   Diagnosis   • Type 2 diabetes mellitus without complication, with long-term current use of insulin (CMS/HCC)   • Mixed hyperlipidemia   • Essential hypertension   • Vitamin D deficiency   • CAD (coronary artery disease)   • Aortic stenosis, severe   • Diastolic CHF (CMS/HCC)   • BMI 29.0-29.9,adult   • Colic, biliary   • Choledocholithiasis   • Acute non-ST segment elevation myocardial infarction (CMS/HCC)     Advance Care Planning  Advance Directive is not on file.  ACP discussion was held with the patient during this visit. Patient has an advance directive (not in EMR), copy requested.    Review of Systems   Constitutional: Negative.    Respiratory: Negative.  Negative for  "shortness of breath.    Cardiovascular: Negative.  Negative for chest pain.   Gastrointestinal: Negative.    Psychiatric/Behavioral: Negative.  Negative for dysphoric mood. The patient is not nervous/anxious.         Objective    Vitals:    09/14/21 0858   BP: 124/60   Pulse: 78   Resp: 18   Temp: 98 °F (36.7 °C)   Weight: 69.4 kg (153 lb)   Height: 158.8 cm (62.5\")     BMI Readings from Last 1 Encounters:   09/14/21 27.54 kg/m²   BMI is above normal parameters. Recommendations include: exercise counseling and nutrition counseling    Does the patient have evidence of cognitive impairment? No    Physical Exam  Vitals and nursing note reviewed.   Constitutional:       General: She is not in acute distress.     Appearance: Normal appearance. She is well-developed.   HENT:      Head: Normocephalic and atraumatic.      Right Ear: Tympanic membrane, ear canal and external ear normal.      Left Ear: Tympanic membrane, ear canal and external ear normal.      Nose: Nose normal.   Eyes:      Extraocular Movements: Extraocular movements intact.      Conjunctiva/sclera: Conjunctivae normal.   Neck:      Thyroid: No thyromegaly.   Cardiovascular:      Rate and Rhythm: Normal rate and regular rhythm.      Heart sounds: Murmur heard.   Systolic murmur is present with a grade of 2/6.     Pulmonary:      Effort: Pulmonary effort is normal. No respiratory distress.      Breath sounds: Normal breath sounds.   Abdominal:      General: Bowel sounds are normal. There is no distension.      Palpations: Abdomen is soft.      Tenderness: There is no abdominal tenderness.   Musculoskeletal:      Cervical back: Normal range of motion and neck supple.   Lymphadenopathy:      Cervical: No cervical adenopathy.   Skin:     General: Skin is warm and dry.   Neurological:      Mental Status: She is alert and oriented to person, place, and time.   Psychiatric:         Mood and Affect: Mood normal.         Behavior: Behavior normal.         Thought " Content: Thought content normal.         Judgment: Judgment normal.       Lab Results   Component Value Date    TRIG 461 (H) 07/23/2021    HDL 17 (L) 07/23/2021    LDL 96 07/23/2021    VLDL 77 (H) 07/23/2021    HGBA1C 7.10 (H) 07/23/2021            HEALTH RISK ASSESSMENT    Smoking Status:  Social History     Tobacco Use   Smoking Status Never Smoker   Smokeless Tobacco Never Used     Alcohol Consumption:  Social History     Substance and Sexual Activity   Alcohol Use No     Fall Risk Screen:    ALEXANDER Fall Risk Assessment was completed, and patient is at MODERATE risk for falls. Assessment completed on:9/14/2021    Depression Screening:  PHQ-2/PHQ-9 Depression Screening 9/14/2021   Little interest or pleasure in doing things 0   Feeling down, depressed, or hopeless 0   Total Score 0       Health Habits and Functional and Cognitive Screening:  Functional & Cognitive Status 9/14/2021   Do you have difficulty preparing food and eating? No   Do you have difficulty bathing yourself, getting dressed or grooming yourself? No   Do you have difficulty using the toilet? No   Do you have difficulty moving around from place to place? No   Do you have trouble with steps or getting out of a bed or a chair? No   Current Diet Well Balanced Diet   Dental Exam Up to date   Eye Exam Up to date   Exercise (times per week) 0 times per week   Current Exercises Include No Regular Exercise   Current Exercise Activities Include -   Do you need help using the phone?  No   Are you deaf or do you have serious difficulty hearing?  No   Do you need help with transportation? No   Do you need help shopping? No   Do you need help preparing meals?  No   Do you need help with housework?  No   Do you need help with laundry? No   Do you need help taking your medications? No   Do you need help managing money? No   Do you ever drive or ride in a car without wearing a seat belt? No   Have you felt unusual stress, anger or loneliness in the last month? No    Who do you live with? Spouse   If you need help, do you have trouble finding someone available to you? No   Have you been bothered in the last four weeks by sexual problems? No   Do you have difficulty concentrating, remembering or making decisions? No       Age-appropriate Screening Schedule:  Refer to the list below for future screening recommendations based on patient's age, sex and/or medical conditions. Orders for these recommended tests are listed in the plan section. The patient has been provided with a written plan.    Health Maintenance   Topic Date Due   • TDAP/TD VACCINES (1 - Tdap) Never done   • ZOSTER VACCINE (1 of 2) Never done   • INFLUENZA VACCINE  10/01/2021   • URINE MICROALBUMIN  11/16/2021   • HEMOGLOBIN A1C  01/23/2022   • LIPID PANEL  07/23/2022   • DIABETIC EYE EXAM  09/13/2022   • MAMMOGRAM  Discontinued   • DXA SCAN  Discontinued              Assessment/Plan   CMS Preventative Services Quick Reference  Risk Factors Identified During Encounter  Chronic Pain   Depression/Dysphoria  Fall Risk-High or Moderate  The above risks/problems have been discussed with the patient.  Follow up actions/plans if indicated are seen below in the Assessment/Plan Section.  Pertinent information has been shared with the patient in the After Visit Summary.    Diagnoses and all orders for this visit:    1. Medicare annual wellness visit, subsequent (Primary)    2. Mixed hyperlipidemia  -     Comprehensive Metabolic Panel  -     Lipid Panel  -     rosuvastatin (Crestor) 5 MG tablet; Take 1 tablet by mouth Daily.  Dispense: 90 tablet; Refill: 1    3. Essential hypertension  -     CBC & Differential  -     Comprehensive Metabolic Panel  -     irbesartan (Avapro) 75 MG tablet; Take 1 tablet by mouth 2 (two) times a day.  Dispense: 180 tablet; Refill: 1    4. Coronary artery disease involving native coronary artery of native heart without angina pectoris  -     metoprolol tartrate (LOPRESSOR) 25 MG tablet; Take 1  tablet by mouth 2 (Two) Times a Day.  Dispense: 180 tablet; Refill: 1  -     clopidogrel (PLAVIX) 75 MG tablet; Take 1 tablet by mouth Daily.  Dispense: 90 tablet; Refill: 1    5. Type 2 diabetes mellitus without complication, with long-term current use of insulin (CMS/[facility])  -     Comprehensive Metabolic Panel  -     Hemoglobin A1c  -     Insulin Glargine (LANTUS SOLOSTAR) 100 UNIT/ML injection pen; Inject 45 Units under the skin into the appropriate area as directed Daily.  Dispense: 15 pen; Refill: 1  -     metFORMIN (Glucophage) 500 MG tablet; Take 1 tablet by mouth 2 (Two) Times a Day With Meals.  Dispense: 180 tablet; Refill: 1      Counseled pt about well adult care including diet and exercise.  She will work on this  BP doing well on metoprolol and continue plavix for CAD  DM control has been doing well, will check labs and continue lantus and metfomrin  She is tolerating crestor 5 well, had issues with other statins in the past though    Follow Up:   Return in about 3 months (around 12/14/2021).     An After Visit Summary and PPPS were made available to the patient.

## 2021-09-15 LAB
ALBUMIN SERPL-MCNC: 4.2 G/DL (ref 3.6–4.6)
ALBUMIN/GLOB SERPL: 1.7 {RATIO} (ref 1.2–2.2)
ALP SERPL-CCNC: 64 IU/L (ref 44–121)
ALT SERPL-CCNC: 15 IU/L (ref 0–32)
AST SERPL-CCNC: 18 IU/L (ref 0–40)
BASOPHILS # BLD AUTO: 0.1 X10E3/UL (ref 0–0.2)
BASOPHILS NFR BLD AUTO: 1 %
BILIRUB SERPL-MCNC: 0.3 MG/DL (ref 0–1.2)
BUN SERPL-MCNC: 19 MG/DL (ref 8–27)
BUN/CREAT SERPL: 17 (ref 12–28)
CALCIUM SERPL-MCNC: 9.7 MG/DL (ref 8.7–10.3)
CHLORIDE SERPL-SCNC: 105 MMOL/L (ref 96–106)
CHOLEST SERPL-MCNC: 167 MG/DL (ref 100–199)
CO2 SERPL-SCNC: 22 MMOL/L (ref 20–29)
CREAT SERPL-MCNC: 1.1 MG/DL (ref 0.57–1)
EOSINOPHIL # BLD AUTO: 0.4 X10E3/UL (ref 0–0.4)
EOSINOPHIL NFR BLD AUTO: 6 %
ERYTHROCYTE [DISTWIDTH] IN BLOOD BY AUTOMATED COUNT: 13.6 % (ref 11.7–15.4)
GLOBULIN SER CALC-MCNC: 2.5 G/DL (ref 1.5–4.5)
GLUCOSE SERPL-MCNC: 139 MG/DL (ref 65–99)
HBA1C MFR BLD: 7.4 % (ref 4.8–5.6)
HCT VFR BLD AUTO: 42.2 % (ref 34–46.6)
HDLC SERPL-MCNC: 32 MG/DL
HGB BLD-MCNC: 13.5 G/DL (ref 11.1–15.9)
IMM GRANULOCYTES # BLD AUTO: 0 X10E3/UL (ref 0–0.1)
IMM GRANULOCYTES NFR BLD AUTO: 0 %
LDLC SERPL CALC-MCNC: 86 MG/DL (ref 0–99)
LYMPHOCYTES # BLD AUTO: 2.4 X10E3/UL (ref 0.7–3.1)
LYMPHOCYTES NFR BLD AUTO: 39 %
MCH RBC QN AUTO: 30.8 PG (ref 26.6–33)
MCHC RBC AUTO-ENTMCNC: 32 G/DL (ref 31.5–35.7)
MCV RBC AUTO: 96 FL (ref 79–97)
MONOCYTES # BLD AUTO: 0.5 X10E3/UL (ref 0.1–0.9)
MONOCYTES NFR BLD AUTO: 8 %
NEUTROPHILS # BLD AUTO: 2.9 X10E3/UL (ref 1.4–7)
NEUTROPHILS NFR BLD AUTO: 46 %
PLATELET # BLD AUTO: 178 X10E3/UL (ref 150–450)
POTASSIUM SERPL-SCNC: 4.6 MMOL/L (ref 3.5–5.2)
PROT SERPL-MCNC: 6.7 G/DL (ref 6–8.5)
RBC # BLD AUTO: 4.39 X10E6/UL (ref 3.77–5.28)
SODIUM SERPL-SCNC: 144 MMOL/L (ref 134–144)
TRIGL SERPL-MCNC: 295 MG/DL (ref 0–149)
VLDLC SERPL CALC-MCNC: 49 MG/DL (ref 5–40)
WBC # BLD AUTO: 6.2 X10E3/UL (ref 3.4–10.8)

## 2021-10-06 ENCOUNTER — APPOINTMENT (OUTPATIENT)
Dept: CARDIOLOGY | Facility: HOSPITAL | Age: 81
End: 2021-10-06

## 2021-11-03 ENCOUNTER — OFFICE VISIT (OUTPATIENT)
Dept: CARDIOLOGY | Facility: CLINIC | Age: 81
End: 2021-11-03

## 2021-11-03 VITALS
BODY MASS INDEX: 27.91 KG/M2 | WEIGHT: 157.5 LBS | DIASTOLIC BLOOD PRESSURE: 78 MMHG | SYSTOLIC BLOOD PRESSURE: 160 MMHG | HEART RATE: 75 BPM | OXYGEN SATURATION: 97 % | HEIGHT: 63 IN

## 2021-11-03 DIAGNOSIS — E78.2 MIXED HYPERLIPIDEMIA: ICD-10-CM

## 2021-11-03 DIAGNOSIS — I10 ESSENTIAL HYPERTENSION: ICD-10-CM

## 2021-11-03 DIAGNOSIS — I25.10 CORONARY ARTERY DISEASE INVOLVING NATIVE CORONARY ARTERY OF NATIVE HEART WITHOUT ANGINA PECTORIS: Primary | ICD-10-CM

## 2021-11-03 PROCEDURE — 99214 OFFICE O/P EST MOD 30 MIN: CPT | Performed by: INTERNAL MEDICINE

## 2021-11-03 NOTE — PROGRESS NOTES
Subjective:     Encounter Date:11/03/2021    Primary Care Physician: Eyal Cervantes MD      Patient ID: Nancy Goodman is a 81 y.o. female.    Chief Complaint:Follow-up    PROBLEM LIST:  1. Coronary artery disease  a. 5/16/17 angina, cardiac catheter severe ostial LAD and LCx of these.  Ostial RPL and RCA.  b. 5 vessel CABG ([place]) LIMA to LAD, SVG to diagonal and LCx, and SVG to PDA and PL  c. 7/22/2021 LHC occluded LAD with patent LIMA.  Patent VG to diagonal and circumflex.  Occluded right PDA and occluded VG to right PDA.  95% ISR, right PL branch medical management.  2. Aortic stenosis  a. Peak gradient of 27 by cath 5/17  b. 4/19 peak gradient by echo of 66  c. 5/13/19 TAVR #23 Bah Tobias tissue valve  d. 6/13/19 ECHO EF 60%. Mod MR. TAVR normal. Max pressure gradient 34.8  e. 7/2020 echo EF of 70%.  Prosthetic AV with trace regurgitation.  3. Hypertension  4. Dyslipidemia  5. Diabetes mellitus  6. GERD  7. Varicose veins  8. Arthritis  9. Surgeries:  a. Tubal ligation  b. Varicose vein surgery  c. CABG      Allergies   Allergen Reactions   • Amlodipine GI Intolerance     sickness   • Benazepril Nausea Only   • Ciprofloxacin GI Intolerance     Pt not sure of reaction (stomach problems)   • Pokeweed [Phytolacca] Swelling and Rash   • Statins Nausea Only     Stomach problem         Current Outpatient Medications:   •  aspirin 81 MG EC tablet, Take 81 mg by mouth Every Morning., Disp: , Rfl:   •  B-D UF III MINI PEN NEEDLES 31G X 5 MM misc, 1 for daily injection, Disp: 100 each, Rfl: 3  •  Black Elderberry (SAMBUCUS ELDERBERRY PO), Take 500 mg by mouth Daily., Disp: , Rfl:   •  clopidogrel (PLAVIX) 75 MG tablet, Take 1 tablet by mouth Daily., Disp: 90 tablet, Rfl: 1  •  coenzyme Q10 100 MG capsule, Take 100 mg by mouth Daily., Disp: , Rfl:   •  CRANBERRY PO, Take 40 mg by mouth Daily., Disp: , Rfl:   •  FREESTYLE LITE test strip, Check daily and PRN, Disp: 300 each, Rfl: 3  •  furosemide (LASIX) 20  "MG tablet, Take 1 tablet by mouth Daily As Needed (swelling)., Disp: 90 tablet, Rfl: 1  •  Insulin Glargine (LANTUS SOLOSTAR) 100 UNIT/ML injection pen, Inject 45 Units under the skin into the appropriate area as directed Daily., Disp: 15 pen, Rfl: 1  •  irbesartan (Avapro) 75 MG tablet, Take 1 tablet by mouth 2 (two) times a day., Disp: 180 tablet, Rfl: 1  •  Lancets misc, Lancet of choice, check QD and PRN, Disp: 300 each, Rfl: 3  •  metoprolol tartrate (LOPRESSOR) 25 MG tablet, Take 1 tablet by mouth 2 (Two) Times a Day., Disp: 180 tablet, Rfl: 1  •  Milk Thistle 1000 MG capsule, Take 1 capsule by mouth Daily., Disp: , Rfl:   •  NON FORMULARY, Daily. Super cayenne 100,ooo HU every day, Disp: , Rfl:   •  potassium chloride (K-DUR,KLOR-CON) 10 MEQ CR tablet, Take 1 tablet by mouth Daily., Disp: 90 tablet, Rfl: 1  •  Probiotic Product (PROBIOTIC ADVANCED PO), Take 1 tablet by mouth Every Night., Disp: , Rfl:   •  rosuvastatin (Crestor) 5 MG tablet, Take 1 tablet by mouth Daily., Disp: 90 tablet, Rfl: 1  •  metFORMIN (Glucophage) 500 MG tablet, Take 1 tablet by mouth 2 (Two) Times a Day With Meals., Disp: 180 tablet, Rfl: 1  •  omeprazole (priLOSEC) 40 MG capsule, Take 1 capsule by mouth Every Night. Taking it as needed, Disp: 90 capsule, Rfl: 3        History of Present Illness    Patient returns today for hospital follow-up status post cholelithiasis and subsequent ERCP/stone extraction.  Patient overall feels but her appetite is returned to normal.  She is thinks the Crestor may be causing her to feel \"tired\" and malaise fatigue.  Checks her blood pressure at home and says it typically runs 140s range.  She has some lower extremity edema but only takes her diuretic on a as needed basis because of \"what it dries out of me\".  Denies chest pain orthopnea or PND    The following portions of the patient's history were reviewed and updated as appropriate: allergies, current medications, past family history, past medical " "history, past social history, past surgical history and problem list.      Social History     Tobacco Use   • Smoking status: Never Smoker   • Smokeless tobacco: Never Used   Vaping Use   • Vaping Use: Never used   Substance Use Topics   • Alcohol use: No   • Drug use: No         ROS       Objective:   /78   Pulse 75   Ht 160 cm (63\")   Wt 71.4 kg (157 lb 8 oz)   SpO2 97%   BMI 27.90 kg/m²         Vitals reviewed.   Constitutional:       Appearance: Well-developed and not in distress.   Neck:      Thyroid: No thyromegaly.      Vascular: No carotid bruit or JVD.   Pulmonary:      Breath sounds: Normal breath sounds.   Cardiovascular:      Regular rhythm.      No gallop. No S3 and S4 gallop.   Edema:     Ankle: bilateral 1+ edema of the ankle.     Comments: Bilateral venous varicosities  Abdominal:      General: Bowel sounds are normal.      Palpations: Abdomen is soft. There is no abdominal mass.      Tenderness: There is no abdominal tenderness.   Musculoskeletal:         General: No deformity.      Extremities: No clubbing present.Skin:     General: Skin is warm and dry.      Findings: No rash.   Neurological:      Mental Status: Alert and oriented to person, place, and time.         Procedures          Assessment:   Assessment/Plan      Diagnoses and all orders for this visit:    1. Coronary artery disease involving native coronary artery of native heart without angina pectoris (Primary)    2. Essential hypertension    3. Mixed hyperlipidemia      1.  Coronary artery disease.  Occluded vein graft to the RCA with ISR the right posterior lateral.  This is managed medically.  2.  Hypertension, plus minus control, reasonable for age.  3.  Dyslipidemia on high intensity statin  4.  Status post bioprosthetic aortic valve, normal functioning by recent echo    Recommendations:  1.  Continue current medical therapy  2.  Patient may take her diuretics as a as needed basis that she is currently doing.  3.  Patient " has questions about her Plavix as she is having some easy bruising or bleeding, we will have her take it on most days but if she has bleeding or bruising complication may hold it for several days if necessary.  4.  Revisit annually or as needed symptom change    Fidel Valdez MD             Dictated utilizing Dragon dictation

## 2021-11-22 ENCOUNTER — OFFICE VISIT (OUTPATIENT)
Dept: FAMILY MEDICINE CLINIC | Facility: CLINIC | Age: 81
End: 2021-11-22

## 2021-11-22 VITALS
WEIGHT: 155 LBS | TEMPERATURE: 97.5 F | BODY MASS INDEX: 27.46 KG/M2 | HEART RATE: 84 BPM | SYSTOLIC BLOOD PRESSURE: 170 MMHG | DIASTOLIC BLOOD PRESSURE: 80 MMHG | HEIGHT: 63 IN | RESPIRATION RATE: 18 BRPM

## 2021-11-22 DIAGNOSIS — R05.9 COUGH: ICD-10-CM

## 2021-11-22 DIAGNOSIS — J18.9 WALKING PNEUMONIA: Primary | ICD-10-CM

## 2021-11-22 DIAGNOSIS — R06.02 SHORTNESS OF BREATH: ICD-10-CM

## 2021-11-22 PROCEDURE — 99213 OFFICE O/P EST LOW 20 MIN: CPT | Performed by: FAMILY MEDICINE

## 2021-11-22 RX ORDER — AZITHROMYCIN 250 MG/1
TABLET, FILM COATED ORAL
Qty: 6 TABLET | Refills: 0 | Status: SHIPPED | OUTPATIENT
Start: 2021-11-22 | End: 2021-12-14

## 2021-11-22 RX ORDER — BROMPHENIRAMINE MALEATE, PSEUDOEPHEDRINE HYDROCHLORIDE, AND DEXTROMETHORPHAN HYDROBROMIDE 2; 30; 10 MG/5ML; MG/5ML; MG/5ML
5 SYRUP ORAL 4 TIMES DAILY PRN
Qty: 180 ML | Refills: 0 | Status: SHIPPED | OUTPATIENT
Start: 2021-11-22 | End: 2021-12-14

## 2021-11-22 NOTE — PROGRESS NOTES
Subjective   Nancy Goodman is a 81 y.o. female.     History of Present Illness     She has had cough and congestion the past 2 weeks  No fever  Mild SOA noted  Just not getting better  No known exposure to illness      Review of Systems   Constitutional: Negative for fever.   HENT: Positive for congestion.    Respiratory: Positive for cough.        Objective   Physical Exam  Vitals and nursing note reviewed.   Constitutional:       General: She is not in acute distress.     Appearance: Normal appearance. She is well-developed.   HENT:      Head: Normocephalic and atraumatic.   Cardiovascular:      Rate and Rhythm: Normal rate and regular rhythm.      Heart sounds: Normal heart sounds.   Pulmonary:      Effort: Pulmonary effort is normal.      Breath sounds: Rhonchi (left lower lobe) present.   Neurological:      Mental Status: She is alert and oriented to person, place, and time.   Psychiatric:         Mood and Affect: Mood normal.         Behavior: Behavior normal.         Thought Content: Thought content normal.         Judgment: Judgment normal.         Assessment/Plan   Diagnoses and all orders for this visit:    1. Walking pneumonia (Primary)  -     azithromycin (Zithromax) 250 MG tablet; Take 2 tablets the first day, then 1 tablet daily for 4 days.  Dispense: 6 tablet; Refill: 0  -     brompheniramine-pseudoephedrine-DM 30-2-10 MG/5ML syrup; Take 5 mL by mouth 4 (Four) Times a Day As Needed for Congestion or Cough.  Dispense: 180 mL; Refill: 0    2. Cough  -     COVID-19,[facility] ROUTINE, NP/OP SWAB IN TRANSPORT MEDIA OR ESWAB 72 HR TAT - Swab, Nasopharynx    3. Shortness of breath    COVID test sent off with low risk  [facility] and [facility], will f/u pending test

## 2021-11-23 LAB
LABCORP SARS-COV-2, NAA 2 DAY TAT: NORMAL
SARS-COV-2 RNA RESP QL NAA+PROBE: NOT DETECTED

## 2021-12-14 ENCOUNTER — OFFICE VISIT (OUTPATIENT)
Dept: FAMILY MEDICINE CLINIC | Facility: CLINIC | Age: 81
End: 2021-12-14

## 2021-12-14 ENCOUNTER — TELEPHONE (OUTPATIENT)
Dept: FAMILY MEDICINE CLINIC | Facility: CLINIC | Age: 81
End: 2021-12-14

## 2021-12-14 VITALS
RESPIRATION RATE: 18 BRPM | BODY MASS INDEX: 26.93 KG/M2 | SYSTOLIC BLOOD PRESSURE: 170 MMHG | HEIGHT: 63 IN | HEART RATE: 84 BPM | TEMPERATURE: 98.2 F | WEIGHT: 152 LBS | DIASTOLIC BLOOD PRESSURE: 92 MMHG

## 2021-12-14 DIAGNOSIS — E11.9 TYPE 2 DIABETES MELLITUS WITHOUT COMPLICATION, WITH LONG-TERM CURRENT USE OF INSULIN (HCC): Primary | ICD-10-CM

## 2021-12-14 DIAGNOSIS — I25.10 CORONARY ARTERY DISEASE INVOLVING NATIVE CORONARY ARTERY OF NATIVE HEART WITHOUT ANGINA PECTORIS: ICD-10-CM

## 2021-12-14 DIAGNOSIS — I10 ESSENTIAL HYPERTENSION: ICD-10-CM

## 2021-12-14 DIAGNOSIS — J40 BRONCHITIS: ICD-10-CM

## 2021-12-14 DIAGNOSIS — Z79.4 TYPE 2 DIABETES MELLITUS WITHOUT COMPLICATION, WITH LONG-TERM CURRENT USE OF INSULIN (HCC): Primary | ICD-10-CM

## 2021-12-14 DIAGNOSIS — E78.2 MIXED HYPERLIPIDEMIA: ICD-10-CM

## 2021-12-14 PROCEDURE — 99214 OFFICE O/P EST MOD 30 MIN: CPT | Performed by: FAMILY MEDICINE

## 2021-12-14 RX ORDER — DOXYCYCLINE 100 MG/1
100 TABLET ORAL 2 TIMES DAILY
Qty: 14 TABLET | Refills: 0 | Status: SHIPPED | OUTPATIENT
Start: 2021-12-14 | End: 2022-03-01

## 2021-12-14 RX ORDER — METOPROLOL TARTRATE 50 MG/1
50 TABLET, FILM COATED ORAL 2 TIMES DAILY
Qty: 180 TABLET | Refills: 1 | Status: SHIPPED | OUTPATIENT
Start: 2021-12-14 | End: 2022-06-28 | Stop reason: SDUPTHER

## 2021-12-14 RX ORDER — METOPROLOL TARTRATE 50 MG/1
25 TABLET, FILM COATED ORAL 2 TIMES DAILY
Qty: 180 TABLET | Refills: 1 | Status: SHIPPED | OUTPATIENT
Start: 2021-12-14 | End: 2021-12-14 | Stop reason: SDUPTHER

## 2021-12-14 RX ORDER — POTASSIUM CHLORIDE 750 MG/1
10 TABLET, EXTENDED RELEASE ORAL DAILY
Qty: 90 TABLET | Refills: 1 | Status: SHIPPED | OUTPATIENT
Start: 2021-12-14 | End: 2022-03-01 | Stop reason: SDUPTHER

## 2021-12-14 NOTE — TELEPHONE ENCOUNTER
this patient was here this morning and said that her medcation was sent to mail order and she needed it to go to [facility] here in [place]. can this be changed?

## 2021-12-14 NOTE — TELEPHONE ENCOUNTER
Potassium went to [facility]  Antibiotic [facility] to [facility].    [facility]rolol [facility]  I can send this to [facility] now.

## 2021-12-14 NOTE — PROGRESS NOTES
Subjective   Nancy Goodman is a 81 y.o. female.     History of Present Illness     Her cough is back  Was better after the zpac but then slowly returned  Mild increase in SOA and WOB  No fever    Diabetes Mellitus Type II, Follow-up:   Nancy Goodman is a 81 y.o. female who is here for follow-up of Type 2 diabetes mellitus.  Current symptoms/problems include none and have been stable. Patient is adherent with medications.  Known diabetic complications: cardiovascular disease  Cardiovascular risk factors: advanced age (older than 55 for men, 65 for women), diabetes mellitus, dyslipidemia and hypertension  Eye exam current (within one year): yes  She is on ACE inhibitor or angiotensin II receptor blocker. Patient is on a statin.       Nancy Goodman  is here for follow-up of hypertension of several years duration. She is exercising and is adherent to a low-salt diet. Patient does not check her blood pressure.  She is compliant with meds.        Nancy Goodman returns today for follow up of Hyperlipidemia  Nancy indicates her exercise level as irregularly.  Diet: trying to eat better  Patient is compliant with medications   Any side effects to medications:   chest pain No myalgia No memory change No  Pt is due for labs        The following portions of the patient's history were reviewed and updated as appropriate: allergies, current medications, past family history, past medical history, past social history, past surgical history and problem list.    Review of Systems   Constitutional: Negative.    HENT: Positive for congestion.    Respiratory: Positive for cough and shortness of breath.        Objective   Physical Exam  Vitals and nursing note reviewed.   Constitutional:       General: She is not in acute distress.     Appearance: Normal appearance. She is well-developed.   Cardiovascular:      Rate and Rhythm: Normal rate and regular rhythm.      Heart sounds: Normal heart sounds.    Pulmonary:      Effort: Pulmonary effort is normal.      Breath sounds: Normal breath sounds.   Neurological:      Mental Status: She is alert and oriented to person, place, and time.   Psychiatric:         Mood and Affect: Mood normal.         Behavior: Behavior normal.         Thought Content: Thought content normal.         Judgment: Judgment normal.         Assessment/Plan   Diagnoses and all orders for this visit:    1. Type 2 diabetes mellitus without complication, with long-term current use of insulin (HCC) (Primary)  -     Comprehensive Metabolic Panel  -     Hemoglobin A1c    2. Bronchitis  -     doxycycline (ADOXA) 100 MG tablet; Take 1 tablet by mouth 2 (Two) Times a Day.  Dispense: 14 tablet; Refill: 0    3. Coronary artery disease involving native coronary artery of native heart without angina pectoris  -     metoprolol tartrate (LOPRESSOR) 50 MG tablet; Take 0.5 tablets by mouth 2 (Two) Times a Day.  Dispense: 180 tablet; Refill: 1    4. Essential hypertension  -     CBC & Differential  -     Comprehensive Metabolic Panel    5. Mixed hyperlipidemia  -     Comprehensive Metabolic Panel  -     Lipid Panel    Other orders  -     potassium chloride (K-DUR,KLOR-CON) 10 MEQ CR tablet; Take 1 tablet by mouth Daily.  Dispense: 90 tablet; Refill: 1    will recheck her DM labs, control had been decent at just above 7.  Continue metformin and lantus  BP is high, will increase toprol to 50 BID, this could be related to her cough and illness though  Will use doxy for cough and then consider CXR INB.  Pt agrees to call back [facility] in 7 days

## 2021-12-15 LAB
ALBUMIN SERPL-MCNC: 4.4 G/DL (ref 3.5–5.2)
ALBUMIN/GLOB SERPL: 1.8 G/DL
ALP SERPL-CCNC: 62 U/L (ref 39–117)
ALT SERPL-CCNC: 22 U/L (ref 1–33)
AST SERPL-CCNC: 22 U/L (ref 1–32)
BASOPHILS # BLD AUTO: 0.07 10*3/MM3 (ref 0–0.2)
BASOPHILS NFR BLD AUTO: 1.1 % (ref 0–1.5)
BILIRUB SERPL-MCNC: 0.4 MG/DL (ref 0–1.2)
BUN SERPL-MCNC: 17 MG/DL (ref 8–23)
BUN/CREAT SERPL: 18.3 (ref 7–25)
CALCIUM SERPL-MCNC: 9.7 MG/DL (ref 8.6–10.5)
CHLORIDE SERPL-SCNC: 104 MMOL/L (ref 98–107)
CHOLEST SERPL-MCNC: 162 MG/DL (ref 0–200)
CO2 SERPL-SCNC: 29.8 MMOL/L (ref 22–29)
CREAT SERPL-MCNC: 0.93 MG/DL (ref 0.57–1)
EOSINOPHIL # BLD AUTO: 0.57 10*3/MM3 (ref 0–0.4)
EOSINOPHIL NFR BLD AUTO: 8.6 % (ref 0.3–6.2)
ERYTHROCYTE [DISTWIDTH] IN BLOOD BY AUTOMATED COUNT: 12.6 % (ref 12.3–15.4)
GLOBULIN SER CALC-MCNC: 2.4 GM/DL
GLUCOSE SERPL-MCNC: 90 MG/DL (ref 65–99)
HBA1C MFR BLD: 7.7 % (ref 4.8–5.6)
HCT VFR BLD AUTO: 44.1 % (ref 34–46.6)
HDLC SERPL-MCNC: 32 MG/DL (ref 40–60)
HGB BLD-MCNC: 14.6 G/DL (ref 12–15.9)
IMM GRANULOCYTES # BLD AUTO: 0.02 10*3/MM3 (ref 0–0.05)
IMM GRANULOCYTES NFR BLD AUTO: 0.3 % (ref 0–0.5)
LDLC SERPL CALC-MCNC: 91 MG/DL (ref 0–100)
LYMPHOCYTES # BLD AUTO: 2.59 10*3/MM3 (ref 0.7–3.1)
LYMPHOCYTES NFR BLD AUTO: 39.2 % (ref 19.6–45.3)
MCH RBC QN AUTO: 31.1 PG (ref 26.6–33)
MCHC RBC AUTO-ENTMCNC: 33.1 G/DL (ref 31.5–35.7)
MCV RBC AUTO: 94 FL (ref 79–97)
MONOCYTES # BLD AUTO: 0.53 10*3/MM3 (ref 0.1–0.9)
MONOCYTES NFR BLD AUTO: 8 % (ref 5–12)
NEUTROPHILS # BLD AUTO: 2.83 10*3/MM3 (ref 1.7–7)
NEUTROPHILS NFR BLD AUTO: 42.8 % (ref 42.7–76)
NRBC BLD AUTO-RTO: 0 /100 WBC (ref 0–0.2)
PLATELET # BLD AUTO: 198 10*3/MM3 (ref 140–450)
POTASSIUM SERPL-SCNC: 4.2 MMOL/L (ref 3.5–5.2)
PROT SERPL-MCNC: 6.8 G/DL (ref 6–8.5)
RBC # BLD AUTO: 4.69 10*6/MM3 (ref 3.77–5.28)
SODIUM SERPL-SCNC: 141 MMOL/L (ref 136–145)
TRIGL SERPL-MCNC: 228 MG/DL (ref 0–150)
VLDLC SERPL CALC-MCNC: 39 MG/DL (ref 5–40)
WBC # BLD AUTO: 6.61 10*3/MM3 (ref 3.4–10.8)

## 2022-01-21 ENCOUNTER — TELEPHONE (OUTPATIENT)
Dept: FAMILY MEDICINE CLINIC | Facility: CLINIC | Age: 82
End: 2022-01-21

## 2022-02-18 DIAGNOSIS — E78.2 MIXED HYPERLIPIDEMIA: ICD-10-CM

## 2022-02-18 DIAGNOSIS — I10 ESSENTIAL HYPERTENSION: ICD-10-CM

## 2022-02-18 RX ORDER — IRBESARTAN 75 MG/1
75 TABLET ORAL DAILY
Qty: 180 TABLET | Refills: 1 | Status: SHIPPED | OUTPATIENT
Start: 2022-02-18 | End: 2022-03-01 | Stop reason: SDUPTHER

## 2022-02-18 RX ORDER — ROSUVASTATIN CALCIUM 5 MG/1
5 TABLET, COATED ORAL DAILY
Qty: 90 TABLET | Refills: 1 | Status: SHIPPED | OUTPATIENT
Start: 2022-02-18 | End: 2022-03-01 | Stop reason: SDUPTHER

## 2022-02-18 NOTE — TELEPHONE ENCOUNTER
Caller: Nancy Goodman    Relationship: Self    Best call back number:     Requested Prescriptions:   Requested Prescriptions     Pending Prescriptions Disp Refills   • rosuvastatin (Crestor) 5 MG tablet 90 tablet 1     Sig: Take 1 tablet by mouth Daily.   • irbesartan (Avapro) 75 MG tablet 180 tablet 1     Sig: Take 1 tablet by mouth.        Pharmacy where request should be sent: [facility] [address] [place] [address] [phone] [facility] [phone]     Additional details provided by patient: PATIENT IS OUT OF MEDICATION    Does the patient have less than a 3 day supply:  [x] Yes  [] No    Luis Russell Rep   02/18/22 14:31 EST

## 2022-03-01 DIAGNOSIS — E78.2 MIXED HYPERLIPIDEMIA: ICD-10-CM

## 2022-03-01 DIAGNOSIS — Z79.4 TYPE 2 DIABETES MELLITUS WITHOUT COMPLICATION, WITH LONG-TERM CURRENT USE OF INSULIN: ICD-10-CM

## 2022-03-01 DIAGNOSIS — I10 ESSENTIAL HYPERTENSION: ICD-10-CM

## 2022-03-01 DIAGNOSIS — E11.9 TYPE 2 DIABETES MELLITUS WITHOUT COMPLICATION, WITH LONG-TERM CURRENT USE OF INSULIN: ICD-10-CM

## 2022-03-01 DIAGNOSIS — I25.10 CORONARY ARTERY DISEASE INVOLVING NATIVE CORONARY ARTERY OF NATIVE HEART WITHOUT ANGINA PECTORIS: ICD-10-CM

## 2022-03-01 RX ORDER — ROSUVASTATIN CALCIUM 5 MG/1
5 TABLET, COATED ORAL DAILY
Qty: 90 TABLET | Refills: 1 | Status: SHIPPED | OUTPATIENT
Start: 2022-03-01 | End: 2022-09-12 | Stop reason: SDUPTHER

## 2022-03-01 RX ORDER — IRBESARTAN 75 MG/1
75 TABLET ORAL DAILY
Qty: 180 TABLET | Refills: 1 | Status: SHIPPED | OUTPATIENT
Start: 2022-03-01 | End: 2022-03-03 | Stop reason: SDUPTHER

## 2022-03-01 RX ORDER — FUROSEMIDE 20 MG/1
20 TABLET ORAL DAILY PRN
Qty: 90 TABLET | Refills: 1 | Status: SHIPPED | OUTPATIENT
Start: 2022-03-01 | End: 2023-02-27 | Stop reason: SDUPTHER

## 2022-03-01 RX ORDER — CLOPIDOGREL BISULFATE 75 MG/1
75 TABLET ORAL DAILY
Qty: 90 TABLET | Refills: 1 | Status: SHIPPED | OUTPATIENT
Start: 2022-03-01 | End: 2022-09-12 | Stop reason: SDUPTHER

## 2022-03-01 RX ORDER — POTASSIUM CHLORIDE 750 MG/1
10 TABLET, EXTENDED RELEASE ORAL DAILY
Qty: 90 TABLET | Refills: 1 | Status: SHIPPED | OUTPATIENT
Start: 2022-03-01 | End: 2022-09-12 | Stop reason: SDUPTHER

## 2022-03-03 ENCOUNTER — TELEPHONE (OUTPATIENT)
Dept: FAMILY MEDICINE CLINIC | Facility: CLINIC | Age: 82
End: 2022-03-03

## 2022-03-03 DIAGNOSIS — I10 ESSENTIAL HYPERTENSION: ICD-10-CM

## 2022-03-03 RX ORDER — IRBESARTAN 75 MG/1
75 TABLET ORAL DAILY
Qty: 90 TABLET | Refills: 1 | Status: SHIPPED | OUTPATIENT
Start: 2022-03-03 | End: 2022-03-15 | Stop reason: SDUPTHER

## 2022-03-03 NOTE — TELEPHONE ENCOUNTER
[facility] called wanting to clarify the dose on the Irbesartan, you have once daily but a qty of 180. She has been on once daily and twice daily in the past and we did not know what to tell them.     Pt is there now. They are just going to give the printed script back to her and we can correct it and inform pt.

## 2022-03-15 ENCOUNTER — OFFICE VISIT (OUTPATIENT)
Dept: FAMILY MEDICINE CLINIC | Facility: CLINIC | Age: 82
End: 2022-03-15

## 2022-03-15 VITALS
WEIGHT: 155 LBS | TEMPERATURE: 97.8 F | RESPIRATION RATE: 18 BRPM | DIASTOLIC BLOOD PRESSURE: 64 MMHG | BODY MASS INDEX: 27.46 KG/M2 | HEIGHT: 63 IN | SYSTOLIC BLOOD PRESSURE: 138 MMHG | HEART RATE: 74 BPM

## 2022-03-15 DIAGNOSIS — E78.2 MIXED HYPERLIPIDEMIA: ICD-10-CM

## 2022-03-15 DIAGNOSIS — E11.9 TYPE 2 DIABETES MELLITUS WITHOUT COMPLICATION, WITH LONG-TERM CURRENT USE OF INSULIN: Primary | ICD-10-CM

## 2022-03-15 DIAGNOSIS — Z79.4 TYPE 2 DIABETES MELLITUS WITHOUT COMPLICATION, WITH LONG-TERM CURRENT USE OF INSULIN: Primary | ICD-10-CM

## 2022-03-15 DIAGNOSIS — I10 ESSENTIAL HYPERTENSION: ICD-10-CM

## 2022-03-15 LAB
ALBUMIN SERPL-MCNC: 4.4 G/DL (ref 3.5–5.2)
ALBUMIN/GLOB SERPL: 1.8 G/DL
ALP SERPL-CCNC: 60 U/L (ref 39–117)
ALT SERPL-CCNC: 17 U/L (ref 1–33)
AST SERPL-CCNC: 17 U/L (ref 1–32)
BASOPHILS # BLD AUTO: 0.04 10*3/MM3 (ref 0–0.2)
BASOPHILS NFR BLD AUTO: 0.7 % (ref 0–1.5)
BILIRUB SERPL-MCNC: 0.5 MG/DL (ref 0–1.2)
BUN SERPL-MCNC: 14 MG/DL (ref 8–23)
BUN/CREAT SERPL: 15.2 (ref 7–25)
CALCIUM SERPL-MCNC: 9.8 MG/DL (ref 8.6–10.5)
CHLORIDE SERPL-SCNC: 104 MMOL/L (ref 98–107)
CO2 SERPL-SCNC: 27.5 MMOL/L (ref 22–29)
CREAT SERPL-MCNC: 0.92 MG/DL (ref 0.57–1)
EGFRCR SERPLBLD CKD-EPI 2021: 62.3 ML/MIN/1.73
EOSINOPHIL # BLD AUTO: 0.23 10*3/MM3 (ref 0–0.4)
EOSINOPHIL NFR BLD AUTO: 3.8 % (ref 0.3–6.2)
ERYTHROCYTE [DISTWIDTH] IN BLOOD BY AUTOMATED COUNT: 13 % (ref 12.3–15.4)
GLOBULIN SER CALC-MCNC: 2.5 GM/DL
GLUCOSE SERPL-MCNC: 96 MG/DL (ref 65–99)
HBA1C MFR BLD: 7.7 % (ref 4.8–5.6)
HCT VFR BLD AUTO: 43.7 % (ref 34–46.6)
HGB BLD-MCNC: 14.5 G/DL (ref 12–15.9)
IMM GRANULOCYTES # BLD AUTO: 0.01 10*3/MM3 (ref 0–0.05)
IMM GRANULOCYTES NFR BLD AUTO: 0.2 % (ref 0–0.5)
LYMPHOCYTES # BLD AUTO: 2.17 10*3/MM3 (ref 0.7–3.1)
LYMPHOCYTES NFR BLD AUTO: 35.9 % (ref 19.6–45.3)
MCH RBC QN AUTO: 30.7 PG (ref 26.6–33)
MCHC RBC AUTO-ENTMCNC: 33.2 G/DL (ref 31.5–35.7)
MCV RBC AUTO: 92.6 FL (ref 79–97)
MONOCYTES # BLD AUTO: 0.46 10*3/MM3 (ref 0.1–0.9)
MONOCYTES NFR BLD AUTO: 7.6 % (ref 5–12)
NEUTROPHILS # BLD AUTO: 3.13 10*3/MM3 (ref 1.7–7)
NEUTROPHILS NFR BLD AUTO: 51.8 % (ref 42.7–76)
NRBC BLD AUTO-RTO: 0 /100 WBC (ref 0–0.2)
PLATELET # BLD AUTO: 171 10*3/MM3 (ref 140–450)
POTASSIUM SERPL-SCNC: 4.3 MMOL/L (ref 3.5–5.2)
PROT SERPL-MCNC: 6.9 G/DL (ref 6–8.5)
RBC # BLD AUTO: 4.72 10*6/MM3 (ref 3.77–5.28)
SODIUM SERPL-SCNC: 141 MMOL/L (ref 136–145)
WBC # BLD AUTO: 6.04 10*3/MM3 (ref 3.4–10.8)

## 2022-03-15 PROCEDURE — 99214 OFFICE O/P EST MOD 30 MIN: CPT | Performed by: FAMILY MEDICINE

## 2022-03-15 RX ORDER — IRBESARTAN 75 MG/1
75 TABLET ORAL 2 TIMES DAILY
Qty: 180 TABLET | Refills: 1 | Status: SHIPPED | OUTPATIENT
Start: 2022-03-15 | End: 2022-09-12 | Stop reason: SDUPTHER

## 2022-03-15 NOTE — PROGRESS NOTES
Subjective   Nancy Goodman is a 81 y.o. female.     History of Present Illness     Diabetes Mellitus Type II, Follow-up:   Nancy Goodman is a 81 y.o. female who is here for follow-up of Type 2 diabetes mellitus.  Current symptoms/problems include none and have been stable. Patient is adherent with medications.  Known diabetic complications: none  Cardiovascular risk factors: diabetes mellitus, dyslipidemia and hypertension  Current diabetic medications include lantus 45 units and metformin 500 irregulalry  Current monitoring regimen: home blood tests - daily  Home blood sugar records: fasting range: doing well  Any episodes of hypoglycemia? no  Eye exam current (within one year): yes  She is on ACE inhibitor or angiotensin II receptor blocker. Patient is on a statin.       Nancy Goodman  is here for follow-up of hypertension of several years duration. She is not exercising and is not adherent to a low-salt diet. Patient does not check her blood pressure.  She is compliant with meds.        Nancy Goodman returns today for follow up of Hyperlipidemia  Nancy indicates her exercise level as not as much the past 4 months.  Diet: has not been eating as well  Patient is compliant with medications   Any side effects to medications:   chest pain No myalgia No memory change No  Pt is not due for labs        The following portions of the patient's history were reviewed and updated as appropriate: allergies, current medications, past family history, past medical history, past social history, past surgical history and problem list.    Review of Systems   Constitutional: Negative.    Respiratory: Negative.  Negative for shortness of breath.    Cardiovascular: Negative.  Negative for chest pain.   Psychiatric/Behavioral: Negative.        Objective   Physical Exam  Vitals and nursing note reviewed.   Constitutional:       General: She is not in acute distress.     Appearance: Normal  appearance. She is well-developed.   HENT:      Head: Normocephalic and atraumatic.   Eyes:      Extraocular Movements: Extraocular movements intact.      Conjunctiva/sclera: Conjunctivae normal.   Cardiovascular:      Rate and Rhythm: Normal rate and regular rhythm.      Heart sounds: Murmur heard.    Systolic murmur is present with a grade of 2/6.  Pulmonary:      Effort: Pulmonary effort is normal.      Breath sounds: Normal breath sounds.   Neurological:      Mental Status: She is alert and oriented to person, place, and time.   Psychiatric:         Mood and Affect: Mood normal.         Behavior: Behavior normal.         Thought Content: Thought content normal.         Judgment: Judgment normal.         Assessment/Plan   Diagnoses and all orders for this visit:    1. Type 2 diabetes mellitus without complication, with long-term current use of insulin (HCC) (Primary)  -     CBC & Differential  -     Comprehensive Metabolic Panel  -     Hemoglobin A1c    2. Essential hypertension  -     irbesartan (Avapro) 75 MG tablet; Take 1 tablet by mouth 2 (Two) Times a Day.  Dispense: 180 tablet; Refill: 1  -     CBC & Differential  -     Comprehensive Metabolic Panel    3. Mixed hyperlipidemia    will recheck DM labs and continue lantus,  However she is not taking the metformin on a regular basis, only 2-3 times per month!  She is on lantus 45 units.  Will f/u pending labs  Continue avapro 150 QD but she has been taking 75 BID.  Will resume this  No change in crestor 5, recheck in future

## 2022-06-28 ENCOUNTER — OFFICE VISIT (OUTPATIENT)
Dept: FAMILY MEDICINE CLINIC | Facility: CLINIC | Age: 82
End: 2022-06-28

## 2022-06-28 VITALS
HEIGHT: 63 IN | TEMPERATURE: 97.3 F | WEIGHT: 159.6 LBS | DIASTOLIC BLOOD PRESSURE: 86 MMHG | RESPIRATION RATE: 18 BRPM | OXYGEN SATURATION: 99 % | BODY MASS INDEX: 28.28 KG/M2 | SYSTOLIC BLOOD PRESSURE: 132 MMHG | HEART RATE: 69 BPM

## 2022-06-28 DIAGNOSIS — E11.9 TYPE 2 DIABETES MELLITUS WITHOUT COMPLICATION, WITH LONG-TERM CURRENT USE OF INSULIN: ICD-10-CM

## 2022-06-28 DIAGNOSIS — I25.10 CORONARY ARTERY DISEASE INVOLVING NATIVE CORONARY ARTERY OF NATIVE HEART WITHOUT ANGINA PECTORIS: ICD-10-CM

## 2022-06-28 DIAGNOSIS — F51.04 PSYCHOPHYSIOLOGICAL INSOMNIA: Primary | ICD-10-CM

## 2022-06-28 DIAGNOSIS — I10 ESSENTIAL HYPERTENSION: ICD-10-CM

## 2022-06-28 DIAGNOSIS — E78.2 MIXED HYPERLIPIDEMIA: ICD-10-CM

## 2022-06-28 DIAGNOSIS — Z79.4 TYPE 2 DIABETES MELLITUS WITHOUT COMPLICATION, WITH LONG-TERM CURRENT USE OF INSULIN: ICD-10-CM

## 2022-06-28 PROCEDURE — 99214 OFFICE O/P EST MOD 30 MIN: CPT | Performed by: FAMILY MEDICINE

## 2022-06-28 RX ORDER — ZOLPIDEM TARTRATE 5 MG/1
TABLET ORAL
Qty: 30 TABLET | Refills: 1 | Status: SHIPPED | OUTPATIENT
Start: 2022-06-28 | End: 2022-12-05

## 2022-06-28 RX ORDER — METOPROLOL TARTRATE 50 MG/1
50 TABLET, FILM COATED ORAL 2 TIMES DAILY
Qty: 180 TABLET | Refills: 1 | Status: SHIPPED | OUTPATIENT
Start: 2022-06-28 | End: 2022-09-20 | Stop reason: SDUPTHER

## 2022-06-28 NOTE — PROGRESS NOTES
Subjective   Nancy Goodman is a 81 y.o. female.     History of Present Illness     Diabetes Mellitus Type II, Follow-up:   Nancy Goodman is a 81 y.o. female who is here for follow-up of Type 2 diabetes mellitus.  Current symptoms/problems include none and have been stable. Patient is adherent with medications.  Known diabetic complications: none  Cardiovascular risk factors: diabetes mellitus, dyslipidemia and hypertension  Current diabetic medications include metformin and lantusd 45 units.   Current monitoring regimen: home blood tests - daily  Home blood sugar records: fasting range: doing well  Any episodes of hypoglycemia? no  Eye exam current (within one year): yes  She is on ACE inhibitor or angiotensin II receptor blocker. Patient is on a statin.       Nancy Goodman  is here for follow-up of hypertension of several years duration. She is exercising and is adherent to a low-salt diet. Patient does not check her blood pressure.   She is compliant with meds.        Nancy Goodman returns today for follow up of Hyperlipidemia  Nancy indicates her exercise level as irregularly but trying to do more.  Diet: trying to eat healthy  Patient is compliant with medications   Any side effects to medications:   chest pain No myalgia No memory change No  Pt is due for labs        The following portions of the patient's history were reviewed and updated as appropriate: allergies, current medications, past family history, past medical history, past social history, past surgical history and problem list.    Review of Systems   Constitutional: Negative.        Objective   Physical Exam  Vitals and nursing note reviewed.   Constitutional:       General: She is not in acute distress.     Appearance: Normal appearance. She is well-developed.   Cardiovascular:      Rate and Rhythm: Normal rate and regular rhythm.      Heart sounds: Normal heart sounds.   Pulmonary:      Effort: Pulmonary  effort is normal.      Breath sounds: Normal breath sounds.   Neurological:      Mental Status: She is alert and oriented to person, place, and time.   Psychiatric:         Mood and Affect: Mood normal.         Behavior: Behavior normal.         Thought Content: Thought content normal.         Judgment: Judgment normal.         Assessment & Plan   Diagnoses and all orders for this visit:    1. Psychophysiological insomnia (Primary)  -     zolpidem (Ambien) 5 MG tablet; 1/2-1 PO QHS  Dispense: 30 tablet; Refill: 1    2. Type 2 diabetes mellitus without complication, with long-term current use of insulin (HCC)  -     CBC & Differential  -     Comprehensive Metabolic Panel  -     Hemoglobin A1c    3. Coronary artery disease involving native coronary artery of native heart without angina pectoris  -     metoprolol tartrate (LOPRESSOR) 50 MG tablet; Take 1 tablet by mouth 2 (Two) Times a Day.  Dispense: 180 tablet; Refill: 1    4. Essential hypertension  -     CBC & Differential  -     Comprehensive Metabolic Panel    5. Mixed hyperlipidemia  -     Comprehensive Metabolic Panel  -     Lipid Panel    ok for ambien for PRN use to help with sleep.  Mood has been poor since she her son passed away  Recheck DM labs and continue medicine  BP stable  Will check lipids and f/u pending labs

## 2022-06-29 LAB
ALBUMIN SERPL-MCNC: 4.4 G/DL (ref 3.6–4.6)
ALBUMIN/GLOB SERPL: 1.8 {RATIO} (ref 1.2–2.2)
ALP SERPL-CCNC: 54 IU/L (ref 44–121)
ALT SERPL-CCNC: 15 IU/L (ref 0–32)
AST SERPL-CCNC: 16 IU/L (ref 0–40)
BASOPHILS # BLD AUTO: 0.1 X10E3/UL (ref 0–0.2)
BASOPHILS NFR BLD AUTO: 1 %
BILIRUB SERPL-MCNC: 0.4 MG/DL (ref 0–1.2)
BUN SERPL-MCNC: 17 MG/DL (ref 8–27)
BUN/CREAT SERPL: 18 (ref 12–28)
CALCIUM SERPL-MCNC: 10 MG/DL (ref 8.7–10.3)
CHLORIDE SERPL-SCNC: 105 MMOL/L (ref 96–106)
CHOLEST SERPL-MCNC: 162 MG/DL (ref 100–199)
CO2 SERPL-SCNC: 25 MMOL/L (ref 20–29)
CREAT SERPL-MCNC: 0.96 MG/DL (ref 0.57–1)
EGFRCR SERPLBLD CKD-EPI 2021: 59 ML/MIN/1.73
EOSINOPHIL # BLD AUTO: 0.2 X10E3/UL (ref 0–0.4)
EOSINOPHIL NFR BLD AUTO: 3 %
ERYTHROCYTE [DISTWIDTH] IN BLOOD BY AUTOMATED COUNT: 13.1 % (ref 11.7–15.4)
GLOBULIN SER CALC-MCNC: 2.4 G/DL (ref 1.5–4.5)
GLUCOSE SERPL-MCNC: 105 MG/DL (ref 65–99)
HBA1C MFR BLD: 7.9 % (ref 4.8–5.6)
HCT VFR BLD AUTO: 42.9 % (ref 34–46.6)
HDLC SERPL-MCNC: 37 MG/DL
HGB BLD-MCNC: 13.9 G/DL (ref 11.1–15.9)
IMM GRANULOCYTES # BLD AUTO: 0 X10E3/UL (ref 0–0.1)
IMM GRANULOCYTES NFR BLD AUTO: 0 %
LDLC SERPL CALC-MCNC: 88 MG/DL (ref 0–99)
LYMPHOCYTES # BLD AUTO: 2.4 X10E3/UL (ref 0.7–3.1)
LYMPHOCYTES NFR BLD AUTO: 35 %
MCH RBC QN AUTO: 30.8 PG (ref 26.6–33)
MCHC RBC AUTO-ENTMCNC: 32.4 G/DL (ref 31.5–35.7)
MCV RBC AUTO: 95 FL (ref 79–97)
MONOCYTES # BLD AUTO: 0.5 X10E3/UL (ref 0.1–0.9)
MONOCYTES NFR BLD AUTO: 7 %
NEUTROPHILS # BLD AUTO: 3.8 X10E3/UL (ref 1.4–7)
NEUTROPHILS NFR BLD AUTO: 54 %
PLATELET # BLD AUTO: 181 X10E3/UL (ref 150–450)
POTASSIUM SERPL-SCNC: 4.6 MMOL/L (ref 3.5–5.2)
PROT SERPL-MCNC: 6.8 G/DL (ref 6–8.5)
RBC # BLD AUTO: 4.52 X10E6/UL (ref 3.77–5.28)
SODIUM SERPL-SCNC: 141 MMOL/L (ref 134–144)
TRIGL SERPL-MCNC: 217 MG/DL (ref 0–149)
VLDLC SERPL CALC-MCNC: 37 MG/DL (ref 5–40)
WBC # BLD AUTO: 7 X10E3/UL (ref 3.4–10.8)

## 2022-09-12 ENCOUNTER — OFFICE VISIT (OUTPATIENT)
Dept: FAMILY MEDICINE CLINIC | Facility: CLINIC | Age: 82
End: 2022-09-12

## 2022-09-12 VITALS
WEIGHT: 159 LBS | RESPIRATION RATE: 18 BRPM | HEART RATE: 80 BPM | BODY MASS INDEX: 28.17 KG/M2 | HEIGHT: 63 IN | TEMPERATURE: 98.2 F | SYSTOLIC BLOOD PRESSURE: 180 MMHG | DIASTOLIC BLOOD PRESSURE: 84 MMHG

## 2022-09-12 DIAGNOSIS — I25.10 CORONARY ARTERY DISEASE INVOLVING NATIVE CORONARY ARTERY OF NATIVE HEART WITHOUT ANGINA PECTORIS: ICD-10-CM

## 2022-09-12 DIAGNOSIS — R30.0 DYSURIA: ICD-10-CM

## 2022-09-12 DIAGNOSIS — I10 ESSENTIAL HYPERTENSION: ICD-10-CM

## 2022-09-12 DIAGNOSIS — Z79.4 TYPE 2 DIABETES MELLITUS WITHOUT COMPLICATION, WITH LONG-TERM CURRENT USE OF INSULIN: ICD-10-CM

## 2022-09-12 DIAGNOSIS — F51.04 PSYCHOPHYSIOLOGICAL INSOMNIA: ICD-10-CM

## 2022-09-12 DIAGNOSIS — E11.9 TYPE 2 DIABETES MELLITUS WITHOUT COMPLICATION, WITH LONG-TERM CURRENT USE OF INSULIN: ICD-10-CM

## 2022-09-12 DIAGNOSIS — R53.82 CHRONIC FATIGUE: ICD-10-CM

## 2022-09-12 DIAGNOSIS — Z00.00 MEDICARE ANNUAL WELLNESS VISIT, SUBSEQUENT: Primary | ICD-10-CM

## 2022-09-12 DIAGNOSIS — E78.2 MIXED HYPERLIPIDEMIA: ICD-10-CM

## 2022-09-12 LAB
BILIRUB BLD-MCNC: NEGATIVE MG/DL
CLARITY, POC: CLEAR
COLOR UR: YELLOW
EXPIRATION DATE: ABNORMAL
GLUCOSE UR STRIP-MCNC: NEGATIVE MG/DL
KETONES UR QL: NEGATIVE
LEUKOCYTE EST, POC: ABNORMAL
Lab: ABNORMAL
NITRITE UR-MCNC: NEGATIVE MG/ML
PH UR: 6.5 [PH] (ref 5–8)
POC CREATININE URINE: 300
POC MICROALBUMIN URINE: 30
PROT UR STRIP-MCNC: NEGATIVE MG/DL
RBC # UR STRIP: NEGATIVE /UL
SP GR UR: 1.01 (ref 1–1.03)
UROBILINOGEN UR QL: NORMAL

## 2022-09-12 PROCEDURE — 82044 UR ALBUMIN SEMIQUANTITATIVE: CPT | Performed by: FAMILY MEDICINE

## 2022-09-12 PROCEDURE — G0439 PPPS, SUBSEQ VISIT: HCPCS | Performed by: FAMILY MEDICINE

## 2022-09-12 PROCEDURE — 1159F MED LIST DOCD IN RCRD: CPT | Performed by: FAMILY MEDICINE

## 2022-09-12 PROCEDURE — 1170F FXNL STATUS ASSESSED: CPT | Performed by: FAMILY MEDICINE

## 2022-09-12 PROCEDURE — 81003 URINALYSIS AUTO W/O SCOPE: CPT | Performed by: FAMILY MEDICINE

## 2022-09-12 PROCEDURE — 99214 OFFICE O/P EST MOD 30 MIN: CPT | Performed by: FAMILY MEDICINE

## 2022-09-12 RX ORDER — IRBESARTAN 75 MG/1
75 TABLET ORAL 2 TIMES DAILY
Qty: 180 TABLET | Refills: 1 | Status: SHIPPED | OUTPATIENT
Start: 2022-09-12 | End: 2022-12-05 | Stop reason: SDUPTHER

## 2022-09-12 RX ORDER — CLOPIDOGREL BISULFATE 75 MG/1
75 TABLET ORAL DAILY
Qty: 90 TABLET | Refills: 1 | Status: SHIPPED | OUTPATIENT
Start: 2022-09-12 | End: 2022-12-05 | Stop reason: SDUPTHER

## 2022-09-12 RX ORDER — POTASSIUM CHLORIDE 750 MG/1
10 TABLET, EXTENDED RELEASE ORAL DAILY
Qty: 90 TABLET | Refills: 1 | Status: SHIPPED | OUTPATIENT
Start: 2022-09-12

## 2022-09-12 RX ORDER — ROSUVASTATIN CALCIUM 5 MG/1
5 TABLET, COATED ORAL DAILY
Qty: 90 TABLET | Refills: 1 | Status: SHIPPED | OUTPATIENT
Start: 2022-09-12 | End: 2022-12-05 | Stop reason: SDUPTHER

## 2022-09-12 NOTE — PROGRESS NOTES
The ABCs of the Annual Wellness Visit  Subsequent Medicare Wellness Visit    Chief Complaint   Patient presents with   • Medicare Wellness-subsequent   • Diabetes   • Dysuria      Subjective    History of Present Illness:  Nancy Goodman is a 81 y.o. female who presents for a Subsequent Medicare Wellness Visit.    The following portions of the patient's history were reviewed and   updated as appropriate: allergies, current medications, past family history, past medical history, past social history, past surgical history and problem list.    She has bee more tired recently  Worried about a UTI  Just less energy and feeling more malaise    Diabetes Mellitus Type II, Follow-up:   Nancy Goodman is a 81 y.o. female who is here for follow-up of Type 2 diabetes mellitus.  Current symptoms/problems include none and have been stable. Patient is adherent with medications.  Known diabetic complications: none  Cardiovascular risk factors: advanced age (older than 55 for men, 65 for women), diabetes mellitus, dyslipidemia and hypertension  Current diabetic medications include lantus and metformin.   Current monitoring regimen: home blood tests - weekly  Home blood sugar records: fasting range: has been a little lower  Any episodes of hypoglycemia? no  Eye exam current (within one year): no  She is on ACE inhibitor or angiotensin II receptor blocker. Patient is on a statin.       Nancy Goodman  is here for follow-up of hypertension of several years duration. She is exercising and is adherent to a low-salt diet. Patient does not check her blood pressure.  She is compliant with meds.        Nancy Goodman returns today for follow up of Hyperlipidemia  Nancy indicates her exercise level as 3 times per week.  Diet: trying to eat better  Patient is compliant with medications   Any side effects to medications:   chest pain No myalgia No memory change No  Pt is not due for labs        Compared to one  year ago, the patient feels her physical   health is worse.    Compared to one year ago, the patient feels her mental   health is the same.    Recent Hospitalizations:  She was not admitted to the hospital during the last year.       Current Medical Providers:  Patient Care Team:  Eyal Cervantes MD as PCP - General (Family Medicine)  Fidel Valdez MD as Consulting Physician (Cardiology)    Outpatient Medications Prior to Visit   Medication Sig Dispense Refill   • aspirin 81 MG EC tablet Take 81 mg by mouth Every Morning.     • B-D UF III MINI PEN NEEDLES 31G X 5 MM misc 1 for daily injection 100 each 3   • Black Elderberry (SAMBUCUS ELDERBERRY PO) Take 500 mg by mouth Daily.     • coenzyme Q10 100 MG capsule Take 100 mg by mouth Daily.     • CRANBERRY PO Take 40 mg by mouth Daily.     • FREESTYLE LITE test strip Check daily and  each 3   • furosemide (LASIX) 20 MG tablet Take 1 tablet by mouth Daily As Needed (swelling). 90 tablet 1   • Lancets misc Lancet of choice, check QD and  each 3   • metoprolol tartrate (LOPRESSOR) 50 MG tablet Take 1 tablet by mouth 2 (Two) Times a Day. 180 tablet 1   • Milk Thistle 1000 MG capsule Take 1 capsule by mouth Daily.     • NON FORMULARY Daily. Super cayenne 100,ooo HU every day     • omeprazole (priLOSEC) 40 MG capsule Take 1 capsule by mouth Every Night. Taking it as needed 90 capsule 3   • Probiotic Product (PROBIOTIC ADVANCED PO) Take 1 tablet by mouth Every Night.     • zolpidem (Ambien) 5 MG tablet 1/2-1 PO QHS 30 tablet 1   • clopidogrel (PLAVIX) 75 MG tablet Take 1 tablet by mouth Daily. 90 tablet 1   • Insulin Glargine (LANTUS SOLOSTAR) 100 UNIT/ML injection pen Inject 45 Units under the skin into the appropriate area as directed Daily. 15 pen 1   • irbesartan (Avapro) 75 MG tablet Take 1 tablet by mouth 2 (Two) Times a Day. 180 tablet 1   • metFORMIN (Glucophage) 500 MG tablet Take 1 tablet by mouth 2 (Two) Times a Day With Meals. 180 tablet 1   •  "potassium chloride (K-DUR,KLOR-CON) 10 MEQ CR tablet Take 1 tablet by mouth Daily. 90 tablet 1   • rosuvastatin (Crestor) 5 MG tablet Take 1 tablet by mouth Daily. 90 tablet 1     No facility-administered medications prior to visit.       No opioid medication identified on active medication list. I have reviewed chart for other potential  high risk medication/s and harmful drug interactions in the elderly.              Patient Active Problem List   Diagnosis   • Type 2 diabetes mellitus without complication, with long-term current use of insulin (HCC)   • Mixed hyperlipidemia   • Essential hypertension   • Vitamin D deficiency   • CAD (coronary artery disease)   • Aortic stenosis, severe   • Diastolic CHF (HCC)   • BMI 29.0-29.9,adult   • Colic, biliary   • Choledocholithiasis   • Acute non-ST segment elevation myocardial infarction (HCC)     Advance Care Planning  Advance Directive is not on file.  ACP discussion was held with the patient during this visit. Patient does not have an advance directive, information provided.    Review of Systems   Constitutional: Positive for fatigue.   Respiratory: Negative.  Negative for shortness of breath.    Cardiovascular: Negative.  Negative for chest pain.   Gastrointestinal: Negative.    Psychiatric/Behavioral: Negative.  Negative for dysphoric mood. The patient is not nervous/anxious.         Objective    Vitals:    09/12/22 1004   BP: 180/84   Pulse: 80   Resp: 18   Temp: 98.2 °F (36.8 °C)   Weight: 72.1 kg (159 lb)   Height: 160 cm (62.99\")     Estimated body mass index is 28.17 kg/m² as calculated from the following:    Height as of this encounter: 160 cm (62.99\").    Weight as of this encounter: 72.1 kg (159 lb).          Does the patient have evidence of cognitive impairment? No    Physical Exam  Vitals and nursing note reviewed.   Constitutional:       General: She is not in acute distress.     Appearance: Normal appearance. She is well-developed.   HENT:      Head: " Normocephalic and atraumatic.   Eyes:      Extraocular Movements: Extraocular movements intact.      Conjunctiva/sclera: Conjunctivae normal.   Cardiovascular:      Rate and Rhythm: Normal rate and regular rhythm.      Heart sounds: Normal heart sounds.   Pulmonary:      Effort: Pulmonary effort is normal.      Breath sounds: Normal breath sounds.   Abdominal:      General: Abdomen is flat. Bowel sounds are normal. There is no distension.      Tenderness: There is no abdominal tenderness. There is no guarding or rebound.   Neurological:      Mental Status: She is alert and oriented to person, place, and time.   Psychiatric:         Mood and Affect: Mood normal.         Behavior: Behavior normal.         Thought Content: Thought content normal.         Judgment: Judgment normal.       Lab Results   Component Value Date    CHLPL 162 06/28/2022    TRIG 217 (H) 06/28/2022    HDL 37 (L) 06/28/2022    LDL 88 06/28/2022    VLDL 37 06/28/2022    HGBA1C 7.9 (H) 06/28/2022            HEALTH RISK ASSESSMENT    Smoking Status:  Social History     Tobacco Use   Smoking Status Never Smoker   Smokeless Tobacco Never Used     Alcohol Consumption:  Social History     Substance and Sexual Activity   Alcohol Use No     Fall Risk Screen:    STEADI Fall Risk Assessment was completed, and patient is at LOW risk for falls.Assessment completed on:9/12/2022    Depression Screening:  PHQ-2/PHQ-9 Depression Screening 9/12/2022   Retired PHQ-9 Total Score -   Retired Total Score -   Little Interest or Pleasure in Doing Things 0-->not at all   Feeling Down, Depressed or Hopeless 2-->more than half the days   PHQ-9: Brief Depression Severity Measure Score 2       Health Habits and Functional and Cognitive Screening:  Functional & Cognitive Status 9/12/2022   Do you have difficulty preparing food and eating? No   Do you have difficulty bathing yourself, getting dressed or grooming yourself? No   Do you have difficulty using the toilet? No   Do  you have difficulty moving around from place to place? No   Do you have trouble with steps or getting out of a bed or a chair? Yes   Current Diet Well Balanced Diet   Dental Exam Up to date   Eye Exam Up to date   Exercise (times per week) 3 times per week   Current Exercises Include Home Fitness Gym   Current Exercise Activities Include -   Do you need help using the phone?  No   Are you deaf or do you have serious difficulty hearing?  No   Do you need help with transportation? No   Do you need help shopping? No   Do you need help preparing meals?  No   Do you need help with housework?  No   Do you need help with laundry? No   Do you need help taking your medications? No   Do you need help managing money? No   Do you ever drive or ride in a car without wearing a seat belt? No   Have you felt unusual stress, anger or loneliness in the last month? No   Who do you live with? Spouse   If you need help, do you have trouble finding someone available to you? No   Have you been bothered in the last four weeks by sexual problems? No   Do you have difficulty concentrating, remembering or making decisions? No       Age-appropriate Screening Schedule:  Refer to the list below for future screening recommendations based on patient's age, sex and/or medical conditions. Orders for these recommended tests are listed in the plan section. The patient has been provided with a written plan.    Health Maintenance   Topic Date Due   • TDAP/TD VACCINES (1 - Tdap) Never done   • ZOSTER VACCINE (1 of 2) Never done   • URINE MICROALBUMIN  11/16/2021   • DIABETIC EYE EXAM  09/13/2022   • INFLUENZA VACCINE  10/01/2022   • HEMOGLOBIN A1C  12/28/2022   • LIPID PANEL  06/28/2023   • MAMMOGRAM  Discontinued   • DXA SCAN  Discontinued              Assessment & Plan   CMS Preventative Services Quick Reference  Risk Factors Identified During Encounter  Dementia/Memory   Depression/Dysphoria  Fall Risk-High or Moderate  Polypharmacy  The above  risks/problems have been discussed with the patient.  Follow up actions/plans if indicated are seen below in the Assessment/Plan Section.  Pertinent information has been shared with the patient in the After Visit Summary.    Diagnoses and all orders for this visit:    1. Medicare annual wellness visit, subsequent (Primary)    2. Type 2 diabetes mellitus without complication, with long-term current use of insulin (HCC)  -     POCT microalbumin  -     Insulin Glargine (LANTUS SOLOSTAR) 100 UNIT/ML injection pen; Inject 50 Units under the skin into the appropriate area as directed Daily.  Dispense: 45 mL; Refill: 1  -     metFORMIN (Glucophage) 500 MG tablet; Take 1 tablet by mouth 2 (Two) Times a Day With Meals.  Dispense: 180 tablet; Refill: 1  -     Comprehensive Metabolic Panel  -     Hemoglobin A1c    3. Mixed hyperlipidemia  -     rosuvastatin (Crestor) 5 MG tablet; Take 1 tablet by mouth Daily.  Dispense: 90 tablet; Refill: 1  -     Comprehensive Metabolic Panel  -     Lipid Panel    4. Essential hypertension  -     irbesartan (Avapro) 75 MG tablet; Take 1 tablet by mouth 2 (Two) Times a Day.  Dispense: 180 tablet; Refill: 1  -     CBC & Differential  -     Comprehensive Metabolic Panel    5. Coronary artery disease involving native coronary artery of native heart without angina pectoris  -     clopidogrel (PLAVIX) 75 MG tablet; Take 1 tablet by mouth Daily.  Dispense: 90 tablet; Refill: 1    6. Psychophysiological insomnia    7. Chronic fatigue  -     Overnight Sleep Oximetry Study; Future    8. Dysuria  -     POCT urinalysis dipstick, automated  -     Urine Culture - , Urine, Clean Catch    Other orders  -     potassium chloride (K-DUR,KLOR-CON) 10 MEQ CR tablet; Take 1 tablet by mouth Daily.  Dispense: 90 tablet; Refill: 1      Medicare wellness completed    Will recheck DM labs and discussed change in insulin pending results.  Pt agrees  Will check overnight sleep oxygen test to make sure no cause for her  increase in fatigue  Will check urine culture and f/u pending labs  crestor refilled and lipids to be drawn  Continue potassium as well.    Follow Up:   Return in about 3 months (around 12/12/2022).     An After Visit Summary and PPPS were made available to the patient.

## 2022-09-13 LAB
ALBUMIN SERPL-MCNC: 4.5 G/DL (ref 3.5–5.2)
ALBUMIN/GLOB SERPL: 2.5 G/DL
ALP SERPL-CCNC: 53 U/L (ref 39–117)
ALT SERPL-CCNC: 17 U/L (ref 1–33)
AST SERPL-CCNC: 19 U/L (ref 1–32)
BASOPHILS # BLD AUTO: 0.07 10*3/MM3 (ref 0–0.2)
BASOPHILS NFR BLD AUTO: 1 % (ref 0–1.5)
BILIRUB SERPL-MCNC: 0.4 MG/DL (ref 0–1.2)
BUN SERPL-MCNC: 14 MG/DL (ref 8–23)
BUN/CREAT SERPL: 15.1 (ref 7–25)
CALCIUM SERPL-MCNC: 9.9 MG/DL (ref 8.6–10.5)
CHLORIDE SERPL-SCNC: 103 MMOL/L (ref 98–107)
CHOLEST SERPL-MCNC: 155 MG/DL (ref 0–200)
CO2 SERPL-SCNC: 28 MMOL/L (ref 22–29)
CREAT SERPL-MCNC: 0.93 MG/DL (ref 0.57–1)
EGFRCR-CYS SERPLBLD CKD-EPI 2021: 61.5 ML/MIN/1.73
EOSINOPHIL # BLD AUTO: 0.25 10*3/MM3 (ref 0–0.4)
EOSINOPHIL NFR BLD AUTO: 3.7 % (ref 0.3–6.2)
ERYTHROCYTE [DISTWIDTH] IN BLOOD BY AUTOMATED COUNT: 12.6 % (ref 12.3–15.4)
GLOBULIN SER CALC-MCNC: 1.8 GM/DL
GLUCOSE SERPL-MCNC: 110 MG/DL (ref 65–99)
HBA1C MFR BLD: 7.7 % (ref 4.8–5.6)
HCT VFR BLD AUTO: 42.6 % (ref 34–46.6)
HDLC SERPL-MCNC: 36 MG/DL (ref 40–60)
HGB BLD-MCNC: 14 G/DL (ref 12–15.9)
IMM GRANULOCYTES # BLD AUTO: 0.02 10*3/MM3 (ref 0–0.05)
IMM GRANULOCYTES NFR BLD AUTO: 0.3 % (ref 0–0.5)
LDLC SERPL CALC-MCNC: 79 MG/DL (ref 0–100)
LYMPHOCYTES # BLD AUTO: 2.37 10*3/MM3 (ref 0.7–3.1)
LYMPHOCYTES NFR BLD AUTO: 35 % (ref 19.6–45.3)
MCH RBC QN AUTO: 31.5 PG (ref 26.6–33)
MCHC RBC AUTO-ENTMCNC: 32.9 G/DL (ref 31.5–35.7)
MCV RBC AUTO: 95.9 FL (ref 79–97)
MONOCYTES # BLD AUTO: 0.52 10*3/MM3 (ref 0.1–0.9)
MONOCYTES NFR BLD AUTO: 7.7 % (ref 5–12)
NEUTROPHILS # BLD AUTO: 3.55 10*3/MM3 (ref 1.7–7)
NEUTROPHILS NFR BLD AUTO: 52.3 % (ref 42.7–76)
NRBC BLD AUTO-RTO: 0 /100 WBC (ref 0–0.2)
PLATELET # BLD AUTO: 161 10*3/MM3 (ref 140–450)
POTASSIUM SERPL-SCNC: 4.2 MMOL/L (ref 3.5–5.2)
PROT SERPL-MCNC: 6.3 G/DL (ref 6–8.5)
RBC # BLD AUTO: 4.44 10*6/MM3 (ref 3.77–5.28)
SODIUM SERPL-SCNC: 142 MMOL/L (ref 136–145)
TRIGL SERPL-MCNC: 243 MG/DL (ref 0–150)
VLDLC SERPL CALC-MCNC: 40 MG/DL (ref 5–40)
WBC # BLD AUTO: 6.78 10*3/MM3 (ref 3.4–10.8)

## 2022-09-15 LAB
BACTERIA UR CULT: NORMAL
BACTERIA UR CULT: NORMAL

## 2022-09-20 ENCOUNTER — TELEPHONE (OUTPATIENT)
Dept: FAMILY MEDICINE CLINIC | Facility: CLINIC | Age: 82
End: 2022-09-20

## 2022-09-20 DIAGNOSIS — I25.10 CORONARY ARTERY DISEASE INVOLVING NATIVE CORONARY ARTERY OF NATIVE HEART WITHOUT ANGINA PECTORIS: ICD-10-CM

## 2022-09-20 RX ORDER — METOPROLOL TARTRATE 50 MG/1
25 TABLET, FILM COATED ORAL 2 TIMES DAILY
Qty: 180 TABLET | Refills: 1 | Status: SHIPPED | OUTPATIENT
Start: 2022-09-20 | End: 2022-09-20

## 2022-09-20 NOTE — TELEPHONE ENCOUNTER
Not clear when she was on 25 BID but ok to resume 25 BID.    Can break the 50 mg tablets in half.  New script printed out if needed

## 2022-09-20 NOTE — TELEPHONE ENCOUNTER
Pt called and she has been taking metoprolol 50mg one bid as of last week. however looks like this dose has been prescribed for quit a while. however she just recently increased it. She said she has not been feeling well since increasing medication and wants to go back to 25mg bid.

## 2022-10-28 DIAGNOSIS — R29.818 SUSPECTED SLEEP APNEA: ICD-10-CM

## 2022-10-28 DIAGNOSIS — G47.34 NOCTURNAL HYPOXEMIA: Primary | ICD-10-CM

## 2022-10-28 DIAGNOSIS — G47.19 DAYTIME HYPERSOMNOLENCE: ICD-10-CM

## 2022-11-30 NOTE — PAT
Patient instructed to take 325 mg ASA tonight.    Bactroban placed in nares in PAT.    O2 sat 98%. No ABG's required.    Patient to apply Chlorhexadine wipes  to surgical area (as instructed) the night before procedure and the AM of procedure. Wipes provided.    Patient stated she instructed to continue ASA and plavix per surgeon.        Size Of Lesion In Cm: 1

## 2022-12-05 ENCOUNTER — OFFICE VISIT (OUTPATIENT)
Dept: FAMILY MEDICINE CLINIC | Facility: CLINIC | Age: 82
End: 2022-12-05

## 2022-12-05 VITALS
WEIGHT: 157.8 LBS | TEMPERATURE: 97.7 F | OXYGEN SATURATION: 98 % | DIASTOLIC BLOOD PRESSURE: 84 MMHG | HEART RATE: 78 BPM | SYSTOLIC BLOOD PRESSURE: 130 MMHG | BODY MASS INDEX: 27.96 KG/M2

## 2022-12-05 DIAGNOSIS — I25.10 CORONARY ARTERY DISEASE INVOLVING NATIVE CORONARY ARTERY OF NATIVE HEART WITHOUT ANGINA PECTORIS: ICD-10-CM

## 2022-12-05 DIAGNOSIS — E11.9 TYPE 2 DIABETES MELLITUS WITHOUT COMPLICATION, WITH LONG-TERM CURRENT USE OF INSULIN: ICD-10-CM

## 2022-12-05 DIAGNOSIS — I10 ESSENTIAL HYPERTENSION: ICD-10-CM

## 2022-12-05 DIAGNOSIS — Z79.4 TYPE 2 DIABETES MELLITUS WITHOUT COMPLICATION, WITH LONG-TERM CURRENT USE OF INSULIN: ICD-10-CM

## 2022-12-05 DIAGNOSIS — E78.2 MIXED HYPERLIPIDEMIA: ICD-10-CM

## 2022-12-05 PROCEDURE — 90662 IIV NO PRSV INCREASED AG IM: CPT | Performed by: FAMILY MEDICINE

## 2022-12-05 PROCEDURE — G0008 ADMIN INFLUENZA VIRUS VAC: HCPCS | Performed by: FAMILY MEDICINE

## 2022-12-05 PROCEDURE — 99214 OFFICE O/P EST MOD 30 MIN: CPT | Performed by: FAMILY MEDICINE

## 2022-12-05 RX ORDER — CLOPIDOGREL BISULFATE 75 MG/1
75 TABLET ORAL DAILY
Qty: 90 TABLET | Refills: 1 | Status: SHIPPED | OUTPATIENT
Start: 2022-12-05

## 2022-12-05 RX ORDER — FLURBIPROFEN SODIUM 0.3 MG/ML
SOLUTION/ DROPS OPHTHALMIC
Qty: 100 EACH | Refills: 11 | Status: SHIPPED | OUTPATIENT
Start: 2022-12-05

## 2022-12-05 RX ORDER — IRBESARTAN 75 MG/1
75 TABLET ORAL 2 TIMES DAILY
Qty: 180 TABLET | Refills: 3 | Status: SHIPPED | OUTPATIENT
Start: 2022-12-05

## 2022-12-05 RX ORDER — ROSUVASTATIN CALCIUM 5 MG/1
5 TABLET, COATED ORAL DAILY
Qty: 90 TABLET | Refills: 1 | Status: SHIPPED | OUTPATIENT
Start: 2022-12-05 | End: 2023-02-27 | Stop reason: SDUPTHER

## 2022-12-05 NOTE — PROGRESS NOTES
Subjective   Nancy Goodman is a 82 y.o. female.     History of Present Illness     Diabetes Mellitus Type II, Follow-up:   Nancy Goodman is a 82 y.o. female who is here for follow-up of Type 2 diabetes mellitus.  Current symptoms/problems include none and have been stable. Patient is adherent with medications.  Known diabetic complications: none  Cardiovascular risk factors: advanced age (older than 55 for men, 65 for women), diabetes mellitus, dyslipidemia and hypertension  Current diabetic medications include lantus and metformin.   Current monitoring regimen: home blood tests - daily  Home blood sugar records: fasting range: doing well  Any episodes of hypoglycemia? no  Eye exam current (within one year): yes  She is on ACE inhibitor or angiotensin II receptor blocker. Patient is on a statin.       Nancy Goodman  is here for follow-up of hypertension of several years duration. She is not exercising and is not adherent to a low-salt diet. Patient does not check her blood pressure.  She is compliant with meds.        Nancy Goodman returns today for follow up of Hyperlipidemia  Nancy indicates her exercise level as not at all.  Diet: trying to eat healthy   Patient is compliant with medications   Any side effects to medications:   chest pain No myalgia No memory change No  Pt is due for labs      The following portions of the patient's history were reviewed and updated as appropriate: allergies, current medications, past family history, past medical history, past social history, past surgical history and problem list.    Review of Systems   Constitutional: Negative.        Objective   Physical Exam  Vitals and nursing note reviewed.   Constitutional:       General: She is not in acute distress.     Appearance: Normal appearance. She is well-developed.   Cardiovascular:      Rate and Rhythm: Normal rate and regular rhythm.      Heart sounds: Normal heart sounds.   Pulmonary:       Effort: Pulmonary effort is normal.      Breath sounds: Normal breath sounds.   Neurological:      Mental Status: She is alert and oriented to person, place, and time.   Psychiatric:         Mood and Affect: Mood normal.         Behavior: Behavior normal.         Thought Content: Thought content normal.         Judgment: Judgment normal.         Assessment & Plan   Diagnoses and all orders for this visit:    1. Type 2 diabetes mellitus without complication, with long-term current use of insulin ([facility])  -     B-D UF III MINI PEN NEEDLES 31G X 5 MM misc; 1 for daily injection  Dispense: 100 each; Refill: 11  -     CBC & Differential  -     Comprehensive Metabolic Panel  -     Hemoglobin A1c    2. Coronary artery disease involving native coronary artery of native heart without angina pectoris  -     clopidogrel (PLAVIX) 75 MG tablet; Take 1 tablet by mouth Daily.  Dispense: 90 tablet; Refill: 1  -     metoprolol tartrate (LOPRESSOR) 25 MG tablet; Take 1 tablet by mouth 2 (Two) Times a Day.  Dispense: 180 tablet; Refill: 1    3. Essential hypertension  -     irbesartan (Avapro) 75 MG tablet; Take 1 tablet by mouth 2 (Two) Times a Day.  Dispense: 180 tablet; Refill: 3    4. Mixed hyperlipidemia  -     rosuvastatin (Crestor) 5 MG tablet; Take 1 tablet by mouth Daily.  Dispense: 90 tablet; Refill: 1    Other orders  -     SCANNED - EYE EXAM  -     Fluzone High-Dose 65+yrs (4625-7325)    wrote for refills of DM medicine and new glucometer.  Will recheck labs and f/u pending results  Continue toprol and avapro for BP, working well  crestor refilled today as well

## 2022-12-06 LAB
ALBUMIN SERPL-MCNC: 4.4 G/DL (ref 3.5–5.2)
ALBUMIN/GLOB SERPL: 2 G/DL
ALP SERPL-CCNC: 60 U/L (ref 39–117)
ALT SERPL-CCNC: 16 U/L (ref 1–33)
AST SERPL-CCNC: 18 U/L (ref 1–32)
BASOPHILS # BLD AUTO: 0.07 10*3/MM3 (ref 0–0.2)
BASOPHILS NFR BLD AUTO: 0.8 % (ref 0–1.5)
BILIRUB SERPL-MCNC: 0.5 MG/DL (ref 0–1.2)
BUN SERPL-MCNC: 18 MG/DL (ref 8–23)
BUN/CREAT SERPL: 18.9 (ref 7–25)
CALCIUM SERPL-MCNC: 9.9 MG/DL (ref 8.6–10.5)
CHLORIDE SERPL-SCNC: 100 MMOL/L (ref 98–107)
CO2 SERPL-SCNC: 28 MMOL/L (ref 22–29)
CREAT SERPL-MCNC: 0.95 MG/DL (ref 0.57–1)
EGFRCR SERPLBLD CKD-EPI 2021: 59.6 ML/MIN/1.73
EOSINOPHIL # BLD AUTO: 0.43 10*3/MM3 (ref 0–0.4)
EOSINOPHIL NFR BLD AUTO: 4.8 % (ref 0.3–6.2)
ERYTHROCYTE [DISTWIDTH] IN BLOOD BY AUTOMATED COUNT: 12.2 % (ref 12.3–15.4)
GLOBULIN SER CALC-MCNC: 2.2 GM/DL
GLUCOSE SERPL-MCNC: 105 MG/DL (ref 65–99)
HBA1C MFR BLD: 8.1 % (ref 4.8–5.6)
HCT VFR BLD AUTO: 40.5 % (ref 34–46.6)
HGB BLD-MCNC: 13.9 G/DL (ref 12–15.9)
IMM GRANULOCYTES # BLD AUTO: 0.03 10*3/MM3 (ref 0–0.05)
IMM GRANULOCYTES NFR BLD AUTO: 0.3 % (ref 0–0.5)
LYMPHOCYTES # BLD AUTO: 2.4 10*3/MM3 (ref 0.7–3.1)
LYMPHOCYTES NFR BLD AUTO: 26.8 % (ref 19.6–45.3)
MCH RBC QN AUTO: 31 PG (ref 26.6–33)
MCHC RBC AUTO-ENTMCNC: 34.3 G/DL (ref 31.5–35.7)
MCV RBC AUTO: 90.4 FL (ref 79–97)
MONOCYTES # BLD AUTO: 0.59 10*3/MM3 (ref 0.1–0.9)
MONOCYTES NFR BLD AUTO: 6.6 % (ref 5–12)
NEUTROPHILS # BLD AUTO: 5.42 10*3/MM3 (ref 1.7–7)
NEUTROPHILS NFR BLD AUTO: 60.7 % (ref 42.7–76)
NRBC BLD AUTO-RTO: 0 /100 WBC (ref 0–0.2)
PLATELET # BLD AUTO: 196 10*3/MM3 (ref 140–450)
POTASSIUM SERPL-SCNC: 4.8 MMOL/L (ref 3.5–5.2)
PROT SERPL-MCNC: 6.6 G/DL (ref 6–8.5)
RBC # BLD AUTO: 4.48 10*6/MM3 (ref 3.77–5.28)
SODIUM SERPL-SCNC: 140 MMOL/L (ref 136–145)
WBC # BLD AUTO: 8.94 10*3/MM3 (ref 3.4–10.8)

## 2023-01-04 DIAGNOSIS — G47.19 DAYTIME HYPERSOMNOLENCE: ICD-10-CM

## 2023-01-04 DIAGNOSIS — G47.34 NOCTURNAL HYPOXEMIA: ICD-10-CM

## 2023-01-04 DIAGNOSIS — R29.818 SUSPECTED SLEEP APNEA: ICD-10-CM

## 2023-02-27 ENCOUNTER — TELEPHONE (OUTPATIENT)
Dept: FAMILY MEDICINE CLINIC | Facility: CLINIC | Age: 83
End: 2023-02-27
Payer: MEDICARE

## 2023-02-27 DIAGNOSIS — E11.9 TYPE 2 DIABETES MELLITUS WITHOUT COMPLICATION, WITH LONG-TERM CURRENT USE OF INSULIN: ICD-10-CM

## 2023-02-27 DIAGNOSIS — E78.2 MIXED HYPERLIPIDEMIA: ICD-10-CM

## 2023-02-27 DIAGNOSIS — Z79.4 TYPE 2 DIABETES MELLITUS WITHOUT COMPLICATION, WITH LONG-TERM CURRENT USE OF INSULIN: ICD-10-CM

## 2023-02-27 RX ORDER — FUROSEMIDE 20 MG/1
20 TABLET ORAL DAILY PRN
Qty: 90 TABLET | Refills: 1 | Status: SHIPPED | OUTPATIENT
Start: 2023-02-27

## 2023-02-27 RX ORDER — ROSUVASTATIN CALCIUM 5 MG/1
5 TABLET, COATED ORAL DAILY
Qty: 90 TABLET | Refills: 1 | Status: SHIPPED | OUTPATIENT
Start: 2023-02-27

## 2023-02-27 NOTE — TELEPHONE ENCOUNTER
Needing paper script printed for taking to [place] if possible and needed these ready by tomorrow to take with her   Insulin Glargine (LANTUS SOLOSTAR) 100 UNIT/ML injection pen  furosemide (LASIX) 20 MG tablet  rosuvastatin (Crestor) 5 MG tablet

## 2023-03-06 ENCOUNTER — OFFICE VISIT (OUTPATIENT)
Dept: FAMILY MEDICINE CLINIC | Facility: CLINIC | Age: 83
End: 2023-03-06
Payer: MEDICARE

## 2023-03-06 VITALS
DIASTOLIC BLOOD PRESSURE: 84 MMHG | WEIGHT: 156.5 LBS | SYSTOLIC BLOOD PRESSURE: 130 MMHG | BODY MASS INDEX: 27.73 KG/M2 | TEMPERATURE: 97.7 F | HEART RATE: 76 BPM | OXYGEN SATURATION: 98 %

## 2023-03-06 DIAGNOSIS — E11.65 TYPE 2 DIABETES MELLITUS WITH HYPERGLYCEMIA, WITH LONG-TERM CURRENT USE OF INSULIN: Primary | ICD-10-CM

## 2023-03-06 DIAGNOSIS — I10 ESSENTIAL HYPERTENSION: ICD-10-CM

## 2023-03-06 DIAGNOSIS — Z79.4 TYPE 2 DIABETES MELLITUS WITH HYPERGLYCEMIA, WITH LONG-TERM CURRENT USE OF INSULIN: Primary | ICD-10-CM

## 2023-03-06 DIAGNOSIS — E78.2 MIXED HYPERLIPIDEMIA: ICD-10-CM

## 2023-03-06 PROCEDURE — 99214 OFFICE O/P EST MOD 30 MIN: CPT | Performed by: FAMILY MEDICINE

## 2023-03-06 RX ORDER — BLOOD-GLUCOSE SENSOR
1 EACH MISCELLANEOUS
Qty: 2 EACH | Refills: 5 | Status: SHIPPED | OUTPATIENT
Start: 2023-03-06

## 2023-03-06 NOTE — PROGRESS NOTES
Subjective   Nancy Goodman is a 82 y.o. female.     History of Present Illness     She has had a lot of muscle soreness the past several months  Is constant soreness  OTC medicine does help a little  Seems to have started since she had the flu shot!      Diabetes Mellitus Type II, Follow-up:   Nancy Goodman is a 82 y.o. female who is here for follow-up of Type 2 diabetes mellitus.  Current symptoms/problems include hyperglycemia and have been stable. Patient is adherent with medications.  Known diabetic complications: none  Cardiovascular risk factors: advanced age (older than 55 for men, 65 for women), diabetes mellitus, dyslipidemia and hypertension  Current diabetic medications include lanut and metformin.   Current monitoring regimen: home blood tests - daily  Home blood sugar records: fasting range: has been a little high when she checks  Any episodes of hypoglycemia? no  Eye exam current (within one year): no  She is on ACE inhibitor or angiotensin II receptor blocker. Patient is on a statin.       Nancy Goodman  is here for follow-up of hypertension of several years duration. She is exercising gym 3 times a week and is not adherent to a low-salt diet. Patient does not check her blood pressure.  She is compliant with meds.        Nancy Goodman returns today for follow up of Hyperlipidemia  Nancy indicates her exercise level as gym 3 times a week as well as walking.  Diet: trying to eat better  Patient is compliant with medications   Any side effects to medications:   chest pain No myalgia No memory change No  Pt is due for labs        The following portions of the patient's history were reviewed and updated as appropriate: allergies, current medications, past family history, past medical history, past social history, past surgical history and problem list.    Review of Systems   Musculoskeletal: Positive for myalgias.       Objective   Physical Exam  Vitals and nursing  note reviewed.   Constitutional:       General: She is not in acute distress.     Appearance: Normal appearance. She is well-developed.   Cardiovascular:      Rate and Rhythm: Normal rate and regular rhythm.      Heart sounds: Normal heart sounds.   Pulmonary:      Effort: Pulmonary effort is normal.      Breath sounds: Normal breath sounds.   Neurological:      Mental Status: She is alert and oriented to person, place, and time.   Psychiatric:         Mood and Affect: Mood normal.         Behavior: Behavior normal.         Thought Content: Thought content normal.         Judgment: Judgment normal.         Assessment & Plan   Diagnoses and all orders for this visit:    1. Type 2 diabetes mellitus with hyperglycemia, with long-term current use of insulin (HCC) (Primary)  -     Continuous Blood Gluc Sensor (Superior Solar SolutionStyle Mirlande 3 Sensor) misc; 1 each Every 14 (Fourteen) Days.  Dispense: 2 each; Refill: 5  -     Comprehensive Metabolic Panel  -     Hemoglobin A1c    2. Mixed hyperlipidemia  -     Comprehensive Metabolic Panel  -     Lipid Panel    3. Essential hypertension  -     CBC & Differential  -     Comprehensive Metabolic Panel    DM control is worse with last HgA1C at 8.4, will work on mirlande system as this greatly helped her  improve his DM control.  F/u pending labs and continue lantus  Recheck lipids, on crestor  BP stable on avapro as well, no SE noted with medicine

## 2023-03-07 LAB
ALBUMIN SERPL-MCNC: 4.3 G/DL (ref 3.6–4.6)
ALBUMIN/GLOB SERPL: 1.8 {RATIO} (ref 1.2–2.2)
ALP SERPL-CCNC: 60 IU/L (ref 44–121)
ALT SERPL-CCNC: 16 IU/L (ref 0–32)
AST SERPL-CCNC: 19 IU/L (ref 0–40)
BASOPHILS # BLD AUTO: 0.1 X10E3/UL (ref 0–0.2)
BASOPHILS NFR BLD AUTO: 1 %
BILIRUB SERPL-MCNC: 0.5 MG/DL (ref 0–1.2)
BUN SERPL-MCNC: 17 MG/DL (ref 8–27)
BUN/CREAT SERPL: 18 (ref 12–28)
CALCIUM SERPL-MCNC: 10.2 MG/DL (ref 8.7–10.3)
CHLORIDE SERPL-SCNC: 103 MMOL/L (ref 96–106)
CHOLEST SERPL-MCNC: 153 MG/DL (ref 100–199)
CO2 SERPL-SCNC: 29 MMOL/L (ref 20–29)
CREAT SERPL-MCNC: 0.94 MG/DL (ref 0.57–1)
EGFRCR SERPLBLD CKD-EPI 2021: 60 ML/MIN/1.73
EOSINOPHIL # BLD AUTO: 0.2 X10E3/UL (ref 0–0.4)
EOSINOPHIL NFR BLD AUTO: 3 %
ERYTHROCYTE [DISTWIDTH] IN BLOOD BY AUTOMATED COUNT: 13.1 % (ref 11.7–15.4)
GLOBULIN SER CALC-MCNC: 2.4 G/DL (ref 1.5–4.5)
GLUCOSE SERPL-MCNC: 97 MG/DL (ref 70–99)
HBA1C MFR BLD: 7.2 % (ref 4.8–5.6)
HCT VFR BLD AUTO: 41.9 % (ref 34–46.6)
HDLC SERPL-MCNC: 37 MG/DL
HGB BLD-MCNC: 13.9 G/DL (ref 11.1–15.9)
IMM GRANULOCYTES # BLD AUTO: 0 X10E3/UL (ref 0–0.1)
IMM GRANULOCYTES NFR BLD AUTO: 0 %
LDLC SERPL CALC-MCNC: 85 MG/DL (ref 0–99)
LYMPHOCYTES # BLD AUTO: 2.2 X10E3/UL (ref 0.7–3.1)
LYMPHOCYTES NFR BLD AUTO: 27 %
MCH RBC QN AUTO: 30.8 PG (ref 26.6–33)
MCHC RBC AUTO-ENTMCNC: 33.2 G/DL (ref 31.5–35.7)
MCV RBC AUTO: 93 FL (ref 79–97)
MONOCYTES # BLD AUTO: 0.6 X10E3/UL (ref 0.1–0.9)
MONOCYTES NFR BLD AUTO: 7 %
NEUTROPHILS # BLD AUTO: 5.1 X10E3/UL (ref 1.4–7)
NEUTROPHILS NFR BLD AUTO: 62 %
PLATELET # BLD AUTO: 188 X10E3/UL (ref 150–450)
POTASSIUM SERPL-SCNC: 4.5 MMOL/L (ref 3.5–5.2)
PROT SERPL-MCNC: 6.7 G/DL (ref 6–8.5)
RBC # BLD AUTO: 4.51 X10E6/UL (ref 3.77–5.28)
SODIUM SERPL-SCNC: 144 MMOL/L (ref 134–144)
TRIGL SERPL-MCNC: 178 MG/DL (ref 0–149)
VLDLC SERPL CALC-MCNC: 31 MG/DL (ref 5–40)
WBC # BLD AUTO: 8.2 X10E3/UL (ref 3.4–10.8)

## 2023-06-06 ENCOUNTER — OFFICE VISIT (OUTPATIENT)
Dept: FAMILY MEDICINE CLINIC | Facility: CLINIC | Age: 83
End: 2023-06-06
Payer: MEDICARE

## 2023-06-06 VITALS
BODY MASS INDEX: 28.17 KG/M2 | RESPIRATION RATE: 16 BRPM | DIASTOLIC BLOOD PRESSURE: 84 MMHG | OXYGEN SATURATION: 98 % | HEIGHT: 63 IN | TEMPERATURE: 97.8 F | SYSTOLIC BLOOD PRESSURE: 112 MMHG | HEART RATE: 65 BPM | WEIGHT: 159 LBS

## 2023-06-06 DIAGNOSIS — Z79.4 TYPE 2 DIABETES MELLITUS WITH HYPERGLYCEMIA, WITH LONG-TERM CURRENT USE OF INSULIN: Primary | ICD-10-CM

## 2023-06-06 DIAGNOSIS — I10 ESSENTIAL HYPERTENSION: ICD-10-CM

## 2023-06-06 DIAGNOSIS — I25.10 CORONARY ARTERY DISEASE INVOLVING NATIVE CORONARY ARTERY OF NATIVE HEART WITHOUT ANGINA PECTORIS: ICD-10-CM

## 2023-06-06 DIAGNOSIS — E78.2 MIXED HYPERLIPIDEMIA: ICD-10-CM

## 2023-06-06 DIAGNOSIS — E11.65 TYPE 2 DIABETES MELLITUS WITH HYPERGLYCEMIA, WITH LONG-TERM CURRENT USE OF INSULIN: Primary | ICD-10-CM

## 2023-06-06 PROCEDURE — 1160F RVW MEDS BY RX/DR IN RCRD: CPT | Performed by: FAMILY MEDICINE

## 2023-06-06 PROCEDURE — 99214 OFFICE O/P EST MOD 30 MIN: CPT | Performed by: FAMILY MEDICINE

## 2023-06-06 PROCEDURE — 3079F DIAST BP 80-89 MM HG: CPT | Performed by: FAMILY MEDICINE

## 2023-06-06 PROCEDURE — 1159F MED LIST DOCD IN RCRD: CPT | Performed by: FAMILY MEDICINE

## 2023-06-06 PROCEDURE — 3074F SYST BP LT 130 MM HG: CPT | Performed by: FAMILY MEDICINE

## 2023-06-06 RX ORDER — FUROSEMIDE 20 MG/1
20 TABLET ORAL DAILY PRN
Qty: 90 TABLET | Refills: 1 | Status: SHIPPED | OUTPATIENT
Start: 2023-06-06

## 2023-06-06 RX ORDER — POTASSIUM CHLORIDE 750 MG/1
10 TABLET, EXTENDED RELEASE ORAL DAILY
Qty: 90 TABLET | Refills: 1 | Status: SHIPPED | OUTPATIENT
Start: 2023-06-06

## 2023-06-06 RX ORDER — CLOPIDOGREL BISULFATE 75 MG/1
75 TABLET ORAL DAILY
Qty: 90 TABLET | Refills: 1 | Status: SHIPPED | OUTPATIENT
Start: 2023-06-06

## 2023-06-06 NOTE — PROGRESS NOTES
Subjective   Nancy Goodman is a 82 y.o. female.     History of Present Illness     Diabetes Mellitus Type II, Follow-up:   Nancy Goodman is a 82 y.o. female who is here for follow-up of Type 2 diabetes mellitus.  Current symptoms/problems include none and have been stable. Patient is adherent with medications.  Known diabetic complications: cardiovascular disease  Cardiovascular risk factors: advanced age (older than 55 for men, 65 for women), diabetes mellitus, dyslipidemia, and hypertension  Current diabetic medications include  metformin and lantus  45 units.   Current monitoring regimen: home blood tests - daily  Home blood sugar records: fasting range: doing well  Any episodes of hypoglycemia? no  Eye exam current (within one year): yes  She is on ACE inhibitor or angiotensin II receptor blocker. Patient is on a statin.       Nancy Goodman  is here for follow-up of hypertension of several years duration. She is exercising and is adherent to a low-salt diet. Patient does not check her blood pressure.    Patient denies chest pain. She is compliant with meds.        Nancy Goodman returns today for follow up of Hyperlipidemia  Nancy indicates her exercise level as gym 1-3 timed per week.  Diet: trying to et better  Patient is compliant with medications   Any side effects to medications:   chest pain No myalgia No memory change No  Pt is not due for labs      The following portions of the patient's history were reviewed and updated as appropriate: allergies, current medications, past family history, past medical history, past social history, past surgical history, and problem list.    Review of Systems   Constitutional: Negative.    Psychiatric/Behavioral: Negative.       Objective   Physical Exam  Vitals and nursing note reviewed.   Constitutional:       General: She is not in acute distress.     Appearance: Normal appearance. She is well-developed.   Cardiovascular:      Rate  and Rhythm: Normal rate and regular rhythm.      Heart sounds: Normal heart sounds.   Pulmonary:      Effort: Pulmonary effort is normal.      Breath sounds: Normal breath sounds.   Neurological:      Mental Status: She is alert and oriented to person, place, and time.   Psychiatric:         Mood and Affect: Mood normal.         Behavior: Behavior normal.         Thought Content: Thought content normal.         Judgment: Judgment normal.       Assessment & Plan   Diagnoses and all orders for this visit:    1. Type 2 diabetes mellitus with hyperglycemia, with long-term current use of insulin (HCC) (Primary)  -     CBC & Differential  -     Comprehensive Metabolic Panel  -     Hemoglobin A1c    2. Essential hypertension  -     CBC & Differential  -     Comprehensive Metabolic Panel    3. Coronary artery disease involving native coronary artery of native heart without angina pectoris  -     clopidogrel (PLAVIX) 75 MG tablet; Take 1 tablet by mouth Daily.  Dispense: 90 tablet; Refill: 1    4. Mixed hyperlipidemia    Other orders  -     potassium chloride (K-DUR,KLOR-CON) 10 MEQ CR tablet; Take 1 tablet by mouth Daily.  Dispense: 90 tablet; Refill: 1  -     furosemide (LASIX) 20 MG tablet; Take 1 tablet by mouth Daily As Needed (swelling).  Dispense: 90 tablet; Refill: 1    DM control has been doing well, will continue medicine and check labs  BP table, no change in regimen  Continue statin and plavix, pt tolerating well

## 2023-06-07 LAB
ALBUMIN SERPL-MCNC: 4.4 G/DL (ref 3.5–5.2)
ALBUMIN/GLOB SERPL: 1.8 G/DL
ALP SERPL-CCNC: 56 U/L (ref 39–117)
ALT SERPL-CCNC: 19 U/L (ref 1–33)
AST SERPL-CCNC: 19 U/L (ref 1–32)
BASOPHILS # BLD AUTO: 0.05 10*3/MM3 (ref 0–0.2)
BASOPHILS NFR BLD AUTO: 0.7 % (ref 0–1.5)
BILIRUB SERPL-MCNC: 0.5 MG/DL (ref 0–1.2)
BUN SERPL-MCNC: 21 MG/DL (ref 8–23)
BUN/CREAT SERPL: 20.6 (ref 7–25)
CALCIUM SERPL-MCNC: 10.3 MG/DL (ref 8.6–10.5)
CHLORIDE SERPL-SCNC: 102 MMOL/L (ref 98–107)
CO2 SERPL-SCNC: 30.8 MMOL/L (ref 22–29)
CREAT SERPL-MCNC: 1.02 MG/DL (ref 0.57–1)
EGFRCR SERPLBLD CKD-EPI 2021: 54.7 ML/MIN/1.73
EOSINOPHIL # BLD AUTO: 0.23 10*3/MM3 (ref 0–0.4)
EOSINOPHIL NFR BLD AUTO: 3.1 % (ref 0.3–6.2)
ERYTHROCYTE [DISTWIDTH] IN BLOOD BY AUTOMATED COUNT: 13.1 % (ref 12.3–15.4)
GLOBULIN SER CALC-MCNC: 2.4 GM/DL
GLUCOSE SERPL-MCNC: 112 MG/DL (ref 65–99)
HBA1C MFR BLD: 8.2 % (ref 4.8–5.6)
HCT VFR BLD AUTO: 42.7 % (ref 34–46.6)
HGB BLD-MCNC: 14.4 G/DL (ref 12–15.9)
IMM GRANULOCYTES # BLD AUTO: 0.02 10*3/MM3 (ref 0–0.05)
IMM GRANULOCYTES NFR BLD AUTO: 0.3 % (ref 0–0.5)
LYMPHOCYTES # BLD AUTO: 2.47 10*3/MM3 (ref 0.7–3.1)
LYMPHOCYTES NFR BLD AUTO: 33 % (ref 19.6–45.3)
MCH RBC QN AUTO: 31 PG (ref 26.6–33)
MCHC RBC AUTO-ENTMCNC: 33.7 G/DL (ref 31.5–35.7)
MCV RBC AUTO: 91.8 FL (ref 79–97)
MONOCYTES # BLD AUTO: 0.68 10*3/MM3 (ref 0.1–0.9)
MONOCYTES NFR BLD AUTO: 9.1 % (ref 5–12)
NEUTROPHILS # BLD AUTO: 4.03 10*3/MM3 (ref 1.7–7)
NEUTROPHILS NFR BLD AUTO: 53.8 % (ref 42.7–76)
NRBC BLD AUTO-RTO: 0 /100 WBC (ref 0–0.2)
PLATELET # BLD AUTO: 178 10*3/MM3 (ref 140–450)
POTASSIUM SERPL-SCNC: 4.3 MMOL/L (ref 3.5–5.2)
PROT SERPL-MCNC: 6.8 G/DL (ref 6–8.5)
RBC # BLD AUTO: 4.65 10*6/MM3 (ref 3.77–5.28)
SODIUM SERPL-SCNC: 143 MMOL/L (ref 136–145)
WBC # BLD AUTO: 7.48 10*3/MM3 (ref 3.4–10.8)

## 2023-06-21 PROBLEM — H61.21 IMPACTED CERUMEN OF RIGHT EAR: Status: ACTIVE | Noted: 2023-06-21

## 2023-06-21 PROBLEM — H66.001 NON-RECURRENT ACUTE SUPPURATIVE OTITIS MEDIA OF RIGHT EAR WITHOUT SPONTANEOUS RUPTURE OF TYMPANIC MEMBRANE: Status: ACTIVE | Noted: 2023-06-21

## 2023-08-17 DIAGNOSIS — E11.65 TYPE 2 DIABETES MELLITUS WITH HYPERGLYCEMIA, WITH LONG-TERM CURRENT USE OF INSULIN: ICD-10-CM

## 2023-08-17 DIAGNOSIS — Z79.4 TYPE 2 DIABETES MELLITUS WITH HYPERGLYCEMIA, WITH LONG-TERM CURRENT USE OF INSULIN: ICD-10-CM

## 2023-08-18 RX ORDER — BLOOD-GLUCOSE SENSOR
EACH MISCELLANEOUS
Qty: 2 EACH | Refills: 0 | Status: SHIPPED | OUTPATIENT
Start: 2023-08-18

## 2023-08-29 ENCOUNTER — OFFICE VISIT (OUTPATIENT)
Dept: FAMILY MEDICINE CLINIC | Facility: CLINIC | Age: 83
End: 2023-08-29
Payer: MEDICARE

## 2023-08-29 VITALS
TEMPERATURE: 97.8 F | BODY MASS INDEX: 28.17 KG/M2 | SYSTOLIC BLOOD PRESSURE: 140 MMHG | HEIGHT: 63 IN | OXYGEN SATURATION: 98 % | WEIGHT: 159 LBS | RESPIRATION RATE: 18 BRPM | HEART RATE: 77 BPM | DIASTOLIC BLOOD PRESSURE: 72 MMHG

## 2023-08-29 DIAGNOSIS — E11.9 TYPE 2 DIABETES MELLITUS WITHOUT COMPLICATION, WITH LONG-TERM CURRENT USE OF INSULIN: ICD-10-CM

## 2023-08-29 DIAGNOSIS — E78.2 MIXED HYPERLIPIDEMIA: ICD-10-CM

## 2023-08-29 DIAGNOSIS — I10 ESSENTIAL HYPERTENSION: ICD-10-CM

## 2023-08-29 DIAGNOSIS — Z79.4 TYPE 2 DIABETES MELLITUS WITHOUT COMPLICATION, WITH LONG-TERM CURRENT USE OF INSULIN: ICD-10-CM

## 2023-08-29 DIAGNOSIS — I25.10 CORONARY ARTERY DISEASE INVOLVING NATIVE CORONARY ARTERY OF NATIVE HEART WITHOUT ANGINA PECTORIS: ICD-10-CM

## 2023-08-29 RX ORDER — ROSUVASTATIN CALCIUM 5 MG/1
5 TABLET, COATED ORAL DAILY
Qty: 90 TABLET | Refills: 1 | Status: SHIPPED | OUTPATIENT
Start: 2023-08-29

## 2023-08-29 RX ORDER — POTASSIUM CHLORIDE 750 MG/1
TABLET, FILM COATED, EXTENDED RELEASE ORAL
COMMUNITY
Start: 2023-06-08

## 2023-08-29 NOTE — PROGRESS NOTES
Subjective   Nancy Goodman is a 82 y.o. female.     History of Present Illness     Diabetes Mellitus Type II, Follow-up:   Nancy Goodman is a 82 y.o. female who is here for follow-up of Type 2 diabetes mellitus.  Current symptoms/problems include none and have been stable. Patient is adherent with medications.  Known diabetic complications: none  Cardiovascular risk factors: diabetes mellitus, dyslipidemia, and hypertension  Current diabetic medications include  lantus 45 and metformin .   Current monitoring regimen: jazmyne system  Home blood sugar records: fasting range: doing well  Any episodes of hypoglycemia? no  Eye exam current (within one year): yes  She is on ACE inhibitor or angiotensin II receptor blocker. Patient is on a statin.       Nancy Goodman  is here for follow-up of hypertension of several years duration. She is exercising and is not adherent to a low-salt diet. Patient does not check her blood pressure.    Patient denies chest pain and palpitations. She is compliant with meds.        Nancy Goodman returns today for follow up of Hyperlipidemia  Nancy indicates her exercise level as 3 times per week.  Diet: ate out a lot with travels  Patient is compliant with medications   Any side effects to medications:   chest pain No myalgia No memory change No  Pt is not due for labs      The following portions of the patient's history were reviewed and updated as appropriate: allergies, current medications, past family history, past medical history, past social history, past surgical history, and problem list.    Review of Systems   Constitutional: Negative.    Psychiatric/Behavioral: Negative.       Objective   Physical Exam  Vitals and nursing note reviewed.   Constitutional:       General: She is not in acute distress.     Appearance: Normal appearance. She is well-developed.   Cardiovascular:      Rate and Rhythm: Normal rate and regular rhythm.      Heart sounds:  Normal heart sounds.    with a grade of 2/6.   Pulmonary:      Effort: Pulmonary effort is normal.      Breath sounds: Normal breath sounds.   Neurological:      Mental Status: She is alert and oriented to person, place, and time.   Psychiatric:         Mood and Affect: Mood normal.         Behavior: Behavior normal.         Thought Content: Thought content normal.         Judgment: Judgment normal.       Assessment & Plan   Diagnoses and all orders for this visit:    1. Type 2 diabetes mellitus without complication, with long-term current use of insulin  -     Insulin Glargine (LANTUS SOLOSTAR) 100 UNIT/ML injection pen; Inject 50 Units under the skin into the appropriate area as directed Daily.  Dispense: 45 mL; Refill: 3  -     metFORMIN (Glucophage) 500 MG tablet; Take 1 tablet by mouth 2 (Two) Times a Day With Meals.  Dispense: 180 tablet; Refill: 1  -     CBC & Differential  -     Comprehensive Metabolic Panel  -     Hemoglobin A1c    2. Mixed hyperlipidemia  -     rosuvastatin (Crestor) 5 MG tablet; Take 1 tablet by mouth Daily.  Dispense: 90 tablet; Refill: 1  -     Comprehensive Metabolic Panel    3. Essential hypertension  -     CBC & Differential  -     Comprehensive Metabolic Panel    4. Coronary artery disease involving native coronary artery of native heart without angina pectoris    Will continue metformin and lantus.  Recheck DM control but it had slipped last time.  Consider adding jardiance for heart health and improved control but cost is an issue for her meds  Tolerating crestor 5, will continue this  BP also doing fair, continue toprol and losartan

## 2023-08-30 LAB
ALBUMIN SERPL-MCNC: 4.5 G/DL (ref 3.5–5.2)
ALBUMIN/GLOB SERPL: 1.9 G/DL
ALP SERPL-CCNC: 62 U/L (ref 39–117)
ALT SERPL-CCNC: 18 U/L (ref 1–33)
AST SERPL-CCNC: 20 U/L (ref 1–32)
BASOPHILS # BLD AUTO: 0.06 10*3/MM3 (ref 0–0.2)
BASOPHILS NFR BLD AUTO: 0.9 % (ref 0–1.5)
BILIRUB SERPL-MCNC: 0.5 MG/DL (ref 0–1.2)
BUN SERPL-MCNC: 19 MG/DL (ref 8–23)
BUN/CREAT SERPL: 20.2 (ref 7–25)
CALCIUM SERPL-MCNC: 9.8 MG/DL (ref 8.6–10.5)
CHLORIDE SERPL-SCNC: 103 MMOL/L (ref 98–107)
CO2 SERPL-SCNC: 27 MMOL/L (ref 22–29)
CREAT SERPL-MCNC: 0.94 MG/DL (ref 0.57–1)
EGFRCR SERPLBLD CKD-EPI 2021: 60.3 ML/MIN/1.73
EOSINOPHIL # BLD AUTO: 0.26 10*3/MM3 (ref 0–0.4)
EOSINOPHIL NFR BLD AUTO: 4 % (ref 0.3–6.2)
ERYTHROCYTE [DISTWIDTH] IN BLOOD BY AUTOMATED COUNT: 12.8 % (ref 12.3–15.4)
GLOBULIN SER CALC-MCNC: 2.4 GM/DL
GLUCOSE SERPL-MCNC: 170 MG/DL (ref 65–99)
HBA1C MFR BLD: 8.3 % (ref 4.8–5.6)
HCT VFR BLD AUTO: 41.6 % (ref 34–46.6)
HGB BLD-MCNC: 13.9 G/DL (ref 12–15.9)
IMM GRANULOCYTES # BLD AUTO: 0.01 10*3/MM3 (ref 0–0.05)
IMM GRANULOCYTES NFR BLD AUTO: 0.2 % (ref 0–0.5)
LYMPHOCYTES # BLD AUTO: 2.24 10*3/MM3 (ref 0.7–3.1)
LYMPHOCYTES NFR BLD AUTO: 34.6 % (ref 19.6–45.3)
MCH RBC QN AUTO: 31 PG (ref 26.6–33)
MCHC RBC AUTO-ENTMCNC: 33.4 G/DL (ref 31.5–35.7)
MCV RBC AUTO: 92.9 FL (ref 79–97)
MONOCYTES # BLD AUTO: 0.62 10*3/MM3 (ref 0.1–0.9)
MONOCYTES NFR BLD AUTO: 9.6 % (ref 5–12)
NEUTROPHILS # BLD AUTO: 3.29 10*3/MM3 (ref 1.7–7)
NEUTROPHILS NFR BLD AUTO: 50.7 % (ref 42.7–76)
NRBC BLD AUTO-RTO: 0 /100 WBC (ref 0–0.2)
PLATELET # BLD AUTO: 171 10*3/MM3 (ref 140–450)
POTASSIUM SERPL-SCNC: 4.6 MMOL/L (ref 3.5–5.2)
PROT SERPL-MCNC: 6.9 G/DL (ref 6–8.5)
RBC # BLD AUTO: 4.48 10*6/MM3 (ref 3.77–5.28)
SODIUM SERPL-SCNC: 142 MMOL/L (ref 136–145)
WBC # BLD AUTO: 6.48 10*3/MM3 (ref 3.4–10.8)

## 2023-09-05 DIAGNOSIS — E11.65 TYPE 2 DIABETES MELLITUS WITH HYPERGLYCEMIA, WITH LONG-TERM CURRENT USE OF INSULIN: Primary | ICD-10-CM

## 2023-09-05 DIAGNOSIS — Z79.4 TYPE 2 DIABETES MELLITUS WITH HYPERGLYCEMIA, WITH LONG-TERM CURRENT USE OF INSULIN: Primary | ICD-10-CM

## 2023-09-11 DIAGNOSIS — Z79.4 TYPE 2 DIABETES MELLITUS WITH HYPERGLYCEMIA, WITH LONG-TERM CURRENT USE OF INSULIN: ICD-10-CM

## 2023-09-11 DIAGNOSIS — E11.65 TYPE 2 DIABETES MELLITUS WITH HYPERGLYCEMIA, WITH LONG-TERM CURRENT USE OF INSULIN: ICD-10-CM

## 2023-09-13 RX ORDER — BLOOD-GLUCOSE SENSOR
EACH MISCELLANEOUS
Qty: 2 EACH | Refills: 0 | Status: SHIPPED | OUTPATIENT
Start: 2023-09-13

## 2023-11-13 ENCOUNTER — OFFICE VISIT (OUTPATIENT)
Dept: FAMILY MEDICINE CLINIC | Facility: CLINIC | Age: 83
End: 2023-11-13
Payer: MEDICARE

## 2023-11-13 VITALS
OXYGEN SATURATION: 96 % | HEART RATE: 60 BPM | WEIGHT: 161 LBS | BODY MASS INDEX: 28.53 KG/M2 | DIASTOLIC BLOOD PRESSURE: 62 MMHG | SYSTOLIC BLOOD PRESSURE: 114 MMHG | HEIGHT: 63 IN | RESPIRATION RATE: 16 BRPM

## 2023-11-13 DIAGNOSIS — I35.0 AORTIC STENOSIS, SEVERE: ICD-10-CM

## 2023-11-13 DIAGNOSIS — R30.0 DYSURIA: ICD-10-CM

## 2023-11-13 DIAGNOSIS — Z00.00 MEDICARE ANNUAL WELLNESS VISIT, SUBSEQUENT: Primary | ICD-10-CM

## 2023-11-13 DIAGNOSIS — I10 ESSENTIAL HYPERTENSION: ICD-10-CM

## 2023-11-13 DIAGNOSIS — Z79.4 TYPE 2 DIABETES MELLITUS WITH HYPERGLYCEMIA, WITH LONG-TERM CURRENT USE OF INSULIN: ICD-10-CM

## 2023-11-13 DIAGNOSIS — E78.2 MIXED HYPERLIPIDEMIA: ICD-10-CM

## 2023-11-13 DIAGNOSIS — E11.65 TYPE 2 DIABETES MELLITUS WITH HYPERGLYCEMIA, WITH LONG-TERM CURRENT USE OF INSULIN: ICD-10-CM

## 2023-11-13 DIAGNOSIS — I25.10 CORONARY ARTERY DISEASE INVOLVING NATIVE CORONARY ARTERY OF NATIVE HEART WITHOUT ANGINA PECTORIS: ICD-10-CM

## 2023-11-13 LAB
ALBUMIN/CREATININE RATIO, URINE: ABNORMAL
EXPIRATION DATE: ABNORMAL
Lab: ABNORMAL
POC CREATININE URINE: 200
POC MICROALBUMIN URINE: 80

## 2023-11-13 RX ORDER — FUROSEMIDE 20 MG/1
20 TABLET ORAL DAILY PRN
Qty: 90 TABLET | Refills: 1 | Status: SHIPPED | OUTPATIENT
Start: 2023-11-13

## 2023-11-13 RX ORDER — IRBESARTAN 75 MG/1
75 TABLET ORAL 2 TIMES DAILY
Qty: 180 TABLET | Refills: 3 | Status: SHIPPED | OUTPATIENT
Start: 2023-11-13

## 2023-11-13 NOTE — PROGRESS NOTES
The ABCs of the Annual Wellness Visit  Subsequent Medicare Wellness Visit    Subjective    Nancy Goodman is a 83 y.o. female who presents for a Subsequent Medicare Wellness Visit.    The following portions of the patient's history were reviewed and   updated as appropriate: allergies, current medications, past family history, past medical history, past social history, past surgical history, and problem list.    Compared to one year ago, the patient feels her physical   health is the same.    Compared to one year ago, the patient feels her mental   health is the same.    Recent Hospitalizations:  She was not admitted to the hospital during the last year.       Current Medical Providers:  Patient Care Team:  Eyal Cervantes MD as PCP - General (Family Medicine)  Fidel Valdez MD as Consulting Physician (Cardiology)    Outpatient Medications Prior to Visit   Medication Sig Dispense Refill    aspirin 81 MG EC tablet Take 1 tablet by mouth Every Morning.      B-D UF III MINI PEN NEEDLES 31G X 5 MM misc 1 for daily injection 100 each 11    Black Elderberry (SAMBUCUS ELDERBERRY PO) Take 500 mg by mouth Daily.      clopidogrel (PLAVIX) 75 MG tablet Take 1 tablet by mouth Daily. 90 tablet 1    coenzyme Q10 100 MG capsule Take 1 capsule by mouth Daily.      Continuous Blood Gluc Sensor (FreeStyle Mirlande 3 Sensor) misc CHANGE SENSOR EVERY 14 DAYS. 2 each 0    CRANBERRY PO Take 40 mg by mouth Daily.      empagliflozin (Jardiance) 10 MG tablet tablet Take 1 tablet by mouth Daily. 90 tablet 1    FREESTYLE LITE test strip Check daily and  each 3    Insulin Glargine (LANTUS SOLOSTAR) 100 UNIT/ML injection pen Inject 50 Units under the skin into the appropriate area as directed Daily. 45 mL 3    Lancets misc Lancet of choice, check QD and  each 3    metFORMIN (Glucophage) 500 MG tablet Take 1 tablet by mouth 2 (Two) Times a Day With Meals. 180 tablet 1    Milk Thistle 1000 MG capsule Take 1 capsule by mouth  "Daily.      NON FORMULARY Daily. Super cayenne 100,ooo HU every day      omeprazole (priLOSEC) 40 MG capsule Take 1 capsule by mouth Every Night. Taking it as needed 90 capsule 3    potassium chloride (K-DUR,KLOR-CON) 10 MEQ CR tablet Take 1 tablet by mouth Daily. 90 tablet 1    potassium chloride 10 MEQ CR tablet       Probiotic Product (PROBIOTIC ADVANCED PO) Take 1 tablet by mouth Every Night.      rosuvastatin (Crestor) 5 MG tablet Take 1 tablet by mouth Daily. 90 tablet 1    furosemide (LASIX) 20 MG tablet Take 1 tablet by mouth Daily As Needed (swelling). 90 tablet 1    irbesartan (Avapro) 75 MG tablet Take 1 tablet by mouth 2 (Two) Times a Day. 180 tablet 3    metoprolol tartrate (LOPRESSOR) 25 MG tablet Take 1 tablet by mouth 2 (Two) Times a Day. 180 tablet 1     No facility-administered medications prior to visit.       No opioid medication identified on active medication list. I have reviewed chart for other potential  high risk medication/s and harmful drug interactions in the elderly.              Patient Active Problem List   Diagnosis    Type 2 diabetes mellitus with hyperglycemia, with long-term current use of insulin    Mixed hyperlipidemia    Essential hypertension    Vitamin D deficiency    CAD (coronary artery disease)    Aortic stenosis, severe    Diastolic CHF    BMI 29.0-29.9,adult    Colic, biliary    Choledocholithiasis    Acute non-ST segment elevation myocardial infarction    Non-recurrent acute suppurative otitis media of right ear without spontaneous rupture of tympanic membrane    Impacted cerumen of right ear     Advance Care Planning   Advance Care Planning     Advance Directive is not on file.  ACP discussion was held with the patient during this visit. Patient does not have an advance directive, information provided.     Objective    Vitals:    11/13/23 0949   BP: 114/62   Pulse: 60   Resp: 16   SpO2: 96%   Weight: 73 kg (161 lb)   Height: 160 cm (63\")     Estimated body mass index " "is 28.52 kg/m² as calculated from the following:    Height as of this encounter: 160 cm (63\").    Weight as of this encounter: 73 kg (161 lb).        Does the patient have evidence of cognitive impairment? No    Lab Results   Component Value Date    HGBA1C 8.30 (H) 2023        HEALTH RISK ASSESSMENT    Smoking Status:  Social History     Tobacco Use   Smoking Status Never   Smokeless Tobacco Never     Alcohol Consumption:  Social History     Substance and Sexual Activity   Alcohol Use No     Fall Risk Screen:    STEADI Fall Risk Assessment was completed, and patient is at MODERATE risk for falls. Assessment completed on:2023    Depression Screenin/13/2023    10:03 AM   PHQ-2/PHQ-9 Depression Screening   Little Interest or Pleasure in Doing Things 0-->not at all   Feeling Down, Depressed or Hopeless 0-->not at all   PHQ-9: Brief Depression Severity Measure Score 0       Health Habits and Functional and Cognitive Screenin/13/2023    10:00 AM   Functional & Cognitive Status   Do you have difficulty preparing food and eating? No   Do you have difficulty bathing yourself, getting dressed or grooming yourself? No   Do you have difficulty using the toilet? No   Do you have difficulty moving around from place to place? No   Do you have trouble with steps or getting out of a bed or a chair? No   Current Diet Well Balanced Diet   Dental Exam Not up to date   Eye Exam Up to date   Exercise (times per week) 3 times per week   Current Exercises Include Light Weights   Do you need help using the phone?  No   Are you deaf or do you have serious difficulty hearing?  No   Do you need help to go to places out of walking distance? No   Do you need help shopping? No   Do you need help preparing meals?  No   Do you need help with housework?  No   Do you need help with laundry? No   Do you need help taking your medications? No   Do you need help managing money? No   Do you ever drive or ride in a car " without wearing a seat belt? No   Have you felt unusual stress, anger or loneliness in the last month? No   Who do you live with? Spouse   If you need help, do you have trouble finding someone available to you? No   Have you been bothered in the last four weeks by sexual problems? No   Do you have difficulty concentrating, remembering or making decisions? Yes       Age-appropriate Screening Schedule:  Refer to the list below for future screening recommendations based on patient's age, sex and/or medical conditions. Orders for these recommended tests are listed in the plan section. The patient has been provided with a written plan.    Health Maintenance   Topic Date Due    TDAP/TD VACCINES (1 - Tdap) Never done    ZOSTER VACCINE (1 of 2) Never done    BMI FOLLOWUP  09/14/2022    COVID-19 Vaccine (3 - 2023-24 season) 09/01/2023    ANNUAL WELLNESS VISIT  09/12/2023    INFLUENZA VACCINE  03/31/2024 (Originally 8/1/2023)    HEMOGLOBIN A1C  02/29/2024    LIPID PANEL  03/06/2024    DIABETIC EYE EXAM  09/19/2024    URINE MICROALBUMIN  11/13/2024    Pneumococcal Vaccine 65+  Discontinued    DXA SCAN  Discontinued                  CMS Preventative Services Quick Reference  Risk Factors Identified During Encounter  Polypharmacy: Medication List reviewed  The above risks/problems have been discussed with the patient.  Pertinent information has been shared with the patient in the After Visit Summary.  An After Visit Summary and PPPS were made available to the patient.    Follow Up:   Next Medicare Wellness visit to be scheduled in 1 year.       Additional E&M Note during same encounter follows:  Patient has multiple medical problems which are significant and separately identifiable that require additional work above and beyond the Medicare Wellness Visit.      Chief Complaint  Medicare Wellness-subsequent, Diabetes, Hypertension, and Hyperlipidemia    Subjective        HPI  Nancy Swiftstan Goodman is also being seen today for  "DM    Diabetes Mellitus Type II, Follow-up:   Nancy Goodman is a 83 y.o. female who is here for follow-up of Type 2 diabetes mellitus.  Current symptoms/problems include none and have been stable. Patient is adherent with medications.  Known diabetic complications: cardiovascular disease  Cardiovascular risk factors: advanced age (older than 55 for men, 65 for women), diabetes mellitus, dyslipidemia, and hypertension  Current diabetic medications include  jardiance, lantus and metformin .   She is on ACE inhibitor or angiotensin II receptor blocker. Patient is on a statin.       Nancy Goodman  is here for follow-up of hypertension of several years duration. She is exercising and is adherent to a low-salt diet. Patient does not check her blood pressure.   She is compliant with meds.        Nancy Goodman returns today for follow up of Hyperlipidemia  Nancy indicates her exercise level as regularly as directed.  Diet: eating healthy  Patient is compliant with medications   Any side effects to medications:   chest pain No myalgia No memory change No  Pt is due for labs      Review of Systems   Constitutional: Negative.    Respiratory: Negative.     Cardiovascular: Negative.    Psychiatric/Behavioral: Negative.         Objective   Vital Signs:  /62   Pulse 60   Resp 16   Ht 160 cm (63\")   Wt 73 kg (161 lb)   SpO2 96%   BMI 28.52 kg/m²     Physical Exam  Vitals and nursing note reviewed.   Constitutional:       General: She is not in acute distress.     Appearance: Normal appearance. She is well-developed.   Cardiovascular:      Rate and Rhythm: Normal rate and regular rhythm.      Heart sounds: Normal heart sounds.   Pulmonary:      Effort: Pulmonary effort is normal.      Breath sounds: Normal breath sounds.   Neurological:      Mental Status: She is alert and oriented to person, place, and time.   Psychiatric:         Mood and Affect: Mood normal.         Behavior: Behavior " normal.         Thought Content: Thought content normal.         Judgment: Judgment normal.                     Assessment and Plan   Diagnoses and all orders for this visit:    1. Medicare annual wellness visit, subsequent (Primary)    2. Coronary artery disease involving native coronary artery of native heart without angina pectoris  -     metoprolol tartrate (LOPRESSOR) 25 MG tablet; 1/2 PO QAM and 1 PO QHS  Dispense: 180 tablet; Refill: 1    3. Essential hypertension  -     irbesartan (Avapro) 75 MG tablet; Take 1 tablet by mouth 2 (Two) Times a Day.  Dispense: 180 tablet; Refill: 3  -     CBC & Differential  -     Comprehensive Metabolic Panel    4. Aortic stenosis, severe  -     furosemide (LASIX) 20 MG tablet; Take 1 tablet by mouth Daily As Needed (swelling).  Dispense: 90 tablet; Refill: 1    5. Mixed hyperlipidemia  -     Comprehensive Metabolic Panel  -     Lipid Panel    6. Type 2 diabetes mellitus with hyperglycemia, with long-term current use of insulin  -     Comprehensive Metabolic Panel  -     Hemoglobin A1c  -     POCT microalbumin    7. Dysuria  -     Urine Culture - Urine, Urine, Clean Catch    Medicare wellness completed and advice as above given    Will recheck DM and continue medicine.    Continue BP medicine, doing well.  She has not seen her cardiologist in quite some time, will place cards referral again    Will check urine culture to ensure no infection         Does she need to se cardiologist again

## 2023-11-15 LAB
ALBUMIN SERPL-MCNC: 4.4 G/DL (ref 3.7–4.7)
ALBUMIN/GLOB SERPL: 1.9 {RATIO} (ref 1.2–2.2)
ALP SERPL-CCNC: 66 IU/L (ref 44–121)
ALT SERPL-CCNC: 19 IU/L (ref 0–32)
AST SERPL-CCNC: 20 IU/L (ref 0–40)
BACTERIA UR CULT: NORMAL
BACTERIA UR CULT: NORMAL
BASOPHILS # BLD AUTO: 0.1 X10E3/UL (ref 0–0.2)
BASOPHILS NFR BLD AUTO: 1 %
BILIRUB SERPL-MCNC: 0.3 MG/DL (ref 0–1.2)
BUN SERPL-MCNC: 17 MG/DL (ref 8–27)
BUN/CREAT SERPL: 18 (ref 12–28)
CALCIUM SERPL-MCNC: 9.6 MG/DL (ref 8.7–10.3)
CHLORIDE SERPL-SCNC: 103 MMOL/L (ref 96–106)
CHOLEST SERPL-MCNC: 167 MG/DL (ref 100–199)
CO2 SERPL-SCNC: 24 MMOL/L (ref 20–29)
CREAT SERPL-MCNC: 0.93 MG/DL (ref 0.57–1)
EGFRCR SERPLBLD CKD-EPI 2021: 61 ML/MIN/1.73
EOSINOPHIL # BLD AUTO: 0.3 X10E3/UL (ref 0–0.4)
EOSINOPHIL NFR BLD AUTO: 5 %
ERYTHROCYTE [DISTWIDTH] IN BLOOD BY AUTOMATED COUNT: 13.1 % (ref 11.7–15.4)
GLOBULIN SER CALC-MCNC: 2.3 G/DL (ref 1.5–4.5)
GLUCOSE SERPL-MCNC: 197 MG/DL (ref 70–99)
HBA1C MFR BLD: 8.5 % (ref 4.8–5.6)
HCT VFR BLD AUTO: 43 % (ref 34–46.6)
HDLC SERPL-MCNC: 36 MG/DL
HGB BLD-MCNC: 14.2 G/DL (ref 11.1–15.9)
IMM GRANULOCYTES # BLD AUTO: 0 X10E3/UL (ref 0–0.1)
IMM GRANULOCYTES NFR BLD AUTO: 0 %
LDLC SERPL CALC-MCNC: 88 MG/DL (ref 0–99)
LYMPHOCYTES # BLD AUTO: 2.1 X10E3/UL (ref 0.7–3.1)
LYMPHOCYTES NFR BLD AUTO: 34 %
MCH RBC QN AUTO: 31.3 PG (ref 26.6–33)
MCHC RBC AUTO-ENTMCNC: 33 G/DL (ref 31.5–35.7)
MCV RBC AUTO: 95 FL (ref 79–97)
MONOCYTES # BLD AUTO: 0.5 X10E3/UL (ref 0.1–0.9)
MONOCYTES NFR BLD AUTO: 9 %
NEUTROPHILS # BLD AUTO: 3 X10E3/UL (ref 1.4–7)
NEUTROPHILS NFR BLD AUTO: 51 %
PLATELET # BLD AUTO: 166 X10E3/UL (ref 150–450)
POTASSIUM SERPL-SCNC: 4.3 MMOL/L (ref 3.5–5.2)
PROT SERPL-MCNC: 6.7 G/DL (ref 6–8.5)
RBC # BLD AUTO: 4.54 X10E6/UL (ref 3.77–5.28)
SODIUM SERPL-SCNC: 141 MMOL/L (ref 134–144)
TRIGL SERPL-MCNC: 259 MG/DL (ref 0–149)
VLDLC SERPL CALC-MCNC: 43 MG/DL (ref 5–40)
WBC # BLD AUTO: 6 X10E3/UL (ref 3.4–10.8)

## 2023-11-30 DIAGNOSIS — E11.65 TYPE 2 DIABETES MELLITUS WITH HYPERGLYCEMIA, WITH LONG-TERM CURRENT USE OF INSULIN: ICD-10-CM

## 2023-11-30 DIAGNOSIS — Z79.4 TYPE 2 DIABETES MELLITUS WITH HYPERGLYCEMIA, WITH LONG-TERM CURRENT USE OF INSULIN: ICD-10-CM

## 2023-12-01 RX ORDER — BLOOD-GLUCOSE SENSOR
EACH MISCELLANEOUS
Qty: 2 EACH | Refills: 2 | Status: SHIPPED | OUTPATIENT
Start: 2023-12-01

## 2023-12-11 NOTE — PROGRESS NOTES
Subjective:     Encounter Date:12/13/2023    Primary Care Physician: Eyal Cervantes MD      Patient ID: Nancy Goodman is a 83 y.o. female.    Chief Complaint:Consult and Coronary Artery Disease      PROBLEM LIST:  Coronary artery disease  5/16/17 angina, cardiac catheter severe ostial LAD and LCx of these.  Ostial RPL and RCA.  5 vessel CABG ([place]) LIMA to LAD, SVG to diagonal and LCx, and SVG to PDA and PL  7/22/2021 LHC occluded LAD with patent LIMA.  Patent VG to diagonal and circumflex.  Occluded right PDA and occluded VG to right PDA.  95% ISR, right PL branch medical management.  Aortic stenosis  Peak gradient of 27 by cath 5/17 4/19 peak gradient by echo of 66  5/13/19 TAVR #23 Bah Tobias tissue valve  6/13/19 ECHO EF 60%. Mod MR. TAVR normal. Max pressure gradient 34.8  7/2020 echo EF of 70%.  Prosthetic AV with trace regurgitation.  Hypertension  Dyslipidemia  Diabetes mellitus  GERD  Varicose veins  Arthritis  Surgeries:  Tubal ligation  Varicose vein surgery  CABG        Allergies   Allergen Reactions    Amlodipine GI Intolerance     sickness    Benazepril Nausea Only    Ciprofloxacin GI Intolerance     Pt not sure of reaction (stomach problems)    Pokeweed [Phytolacca] Swelling and Rash    Statins Nausea Only     Stomach problem         Current Outpatient Medications:     aspirin 81 MG EC tablet, Take 1 tablet by mouth Every Morning., Disp: , Rfl:     B-D UF III MINI PEN NEEDLES 31G X 5 MM misc, 1 for daily injection, Disp: 100 each, Rfl: 11    Black Elderberry (SAMBUCUS ELDERBERRY PO), Take 500 mg by mouth Daily., Disp: , Rfl:     clopidogrel (PLAVIX) 75 MG tablet, Take 1 tablet by mouth Daily., Disp: 90 tablet, Rfl: 1    coenzyme Q10 100 MG capsule, Take 1 capsule by mouth Daily., Disp: , Rfl:     Continuous Blood Gluc Sensor (FreeStyle Mirlande 3 Sensor) misc, CHANGE SENSORS EVERY 14 DAYS, Disp: 2 each, Rfl: 2    CRANBERRY PO, Take 40 mg by mouth Daily., Disp: , Rfl:     FREESTYLE LITE  test strip, Check daily and PRN, Disp: 300 each, Rfl: 3    furosemide (LASIX) 20 MG tablet, Take 1 tablet by mouth Daily As Needed (swelling)., Disp: 90 tablet, Rfl: 1    Insulin Glargine (LANTUS SOLOSTAR) 100 UNIT/ML injection pen, Inject 50 Units under the skin into the appropriate area as directed Daily., Disp: 45 mL, Rfl: 3    irbesartan (Avapro) 75 MG tablet, Take 1 tablet by mouth 2 (Two) Times a Day., Disp: 180 tablet, Rfl: 3    Lancets misc, Lancet of choice, check QD and PRN, Disp: 300 each, Rfl: 3    metFORMIN (Glucophage) 500 MG tablet, Take 1 tablet by mouth 2 (Two) Times a Day With Meals., Disp: 180 tablet, Rfl: 1    metoprolol tartrate (LOPRESSOR) 25 MG tablet, 1/2 PO QAM and 1 PO QHS, Disp: 180 tablet, Rfl: 1    Milk Thistle 1000 MG capsule, Take 1 capsule by mouth Daily., Disp: , Rfl:     NON FORMULARY, Daily. Super cayenne 100,ooo HU every day, Disp: , Rfl:     potassium chloride (K-DUR,KLOR-CON) 10 MEQ CR tablet, Take 1 tablet by mouth Daily., Disp: 90 tablet, Rfl: 1    potassium chloride 10 MEQ CR tablet, , Disp: , Rfl:     Probiotic Product (PROBIOTIC ADVANCED PO), Take 1 tablet by mouth Every Night., Disp: , Rfl:     rosuvastatin (Crestor) 5 MG tablet, Take 1 tablet by mouth Daily., Disp: 90 tablet, Rfl: 1    omeprazole (priLOSEC) 40 MG capsule, Take 1 capsule by mouth Every Night. Taking it as needed (Patient not taking: Reported on 12/13/2023), Disp: 90 capsule, Rfl: 3        History of Present Illness    Patient is an 83-year-old  female who presents today for reestablishment of cardiac care secondary to history of coronary artery disease, previous CABG, aortic stenosis with previous AVR.  Since last being seen she notes overall doing well from cardiac standpoint.  She is active and goes to the gym with her .  Notes that she feels like this helps her.  She denies any chest pain, pressure, tightness.  She has chronic shortness of breath which is overall unchanged from since her  bypass surgery.  No reported syncope, presyncope.  Does endorse some occasional edema.  Patient was lost to follow-up for almost 2 years and was referred for reevaluation.    The following portions of the patient's history were reviewed and updated as appropriate: allergies, current medications, past family history, past medical history, past social history, past surgical history and problem list.    Family History   Problem Relation Age of Onset    Cancer Mother         Lung cancer    Heart attack Father     No Known Problems Maternal Grandmother     No Known Problems Maternal Grandfather        Social History     Tobacco Use    Smoking status: Never    Smokeless tobacco: Never   Vaping Use    Vaping Use: Never used   Substance Use Topics    Alcohol use: No    Drug use: No         Review of Systems   Constitutional: Negative for fever and malaise/fatigue.   HENT:  Negative for nosebleeds.    Eyes:  Negative for redness and visual disturbance.   Cardiovascular:  Positive for leg swelling. Negative for orthopnea, palpitations and paroxysmal nocturnal dyspnea.   Respiratory:  Negative for cough, snoring, sputum production and wheezing.    Hematologic/Lymphatic: Negative for bleeding problem.   Skin:  Negative for flushing, itching and rash.   Musculoskeletal:  Positive for arthritis. Negative for falls, joint pain and muscle cramps.   Gastrointestinal:  Negative for abdominal pain, diarrhea, heartburn, nausea and vomiting.   Genitourinary:  Negative for hematuria.   Neurological:  Negative for excessive daytime sleepiness, dizziness, headaches, tremors and weakness.   Psychiatric/Behavioral:  Negative for substance abuse. The patient is not nervous/anxious.           Objective:   /82   Pulse 77   Wt 72.8 kg (160 lb 6.4 oz)   SpO2 96%   BMI 28.41 kg/m²         Vitals reviewed.   Constitutional:       Appearance: Healthy appearance. Well-developed and not in distress.   Eyes:      Conjunctiva/sclera:  Conjunctivae normal.      Pupils: Pupils are equal, round, and reactive to light.   HENT:      Head: Normocephalic and atraumatic.    Mouth/Throat:      Pharynx: Oropharynx is clear.   Neck:      Thyroid: Thyroid normal. No thyromegaly.      Vascular: Normal carotid pulses. No carotid bruit or JVD. JVD normal.      Lymphadenopathy: No cervical adenopathy.   Pulmonary:      Effort: No respiratory distress.      Breath sounds: No wheezing. No rales.   Chest:      Chest wall: Not tender to palpatation.   Cardiovascular:      Normal rate. Regular rhythm.      Murmurs: There is a grade 1/6 systolic murmur at the URSB and LRSB.      No gallop.    Pulses:     Carotid: 2+ bilaterally.     Dorsalis pedis: 2+ bilaterally.     Posterior tibial: 2+ bilaterally.  Edema:     Peripheral edema absent.   Abdominal:      General: There is no distension or abdominal bruit.      Palpations: There is no abdominal mass.      Tenderness: There is no abdominal tenderness. There is no rebound.   Musculoskeletal:         General: No tenderness or deformity.      Extremities: No clubbing present.Skin:     General: Skin is warm and dry. There is no cyanosis.      Findings: No rash.   Neurological:      Mental Status: Alert, oriented to person, place, and time and oriented to person, place and time.           ECG 12 Lead    Date/Time: 12/13/2023 9:17 AM  Performed by: Fidel Valdez MD    Authorized by: Fidel Valdez MD  Comparison: compared with previous ECG from 7/22/2021  Comparison to previous ECG: Rbbb now present  Rhythm: sinus rhythm  Conduction: right bundle branch block                Assessment:   Assessment & Plan      Diagnoses and all orders for this visit:    1. Coronary artery disease involving native coronary artery of native heart without angina pectoris (Primary)  -     ECG 12 Lead      1.  Coronary artery disease, 6 years post CABG.  Cardiac cath 2 years ago showed chronically occluded RCA and vein graft, with  collaterals.  Manage medically.  No current angina  2.  Aortic stenosis status post AVR.  3.  Malaise fatigue.  Patient feels due to beta-blocker.  Improved now that she is off of this.  4.  Dyslipidemia, on max tolerated statin dose.  5.  Hypertension well-controlled, elevated today, but most recent check in primary physician's office, as well as blood pressure log shows normal blood pressures at home.    Recommendations:  1.  Check echocardiogram.  2.  Decrease metoprolol to 12.5 mg twice daily.  3.  Patient to keep on blood pressure, if elevated, may another agent instead of beta-blocker to control this.  4.  Continue other current medical therapy  5.  Revisit annually as needed symptom change       Veronica CAMPOS scribed portions of this dictation for Dr. Fidel Valdez.  I have seen and examined the patient, I have reviewed the note, discussed the case with the advance practice clinician, made necessary changes and I agree with the final note.    Fidel Valdez MD  12/13/23  10:12 EST              Dictated utilizing Dragon dictation

## 2023-12-13 ENCOUNTER — OFFICE VISIT (OUTPATIENT)
Dept: CARDIOLOGY | Facility: CLINIC | Age: 83
End: 2023-12-13
Payer: MEDICARE

## 2023-12-13 VITALS
OXYGEN SATURATION: 96 % | BODY MASS INDEX: 28.41 KG/M2 | HEART RATE: 77 BPM | SYSTOLIC BLOOD PRESSURE: 166 MMHG | WEIGHT: 160.4 LBS | DIASTOLIC BLOOD PRESSURE: 82 MMHG

## 2023-12-13 DIAGNOSIS — I25.10 CORONARY ARTERY DISEASE INVOLVING NATIVE CORONARY ARTERY OF NATIVE HEART WITHOUT ANGINA PECTORIS: ICD-10-CM

## 2023-12-13 DIAGNOSIS — I50.32 CHRONIC DIASTOLIC CONGESTIVE HEART FAILURE: ICD-10-CM

## 2023-12-13 DIAGNOSIS — I35.0 AORTIC STENOSIS, SEVERE: Primary | ICD-10-CM

## 2023-12-13 DIAGNOSIS — I25.10 CORONARY ARTERY DISEASE INVOLVING NATIVE CORONARY ARTERY OF NATIVE HEART WITHOUT ANGINA PECTORIS: Primary | ICD-10-CM

## 2023-12-13 PROCEDURE — 3079F DIAST BP 80-89 MM HG: CPT | Performed by: INTERNAL MEDICINE

## 2023-12-13 PROCEDURE — 1159F MED LIST DOCD IN RCRD: CPT | Performed by: INTERNAL MEDICINE

## 2023-12-13 PROCEDURE — 1160F RVW MEDS BY RX/DR IN RCRD: CPT | Performed by: INTERNAL MEDICINE

## 2023-12-13 PROCEDURE — 93000 ELECTROCARDIOGRAM COMPLETE: CPT | Performed by: INTERNAL MEDICINE

## 2023-12-13 PROCEDURE — 99214 OFFICE O/P EST MOD 30 MIN: CPT | Performed by: INTERNAL MEDICINE

## 2023-12-13 PROCEDURE — 3077F SYST BP >= 140 MM HG: CPT | Performed by: INTERNAL MEDICINE

## 2024-02-13 ENCOUNTER — OFFICE VISIT (OUTPATIENT)
Dept: FAMILY MEDICINE CLINIC | Facility: CLINIC | Age: 84
End: 2024-02-13
Payer: MEDICARE

## 2024-02-13 ENCOUNTER — HOSPITAL ENCOUNTER (OUTPATIENT)
Dept: CARDIOLOGY | Facility: HOSPITAL | Age: 84
Discharge: HOME | End: 2024-02-13
Admitting: INTERNAL MEDICINE
Payer: MEDICARE

## 2024-02-13 VITALS
HEART RATE: 72 BPM | TEMPERATURE: 97.8 F | BODY MASS INDEX: 28.35 KG/M2 | DIASTOLIC BLOOD PRESSURE: 60 MMHG | WEIGHT: 160 LBS | HEIGHT: 63 IN | RESPIRATION RATE: 16 BRPM | SYSTOLIC BLOOD PRESSURE: 106 MMHG | OXYGEN SATURATION: 96 %

## 2024-02-13 VITALS — BODY MASS INDEX: 28.36 KG/M2 | WEIGHT: 160.05 LBS | HEIGHT: 63 IN

## 2024-02-13 DIAGNOSIS — I35.0 AORTIC STENOSIS, SEVERE: ICD-10-CM

## 2024-02-13 DIAGNOSIS — I25.10 CORONARY ARTERY DISEASE INVOLVING NATIVE CORONARY ARTERY OF NATIVE HEART WITHOUT ANGINA PECTORIS: ICD-10-CM

## 2024-02-13 DIAGNOSIS — Z79.4 TYPE 2 DIABETES MELLITUS WITHOUT COMPLICATION, WITH LONG-TERM CURRENT USE OF INSULIN: ICD-10-CM

## 2024-02-13 DIAGNOSIS — E11.65 TYPE 2 DIABETES MELLITUS WITH HYPERGLYCEMIA, WITH LONG-TERM CURRENT USE OF INSULIN: Primary | ICD-10-CM

## 2024-02-13 DIAGNOSIS — E78.2 MIXED HYPERLIPIDEMIA: ICD-10-CM

## 2024-02-13 DIAGNOSIS — Z79.4 TYPE 2 DIABETES MELLITUS WITH HYPERGLYCEMIA, WITH LONG-TERM CURRENT USE OF INSULIN: Primary | ICD-10-CM

## 2024-02-13 DIAGNOSIS — I50.32 CHRONIC DIASTOLIC CONGESTIVE HEART FAILURE: ICD-10-CM

## 2024-02-13 DIAGNOSIS — I10 ESSENTIAL HYPERTENSION: ICD-10-CM

## 2024-02-13 DIAGNOSIS — E11.9 TYPE 2 DIABETES MELLITUS WITHOUT COMPLICATION, WITH LONG-TERM CURRENT USE OF INSULIN: ICD-10-CM

## 2024-02-13 LAB
BH CV ECHO MEAS - AI P1/2T: 629.7 MSEC
BH CV ECHO MEAS - AO MAX PG: 11.6 MMHG
BH CV ECHO MEAS - AO MEAN PG: 6.1 MMHG
BH CV ECHO MEAS - AO ROOT DIAM: 3 CM
BH CV ECHO MEAS - AO V2 MAX: 170.3 CM/SEC
BH CV ECHO MEAS - AO V2 VTI: 41.2 CM
BH CV ECHO MEAS - AVA(I,D): 1.78 CM2
BH CV ECHO MEAS - EDV(CUBED): 132.6 ML
BH CV ECHO MEAS - EDV(MOD-SP2): 58 ML
BH CV ECHO MEAS - EDV(MOD-SP4): 75 ML
BH CV ECHO MEAS - EF(MOD-BP): 67 %
BH CV ECHO MEAS - EF(MOD-SP2): 63.8 %
BH CV ECHO MEAS - EF(MOD-SP4): 69.3 %
BH CV ECHO MEAS - ESV(CUBED): 18.1 ML
BH CV ECHO MEAS - ESV(MOD-SP2): 21 ML
BH CV ECHO MEAS - ESV(MOD-SP4): 23 ML
BH CV ECHO MEAS - FS: 48.5 %
BH CV ECHO MEAS - IVS/LVPW: 0.83 CM
BH CV ECHO MEAS - IVSD: 0.72 CM
BH CV ECHO MEAS - LA DIMENSION: 4.8 CM
BH CV ECHO MEAS - LAT PEAK E' VEL: 7.5 CM/SEC
BH CV ECHO MEAS - LV DIASTOLIC VOL/BSA (35-75): 42.6 CM2
BH CV ECHO MEAS - LV MASS(C)D: 140 GRAMS
BH CV ECHO MEAS - LV MAX PG: 3.7 MMHG
BH CV ECHO MEAS - LV MEAN PG: 1.96 MMHG
BH CV ECHO MEAS - LV SYSTOLIC VOL/BSA (12-30): 13.1 CM2
BH CV ECHO MEAS - LV V1 MAX: 95.6 CM/SEC
BH CV ECHO MEAS - LV V1 VTI: 23.3 CM
BH CV ECHO MEAS - LVIDD: 5.1 CM
BH CV ECHO MEAS - LVIDS: 2.6 CM
BH CV ECHO MEAS - LVOT AREA: 3.2 CM2
BH CV ECHO MEAS - LVOT DIAM: 2 CM
BH CV ECHO MEAS - LVPWD: 0.87 CM
BH CV ECHO MEAS - MED PEAK E' VEL: 5.97 CM/SEC
BH CV ECHO MEAS - MV A MAX VEL: 131.4 CM/SEC
BH CV ECHO MEAS - MV DEC SLOPE: 446.6 CM/SEC2
BH CV ECHO MEAS - MV DEC TIME: 0.32 SEC
BH CV ECHO MEAS - MV E MAX VEL: 130.8 CM/SEC
BH CV ECHO MEAS - MV E/A: 1
BH CV ECHO MEAS - MV MAX PG: 7.2 MMHG
BH CV ECHO MEAS - MV MEAN PG: 4.2 MMHG
BH CV ECHO MEAS - MV P1/2T: 83.8 MSEC
BH CV ECHO MEAS - MV V2 VTI: 44.1 CM
BH CV ECHO MEAS - MVA(P1/2T): 2.6 CM2
BH CV ECHO MEAS - MVA(VTI): 1.67 CM2
BH CV ECHO MEAS - PA ACC SLOPE: 404.2 CM/SEC2
BH CV ECHO MEAS - PA ACC TIME: 0.15 SEC
BH CV ECHO MEAS - PI END-D VEL: 101.8 CM/SEC
BH CV ECHO MEAS - RAP SYSTOLE: 3 MMHG
BH CV ECHO MEAS - RVSP: 23 MMHG
BH CV ECHO MEAS - SI(MOD-SP2): 21 ML/M2
BH CV ECHO MEAS - SI(MOD-SP4): 29.6 ML/M2
BH CV ECHO MEAS - SV(LVOT): 73.5 ML
BH CV ECHO MEAS - SV(MOD-SP2): 37 ML
BH CV ECHO MEAS - SV(MOD-SP4): 52 ML
BH CV ECHO MEAS - TAPSE (>1.6): 1.9 CM
BH CV ECHO MEAS - TR MAX PG: 20.3 MMHG
BH CV ECHO MEAS - TR MAX VEL: 222.6 CM/SEC
BH CV ECHO MEASUREMENTS AVERAGE E/E' RATIO: 19.42
BH CV VAS BP RIGHT ARM: NORMAL MMHG
BH CV XLRA - RV BASE: 3.6 CM
BH CV XLRA - RV LENGTH: 6.9 CM
BH CV XLRA - RV MID: 2.9 CM
BH CV XLRA - TDI S': 7.98 CM/SEC
LEFT ATRIUM VOLUME INDEX: 26.1 ML/M2
LV EF 2D ECHO EST: 65 %

## 2024-02-13 PROCEDURE — 93306 TTE W/DOPPLER COMPLETE: CPT | Performed by: INTERNAL MEDICINE

## 2024-02-13 PROCEDURE — 93306 TTE W/DOPPLER COMPLETE: CPT

## 2024-02-13 RX ORDER — IRBESARTAN 75 MG/1
75 TABLET ORAL 2 TIMES DAILY
Qty: 180 TABLET | Refills: 3 | Status: SHIPPED | OUTPATIENT
Start: 2024-02-13

## 2024-02-13 RX ORDER — FUROSEMIDE 20 MG/1
20 TABLET ORAL DAILY PRN
Qty: 90 TABLET | Refills: 1 | Status: SHIPPED | OUTPATIENT
Start: 2024-02-13

## 2024-02-13 RX ORDER — CLOPIDOGREL BISULFATE 75 MG/1
75 TABLET ORAL DAILY
Qty: 90 TABLET | Refills: 1 | Status: SHIPPED | OUTPATIENT
Start: 2024-02-13

## 2024-02-13 RX ORDER — ROSUVASTATIN CALCIUM 5 MG/1
5 TABLET, COATED ORAL DAILY
Qty: 90 TABLET | Refills: 1 | Status: SHIPPED | OUTPATIENT
Start: 2024-02-13

## 2024-02-14 ENCOUNTER — TELEPHONE (OUTPATIENT)
Dept: FAMILY MEDICINE CLINIC | Facility: CLINIC | Age: 84
End: 2024-02-14

## 2024-02-14 DIAGNOSIS — E11.9 TYPE 2 DIABETES MELLITUS WITHOUT COMPLICATION, WITH LONG-TERM CURRENT USE OF INSULIN: ICD-10-CM

## 2024-02-14 DIAGNOSIS — Z79.4 TYPE 2 DIABETES MELLITUS WITHOUT COMPLICATION, WITH LONG-TERM CURRENT USE OF INSULIN: ICD-10-CM

## 2024-02-14 LAB
ALBUMIN SERPL-MCNC: 4.4 G/DL (ref 3.5–5.2)
ALBUMIN/GLOB SERPL: 1.9 G/DL
ALP SERPL-CCNC: 60 U/L (ref 39–117)
ALT SERPL-CCNC: 18 U/L (ref 1–33)
AST SERPL-CCNC: 18 U/L (ref 1–32)
BASOPHILS # BLD AUTO: 0.05 10*3/MM3 (ref 0–0.2)
BASOPHILS NFR BLD AUTO: 0.9 % (ref 0–1.5)
BILIRUB SERPL-MCNC: 0.4 MG/DL (ref 0–1.2)
BUN SERPL-MCNC: 17 MG/DL (ref 8–23)
BUN/CREAT SERPL: 15.9 (ref 7–25)
CALCIUM SERPL-MCNC: 9.6 MG/DL (ref 8.6–10.5)
CHLORIDE SERPL-SCNC: 105 MMOL/L (ref 98–107)
CO2 SERPL-SCNC: 24.3 MMOL/L (ref 22–29)
CREAT SERPL-MCNC: 1.07 MG/DL (ref 0.57–1)
EGFRCR SERPLBLD CKD-EPI 2021: 51.3 ML/MIN/1.73
EOSINOPHIL # BLD AUTO: 0.24 10*3/MM3 (ref 0–0.4)
EOSINOPHIL NFR BLD AUTO: 4.4 % (ref 0.3–6.2)
ERYTHROCYTE [DISTWIDTH] IN BLOOD BY AUTOMATED COUNT: 12.7 % (ref 12.3–15.4)
GLOBULIN SER CALC-MCNC: 2.3 GM/DL
GLUCOSE SERPL-MCNC: 93 MG/DL (ref 65–99)
HBA1C MFR BLD: 8.6 % (ref 4.8–5.6)
HCT VFR BLD AUTO: 40.5 % (ref 34–46.6)
HGB BLD-MCNC: 13.5 G/DL (ref 12–15.9)
IMM GRANULOCYTES # BLD AUTO: 0.01 10*3/MM3 (ref 0–0.05)
IMM GRANULOCYTES NFR BLD AUTO: 0.2 % (ref 0–0.5)
LYMPHOCYTES # BLD AUTO: 2.11 10*3/MM3 (ref 0.7–3.1)
LYMPHOCYTES NFR BLD AUTO: 39.1 % (ref 19.6–45.3)
MCH RBC QN AUTO: 30.5 PG (ref 26.6–33)
MCHC RBC AUTO-ENTMCNC: 33.3 G/DL (ref 31.5–35.7)
MCV RBC AUTO: 91.4 FL (ref 79–97)
MONOCYTES # BLD AUTO: 0.45 10*3/MM3 (ref 0.1–0.9)
MONOCYTES NFR BLD AUTO: 8.3 % (ref 5–12)
NEUTROPHILS # BLD AUTO: 2.54 10*3/MM3 (ref 1.7–7)
NEUTROPHILS NFR BLD AUTO: 47.1 % (ref 42.7–76)
NRBC BLD AUTO-RTO: 0 /100 WBC (ref 0–0.2)
PLATELET # BLD AUTO: 189 10*3/MM3 (ref 140–450)
POTASSIUM SERPL-SCNC: 4.4 MMOL/L (ref 3.5–5.2)
PROT SERPL-MCNC: 6.7 G/DL (ref 6–8.5)
RBC # BLD AUTO: 4.43 10*6/MM3 (ref 3.77–5.28)
SODIUM SERPL-SCNC: 141 MMOL/L (ref 136–145)
WBC # BLD AUTO: 5.4 10*3/MM3 (ref 3.4–10.8)

## 2024-02-14 NOTE — TELEPHONE ENCOUNTER
She stopped her lantus??????  She was never supposed to stop this.    Lantus is used with metformin or janumet.  Jenumet is stronger than metformin and I would rahher her take the janumet for better DM control as long as her insurance will cover this.      Can we ask her about using the janumet instead of metformin (again HAS TO TAKE LANTUS NO MATTER WHAT ORAL SHE IS ON)

## 2024-02-14 NOTE — TELEPHONE ENCOUNTER
Aure has decided to continue taking Lantus instead of JANUMET.  She is increased her dosage to 55.     [facility] [id number] - [place], [place] - [address] AT [facility] [address] - [phone]  - [phone]  [phone]

## 2024-02-14 NOTE — TELEPHONE ENCOUNTER
Pt stated, this message was taken incorrectly. She is going to continue her Lantus with Metformin not the Janument because she has been woken up several times by her monitor telling her that her glucose level was in the 50's during the night. That does not seem to happen with just the Lantus and Metformin. She has never at anytime stopped her Lantus.

## 2024-02-27 DIAGNOSIS — Z79.4 TYPE 2 DIABETES MELLITUS WITH HYPERGLYCEMIA, WITH LONG-TERM CURRENT USE OF INSULIN: Primary | ICD-10-CM

## 2024-02-27 DIAGNOSIS — E11.65 TYPE 2 DIABETES MELLITUS WITH HYPERGLYCEMIA, WITH LONG-TERM CURRENT USE OF INSULIN: Primary | ICD-10-CM

## 2024-03-01 DIAGNOSIS — Z79.4 TYPE 2 DIABETES MELLITUS WITH HYPERGLYCEMIA, WITH LONG-TERM CURRENT USE OF INSULIN: ICD-10-CM

## 2024-03-01 DIAGNOSIS — E11.65 TYPE 2 DIABETES MELLITUS WITH HYPERGLYCEMIA, WITH LONG-TERM CURRENT USE OF INSULIN: ICD-10-CM

## 2024-03-01 NOTE — TELEPHONE ENCOUNTER
"Caller: Nancy Goodman \"Pat\"    Relationship: Self    Best call back number: [phone]     Requested Prescriptions:   Requested Prescriptions     Pending Prescriptions Disp Refills    Continuous Blood Gluc Sensor (FreeStyle Mirlande 3 Sensor) [facility] 2 each         Pharmacy where request should be sent: [facility][id number] - [place], [place] - [address] - [phone]  - [phone] FX     Last office visit with prescribing clinician: 2/13/2024   Last telemedicine visit with prescribing clinician: Visit date not found   Next office visit with prescribing clinician: 5/20/2024     Additional details provided by patient: PATIENT STATES THIS MEDICATION NEEDS TO BE FAXED TO THE PHARMACY IF POSSIBLE.     Does the patient have less than a 3 day supply:  [x] Yes  [] No    Would you like a call back once the refill request has been completed: [] Yes [x] No    If the office needs to give you a call back, can they leave a voicemail: [] Yes [x] No    Cadance Dunaway, RegSched Rep   03/01/24 14:00 EST           "

## 2024-03-02 DIAGNOSIS — Z79.4 TYPE 2 DIABETES MELLITUS WITH HYPERGLYCEMIA, WITH LONG-TERM CURRENT USE OF INSULIN: ICD-10-CM

## 2024-03-02 DIAGNOSIS — E11.65 TYPE 2 DIABETES MELLITUS WITH HYPERGLYCEMIA, WITH LONG-TERM CURRENT USE OF INSULIN: ICD-10-CM

## 2024-03-03 RX ORDER — BLOOD-GLUCOSE SENSOR
EACH MISCELLANEOUS
Qty: 2 EACH | Refills: 0 | Status: SHIPPED | OUTPATIENT
Start: 2024-03-03

## 2024-03-03 RX ORDER — BLOOD-GLUCOSE SENSOR
EACH MISCELLANEOUS
Qty: 2 EACH | Refills: 2 | OUTPATIENT
Start: 2024-03-03

## 2024-04-01 DIAGNOSIS — E11.65 TYPE 2 DIABETES MELLITUS WITH HYPERGLYCEMIA, WITH LONG-TERM CURRENT USE OF INSULIN: ICD-10-CM

## 2024-04-01 DIAGNOSIS — Z79.4 TYPE 2 DIABETES MELLITUS WITH HYPERGLYCEMIA, WITH LONG-TERM CURRENT USE OF INSULIN: ICD-10-CM

## 2024-04-01 RX ORDER — BLOOD-GLUCOSE SENSOR
EACH MISCELLANEOUS
Qty: 2 EACH | Refills: 2 | Status: SHIPPED | OUTPATIENT
Start: 2024-04-01

## 2024-05-20 ENCOUNTER — OFFICE VISIT (OUTPATIENT)
Dept: FAMILY MEDICINE CLINIC | Facility: CLINIC | Age: 84
End: 2024-05-20
Payer: OTHER GOVERNMENT

## 2024-05-20 VITALS
HEIGHT: 63 IN | SYSTOLIC BLOOD PRESSURE: 138 MMHG | RESPIRATION RATE: 16 BRPM | OXYGEN SATURATION: 96 % | TEMPERATURE: 97.8 F | WEIGHT: 161.4 LBS | HEART RATE: 80 BPM | BODY MASS INDEX: 28.6 KG/M2 | DIASTOLIC BLOOD PRESSURE: 70 MMHG

## 2024-05-20 DIAGNOSIS — Z79.4 TYPE 2 DIABETES MELLITUS WITHOUT COMPLICATION, WITH LONG-TERM CURRENT USE OF INSULIN: Primary | ICD-10-CM

## 2024-05-20 DIAGNOSIS — E11.9 TYPE 2 DIABETES MELLITUS WITHOUT COMPLICATION, WITH LONG-TERM CURRENT USE OF INSULIN: Primary | ICD-10-CM

## 2024-05-20 DIAGNOSIS — E78.2 MIXED HYPERLIPIDEMIA: ICD-10-CM

## 2024-05-20 DIAGNOSIS — I10 ESSENTIAL HYPERTENSION: ICD-10-CM

## 2024-05-20 PROCEDURE — 99214 OFFICE O/P EST MOD 30 MIN: CPT | Performed by: FAMILY MEDICINE

## 2024-05-20 RX ORDER — POTASSIUM CHLORIDE 750 MG/1
10 TABLET, EXTENDED RELEASE ORAL DAILY
Qty: 90 TABLET | Refills: 1 | Status: SHIPPED | OUTPATIENT
Start: 2024-05-20

## 2024-05-20 NOTE — PROGRESS NOTES
Subjective   Nancy Goodman is a 83 y.o. female.     History of Present Illness     Diabetes Mellitus Type II, Follow-up:   Nancy Goodman is a 83 y.o. female who is here for follow-up of Type 2 diabetes mellitus.  Current symptoms/problems include none and have been stable. Patient is adherent with medications.  Known diabetic complications: none  Cardiovascular risk factors: diabetes mellitus, dyslipidemia, and hypertension  Current diabetic medications include  lantus 55 units and sometimes metfromin .   Current monitoring regimen: home blood tests - multiple times daily  Home blood sugar records:  doing well most of the time  She is on ACE inhibitor or angiotensin II receptor blocker. Patient is on a statin.   Lantus 55 units and metformin      Nancy Goodman  is here for follow-up of hypertension of  years  duration. She is not exercising and is not adherent to a low-salt diet. Patient does not check her blood pressure.    She is compliant with meds.        Nancy Goodman returns today for follow up of Hyperlipidemia  Nancy indicates her exercise level as irregularly.  Diet: eats out and then worse but does well when she eats at home  Patient is compliant with medications   Any side effects to medications:   chest pain No myalgia No memory change No  Pt is not due for labs      The following portions of the patient's history were reviewed and updated as appropriate: allergies, current medications, past family history, past medical history, past social history, past surgical history, and problem list.    Review of Systems   Constitutional: Negative.    Respiratory: Negative.     Psychiatric/Behavioral: Negative.         Objective   Physical Exam  Vitals and nursing note reviewed.   Constitutional:       General: She is not in acute distress.     Appearance: Normal appearance. She is well-developed.   Cardiovascular:      Rate and Rhythm: Normal rate and regular rhythm.       Heart sounds: Normal heart sounds.   Pulmonary:      Effort: Pulmonary effort is normal.      Breath sounds: Normal breath sounds.   Neurological:      Mental Status: She is alert and oriented to person, place, and time.   Psychiatric:         Mood and Affect: Mood normal.         Behavior: Behavior normal.         Thought Content: Thought content normal.         Judgment: Judgment normal.         Assessment & Plan   Diagnoses and all orders for this visit:    1. Type 2 diabetes mellitus without complication, with long-term current use of insulin (Primary)  -     Comprehensive Metabolic Panel  -     Hemoglobin A1c    2. Mixed hyperlipidemia  -     Lipid Panel    3. Essential hypertension  -     CBC & Differential  -     Comprehensive Metabolic Panel    Other orders  -     potassium chloride (KLOR-CON M10) 10 MEQ CR tablet; Take 1 tablet by mouth Daily.  Dispense: 90 tablet; Refill: 1    Continue lantus 48 units and PRN metformin.  DM control has been worsening though.  May need to increase frequency of using the metformin!  Recheck lipids and continue her crestor 5  No change in BP medicine, good control

## 2024-05-21 LAB
ALBUMIN SERPL-MCNC: 4.3 G/DL (ref 3.5–5.2)
ALBUMIN/GLOB SERPL: 1.9 G/DL
ALP SERPL-CCNC: 58 U/L (ref 39–117)
ALT SERPL-CCNC: 18 U/L (ref 1–33)
AST SERPL-CCNC: 17 U/L (ref 1–32)
BASOPHILS # BLD AUTO: 0.06 10*3/MM3 (ref 0–0.2)
BASOPHILS NFR BLD AUTO: 0.9 % (ref 0–1.5)
BILIRUB SERPL-MCNC: 0.4 MG/DL (ref 0–1.2)
BUN SERPL-MCNC: 16 MG/DL (ref 8–23)
BUN/CREAT SERPL: 14.8 (ref 7–25)
CALCIUM SERPL-MCNC: 9.6 MG/DL (ref 8.6–10.5)
CHLORIDE SERPL-SCNC: 105 MMOL/L (ref 98–107)
CHOLEST SERPL-MCNC: 159 MG/DL (ref 0–200)
CO2 SERPL-SCNC: 26.1 MMOL/L (ref 22–29)
CREAT SERPL-MCNC: 1.08 MG/DL (ref 0.57–1)
EGFRCR SERPLBLD CKD-EPI 2021: 50.8 ML/MIN/1.73
EOSINOPHIL # BLD AUTO: 0.23 10*3/MM3 (ref 0–0.4)
EOSINOPHIL NFR BLD AUTO: 3.6 % (ref 0.3–6.2)
ERYTHROCYTE [DISTWIDTH] IN BLOOD BY AUTOMATED COUNT: 12.7 % (ref 12.3–15.4)
GLOBULIN SER CALC-MCNC: 2.3 GM/DL
GLUCOSE SERPL-MCNC: 141 MG/DL (ref 65–99)
HBA1C MFR BLD: 8.5 % (ref 4.8–5.6)
HCT VFR BLD AUTO: 43.7 % (ref 34–46.6)
HDLC SERPL-MCNC: 37 MG/DL (ref 40–60)
HGB BLD-MCNC: 14.1 G/DL (ref 12–15.9)
IMM GRANULOCYTES # BLD AUTO: 0.01 10*3/MM3 (ref 0–0.05)
IMM GRANULOCYTES NFR BLD AUTO: 0.2 % (ref 0–0.5)
LDLC SERPL CALC-MCNC: 80 MG/DL (ref 0–100)
LYMPHOCYTES # BLD AUTO: 2.01 10*3/MM3 (ref 0.7–3.1)
LYMPHOCYTES NFR BLD AUTO: 31.8 % (ref 19.6–45.3)
MCH RBC QN AUTO: 30.9 PG (ref 26.6–33)
MCHC RBC AUTO-ENTMCNC: 32.3 G/DL (ref 31.5–35.7)
MCV RBC AUTO: 95.6 FL (ref 79–97)
MONOCYTES # BLD AUTO: 0.56 10*3/MM3 (ref 0.1–0.9)
MONOCYTES NFR BLD AUTO: 8.8 % (ref 5–12)
NEUTROPHILS # BLD AUTO: 3.46 10*3/MM3 (ref 1.7–7)
NEUTROPHILS NFR BLD AUTO: 54.7 % (ref 42.7–76)
NRBC BLD AUTO-RTO: 0 /100 WBC (ref 0–0.2)
PLATELET # BLD AUTO: 180 10*3/MM3 (ref 140–450)
POTASSIUM SERPL-SCNC: 4.5 MMOL/L (ref 3.5–5.2)
PROT SERPL-MCNC: 6.6 G/DL (ref 6–8.5)
RBC # BLD AUTO: 4.57 10*6/MM3 (ref 3.77–5.28)
SODIUM SERPL-SCNC: 141 MMOL/L (ref 136–145)
TRIGL SERPL-MCNC: 257 MG/DL (ref 0–150)
VLDLC SERPL CALC-MCNC: 42 MG/DL (ref 5–40)
WBC # BLD AUTO: 6.33 10*3/MM3 (ref 3.4–10.8)

## 2024-06-20 DIAGNOSIS — E11.65 TYPE 2 DIABETES MELLITUS WITH HYPERGLYCEMIA, WITH LONG-TERM CURRENT USE OF INSULIN: ICD-10-CM

## 2024-06-20 DIAGNOSIS — Z79.4 TYPE 2 DIABETES MELLITUS WITH HYPERGLYCEMIA, WITH LONG-TERM CURRENT USE OF INSULIN: ICD-10-CM

## 2024-06-20 RX ORDER — BLOOD-GLUCOSE SENSOR
EACH MISCELLANEOUS
Qty: 2 EACH | Refills: 0 | Status: SHIPPED | OUTPATIENT
Start: 2024-06-20

## 2024-07-22 DIAGNOSIS — E11.65 TYPE 2 DIABETES MELLITUS WITH HYPERGLYCEMIA, WITH LONG-TERM CURRENT USE OF INSULIN: ICD-10-CM

## 2024-07-22 DIAGNOSIS — Z79.4 TYPE 2 DIABETES MELLITUS WITH HYPERGLYCEMIA, WITH LONG-TERM CURRENT USE OF INSULIN: ICD-10-CM

## 2024-07-23 RX ORDER — BLOOD-GLUCOSE SENSOR
EACH MISCELLANEOUS
Qty: 2 EACH | Refills: 0 | Status: SHIPPED | OUTPATIENT
Start: 2024-07-23

## 2024-08-06 DIAGNOSIS — Z79.4 TYPE 2 DIABETES MELLITUS WITH HYPERGLYCEMIA, WITH LONG-TERM CURRENT USE OF INSULIN: ICD-10-CM

## 2024-08-06 DIAGNOSIS — E11.65 TYPE 2 DIABETES MELLITUS WITH HYPERGLYCEMIA, WITH LONG-TERM CURRENT USE OF INSULIN: ICD-10-CM

## 2024-08-06 RX ORDER — BLOOD-GLUCOSE SENSOR
EACH MISCELLANEOUS
Qty: 2 EACH | Refills: 0 | OUTPATIENT
Start: 2024-08-06

## 2024-08-20 ENCOUNTER — OFFICE VISIT (OUTPATIENT)
Dept: FAMILY MEDICINE CLINIC | Facility: CLINIC | Age: 84
End: 2024-08-20
Payer: MEDICARE

## 2024-08-20 VITALS
OXYGEN SATURATION: 95 % | SYSTOLIC BLOOD PRESSURE: 162 MMHG | RESPIRATION RATE: 16 BRPM | HEIGHT: 63 IN | WEIGHT: 161 LBS | DIASTOLIC BLOOD PRESSURE: 70 MMHG | BODY MASS INDEX: 28.53 KG/M2 | TEMPERATURE: 97.8 F | HEART RATE: 74 BPM

## 2024-08-20 DIAGNOSIS — Z79.4 TYPE 2 DIABETES MELLITUS WITHOUT COMPLICATION, WITH LONG-TERM CURRENT USE OF INSULIN: ICD-10-CM

## 2024-08-20 DIAGNOSIS — E78.2 MIXED HYPERLIPIDEMIA: ICD-10-CM

## 2024-08-20 DIAGNOSIS — Z79.4 TYPE 2 DIABETES MELLITUS WITH HYPERGLYCEMIA, WITH LONG-TERM CURRENT USE OF INSULIN: ICD-10-CM

## 2024-08-20 DIAGNOSIS — E11.65 TYPE 2 DIABETES MELLITUS WITH HYPERGLYCEMIA, WITH LONG-TERM CURRENT USE OF INSULIN: ICD-10-CM

## 2024-08-20 DIAGNOSIS — I10 ESSENTIAL HYPERTENSION: ICD-10-CM

## 2024-08-20 DIAGNOSIS — I25.10 CORONARY ARTERY DISEASE INVOLVING NATIVE CORONARY ARTERY OF NATIVE HEART WITHOUT ANGINA PECTORIS: ICD-10-CM

## 2024-08-20 DIAGNOSIS — E11.9 TYPE 2 DIABETES MELLITUS WITHOUT COMPLICATION, WITH LONG-TERM CURRENT USE OF INSULIN: ICD-10-CM

## 2024-08-20 PROCEDURE — 99214 OFFICE O/P EST MOD 30 MIN: CPT | Performed by: FAMILY MEDICINE

## 2024-08-20 PROCEDURE — 1126F AMNT PAIN NOTED NONE PRSNT: CPT | Performed by: FAMILY MEDICINE

## 2024-08-20 PROCEDURE — 1160F RVW MEDS BY RX/DR IN RCRD: CPT | Performed by: FAMILY MEDICINE

## 2024-08-20 PROCEDURE — 3078F DIAST BP <80 MM HG: CPT | Performed by: FAMILY MEDICINE

## 2024-08-20 PROCEDURE — 1159F MED LIST DOCD IN RCRD: CPT | Performed by: FAMILY MEDICINE

## 2024-08-20 PROCEDURE — G2211 COMPLEX E/M VISIT ADD ON: HCPCS | Performed by: FAMILY MEDICINE

## 2024-08-20 PROCEDURE — 3077F SYST BP >= 140 MM HG: CPT | Performed by: FAMILY MEDICINE

## 2024-08-20 RX ORDER — ROSUVASTATIN CALCIUM 5 MG/1
5 TABLET, COATED ORAL DAILY
Qty: 90 TABLET | Refills: 1 | Status: SHIPPED | OUTPATIENT
Start: 2024-08-20

## 2024-08-20 RX ORDER — FLURBIPROFEN SODIUM 0.3 MG/ML
SOLUTION/ DROPS OPHTHALMIC
Qty: 100 EACH | Refills: 11 | Status: SHIPPED | OUTPATIENT
Start: 2024-08-20

## 2024-08-20 RX ORDER — IRBESARTAN 75 MG/1
75 TABLET ORAL 2 TIMES DAILY
Qty: 180 TABLET | Refills: 3 | Status: SHIPPED | OUTPATIENT
Start: 2024-08-20

## 2024-08-20 RX ORDER — BLOOD-GLUCOSE SENSOR
1 EACH MISCELLANEOUS
Qty: 2 EACH | Refills: 11 | Status: SHIPPED | OUTPATIENT
Start: 2024-08-20

## 2024-08-20 NOTE — PROGRESS NOTES
Subjective   Nancy Goodman is a 83 y.o. female.     History of Present Illness     Diabetes Mellitus Type II, Follow-up:   Nancy Goodman is a 83 y.o. female who is here for follow-up of Type 2 diabetes mellitus.  Current symptoms/problems include none and have been stable. Patient is adherent with medications.  Known diabetic complications: none  Cardiovascular risk factors: advanced age (older than 55 for men, 65 for women), diabetes mellitus, dyslipidemia, and hypertension  Current diabetic medications include  lantus 55 units, metformin .   Current monitoring regimen: home urines - multiple  times daily  Home blood sugar records: fasting range: doing better  Any episodes of hypoglycemia? yes - on occasion with her jazmyne monitor  She is on ACE inhibitor or angiotensin II receptor blocker. Patient is on a statin.       Nancy Goodman  is here for follow-up of hypertension of several years duration. She is not exercising and is not adherent to a low-salt diet. Patient does not check her blood pressure.    She is compliant with meds.        Nancy Goodman returns today for follow up of Hyperlipidemia  Nancy indicates her exercise level as not at all.  Diet: unchanged  Patient is compliant with medications   Any side effects to medications:   chest pain No myalgia No memory change No  Pt is due for labs      The following portions of the patient's history were reviewed and updated as appropriate: allergies, current medications, past family history, past medical history, past social history, past surgical history, and problem list.    Review of Systems   Constitutional: Negative.    Respiratory: Negative.     Cardiovascular: Negative.    Psychiatric/Behavioral: Negative.         Objective   Physical Exam  Vitals and nursing note reviewed.   Constitutional:       General: She is not in acute distress.     Appearance: Normal appearance. She is well-developed.   Cardiovascular:       Rate and Rhythm: Normal rate and regular rhythm.      Heart sounds: Normal heart sounds.   Pulmonary:      Effort: Pulmonary effort is normal.      Breath sounds: Normal breath sounds.   Neurological:      Mental Status: She is alert and oriented to person, place, and time.   Psychiatric:         Mood and Affect: Mood normal.         Behavior: Behavior normal.         Thought Content: Thought content normal.         Judgment: Judgment normal.         Assessment & Plan   Diagnoses and all orders for this visit:    1. Type 2 diabetes mellitus with hyperglycemia, with long-term current use of insulin  -     Continuous Glucose Sensor (FreeStyle Mirlande 3 Sensor) misc; Apply 1 each topically Every 14 (Fourteen) Days.  Dispense: 2 each; Refill: 11  -     Hemoglobin A1c    2. Type 2 diabetes mellitus without complication, with long-term current use of insulin  -     metFORMIN (Glucophage) 500 MG tablet; Take 1 tablet by mouth 2 (Two) Times a Day With Meals.  Dispense: 180 tablet; Refill: 1  -     Insulin Glargine (LANTUS SOLOSTAR) 100 UNIT/ML injection pen; Inject 50 Units under the skin into the appropriate area as directed Daily.  Dispense: 45 mL; Refill: 3  -     B-D UF III MINI PEN NEEDLES 31G X 5 MM misc; 1 for daily injection  Dispense: 100 each; Refill: 11    3. Coronary artery disease involving native coronary artery of native heart without angina pectoris  -     metoprolol tartrate (LOPRESSOR) 25 MG tablet; Take 0.5 tablets by mouth 2 (Two) Times a Day.  Dispense: 180 tablet; Refill: 3    4. Essential hypertension  -     CBC & Differential  -     Comprehensive Metabolic Panel  -     irbesartan (Avapro) 75 MG tablet; Take 1 tablet by mouth 2 (Two) Times a Day.  Dispense: 180 tablet; Refill: 3    5. Mixed hyperlipidemia  -     Lipid Panel  -     rosuvastatin (Crestor) 5 MG tablet; Take 1 tablet by mouth Daily.  Dispense: 90 tablet; Refill: 1    DM control has been subpar with elevated HgA1C, however she has had some  lows on occasion.  Could not start januvia due to cost, may have to look for alternative oral med to help pending labs  BP is a little up today but has always been controlled in past.  Will recheck in 3 months  Continue crestor and check lipids

## 2024-08-21 LAB
ALBUMIN SERPL-MCNC: 4.1 G/DL (ref 3.7–4.7)
ALP SERPL-CCNC: 48 IU/L (ref 44–121)
ALT SERPL-CCNC: 19 IU/L (ref 0–32)
AST SERPL-CCNC: 21 IU/L (ref 0–40)
BASOPHILS # BLD AUTO: 0.1 X10E3/UL (ref 0–0.2)
BASOPHILS NFR BLD AUTO: 1 %
BILIRUB SERPL-MCNC: 0.3 MG/DL (ref 0–1.2)
BUN SERPL-MCNC: 19 MG/DL (ref 8–27)
BUN/CREAT SERPL: 16 (ref 12–28)
CALCIUM SERPL-MCNC: 9.8 MG/DL (ref 8.7–10.3)
CHLORIDE SERPL-SCNC: 106 MMOL/L (ref 96–106)
CHOLEST SERPL-MCNC: 169 MG/DL (ref 100–199)
CO2 SERPL-SCNC: 24 MMOL/L (ref 20–29)
CREAT SERPL-MCNC: 1.17 MG/DL (ref 0.57–1)
EGFRCR SERPLBLD CKD-EPI 2021: 46 ML/MIN/1.73
EOSINOPHIL # BLD AUTO: 0.3 X10E3/UL (ref 0–0.4)
EOSINOPHIL NFR BLD AUTO: 4 %
ERYTHROCYTE [DISTWIDTH] IN BLOOD BY AUTOMATED COUNT: 13.2 % (ref 11.7–15.4)
GLOBULIN SER CALC-MCNC: 2.5 G/DL (ref 1.5–4.5)
GLUCOSE SERPL-MCNC: 97 MG/DL (ref 70–99)
HBA1C MFR BLD: 8 % (ref 4.8–5.6)
HCT VFR BLD AUTO: 42.6 % (ref 34–46.6)
HDLC SERPL-MCNC: 31 MG/DL
HGB BLD-MCNC: 13.7 G/DL (ref 11.1–15.9)
IMM GRANULOCYTES # BLD AUTO: 0 X10E3/UL (ref 0–0.1)
IMM GRANULOCYTES NFR BLD AUTO: 1 %
LDLC SERPL CALC-MCNC: 85 MG/DL (ref 0–99)
LYMPHOCYTES # BLD AUTO: 2.2 X10E3/UL (ref 0.7–3.1)
LYMPHOCYTES NFR BLD AUTO: 34 %
MCH RBC QN AUTO: 31.5 PG (ref 26.6–33)
MCHC RBC AUTO-ENTMCNC: 32.2 G/DL (ref 31.5–35.7)
MCV RBC AUTO: 98 FL (ref 79–97)
MONOCYTES # BLD AUTO: 0.5 X10E3/UL (ref 0.1–0.9)
MONOCYTES NFR BLD AUTO: 8 %
NEUTROPHILS # BLD AUTO: 3.3 X10E3/UL (ref 1.4–7)
NEUTROPHILS NFR BLD AUTO: 52 %
PLATELET # BLD AUTO: 166 X10E3/UL (ref 150–450)
POTASSIUM SERPL-SCNC: 4.4 MMOL/L (ref 3.5–5.2)
PROT SERPL-MCNC: 6.6 G/DL (ref 6–8.5)
RBC # BLD AUTO: 4.35 X10E6/UL (ref 3.77–5.28)
SODIUM SERPL-SCNC: 143 MMOL/L (ref 134–144)
TRIGL SERPL-MCNC: 319 MG/DL (ref 0–149)
VLDLC SERPL CALC-MCNC: 53 MG/DL (ref 5–40)
WBC # BLD AUTO: 6.4 X10E3/UL (ref 3.4–10.8)

## 2024-11-22 ENCOUNTER — OFFICE VISIT (OUTPATIENT)
Dept: FAMILY MEDICINE CLINIC | Facility: CLINIC | Age: 84
End: 2024-11-22
Payer: MEDICARE

## 2024-11-22 VITALS
HEART RATE: 78 BPM | DIASTOLIC BLOOD PRESSURE: 78 MMHG | HEIGHT: 63 IN | RESPIRATION RATE: 16 BRPM | WEIGHT: 160.8 LBS | BODY MASS INDEX: 28.49 KG/M2 | OXYGEN SATURATION: 94 % | TEMPERATURE: 97.9 F | SYSTOLIC BLOOD PRESSURE: 184 MMHG

## 2024-11-22 DIAGNOSIS — I35.0 AORTIC STENOSIS, SEVERE: ICD-10-CM

## 2024-11-22 DIAGNOSIS — E78.2 MIXED HYPERLIPIDEMIA: ICD-10-CM

## 2024-11-22 DIAGNOSIS — Z00.00 MEDICARE ANNUAL WELLNESS VISIT, SUBSEQUENT: Primary | ICD-10-CM

## 2024-11-22 DIAGNOSIS — I10 ESSENTIAL HYPERTENSION: ICD-10-CM

## 2024-11-22 DIAGNOSIS — Z79.4 TYPE 2 DIABETES MELLITUS WITHOUT COMPLICATION, WITH LONG-TERM CURRENT USE OF INSULIN: ICD-10-CM

## 2024-11-22 DIAGNOSIS — I25.10 CORONARY ARTERY DISEASE INVOLVING NATIVE CORONARY ARTERY OF NATIVE HEART WITHOUT ANGINA PECTORIS: ICD-10-CM

## 2024-11-22 DIAGNOSIS — E11.9 TYPE 2 DIABETES MELLITUS WITHOUT COMPLICATION, WITH LONG-TERM CURRENT USE OF INSULIN: ICD-10-CM

## 2024-11-22 LAB
EXPIRATION DATE: NORMAL
Lab: NORMAL
POC CREATININE URINE: 300
POC MICROALBUMIN URINE: 30

## 2024-11-22 RX ORDER — FUROSEMIDE 20 MG/1
20 TABLET ORAL DAILY PRN
Qty: 90 TABLET | Refills: 1 | Status: SHIPPED | OUTPATIENT
Start: 2024-11-22

## 2024-11-22 RX ORDER — LANCETS 30 GAUGE
EACH MISCELLANEOUS
Qty: 300 EACH | Refills: 3 | Status: SHIPPED | OUTPATIENT
Start: 2024-11-22

## 2024-11-22 RX ORDER — SPIRONOLACTONE 25 MG/1
25 TABLET ORAL DAILY
Qty: 90 TABLET | Refills: 1 | Status: SHIPPED | OUTPATIENT
Start: 2024-11-22

## 2024-11-22 RX ORDER — FLURBIPROFEN SODIUM 0.3 MG/ML
SOLUTION/ DROPS OPHTHALMIC
Qty: 100 EACH | Refills: 11 | Status: SHIPPED | OUTPATIENT
Start: 2024-11-22

## 2024-11-22 RX ORDER — CLOPIDOGREL BISULFATE 75 MG/1
75 TABLET ORAL DAILY
Qty: 90 TABLET | Refills: 1 | Status: SHIPPED | OUTPATIENT
Start: 2024-11-22

## 2024-11-22 RX ORDER — BLOOD-GLUCOSE METER
KIT MISCELLANEOUS
Qty: 300 EACH | Refills: 3 | Status: SHIPPED | OUTPATIENT
Start: 2024-11-22

## 2024-11-22 NOTE — PROGRESS NOTES
Subjective   The ABCs of the Annual Wellness Visit  Medicare Wellness Visit      Nancy Goodman is a 84 y.o. patient who presents for a Medicare Wellness Visit.    The following portions of the patient's history were reviewed and   updated as appropriate: allergies, current medications, past family history, past medical history, past social history, past surgical history, and problem list.    Compared to one year ago, the patient's physical   health is the same.  Compared to one year ago, the patient's mental   health is the same.    Recent Hospitalizations:  She was not admitted to the hospital during the last year.     Current Medical Providers:  Patient Care Team:  Eyal Cervantes MD as PCP - General (Family Medicine)  Fidel Valdez MD as Consulting Physician (Cardiology)    Outpatient Medications Prior to Visit   Medication Sig Dispense Refill    aspirin 81 MG EC tablet Take 1 tablet by mouth Every Morning.      Black Elderberry (SAMBUCUS ELDERBERRY PO) Take 500 mg by mouth Daily.      coenzyme Q10 100 MG capsule Take 1 capsule by mouth Daily.      Continuous Blood Gluc  (FreeStyle Mirlande 2 Jewett) device Use 1 each Daily. 1 each 1    Continuous Glucose Sensor (FreeStyle Mirlande 3 Sensor) misc Apply 1 each topically Every 14 (Fourteen) Days. 2 each 11    CRANBERRY PO Take 40 mg by mouth Daily.      Insulin Glargine (LANTUS SOLOSTAR) 100 UNIT/ML injection pen Inject 50 Units under the skin into the appropriate area as directed Daily. 45 mL 3    irbesartan (Avapro) 75 MG tablet Take 1 tablet by mouth 2 (Two) Times a Day. 180 tablet 3    metFORMIN (Glucophage) 500 MG tablet Take 1 tablet by mouth 2 (Two) Times a Day With Meals. 180 tablet 1    metoprolol tartrate (LOPRESSOR) 25 MG tablet Take 0.5 tablets by mouth 2 (Two) Times a Day. 180 tablet 3    Milk Thistle 1000 MG capsule Take 1 capsule by mouth Daily.      NON FORMULARY Daily. Super cayenne 100,ooo HU every day      omeprazole (priLOSEC) 40  "MG capsule Take 1 capsule by mouth Every Night. Taking it as needed 90 capsule 3    potassium chloride (KLOR-CON M10) 10 MEQ CR tablet Take 1 tablet by mouth Daily. 90 tablet 1    potassium chloride 10 MEQ CR tablet       Probiotic Product (PROBIOTIC ADVANCED PO) Take 1 tablet by mouth Every Night.      rosuvastatin (Crestor) 5 MG tablet Take 1 tablet by mouth Daily. 90 tablet 1    B-D UF III MINI PEN NEEDLES 31G X 5 MM misc 1 for daily injection 100 each 11    clopidogrel (PLAVIX) 75 MG tablet Take 1 tablet by mouth Daily. 90 tablet 1    FREESTYLE LITE test strip Check daily and  each 3    furosemide (LASIX) 20 MG tablet Take 1 tablet by mouth Daily As Needed (swelling). 90 tablet 1    Lancets misc Lancet of choice, check QD and  each 3     No facility-administered medications prior to visit.     No opioid medication identified on active medication list. I have reviewed chart for other potential  high risk medication/s and harmful drug interactions in the elderly.            Patient Active Problem List   Diagnosis    Type 2 diabetes mellitus with hyperglycemia, with long-term current use of insulin    Mixed hyperlipidemia    Essential hypertension    Vitamin D deficiency    CAD (coronary artery disease)    Aortic stenosis, severe    Diastolic CHF    BMI 29.0-29.9,adult    Colic, biliary    Choledocholithiasis    Acute non-ST segment elevation myocardial infarction    Non-recurrent acute suppurative otitis media of right ear without spontaneous rupture of tympanic membrane    Impacted cerumen of right ear     Advance Care Planning Advance Directive is not on file.  ACP discussion was held with the patient during this visit. Patient has an advance directive (not in EMR), copy requested.            Objective   Vitals:    11/22/24 0959   BP: (!) 184/78   Pulse: 78   Resp: 16   Temp: 97.9 °F (36.6 °C)   SpO2: 94%   Weight: 72.9 kg (160 lb 12.8 oz)   Height: 160 cm (62.99\")       Estimated body mass index " "is 28.49 kg/m² as calculated from the following:    Height as of this encounter: 160 cm (62.99\").    Weight as of this encounter: 72.9 kg (160 lb 12.8 oz).           Does the patient have evidence of cognitive impairment? No                                                                                                Health  Risk Assessment    Smoking Status:  Social History     Tobacco Use   Smoking Status Never   Smokeless Tobacco Never     Alcohol Consumption:  Social History     Substance and Sexual Activity   Alcohol Use No       Fall Risk Screen  STEADI Fall Risk Assessment was completed, and patient is at LOW risk for falls.Assessment completed on:2024    Depression Screening   Little interest or pleasure in doing things? Not at all   Feeling down, depressed, or hopeless? Not at all   PHQ-2 Total Score 0      Health Habits and Functional and Cognitive Screenin/22/2024    11:00 AM   Functional & Cognitive Status   Do you have difficulty preparing food and eating? No   Do you have difficulty bathing yourself, getting dressed or grooming yourself? No   Do you have difficulty using the toilet? No   Do you have difficulty moving around from place to place? No   Do you have trouble with steps or getting out of a bed or a chair? No   Current Diet Well Balanced Diet   Dental Exam Not up to date   Eye Exam Up to date   Exercise (times per week) 3 times per week   Current Exercises Include Walking   Do you need help using the phone?  No   Are you deaf or do you have serious difficulty hearing?  No   Do you need help to go to places out of walking distance? No   Do you need help shopping? No   Do you need help preparing meals?  No   Do you need help with housework?  No   Do you need help with laundry? No   Do you need help taking your medications? No   Do you need help managing money? No   Do you ever drive or ride in a car without wearing a seat belt? No   Have you felt unusual stress, anger or " loneliness in the last month? No   Who do you live with? Spouse   If you need help, do you have trouble finding someone available to you? No   Have you been bothered in the last four weeks by sexual problems? No   Do you have difficulty concentrating, remembering or making decisions? No           Age-appropriate Screening Schedule:  Refer to the list below for future screening recommendations based on patient's age, sex and/or medical conditions. Orders for these recommended tests are listed in the plan section. The patient has been provided with a written plan.    Health Maintenance List  Health Maintenance   Topic Date Due    TDAP/TD VACCINES (1 - Tdap) Never done    ZOSTER VACCINE (1 of 2) Never done    RSV Vaccine - Adults (1 - 1-dose 75+ series) Never done    BMI FOLLOWUP  09/14/2022    COVID-19 Vaccine (3 - 2024-25 season) 09/01/2024    ANNUAL WELLNESS VISIT  11/13/2024    HEMOGLOBIN A1C  02/20/2025    LIPID PANEL  08/20/2025    DIABETIC EYE EXAM  09/20/2025    INFLUENZA VACCINE  Discontinued    Pneumococcal Vaccine 65+  Discontinued    MAMMOGRAM  Discontinued    URINE MICROALBUMIN  Discontinued    DXA SCAN  Discontinued                                                                                                                                                CMS Preventative Services Quick Reference  Risk Factors Identified During Encounter  Fall Risk-High or Moderate: Discussed Fall Prevention in the home  Polypharmacy: Medication List reviewed    The above risks/problems have been discussed with the patient.  Pertinent information has been shared with the patient in the After Visit Summary.  An After Visit Summary and PPPS were made available to the patient.    Follow Up:   Next Medicare Wellness visit to be scheduled in 1 year.         Additional E&M Note during same encounter follows:  Patient has additional, significant, and separately identifiable condition(s)/problem(s) that require work above and  "beyond the Medicare Wellness Visit     Chief Complaint  Medicare Wellness-subsequent    Subjective   HPI  Pat is also being seen today for additional medical problem/s.    Diabetes Mellitus Type II, Follow-up:   Nancy Goodman is a 84 y.o. female who is here for follow-up of Type 2 diabetes mellitus.  Current symptoms/problems include none and have been stable. Patient is adherent with medications.  Known diabetic complications:  poor control recently  Cardiovascular risk factors: advanced age (older than 55 for men, 65 for women), diabetes mellitus, dyslipidemia, and hypertension  Current diabetic medications include  lantus and metformin .   Current monitoring regimen: home blood tests - multiple times daily  Home blood sugar records: fasting range: overall glucose levels have been good at home  Any episodes of hypoglycemia? no  Eye exam current (within one year): yes  She is on ACE inhibitor or angiotensin II receptor blocker. Patient is on a statin.       Nancy Goodman  is here for follow-up of hypertension of several years duration. She is not exercising very much and is not adherent to a low-salt diet. Patient does not check her blood pressure.   She is compliant with meds.        Nancy Goodman returns today for follow up of Hyperlipidemia  Nancy indicates her exercise level as irregularly.  Diet: unchanged  Patient is compliant with medications   Any side effects to medications:   chest pain No myalgia No memory change No  Pt is due for labs                  Objective   Vital Signs:  BP (!) 184/78   Pulse 78   Temp 97.9 °F (36.6 °C)   Resp 16   Ht 160 cm (62.99\")   Wt 72.9 kg (160 lb 12.8 oz)   SpO2 94%   BMI 28.49 kg/m²   Physical Exam              Assessment and Plan     Diagnoses and all orders for this visit:    1. Medicare annual wellness visit, subsequent (Primary)    2. Essential hypertension  -     spironolactone (Aldactone) 25 MG tablet; Take 1 tablet by mouth " Daily.  Dispense: 90 tablet; Refill: 1  -     CBC & Differential  -     Comprehensive Metabolic Panel    3. Coronary artery disease involving native coronary artery of native heart without angina pectoris  -     clopidogrel (PLAVIX) 75 MG tablet; Take 1 tablet by mouth Daily.  Dispense: 90 tablet; Refill: 1    4. Aortic stenosis, severe  -     furosemide (LASIX) 20 MG tablet; Take 1 tablet by mouth Daily As Needed (swelling).  Dispense: 90 tablet; Refill: 1    5. Type 2 diabetes mellitus without complication, with long-term current use of insulin  -     Comprehensive Metabolic Panel  -     Hemoglobin A1c  -     Lancets misc; Lancet of choice, check QD and PRN  Dispense: 300 each; Refill: 3  -     B-D UF III MINI PEN NEEDLES 31G X 5 MM misc; 1 for daily injection  Dispense: 100 each; Refill: 11  -     FREESTYLE LITE test strip; Check daily and PRN  Dispense: 300 each; Refill: 3  -     POCT microalbumin    6. Mixed hyperlipidemia  -     CBC & Differential  -     Comprehensive Metabolic Panel  -     Lipid Panel      Medicare wellness completed.  Diet and exercise discussed      Her BP continues to be higher than goal.  Will add low dose spironolactone 25 in the AM to improve control.  I asked her to call back in 2 weeks with update    Recheck DM, she feels numbers have been better at home.    Continue crestor 5mg and recheck lipids today

## 2024-11-23 LAB
ALBUMIN SERPL-MCNC: 4.5 G/DL (ref 3.7–4.7)
ALP SERPL-CCNC: 51 IU/L (ref 44–121)
ALT SERPL-CCNC: 18 IU/L (ref 0–32)
AST SERPL-CCNC: 20 IU/L (ref 0–40)
BASOPHILS # BLD AUTO: 0.1 X10E3/UL (ref 0–0.2)
BASOPHILS NFR BLD AUTO: 1 %
BILIRUB SERPL-MCNC: 0.5 MG/DL (ref 0–1.2)
BUN SERPL-MCNC: 25 MG/DL (ref 8–27)
BUN/CREAT SERPL: 23 (ref 12–28)
CALCIUM SERPL-MCNC: 10 MG/DL (ref 8.7–10.3)
CHLORIDE SERPL-SCNC: 104 MMOL/L (ref 96–106)
CHOLEST SERPL-MCNC: 181 MG/DL (ref 100–199)
CO2 SERPL-SCNC: 26 MMOL/L (ref 20–29)
CREAT SERPL-MCNC: 1.09 MG/DL (ref 0.57–1)
EGFRCR SERPLBLD CKD-EPI 2021: 50 ML/MIN/1.73
EOSINOPHIL # BLD AUTO: 0.3 X10E3/UL (ref 0–0.4)
EOSINOPHIL NFR BLD AUTO: 4 %
ERYTHROCYTE [DISTWIDTH] IN BLOOD BY AUTOMATED COUNT: 13.9 % (ref 11.7–15.4)
GLOBULIN SER CALC-MCNC: 2.6 G/DL (ref 1.5–4.5)
GLUCOSE SERPL-MCNC: 100 MG/DL (ref 70–99)
HBA1C MFR BLD: 8.1 % (ref 4.8–5.6)
HCT VFR BLD AUTO: 44.5 % (ref 34–46.6)
HDLC SERPL-MCNC: 34 MG/DL
HGB BLD-MCNC: 14.1 G/DL (ref 11.1–15.9)
IMM GRANULOCYTES # BLD AUTO: 0 X10E3/UL (ref 0–0.1)
IMM GRANULOCYTES NFR BLD AUTO: 0 %
LDLC SERPL CALC-MCNC: 94 MG/DL (ref 0–99)
LYMPHOCYTES # BLD AUTO: 2.4 X10E3/UL (ref 0.7–3.1)
LYMPHOCYTES NFR BLD AUTO: 35 %
MCH RBC QN AUTO: 31 PG (ref 26.6–33)
MCHC RBC AUTO-ENTMCNC: 31.7 G/DL (ref 31.5–35.7)
MCV RBC AUTO: 98 FL (ref 79–97)
MONOCYTES # BLD AUTO: 0.6 X10E3/UL (ref 0.1–0.9)
MONOCYTES NFR BLD AUTO: 8 %
NEUTROPHILS # BLD AUTO: 3.6 X10E3/UL (ref 1.4–7)
NEUTROPHILS NFR BLD AUTO: 52 %
PLATELET # BLD AUTO: 192 X10E3/UL (ref 150–450)
POTASSIUM SERPL-SCNC: 4.3 MMOL/L (ref 3.5–5.2)
PROT SERPL-MCNC: 7.1 G/DL (ref 6–8.5)
RBC # BLD AUTO: 4.55 X10E6/UL (ref 3.77–5.28)
SODIUM SERPL-SCNC: 143 MMOL/L (ref 134–144)
TRIGL SERPL-MCNC: 314 MG/DL (ref 0–149)
VLDLC SERPL CALC-MCNC: 53 MG/DL (ref 5–40)
WBC # BLD AUTO: 6.9 X10E3/UL (ref 3.4–10.8)

## 2024-12-03 NOTE — PROGRESS NOTES
Subjective:     Encounter Date:12/04/2024    Primary Care Physician: Eyal Cervantes MD      Patient ID: Nancy Goodman is a 84 y.o. female.    Chief Complaint:Follow-up (1 year )      PROBLEM LIST:  Coronary artery disease  5/16/17 angina, cardiac catheter severe ostial LAD and LCx of these.  Ostial RPL and RCA.  5 vessel CABG ([place]) LIMA to LAD, SVG to diagonal and LCx, and SVG to PDA and PL  7/22/2021 LHC occluded LAD with patent LIMA.  Patent VG to diagonal and circumflex.  Occluded right PDA and occluded VG to right PDA.  95% ISR, right PL branch medical management.  Aortic stenosis  Peak gradient of 27 by cath 5/17 4/19 peak gradient by echo of 66  5/13/19 TAVR #23 Bah Tobias tissue valve  6/13/19 ECHO EF 60%. Mod MR. TAVR normal. Max pressure gradient 34.8  7/2020 echo EF of 70%.  Prosthetic AV with trace regurgitation.  2/2024 echo normal LVEF.  Normal functioning TAVR valve.  Mild MR.  Hypertension  Dyslipidemia  Diabetes mellitus  GERD  Varicose veins  Arthritis  Surgeries:  Tubal ligation  Varicose vein surgery  CABG      Allergies   Allergen Reactions    Amlodipine GI Intolerance     sickness    Benazepril Nausea Only    Ciprofloxacin GI Intolerance     Pt not sure of reaction (stomach problems)    Pokeweed [Phytolacca] Swelling and Rash    Statins Nausea Only     Stomach problem         Current Outpatient Medications:     aspirin 81 MG EC tablet, Take 1 tablet by mouth Every Morning., Disp: , Rfl:     B-D UF III MINI PEN NEEDLES 31G X 5 MM misc, 1 for daily injection, Disp: 100 each, Rfl: 11    Black Elderberry (SAMBUCUS ELDERBERRY PO), Take 500 mg by mouth Daily., Disp: , Rfl:     clopidogrel (PLAVIX) 75 MG tablet, Take 1 tablet by mouth Daily., Disp: 90 tablet, Rfl: 1    coenzyme Q10 100 MG capsule, Take 1 capsule by mouth Daily., Disp: , Rfl:     Continuous Blood Gluc  (FreeStyle Mirlande 2 Dexter) device, Use 1 each Daily., Disp: 1 each, Rfl: 1    Continuous Glucose Sensor  (FreeStyle Mirlande 3 Sensor) misc, Apply 1 each topically Every 14 (Fourteen) Days., Disp: 2 each, Rfl: 11    CRANBERRY PO, Take 40 mg by mouth Daily., Disp: , Rfl:     FREESTYLE LITE test strip, Check daily and PRN, Disp: 300 each, Rfl: 3    furosemide (LASIX) 20 MG tablet, Take 1 tablet by mouth Daily As Needed (swelling)., Disp: 90 tablet, Rfl: 1    Insulin Glargine (LANTUS SOLOSTAR) 100 UNIT/ML injection pen, Inject 50 Units under the skin into the appropriate area as directed Daily., Disp: 45 mL, Rfl: 3    irbesartan (Avapro) 75 MG tablet, Take 1 tablet by mouth 2 (Two) Times a Day., Disp: 180 tablet, Rfl: 3    Lancets misc, Lancet of choice, check QD and PRN, Disp: 300 each, Rfl: 3    metFORMIN (Glucophage) 500 MG tablet, Take 1 tablet by mouth 2 (Two) Times a Day With Meals., Disp: 180 tablet, Rfl: 1    metoprolol tartrate (LOPRESSOR) 25 MG tablet, Take 0.5 tablets by mouth 2 (Two) Times a Day. (Patient taking differently: Take 0.5 tablets by mouth Every Night.), Disp: 180 tablet, Rfl: 3    Milk Thistle 1000 MG capsule, Take 1 capsule by mouth Daily., Disp: , Rfl:     NON FORMULARY, Daily. Super cayenne 100,ooo HU every day, Disp: , Rfl:     omeprazole (priLOSEC) 40 MG capsule, Take 1 capsule by mouth Every Night. Taking it as needed, Disp: 90 capsule, Rfl: 3    potassium chloride (KLOR-CON M10) 10 MEQ CR tablet, Take 1 tablet by mouth Daily., Disp: 90 tablet, Rfl: 1    potassium chloride 10 MEQ CR tablet, , Disp: , Rfl:     Probiotic Product (PROBIOTIC ADVANCED PO), Take 1 tablet by mouth Every Night., Disp: , Rfl:     rosuvastatin (Crestor) 5 MG tablet, Take 1 tablet by mouth Daily., Disp: 90 tablet, Rfl: 1    spironolactone (Aldactone) 25 MG tablet, Take 1 tablet by mouth Daily., Disp: 90 tablet, Rfl: 1        History of Present Illness    Patient returns today for annual follow-up coronary disease and TAVR.  Patient is overall doing well.  She is very active, functional class I.  Going on a cruise next  "week.  No exertional chest pain shortness of breath.  No change in chronic lower extremity edema.    The following portions of the patient's history were reviewed and updated as appropriate: allergies, current medications, past family history, past medical history, past social history, past surgical history and problem list.      Social History     Tobacco Use    Smoking status: Never    Smokeless tobacco: Never   Vaping Use    Vaping status: Never Used   Substance Use Topics    Alcohol use: No    Drug use: No         ROS       Objective:   /85   Pulse 80   Ht 158.8 cm (62.5\")   Wt 73.9 kg (163 lb)   SpO2 95%   BMI 29.34 kg/m²         Vitals reviewed.   Constitutional:       Appearance: Well-developed and not in distress.   Neck:      Thyroid: No thyromegaly.      Vascular: No carotid bruit or JVD.   Pulmonary:      Breath sounds: Normal breath sounds.   Cardiovascular:      Regular rhythm.      No gallop. No S3 and S4 gallop.   Pulses:     Intact distal pulses.      Carotid: 2+ bilaterally.     Radial: 2+ bilaterally.  Edema:     Peripheral edema present.     Ankle: bilateral 1+ edema of the ankle.  Abdominal:      General: Bowel sounds are normal.      Palpations: Abdomen is soft. There is no abdominal mass.      Tenderness: There is no abdominal tenderness.   Musculoskeletal:         General: No deformity.      Extremities: No clubbing present.Skin:     General: Skin is warm and dry.      Findings: No rash.   Neurological:      Mental Status: Alert and oriented to person, place, and time.         Procedures          Assessment:   Assessment & Plan      Diagnoses and all orders for this visit:    1. Coronary artery disease involving native coronary artery of native heart without angina pectoris (Primary)      1.  Coronary disease 7 years post CABG.  Stable no angina  2.  Aortic valve disease status post TAVR.  Normal function by echo earlier this year  3.  Hypertension, poorly controlled.  Recently " started on spironolactone the last few weeks patient notes some improvement with this.  Is following with her primary care physician.  4.  Dyslipidemia on statin, max tolerated dose  5.  Lower extremity edema due to venous insufficiency.  Stable.    Recommendations:  1.  Continue current medical therapy.  2.  Keep follow-up with primary care physician who was managing her hypertension is improved though not resolved on spironolactone.  May simply does need a higher dose.  3.  Follow-up with us annually apparent symptom change           Advance Care Planning   ACP discussion was held with the patient during this visit. Patient has an advance directive (not in EMR), copy requested.      Fidel Valdez MD    Dictated utilizing Dragon dictation

## 2024-12-04 ENCOUNTER — OFFICE VISIT (OUTPATIENT)
Dept: CARDIOLOGY | Facility: CLINIC | Age: 84
End: 2024-12-04
Payer: MEDICARE

## 2024-12-04 VITALS
WEIGHT: 163 LBS | SYSTOLIC BLOOD PRESSURE: 160 MMHG | DIASTOLIC BLOOD PRESSURE: 85 MMHG | BODY MASS INDEX: 28.88 KG/M2 | HEART RATE: 80 BPM | HEIGHT: 63 IN | OXYGEN SATURATION: 95 %

## 2024-12-04 DIAGNOSIS — I25.10 CORONARY ARTERY DISEASE INVOLVING NATIVE CORONARY ARTERY OF NATIVE HEART WITHOUT ANGINA PECTORIS: Primary | ICD-10-CM

## 2025-02-25 ENCOUNTER — OFFICE VISIT (OUTPATIENT)
Dept: FAMILY MEDICINE CLINIC | Facility: CLINIC | Age: 85
End: 2025-02-25
Payer: MEDICARE

## 2025-02-25 VITALS
OXYGEN SATURATION: 96 % | WEIGHT: 162 LBS | BODY MASS INDEX: 28.7 KG/M2 | DIASTOLIC BLOOD PRESSURE: 84 MMHG | HEIGHT: 63 IN | SYSTOLIC BLOOD PRESSURE: 154 MMHG | TEMPERATURE: 97.8 F | HEART RATE: 74 BPM

## 2025-02-25 DIAGNOSIS — E11.9 TYPE 2 DIABETES MELLITUS WITHOUT COMPLICATION, WITH LONG-TERM CURRENT USE OF INSULIN: Primary | ICD-10-CM

## 2025-02-25 DIAGNOSIS — Z79.4 TYPE 2 DIABETES MELLITUS WITHOUT COMPLICATION, WITH LONG-TERM CURRENT USE OF INSULIN: Primary | ICD-10-CM

## 2025-02-25 DIAGNOSIS — E78.2 MIXED HYPERLIPIDEMIA: ICD-10-CM

## 2025-02-25 DIAGNOSIS — I25.10 CORONARY ARTERY DISEASE INVOLVING NATIVE CORONARY ARTERY OF NATIVE HEART WITHOUT ANGINA PECTORIS: ICD-10-CM

## 2025-02-25 DIAGNOSIS — I10 ESSENTIAL HYPERTENSION: ICD-10-CM

## 2025-02-25 PROBLEM — H61.21 IMPACTED CERUMEN OF RIGHT EAR: Status: RESOLVED | Noted: 2023-06-21 | Resolved: 2025-02-25

## 2025-02-25 PROBLEM — H66.001 NON-RECURRENT ACUTE SUPPURATIVE OTITIS MEDIA OF RIGHT EAR WITHOUT SPONTANEOUS RUPTURE OF TYMPANIC MEMBRANE: Status: RESOLVED | Noted: 2023-06-21 | Resolved: 2025-02-25

## 2025-02-25 LAB
EXPIRATION DATE: NORMAL
Lab: NORMAL
POC ALBUMIN, URINE: 30 MG/L
POC CREATININE, URINE: 300 MG/DL
POC URINE ALB/CREA RATIO: <30

## 2025-02-25 PROCEDURE — 82570 ASSAY OF URINE CREATININE: CPT | Performed by: FAMILY MEDICINE

## 2025-02-25 PROCEDURE — 1126F AMNT PAIN NOTED NONE PRSNT: CPT | Performed by: FAMILY MEDICINE

## 2025-02-25 PROCEDURE — 82044 UR ALBUMIN SEMIQUANTITATIVE: CPT | Performed by: FAMILY MEDICINE

## 2025-02-25 PROCEDURE — 3079F DIAST BP 80-89 MM HG: CPT | Performed by: FAMILY MEDICINE

## 2025-02-25 PROCEDURE — 99214 OFFICE O/P EST MOD 30 MIN: CPT | Performed by: FAMILY MEDICINE

## 2025-02-25 PROCEDURE — 1160F RVW MEDS BY RX/DR IN RCRD: CPT | Performed by: FAMILY MEDICINE

## 2025-02-25 PROCEDURE — 1159F MED LIST DOCD IN RCRD: CPT | Performed by: FAMILY MEDICINE

## 2025-02-25 PROCEDURE — 3077F SYST BP >= 140 MM HG: CPT | Performed by: FAMILY MEDICINE

## 2025-02-25 RX ORDER — IRBESARTAN 75 MG/1
75 TABLET ORAL 2 TIMES DAILY
Qty: 180 TABLET | Refills: 3 | Status: SHIPPED | OUTPATIENT
Start: 2025-02-25

## 2025-02-25 RX ORDER — CLOPIDOGREL BISULFATE 75 MG/1
75 TABLET ORAL DAILY
Qty: 90 TABLET | Refills: 1 | Status: SHIPPED | OUTPATIENT
Start: 2025-02-25

## 2025-02-25 RX ORDER — ROSUVASTATIN CALCIUM 5 MG/1
5 TABLET, COATED ORAL DAILY
Qty: 90 TABLET | Refills: 1 | Status: SHIPPED | OUTPATIENT
Start: 2025-02-25

## 2025-02-25 RX ORDER — METOPROLOL TARTRATE 25 MG/1
12.5 TABLET, FILM COATED ORAL 2 TIMES DAILY
Qty: 180 TABLET | Refills: 3 | Status: SHIPPED | OUTPATIENT
Start: 2025-02-25

## 2025-02-25 RX ORDER — SPIRONOLACTONE 25 MG/1
25 TABLET ORAL DAILY
Qty: 90 TABLET | Refills: 1 | Status: SHIPPED | OUTPATIENT
Start: 2025-02-25

## 2025-02-25 NOTE — PROGRESS NOTES
Subjective   Nancy Goodman is a 84 y.o. female.     History of Present Illness     Diabetes Mellitus Type II, Follow-up:   Nancy Goodman is a 84 y.o. female who is here for follow-up of Type 2 diabetes mellitus.  Current symptoms/problems include none and have been stable. Patient is adherent with medications.  Known diabetic complications:  elevated glucose  Cardiovascular risk factors: diabetes mellitus, dyslipidemia, and hypertension  Current diabetic medications include  lantus and metformin .   Current monitoring regimen:  jazmyne  Doing ok but worse when traveling  She is on ACE inhibitor or angiotensin II receptor blocker. Patient is on a statin.       Nancy Goodman  is here for follow-up of hypertension of several years duration. She is not exercising and is not adherent to a low-salt diet. Patient does not check her blood pressure.   She is compliant with meds.        Nancy Goodman returns today for follow up of Hyperlipidemia  Nancy indicates her exercise level as irregularly.  Diet: unchanged  Patient is compliant with medications   Any side effects to medications:   chest pain No myalgia No memory change No  Pt is not due for labs      The following portions of the patient's history were reviewed and updated as appropriate: allergies, current medications, past family history, past medical history, past social history, past surgical history, and problem list.    Review of Systems   Constitutional: Negative.    Psychiatric/Behavioral: Negative.         Objective   Physical Exam  Vitals and nursing note reviewed.   Constitutional:       General: She is not in acute distress.     Appearance: Normal appearance. She is well-developed.   Cardiovascular:      Rate and Rhythm: Normal rate and regular rhythm.      Heart sounds: Normal heart sounds.       with a grade of 2/6.   Pulmonary:      Effort: Pulmonary effort is normal.      Breath sounds: Normal breath sounds.    Neurological:      Mental Status: She is alert and oriented to person, place, and time.   Psychiatric:         Mood and Affect: Mood normal.         Behavior: Behavior normal.         Thought Content: Thought content normal.         Judgment: Judgment normal.         Assessment & Plan   Diagnoses and all orders for this visit:    1. Type 2 diabetes mellitus without complication, with long-term current use of insulin (Primary)  -     Comprehensive Metabolic Panel  -     Hemoglobin A1c  -     Albumin/Creatinine Ratio Urine  -     Insulin Glargine (LANTUS SOLOSTAR) 100 UNIT/ML injection pen; Inject 50 Units under the skin into the appropriate area as directed Daily.  Dispense: 45 mL; Refill: 3  -     metFORMIN (Glucophage) 500 MG tablet; Take 1 tablet by mouth 2 (Two) Times a Day With Meals.  Dispense: 180 tablet; Refill: 1    2. Mixed hyperlipidemia  -     Comprehensive Metabolic Panel  -     Lipid Panel  -     rosuvastatin (Crestor) 5 MG tablet; Take 1 tablet by mouth Daily.  Dispense: 90 tablet; Refill: 1    3. Essential hypertension  -     CBC & Differential  -     Comprehensive Metabolic Panel  -     irbesartan (Avapro) 75 MG tablet; Take 1 tablet by mouth 2 (Two) Times a Day.  Dispense: 180 tablet; Refill: 3  -     spironolactone (Aldactone) 25 MG tablet; Take 1 tablet by mouth Daily.  Dispense: 90 tablet; Refill: 1    4. Coronary artery disease involving native coronary artery of native heart without angina pectoris  -     clopidogrel (PLAVIX) 75 MG tablet; Take 1 tablet by mouth Daily.  Dispense: 90 tablet; Refill: 1  -     metoprolol tartrate (LOPRESSOR) 25 MG tablet; Take 0.5 tablets by mouth 2 (Two) Times a Day.  Dispense: 180 tablet; Refill: 3    Will continue to work on improving DM control.  She has had issues with consistent use of her lantus and continues to work on this.  Continue metformin    Crestor refilled and will check lipids    No change in avapro and spironolactone, BP is up a little  today

## 2025-02-26 LAB
ALBUMIN SERPL-MCNC: 4.4 G/DL (ref 3.7–4.7)
ALP SERPL-CCNC: 109 IU/L (ref 44–121)
ALT SERPL-CCNC: 87 IU/L (ref 0–32)
AST SERPL-CCNC: 34 IU/L (ref 0–40)
BASOPHILS # BLD AUTO: 0.1 X10E3/UL (ref 0–0.2)
BASOPHILS NFR BLD AUTO: 1 %
BILIRUB SERPL-MCNC: 0.6 MG/DL (ref 0–1.2)
BUN SERPL-MCNC: 17 MG/DL (ref 8–27)
BUN/CREAT SERPL: 16 (ref 12–28)
CALCIUM SERPL-MCNC: 10 MG/DL (ref 8.7–10.3)
CHLORIDE SERPL-SCNC: 104 MMOL/L (ref 96–106)
CHOLEST SERPL-MCNC: 154 MG/DL (ref 100–199)
CO2 SERPL-SCNC: 22 MMOL/L (ref 20–29)
CREAT SERPL-MCNC: 1.05 MG/DL (ref 0.57–1)
EGFRCR SERPLBLD CKD-EPI 2021: 52 ML/MIN/1.73
EOSINOPHIL # BLD AUTO: 0.3 X10E3/UL (ref 0–0.4)
EOSINOPHIL NFR BLD AUTO: 4 %
ERYTHROCYTE [DISTWIDTH] IN BLOOD BY AUTOMATED COUNT: 13.7 % (ref 11.7–15.4)
GLOBULIN SER CALC-MCNC: 2.4 G/DL (ref 1.5–4.5)
GLUCOSE SERPL-MCNC: 85 MG/DL (ref 70–99)
HBA1C MFR BLD: 8.6 % (ref 4.8–5.6)
HCT VFR BLD AUTO: 42.2 % (ref 34–46.6)
HDLC SERPL-MCNC: 39 MG/DL
HGB BLD-MCNC: 13.5 G/DL (ref 11.1–15.9)
IMM GRANULOCYTES # BLD AUTO: 0 X10E3/UL (ref 0–0.1)
IMM GRANULOCYTES NFR BLD AUTO: 0 %
LDLC SERPL CALC-MCNC: 73 MG/DL (ref 0–99)
LYMPHOCYTES # BLD AUTO: 2.8 X10E3/UL (ref 0.7–3.1)
LYMPHOCYTES NFR BLD AUTO: 41 %
MCH RBC QN AUTO: 30.4 PG (ref 26.6–33)
MCHC RBC AUTO-ENTMCNC: 32 G/DL (ref 31.5–35.7)
MCV RBC AUTO: 95 FL (ref 79–97)
MONOCYTES # BLD AUTO: 0.5 X10E3/UL (ref 0.1–0.9)
MONOCYTES NFR BLD AUTO: 7 %
NEUTROPHILS # BLD AUTO: 3.1 X10E3/UL (ref 1.4–7)
NEUTROPHILS NFR BLD AUTO: 47 %
PLATELET # BLD AUTO: 188 X10E3/UL (ref 150–450)
POTASSIUM SERPL-SCNC: 4.3 MMOL/L (ref 3.5–5.2)
PROT SERPL-MCNC: 6.8 G/DL (ref 6–8.5)
RBC # BLD AUTO: 4.44 X10E6/UL (ref 3.77–5.28)
SODIUM SERPL-SCNC: 142 MMOL/L (ref 134–144)
TRIGL SERPL-MCNC: 255 MG/DL (ref 0–149)
VLDLC SERPL CALC-MCNC: 42 MG/DL (ref 5–40)
WBC # BLD AUTO: 6.8 X10E3/UL (ref 3.4–10.8)

## 2025-05-30 ENCOUNTER — OFFICE VISIT (OUTPATIENT)
Dept: FAMILY MEDICINE CLINIC | Facility: CLINIC | Age: 85
End: 2025-05-30
Payer: MEDICARE

## 2025-05-30 VITALS
BODY MASS INDEX: 27.82 KG/M2 | WEIGHT: 157 LBS | DIASTOLIC BLOOD PRESSURE: 78 MMHG | HEART RATE: 76 BPM | OXYGEN SATURATION: 96 % | HEIGHT: 63 IN | SYSTOLIC BLOOD PRESSURE: 124 MMHG | TEMPERATURE: 97.3 F

## 2025-05-30 DIAGNOSIS — I10 ESSENTIAL HYPERTENSION: ICD-10-CM

## 2025-05-30 DIAGNOSIS — E11.9 TYPE 2 DIABETES MELLITUS WITHOUT COMPLICATION, WITH LONG-TERM CURRENT USE OF INSULIN: Primary | ICD-10-CM

## 2025-05-30 DIAGNOSIS — Z79.4 TYPE 2 DIABETES MELLITUS WITHOUT COMPLICATION, WITH LONG-TERM CURRENT USE OF INSULIN: Primary | ICD-10-CM

## 2025-05-30 DIAGNOSIS — E78.2 MIXED HYPERLIPIDEMIA: ICD-10-CM

## 2025-05-30 RX ORDER — IRBESARTAN 75 MG/1
75 TABLET ORAL 2 TIMES DAILY
Qty: 180 TABLET | Refills: 3 | Status: SHIPPED | OUTPATIENT
Start: 2025-05-30

## 2025-05-30 RX ORDER — ROSUVASTATIN CALCIUM 5 MG/1
5 TABLET, COATED ORAL DAILY
Qty: 90 TABLET | Refills: 3 | Status: SHIPPED | OUTPATIENT
Start: 2025-05-30

## 2025-05-30 NOTE — PROGRESS NOTES
Subjective   Nancy Goodman is a 84 y.o. female.     History of Present Illness     Diabetes Mellitus Type II, Follow-up:   Nancy Goodman is a 84 y.o. female who is here for follow-up of Type 2 diabetes mellitus.  Current symptoms/problems include none and have been stable. Patient is adherent with medications.  Known diabetic complications: none  Cardiovascular risk factors: advanced age (older than 55 for men, 65 for women), diabetes mellitus, dyslipidemia, and hypertension  Current diabetic medications include metformin 500 BID and lantus.   Current monitoring regimen: home blood tests - multiple times daily  Home blood sugar records: have been all over the place  Any episodes of hypoglycemia? no  Eye exam current (within one year): yes  She is on ACE inhibitor or angiotensin II receptor blocker. Patient is on a statin.       Nancy Goodman  is here for follow-up of hypertension of several years duration. She is not exercising and is not adherent to a low-salt diet. Patient does not check her blood pressure.  She is compliant with meds.        Nancy Goodman returns today for follow up of Hyperlipidemia  Nancy indicates her exercise level as irregularly.  Diet: unvhanged  Patient is compliant with medications   Any side effects to medications:   chest pain No myalgia No memory change No  Pt is due for labs      The following portions of the patient's history were reviewed and updated as appropriate: allergies, current medications, past family history, past medical history, past social history, past surgical history, and problem list.    Review of Systems   Constitutional: Negative.    Respiratory: Negative.     Psychiatric/Behavioral: Negative.         Objective   Physical Exam  Vitals and nursing note reviewed.   Constitutional:       General: She is not in acute distress.     Appearance: Normal appearance. She is well-developed.   Cardiovascular:      Rate and Rhythm: Normal  rate and regular rhythm.      Heart sounds: Normal heart sounds.   Pulmonary:      Effort: Pulmonary effort is normal.      Breath sounds: Normal breath sounds.   Neurological:      Mental Status: She is alert and oriented to person, place, and time.   Psychiatric:         Mood and Affect: Mood normal.         Behavior: Behavior normal.         Thought Content: Thought content normal.         Judgment: Judgment normal.         Assessment & Plan   Diagnoses and all orders for this visit:    1. Type 2 diabetes mellitus without complication, with long-term current use of insulin (Primary)  -     CBC & Differential  -     Comprehensive Metabolic Panel  -     Hemoglobin A1c    2. Mixed hyperlipidemia  -     Lipid Panel  -     Comprehensive Metabolic Panel  -     rosuvastatin (Crestor) 5 MG tablet; Take 1 tablet by mouth Daily.  Dispense: 90 tablet; Refill: 3    3. Essential hypertension  -     CBC & Differential  -     Comprehensive Metabolic Panel  -     irbesartan (Avapro) 75 MG tablet; Take 1 tablet by mouth 2 (Two) Times a Day.  Dispense: 180 tablet; Refill: 3    Still using her jazmyne and taking lantus 55, will recheck labs  No change in her crestor and will check lipids  BP stable, continue avepro

## 2025-05-31 LAB
ALBUMIN SERPL-MCNC: 4.4 G/DL (ref 3.5–5.2)
ALBUMIN/GLOB SERPL: 1.6 G/DL
ALP SERPL-CCNC: 180 U/L (ref 39–117)
ALT SERPL-CCNC: 96 U/L (ref 1–33)
AST SERPL-CCNC: 60 U/L (ref 1–32)
BASOPHILS # BLD AUTO: 0.06 10*3/MM3 (ref 0–0.2)
BASOPHILS NFR BLD AUTO: 0.8 % (ref 0–1.5)
BILIRUB SERPL-MCNC: 0.5 MG/DL (ref 0–1.2)
BUN SERPL-MCNC: 20 MG/DL (ref 8–23)
BUN/CREAT SERPL: 16.8 (ref 7–25)
CALCIUM SERPL-MCNC: 10.8 MG/DL (ref 8.6–10.5)
CHLORIDE SERPL-SCNC: 101 MMOL/L (ref 98–107)
CHOLEST SERPL-MCNC: 164 MG/DL (ref 0–200)
CO2 SERPL-SCNC: 29.3 MMOL/L (ref 22–29)
CREAT SERPL-MCNC: 1.19 MG/DL (ref 0.57–1)
EGFRCR SERPLBLD CKD-EPI 2021: 44.9 ML/MIN/1.73
EOSINOPHIL # BLD AUTO: 0.33 10*3/MM3 (ref 0–0.4)
EOSINOPHIL NFR BLD AUTO: 4.4 % (ref 0.3–6.2)
ERYTHROCYTE [DISTWIDTH] IN BLOOD BY AUTOMATED COUNT: 12.5 % (ref 12.3–15.4)
GLOBULIN SER CALC-MCNC: 2.8 GM/DL
GLUCOSE SERPL-MCNC: 122 MG/DL (ref 65–99)
HBA1C MFR BLD: 7.7 % (ref 4.8–5.6)
HCT VFR BLD AUTO: 43.1 % (ref 34–46.6)
HDLC SERPL-MCNC: 39 MG/DL (ref 40–60)
HGB BLD-MCNC: 14.3 G/DL (ref 12–15.9)
IMM GRANULOCYTES # BLD AUTO: 0.01 10*3/MM3 (ref 0–0.05)
IMM GRANULOCYTES NFR BLD AUTO: 0.1 % (ref 0–0.5)
LDLC SERPL CALC-MCNC: 86 MG/DL (ref 0–100)
LYMPHOCYTES # BLD AUTO: 2.03 10*3/MM3 (ref 0.7–3.1)
LYMPHOCYTES NFR BLD AUTO: 27.3 % (ref 19.6–45.3)
MCH RBC QN AUTO: 31.6 PG (ref 26.6–33)
MCHC RBC AUTO-ENTMCNC: 33.2 G/DL (ref 31.5–35.7)
MCV RBC AUTO: 95.4 FL (ref 79–97)
MONOCYTES # BLD AUTO: 0.6 10*3/MM3 (ref 0.1–0.9)
MONOCYTES NFR BLD AUTO: 8.1 % (ref 5–12)
NEUTROPHILS # BLD AUTO: 4.4 10*3/MM3 (ref 1.7–7)
NEUTROPHILS NFR BLD AUTO: 59.3 % (ref 42.7–76)
NRBC BLD AUTO-RTO: 0 /100 WBC (ref 0–0.2)
PLATELET # BLD AUTO: 195 10*3/MM3 (ref 140–450)
POTASSIUM SERPL-SCNC: 5.1 MMOL/L (ref 3.5–5.2)
PROT SERPL-MCNC: 7.2 G/DL (ref 6–8.5)
RBC # BLD AUTO: 4.52 10*6/MM3 (ref 3.77–5.28)
SODIUM SERPL-SCNC: 141 MMOL/L (ref 136–145)
TRIGL SERPL-MCNC: 234 MG/DL (ref 0–150)
VLDLC SERPL CALC-MCNC: 39 MG/DL (ref 5–40)
WBC # BLD AUTO: 7.43 10*3/MM3 (ref 3.4–10.8)

## 2025-08-14 DIAGNOSIS — E11.65 TYPE 2 DIABETES MELLITUS WITH HYPERGLYCEMIA, WITH LONG-TERM CURRENT USE OF INSULIN: ICD-10-CM

## 2025-08-14 DIAGNOSIS — Z79.4 TYPE 2 DIABETES MELLITUS WITH HYPERGLYCEMIA, WITH LONG-TERM CURRENT USE OF INSULIN: ICD-10-CM

## 2025-08-14 RX ORDER — HYDROCHLOROTHIAZIDE 12.5 MG/1
CAPSULE ORAL
Qty: 2 EACH | Refills: 6 | Status: SHIPPED | OUTPATIENT
Start: 2025-08-14

## (undated) DEVICE — KT FAST STRT ATF120

## (undated) DEVICE — ENDOPOUCH RETRIEVER SPECIMEN RETRIEVAL BAGS: Brand: ENDOPOUCH RETRIEVER

## (undated) DEVICE — GW SAFARI2 PRESH XSM CRV .035IN 3.2X2.9X275CM

## (undated) DEVICE — CANN VESL DLP 1WY BLNT/TP 3MM

## (undated) DEVICE — DR ROGERS OH: Brand: MEDLINE INDUSTRIES, INC.

## (undated) DEVICE — SKIN AFFIX SURG ADHESIVE 72/CS 0.55ML: Brand: MEDLINE

## (undated) DEVICE — Device

## (undated) DEVICE — CATH DIAG IMPULSE IMT 6F 100CM

## (undated) DEVICE — SYR LUERLOK 30CC

## (undated) DEVICE — MEDI-VAC NON-CONDUCTIVE SUCTION TUBING: Brand: CARDINAL HEALTH

## (undated) DEVICE — PK CATH CARD 10

## (undated) DEVICE — GLV SURG SENSICARE PI MIC PF SZ8 LF STRL

## (undated) DEVICE — PK LAP LASR CHOLE 10

## (undated) DEVICE — ENDOPATH XCEL BLADELESS TROCARS WITH STABILITY SLEEVES: Brand: ENDOPATH XCEL

## (undated) DEVICE — SUT MONOCRYL PLS ANTIB UND 3/0  PS1 27IN

## (undated) DEVICE — SYR CONTRL LUERLOK 10CC

## (undated) DEVICE — GW J TP FIX CORE .035 150

## (undated) DEVICE — SPHINCTEROTOME: Brand: DREAMTOME™ RX 44

## (undated) DEVICE — CABL PACE ATRIAL PT BLU

## (undated) DEVICE — SINGLE-USE POLYPECTOMY SNARE: Brand: SENSATION SHORT THROW

## (undated) DEVICE — MODEL BT2000 P/N 700287-012KIT CONTENTS: MANIFOLD WITH SALINE AND CONTRAST PORTS, SALINE TUBING WITH SPIKE AND HAND SYRINGE, TRANSDUCER: Brand: BT2000 AUTOMATED MANIFOLD KIT

## (undated) DEVICE — KT ORCA ORCAPOD DISP STRL

## (undated) DEVICE — SOL NACL 0.9PCT 1000ML

## (undated) DEVICE — A2000 MULTI-USE SYRINGE KIT, P/N 701277-003KIT CONTENTS: 100ML CONTRAST RESERVOIR AND TUBING WITH CONTRAST SPIKE AND CLAMP: Brand: A2000 MULTI-USE SYRINGE KIT

## (undated) DEVICE — PRESSURE MONITORING SET: Brand: TRUWAVE, VAMP

## (undated) DEVICE — DEV LK WIREGUIDE FUSN OLYMP SCP

## (undated) DEVICE — MEDI-VAC YANKAUER SUCTION HANDLE W/BULBOUS TIP: Brand: CARDINAL HEALTH

## (undated) DEVICE — AIRWY 90MM NO9

## (undated) DEVICE — SUT MNCRYL PLS ANTIB UD 4/0 PS2 18IN

## (undated) DEVICE — TBG SXN RIGD MINI/SUCKER 9F 4.75IN

## (undated) DEVICE — BLOOD TRANSFUSION FILTER: Brand: HAEMONETICS

## (undated) DEVICE — MINI TREK CORONARY DILATATION CATHETER 2.0 MM X 15 MM / RAPID-EXCHANGE: Brand: MINI TREK

## (undated) DEVICE — PRESSURE MONITORING ACCESSORY: Brand: TRUWAVE

## (undated) DEVICE — ELECTRD BLD 1P STD SS 3/32X2.44IN

## (undated) DEVICE — PINNACLE INTRODUCER SHEATH: Brand: PINNACLE

## (undated) DEVICE — PK SPECIALTYCARE M/STATE 1 OH 1/2V CUST

## (undated) DEVICE — FLTR GAS LN OXYGENATOR 1/4IN STRL

## (undated) DEVICE — DEV COMP RAD PRELUDESYNC 24CM

## (undated) DEVICE — BOWL UTIL STRL 32OZ

## (undated) DEVICE — GUIDE CATHETER: Brand: MACH1™

## (undated) DEVICE — SUT VIC 0 UR6 27IN VCP603H

## (undated) DEVICE — CATH DIAG EXPO M/ PK 6FR FL4/FR4 PIG 3PK

## (undated) DEVICE — 2963 MEDIPORE SOFT CLOTH TAPE 3 IN X 10 YD 12 RLS/CS: Brand: 3M™ MEDIPORE™

## (undated) DEVICE — CATH4F INF PIG 145Â° MOD 110CM: Brand: INFINITI

## (undated) DEVICE — ANTIBACTERIAL UNDYED BRAIDED (POLYGLACTIN 910), SYNTHETIC ABSORBABLE SUTURE: Brand: COATED VICRYL

## (undated) DEVICE — TUBING, SUCTION, 1/4" X 10', STRAIGHT: Brand: MEDLINE

## (undated) DEVICE — ENCORE® LATEX MICRO SIZE 6.5, STERILE LATEX POWDER-FREE SURGICAL GLOVE: Brand: ENCORE

## (undated) DEVICE — BG TRANSF W/COUPLER SPK 600ML

## (undated) DEVICE — SUT SILK 2/0 TIES 18IN A185H

## (undated) DEVICE — BNDG ELAS ELITE V/CLOSE 4IN 5YD LF STRL

## (undated) DEVICE — GW INQWIRE FC PTFE STD J/1.5 .035 260

## (undated) DEVICE — SLV REPOSTNG CATH STRL 60CM

## (undated) DEVICE — SUT SILK 3/0 TIES 18IN A184H

## (undated) DEVICE — PAD LVL SENSR MOUNTING

## (undated) DEVICE — SUCTION CANISTER, 2500CC, RIGID: Brand: DEROYAL

## (undated) DEVICE — PATIENT RETURN ELECTRODE, SINGLE-USE, CONTACT QUALITY MONITORING, ADULT, WITH 9FT CORD, FOR PATIENTS WEIGING OVER 33LBS. (15KG): Brand: MEGADYNE

## (undated) DEVICE — SUT ETHIB 1 CTX CR8 18IN CX30D

## (undated) DEVICE — RX DELIVERY SYSTEM: Brand: NAVIFLEX™ RX DELIVERY SYSTEM

## (undated) DEVICE — SUT PROLN 7/0 8747H

## (undated) DEVICE — SOL IRR H2O BTL 1000ML STRL

## (undated) DEVICE — SUT PROLN 4/0 BB D/A 36IN 8581H

## (undated) DEVICE — Device: Brand: MEDEX

## (undated) DEVICE — 3M™ TEGADERM™ CHG DRESSING 25/CARTON 4 CARTONS/CASE 1658: Brand: TEGADERM™

## (undated) DEVICE — GW LUGE .014 300CM

## (undated) DEVICE — DIAMONDBACK CORONARY, CLASSIC CROWN, 1.25MM, 135CM SHAFT: Brand: DIAMONDBACK CORONARY

## (undated) DEVICE — BOWL PLSTC MD 16OZ BLU STRL

## (undated) DEVICE — 2000CC GUARDIAN II: Brand: GUARDIAN

## (undated) DEVICE — DRP SLUSH MACH

## (undated) DEVICE — CLTH CLENS READYCLEANSE PERI CARE PK/5

## (undated) DEVICE — SPNG ENDO BEDSIDE TUB ENZYM

## (undated) DEVICE — PK HEART OPN 10

## (undated) DEVICE — SUT SILK B CARDIO BB 5/0 30IN K880H BX/36

## (undated) DEVICE — DRSNG WND GZ PAD BORDERED 4X8IN STRL

## (undated) DEVICE — KT MANIFOLD CATHLAB CUST

## (undated) DEVICE — SENSR CERBRL O2 SOMASENSOR ADHS A/ LF

## (undated) DEVICE — PDS II VLT 0 107CM AG ST3: Brand: ENDOLOOP

## (undated) DEVICE — SUT ETHIB 2/0 SH SH 36IN X523H

## (undated) DEVICE — PERCLOSE PROGLIDE™ SUTURE-MEDIATED CLOSURE SYSTEM: Brand: PERCLOSE PROGLIDE™

## (undated) DEVICE — GLV SURG SENSICARE PI MIC PF SZ8.5 LF STRL

## (undated) DEVICE — THE BITE BLOCK MAXI, LATEX FREE STRAP IS USED TO PROTECT THE ENDOSCOPE INSERTION TUBE FROM BEING BITTEN BY THE PATIENT.

## (undated) DEVICE — SUT PROLN 6/0 C1 D/A 30IN 8706H

## (undated) DEVICE — LUBE GEL ENDOGLIDE 1.1OZ

## (undated) DEVICE — CANN AORT ROOT DLP VNT 14G 7F

## (undated) DEVICE — RETRIEVAL BALLOON CATHETER: Brand: EXTRACTOR™ PRO RX

## (undated) DEVICE — SUT PROLN 7/0 BV1 D/A 24IN 8702H

## (undated) DEVICE — CATH DIAG EXPO .056 FL3.5 6F 100CM

## (undated) DEVICE — WIPE THERAWASH SLV SPEC CARE 2PK

## (undated) DEVICE — SUT PROLN 7/0 BV1 24IN 4PK M8702

## (undated) DEVICE — MODEL AT P54, P/N 700608-035KIT CONTENTS: HAND CONTROLLER, 3-WAY HIGH-PRESSURE STOPCOCK WITH ROTATING END AND PREMIUM HIGH-PRESSURE TUBING: Brand: ANGIOTOUCH® KIT

## (undated) DEVICE — SUT SILK 0/0 CT2 18IN C027D

## (undated) DEVICE — ENCORE® LATEX MICRO SIZE 7, STERILE LATEX POWDER-FREE SURGICAL GLOVE: Brand: ENCORE

## (undated) DEVICE — CATH PACE PACEL BIPOL 5F110CM

## (undated) DEVICE — CATH DIAG EXPO .056 AL1 6F 100CM

## (undated) DEVICE — TRAP,MUCUS SPECIMEN,40CC: Brand: MEDLINE

## (undated) DEVICE — SYR LUERLOK 50ML

## (undated) DEVICE — SUT SILK 4/0 TIES 18IN A183H

## (undated) DEVICE — DEV INFL MONARCH 25W

## (undated) DEVICE — CONTN GRAD MEAS TRIANG 32OZ BLK

## (undated) DEVICE — SPNG GZ STRL 2S 4X4 12PLY

## (undated) DEVICE — NC TREK CORONARY DILATATION CATHETER 2.5 MM X 20 MM / RAPID-EXCHANGE: Brand: NC TREK

## (undated) DEVICE — INTRO ACCSR BLNT TP

## (undated) DEVICE — CONN REDUCING CANN/PUMP 3/8X3/8X3/8

## (undated) DEVICE — SAFETY SCALPEL: Brand: DEROYAL

## (undated) DEVICE — HEMOCONCENTRATOR CAPIOX PED SM W/TB

## (undated) DEVICE — BALN SPRINTER LEGEND RX 1.25X12MM 142CM

## (undated) DEVICE — [HIGH FLOW INSUFFLATOR,  DO NOT USE IF PACKAGE IS DAMAGED,  KEEP DRY,  KEEP AWAY FROM SUNLIGHT,  PROTECT FROM HEAT AND RADIOACTIVE SOURCES.]: Brand: PNEUMOSURE

## (undated) DEVICE — ELECTRD BLD EZ CLN STD 2.5IN

## (undated) DEVICE — ANGIO-SEAL EVOLUTION VASCULAR CLOSURE DEVICE: Brand: ANGIO-SEAL

## (undated) DEVICE — CVR HNDL LT SURG ACCSSRY BLU STRL

## (undated) DEVICE — SUCTION CANISTER, 1000CC,SAFELINER: Brand: DEROYAL

## (undated) DEVICE — VASOVIEW HEMOPRO: Brand: VASOVIEW HEMOPRO

## (undated) DEVICE — DRSNG WND BORDR/ADHS NONADHR/GZ LF 4X14IN STRL

## (undated) DEVICE — SHEET, DRAPE, SPLIT, STERILE: Brand: MEDLINE

## (undated) DEVICE — CVR HNDL LIGHT RIGID

## (undated) DEVICE — DRAPE,TOP,102X53,STERILE: Brand: MEDLINE

## (undated) DEVICE — FINECROSS MG CORONARY MICRO-GUIDE CATHETER: Brand: FINECROSS

## (undated) DEVICE — GLIDESHEATH SLENDER STAINLESS STEEL KIT: Brand: GLIDESHEATH SLENDER

## (undated) DEVICE — BNDG ELAS ELITE V/CLOSE 6IN 5YD LF STRL

## (undated) DEVICE — GW CERBRL FC PTFE STD/STR .035 260CM

## (undated) DEVICE — OASIS DRAIN, SINGLE, INLINE & ATS COMPATIBLE: Brand: OASIS

## (undated) DEVICE — SUT SILK 2/0 CT1 CR8 18IN C022D

## (undated) DEVICE — ENDOCUT SCISSOR TIP, DISPOSABLE: Brand: RENEW

## (undated) DEVICE — CONNECTOR PH 6-IN-1 Y ST: Brand: CARDINAL HEALTH

## (undated) DEVICE — MODEL AT P65, P/N 701554-001KIT CONTENTS: HAND CONTROLLER, 3-WAY HIGH-PRESSURE STOPCOCK WITH ROTATING END AND PREMIUM HIGH-PRESSURE TUBING: Brand: ANGIOTOUCH® KIT

## (undated) DEVICE — DRSNG SURESITE WNDW 4X4.5

## (undated) DEVICE — CANNULATING SPHINCTEROTOME: Brand: JAGTOME™ REVOLUTION RX

## (undated) DEVICE — SUT SILK 2 SUTUPAK TIE 60IN SA8H 2STRAND

## (undated) DEVICE — GW LUGE .014 182 CM

## (undated) DEVICE — GUIDEWIRE, VIPERWIRE ADAVNCE CORONARY FLOPPY, .012: DIA, .014" SPRING TIP, 1 PACK 325 CM: Brand: DIAMONDBACK CORONARY

## (undated) DEVICE — INTRAOPERATIVE COVER KIT, 10 PACK: Brand: SITE-RITE

## (undated) DEVICE — SOL NS 500ML

## (undated) DEVICE — 30977 SEE SHARP - ENHANCED INTRAOPERATIVE LAPAROSCOPE CLEANING & DEFOGGING: Brand: 30977 SEE SHARP - ENHANCED INTRAOPERATIVE LAPAROSCOPE CLEANING & DEFOGGING

## (undated) DEVICE — HYBRID CO2 TUBING/CAP SET FOR OLYMPUS® SCOPES & CO2 SOURCE: Brand: ERBE

## (undated) DEVICE — ENDOPATH XCEL UNIVERSAL TROCAR STABLILITY SLEEVES: Brand: ENDOPATH XCEL